# Patient Record
Sex: MALE | Race: WHITE | URBAN - METROPOLITAN AREA
[De-identification: names, ages, dates, MRNs, and addresses within clinical notes are randomized per-mention and may not be internally consistent; named-entity substitution may affect disease eponyms.]

---

## 2017-06-15 ENCOUNTER — OUTPATIENT (OUTPATIENT)
Dept: OUTPATIENT SERVICES | Facility: HOSPITAL | Age: 67
LOS: 1 days | Discharge: HOME | End: 2017-06-15

## 2017-06-15 DIAGNOSIS — I25.10 ATHEROSCLEROTIC HEART DISEASE OF NATIVE CORONARY ARTERY WITHOUT ANGINA PECTORIS: ICD-10-CM

## 2017-06-15 DIAGNOSIS — I10 ESSENTIAL (PRIMARY) HYPERTENSION: ICD-10-CM

## 2017-06-15 DIAGNOSIS — E11.9 TYPE 2 DIABETES MELLITUS WITHOUT COMPLICATIONS: ICD-10-CM

## 2017-06-15 DIAGNOSIS — N20.0 CALCULUS OF KIDNEY: ICD-10-CM

## 2017-06-28 DIAGNOSIS — E11.9 TYPE 2 DIABETES MELLITUS WITHOUT COMPLICATIONS: ICD-10-CM

## 2018-01-26 ENCOUNTER — OUTPATIENT (OUTPATIENT)
Dept: OUTPATIENT SERVICES | Facility: HOSPITAL | Age: 68
LOS: 1 days | Discharge: HOME | End: 2018-01-26

## 2018-01-26 DIAGNOSIS — R07.9 CHEST PAIN, UNSPECIFIED: ICD-10-CM

## 2018-02-04 DIAGNOSIS — I25.10 ATHEROSCLEROTIC HEART DISEASE OF NATIVE CORONARY ARTERY WITHOUT ANGINA PECTORIS: ICD-10-CM

## 2018-02-04 DIAGNOSIS — I10 ESSENTIAL (PRIMARY) HYPERTENSION: ICD-10-CM

## 2018-02-04 DIAGNOSIS — E11.9 TYPE 2 DIABETES MELLITUS WITHOUT COMPLICATIONS: ICD-10-CM

## 2018-02-04 DIAGNOSIS — N20.0 CALCULUS OF KIDNEY: ICD-10-CM

## 2018-03-31 ENCOUNTER — INPATIENT (INPATIENT)
Facility: HOSPITAL | Age: 68
LOS: 0 days | Discharge: HOME | End: 2018-04-01
Attending: SPECIALIST | Admitting: INTERNAL MEDICINE

## 2018-03-31 VITALS
RESPIRATION RATE: 18 BRPM | OXYGEN SATURATION: 96 % | DIASTOLIC BLOOD PRESSURE: 112 MMHG | SYSTOLIC BLOOD PRESSURE: 237 MMHG | HEART RATE: 66 BPM | TEMPERATURE: 96 F

## 2018-03-31 DIAGNOSIS — Z98.890 OTHER SPECIFIED POSTPROCEDURAL STATES: Chronic | ICD-10-CM

## 2018-03-31 DIAGNOSIS — N20.0 CALCULUS OF KIDNEY: ICD-10-CM

## 2018-03-31 LAB
ALBUMIN SERPL ELPH-MCNC: 4.2 G/DL — SIGNIFICANT CHANGE UP (ref 3.5–5.2)
ALP SERPL-CCNC: 54 U/L — SIGNIFICANT CHANGE UP (ref 30–115)
ALT FLD-CCNC: 14 U/L — SIGNIFICANT CHANGE UP (ref 0–41)
ANION GAP SERPL CALC-SCNC: 18 MMOL/L — HIGH (ref 7–14)
APPEARANCE UR: CLEAR — SIGNIFICANT CHANGE UP
APTT BLD: 26.2 SEC — LOW (ref 27–39.2)
AST SERPL-CCNC: 20 U/L — SIGNIFICANT CHANGE UP (ref 0–41)
BILIRUB SERPL-MCNC: 1.3 MG/DL — HIGH (ref 0.2–1.2)
BILIRUB UR-MCNC: NEGATIVE — SIGNIFICANT CHANGE UP
BLD GP AB SCN SERPL QL: SIGNIFICANT CHANGE UP
BUN SERPL-MCNC: 17 MG/DL — SIGNIFICANT CHANGE UP (ref 10–20)
CALCIUM SERPL-MCNC: 8.4 MG/DL — LOW (ref 8.5–10.1)
CHLORIDE SERPL-SCNC: 90 MMOL/L — LOW (ref 98–110)
CK MB CFR SERPL CALC: 2.5 NG/ML — SIGNIFICANT CHANGE UP (ref 0.6–6.3)
CO2 SERPL-SCNC: 23 MMOL/L — SIGNIFICANT CHANGE UP (ref 17–32)
COLOR SPEC: YELLOW — SIGNIFICANT CHANGE UP
CREAT SERPL-MCNC: 0.8 MG/DL — SIGNIFICANT CHANGE UP (ref 0.7–1.5)
DIFF PNL FLD: (no result)
GLUCOSE SERPL-MCNC: 218 MG/DL — HIGH (ref 70–99)
GLUCOSE UR QL: 250 MG/DL
HCT VFR BLD CALC: 44.4 % — SIGNIFICANT CHANGE UP (ref 42–52)
HGB BLD-MCNC: 14.8 G/DL — SIGNIFICANT CHANGE UP (ref 14–18)
INR BLD: 1.59 RATIO — HIGH (ref 0.65–1.3)
KETONES UR-MCNC: NEGATIVE — SIGNIFICANT CHANGE UP
LEUKOCYTE ESTERASE UR-ACNC: NEGATIVE — SIGNIFICANT CHANGE UP
LIDOCAIN IGE QN: 37 U/L — SIGNIFICANT CHANGE UP (ref 7–60)
MCHC RBC-ENTMCNC: 30 PG — SIGNIFICANT CHANGE UP (ref 27–31)
MCHC RBC-ENTMCNC: 33.3 G/DL — SIGNIFICANT CHANGE UP (ref 32–37)
MCV RBC AUTO: 89.9 FL — SIGNIFICANT CHANGE UP (ref 80–94)
NITRITE UR-MCNC: NEGATIVE — SIGNIFICANT CHANGE UP
NRBC # BLD: 0 /100 WBCS — SIGNIFICANT CHANGE UP (ref 0–0)
PH UR: 6 — SIGNIFICANT CHANGE UP (ref 5–8)
PLATELET # BLD AUTO: 182 K/UL — SIGNIFICANT CHANGE UP (ref 130–400)
POTASSIUM SERPL-MCNC: 4.4 MMOL/L — SIGNIFICANT CHANGE UP (ref 3.5–5)
POTASSIUM SERPL-SCNC: 4.4 MMOL/L — SIGNIFICANT CHANGE UP (ref 3.5–5)
PROT SERPL-MCNC: 6.6 G/DL — SIGNIFICANT CHANGE UP (ref 6–8)
PROT UR-MCNC: 100 MG/DL
PROTHROM AB SERPL-ACNC: 17.3 SEC — HIGH (ref 9.95–12.87)
RBC # BLD: 4.94 M/UL — SIGNIFICANT CHANGE UP (ref 4.7–6.1)
RBC # FLD: 12.5 % — SIGNIFICANT CHANGE UP (ref 11.5–14.5)
RBC CASTS # UR COMP ASSIST: (no result) /HPF
SODIUM SERPL-SCNC: 131 MMOL/L — LOW (ref 135–146)
SP GR SPEC: 1.02 — SIGNIFICANT CHANGE UP (ref 1.01–1.03)
TROPONIN T SERPL-MCNC: <0.01 NG/ML — SIGNIFICANT CHANGE UP
TYPE + AB SCN PNL BLD: SIGNIFICANT CHANGE UP
UROBILINOGEN FLD QL: 0.2 MG/DL — SIGNIFICANT CHANGE UP (ref 0.2–0.2)
WBC # BLD: 15.2 K/UL — HIGH (ref 4.8–10.8)
WBC # FLD AUTO: 15.2 K/UL — HIGH (ref 4.8–10.8)

## 2018-03-31 RX ORDER — CIPROFLOXACIN LACTATE 400MG/40ML
400 VIAL (ML) INTRAVENOUS EVERY 12 HOURS
Qty: 0 | Refills: 0 | Status: DISCONTINUED | OUTPATIENT
Start: 2018-03-31 | End: 2018-04-01

## 2018-03-31 RX ORDER — SIMVASTATIN 20 MG/1
40 TABLET, FILM COATED ORAL AT BEDTIME
Qty: 0 | Refills: 0 | Status: DISCONTINUED | OUTPATIENT
Start: 2018-03-31 | End: 2018-04-01

## 2018-03-31 RX ORDER — ONDANSETRON 8 MG/1
4 TABLET, FILM COATED ORAL ONCE
Qty: 0 | Refills: 0 | Status: DISCONTINUED | OUTPATIENT
Start: 2018-03-31 | End: 2018-03-31

## 2018-03-31 RX ORDER — SODIUM CHLORIDE 9 MG/ML
1000 INJECTION INTRAMUSCULAR; INTRAVENOUS; SUBCUTANEOUS ONCE
Qty: 0 | Refills: 0 | Status: DISCONTINUED | OUTPATIENT
Start: 2018-03-31 | End: 2018-03-31

## 2018-03-31 RX ORDER — LOSARTAN POTASSIUM 100 MG/1
50 TABLET, FILM COATED ORAL DAILY
Qty: 0 | Refills: 0 | Status: DISCONTINUED | OUTPATIENT
Start: 2018-03-31 | End: 2018-04-01

## 2018-03-31 RX ORDER — DOCUSATE SODIUM 100 MG
100 CAPSULE ORAL THREE TIMES A DAY
Qty: 0 | Refills: 0 | Status: DISCONTINUED | OUTPATIENT
Start: 2018-03-31 | End: 2018-03-31

## 2018-03-31 RX ORDER — METOPROLOL TARTRATE 50 MG
25 TABLET ORAL
Qty: 0 | Refills: 0 | Status: DISCONTINUED | OUTPATIENT
Start: 2018-03-31 | End: 2018-04-01

## 2018-03-31 RX ORDER — ASPIRIN/CALCIUM CARB/MAGNESIUM 324 MG
81 TABLET ORAL DAILY
Qty: 0 | Refills: 0 | Status: DISCONTINUED | OUTPATIENT
Start: 2018-03-31 | End: 2018-04-01

## 2018-03-31 RX ORDER — MORPHINE SULFATE 50 MG/1
8 CAPSULE, EXTENDED RELEASE ORAL ONCE
Qty: 0 | Refills: 0 | Status: DISCONTINUED | OUTPATIENT
Start: 2018-03-31 | End: 2018-03-31

## 2018-03-31 RX ORDER — MEPERIDINE HYDROCHLORIDE 50 MG/ML
12.5 INJECTION INTRAMUSCULAR; INTRAVENOUS; SUBCUTANEOUS
Qty: 0 | Refills: 0 | Status: DISCONTINUED | OUTPATIENT
Start: 2018-03-31 | End: 2018-03-31

## 2018-03-31 RX ORDER — KETOROLAC TROMETHAMINE 30 MG/ML
30 SYRINGE (ML) INJECTION ONCE
Qty: 0 | Refills: 0 | Status: DISCONTINUED | OUTPATIENT
Start: 2018-03-31 | End: 2018-03-31

## 2018-03-31 RX ORDER — LABETALOL HCL 100 MG
5 TABLET ORAL
Qty: 0 | Refills: 0 | Status: COMPLETED | OUTPATIENT
Start: 2018-03-31 | End: 2018-03-31

## 2018-03-31 RX ORDER — OXYCODONE AND ACETAMINOPHEN 5; 325 MG/1; MG/1
2 TABLET ORAL ONCE
Qty: 0 | Refills: 0 | Status: DISCONTINUED | OUTPATIENT
Start: 2018-03-31 | End: 2018-03-31

## 2018-03-31 RX ORDER — MORPHINE SULFATE 50 MG/1
2 CAPSULE, EXTENDED RELEASE ORAL
Qty: 0 | Refills: 0 | Status: DISCONTINUED | OUTPATIENT
Start: 2018-03-31 | End: 2018-03-31

## 2018-03-31 RX ORDER — ONDANSETRON 8 MG/1
4 TABLET, FILM COATED ORAL ONCE
Qty: 0 | Refills: 0 | Status: COMPLETED | OUTPATIENT
Start: 2018-03-31 | End: 2018-03-31

## 2018-03-31 RX ORDER — TAMSULOSIN HYDROCHLORIDE 0.4 MG/1
0.4 CAPSULE ORAL ONCE
Qty: 0 | Refills: 0 | Status: COMPLETED | OUTPATIENT
Start: 2018-03-31 | End: 2018-03-31

## 2018-03-31 RX ORDER — OXYCODONE AND ACETAMINOPHEN 5; 325 MG/1; MG/1
1 TABLET ORAL EVERY 6 HOURS
Qty: 0 | Refills: 0 | Status: DISCONTINUED | OUTPATIENT
Start: 2018-03-31 | End: 2018-04-01

## 2018-03-31 RX ORDER — SODIUM CHLORIDE 9 MG/ML
1000 INJECTION INTRAMUSCULAR; INTRAVENOUS; SUBCUTANEOUS ONCE
Qty: 0 | Refills: 0 | Status: COMPLETED | OUTPATIENT
Start: 2018-03-31 | End: 2018-03-31

## 2018-03-31 RX ORDER — SODIUM CHLORIDE 9 MG/ML
1000 INJECTION, SOLUTION INTRAVENOUS
Qty: 0 | Refills: 0 | Status: DISCONTINUED | OUTPATIENT
Start: 2018-03-31 | End: 2018-03-31

## 2018-03-31 RX ORDER — ASPIRIN/CALCIUM CARB/MAGNESIUM 324 MG
81 TABLET ORAL
Qty: 0 | Refills: 0 | COMMUNITY

## 2018-03-31 RX ORDER — MORPHINE SULFATE 50 MG/1
4 CAPSULE, EXTENDED RELEASE ORAL ONCE
Qty: 0 | Refills: 0 | Status: DISCONTINUED | OUTPATIENT
Start: 2018-03-31 | End: 2018-03-31

## 2018-03-31 RX ORDER — ONDANSETRON 8 MG/1
4 TABLET, FILM COATED ORAL EVERY 6 HOURS
Qty: 0 | Refills: 0 | Status: DISCONTINUED | OUTPATIENT
Start: 2018-03-31 | End: 2018-03-31

## 2018-03-31 RX ORDER — SODIUM CHLORIDE 9 MG/ML
1000 INJECTION INTRAMUSCULAR; INTRAVENOUS; SUBCUTANEOUS
Qty: 0 | Refills: 0 | Status: DISCONTINUED | OUTPATIENT
Start: 2018-03-31 | End: 2018-03-31

## 2018-03-31 RX ORDER — ONDANSETRON 8 MG/1
4 TABLET, FILM COATED ORAL EVERY 6 HOURS
Qty: 0 | Refills: 0 | Status: DISCONTINUED | OUTPATIENT
Start: 2018-03-31 | End: 2018-04-01

## 2018-03-31 RX ORDER — SENNA PLUS 8.6 MG/1
2 TABLET ORAL AT BEDTIME
Qty: 0 | Refills: 0 | Status: DISCONTINUED | OUTPATIENT
Start: 2018-03-31 | End: 2018-03-31

## 2018-03-31 RX ORDER — METFORMIN HYDROCHLORIDE 850 MG/1
1 TABLET ORAL
Qty: 0 | Refills: 0 | COMMUNITY

## 2018-03-31 RX ORDER — OXYCODONE AND ACETAMINOPHEN 5; 325 MG/1; MG/1
1 TABLET ORAL ONCE
Qty: 0 | Refills: 0 | Status: DISCONTINUED | OUTPATIENT
Start: 2018-03-31 | End: 2018-03-31

## 2018-03-31 RX ORDER — SODIUM CHLORIDE 9 MG/ML
1000 INJECTION, SOLUTION INTRAVENOUS
Qty: 0 | Refills: 0 | Status: DISCONTINUED | OUTPATIENT
Start: 2018-03-31 | End: 2018-04-01

## 2018-03-31 RX ORDER — MORPHINE SULFATE 50 MG/1
4 CAPSULE, EXTENDED RELEASE ORAL EVERY 4 HOURS
Qty: 0 | Refills: 0 | Status: DISCONTINUED | OUTPATIENT
Start: 2018-03-31 | End: 2018-04-01

## 2018-03-31 RX ADMIN — SODIUM CHLORIDE 75 MILLILITER(S): 9 INJECTION, SOLUTION INTRAVENOUS at 22:46

## 2018-03-31 RX ADMIN — MORPHINE SULFATE 4 MILLIGRAM(S): 50 CAPSULE, EXTENDED RELEASE ORAL at 12:21

## 2018-03-31 RX ADMIN — SODIUM CHLORIDE 1000 MILLILITER(S): 9 INJECTION INTRAMUSCULAR; INTRAVENOUS; SUBCUTANEOUS at 12:21

## 2018-03-31 RX ADMIN — SODIUM CHLORIDE 100 MILLILITER(S): 9 INJECTION, SOLUTION INTRAVENOUS at 21:09

## 2018-03-31 RX ADMIN — MORPHINE SULFATE 8 MILLIGRAM(S): 50 CAPSULE, EXTENDED RELEASE ORAL at 13:22

## 2018-03-31 RX ADMIN — TAMSULOSIN HYDROCHLORIDE 0.4 MILLIGRAM(S): 0.4 CAPSULE ORAL at 16:17

## 2018-03-31 RX ADMIN — MORPHINE SULFATE 8 MILLIGRAM(S): 50 CAPSULE, EXTENDED RELEASE ORAL at 16:17

## 2018-03-31 RX ADMIN — ONDANSETRON 4 MILLIGRAM(S): 8 TABLET, FILM COATED ORAL at 13:22

## 2018-03-31 RX ADMIN — Medication 5 MILLIGRAM(S): at 20:51

## 2018-03-31 NOTE — PRE-ANESTHESIA EVALUATION ADULT - NSANTHPMHFT_GEN_ALL_CORE
History of prior MI with CABG.  No current chest pain, SOB.  Able to perform greater than 4 mets of activity

## 2018-03-31 NOTE — H&P ADULT - ASSESSMENT
67 y.o M with Left flank pain x 1 day , CT A/P findings of 7.2 mm left ureteral stone at the junction of mid to distal segment, causing moderate left hydronephrosis.   additional B/L non-obstructing nephrolithiasis.         Admit to Dr. Siddiqi service x OR procedure : cystoscopy, left ureteroscopy, laser lithotripsy possible left ureteral stent placement.  IVF  Analgesia prn x pain  Cont. Flomax  Keep NPO  Patient don't remember medications/doses he is taking daily, awaiting his wife to bring all medications.

## 2018-03-31 NOTE — H&P ADULT - HISTORY OF PRESENT ILLNESS
67 y.o M with PMH of HTN, HLD, DM type 2, MI s/p CABG (6 years ago), kidney stones ( 5 years ago) c/o left flank pain 10/10 since 6 AM today associated with +N/V, chills, dribbling, urgency. Pt. denies fever, hematuria, dysuria, hesitancy. Urology called to evaluate Pt. for CT A/P findings of 7.2 mm left ureteral stone at the junction of mild to distal segment, causing moderate left hydronephrosis. with additional non obstructing nephrolithiasis.

## 2018-03-31 NOTE — ED PROVIDER NOTE - PHYSICAL EXAMINATION
On exam: Pt sitting comfortably on stretcher in NAD. No rash. Conjunctivae and sclera clear. dry mucus membranes. RRR, radial pulses 2/4 b/l. Breath sounds present b/l, CTABL, no wheezing or crackles, no tachypnea, no accessory muscle use, good resp effort and excursion, good air exchange, bs present throughout all 4 quadrants. Abd soft, nd, nt, no rebound or guarding, mild left CVAT. (-) murphys, (-) rovsing (-) obturator (-) psoas. FROM of ext, no edema, no calf pain/swelling/erythema. AAOx3, motor 5/5 and sensation intact throughout upper and lower ext. On exam: Pt sitting comfortably on stretcher in NAD. No rash. Conjunctivae and sclera clear. dry mucus membranes. RRR, radial pulses 2/4 b/l. Breath sounds present b/l, CTABL, no wheezing or crackles, no tachypnea, no accessory muscle use, good resp effort and excursion, good air exchange, bs present throughout all 4 quadrants. Abd soft, nd, nt, no rebound or guarding, mild left CVAT. (-) murphys, (-) rovsing (-) obturator (-) psoas. FROM of ext, no edema, no calf pain/swelling/erythema. AAOx3, motor 5/5 and sensation intact throughout upper and lower ext. No focal deficits.

## 2018-03-31 NOTE — H&P ADULT - NSHPPHYSICALEXAM_GEN_ALL_CORE
General: WN/WD in moderate distress due to flank pain  Neuro: A&O x 3, EOMI  HEENT: NC/ AT, PERRLA, EOMI  Abd: Obese, mildly distended, NT to palpation. + BS in all 4 quadrants. + Left CVAT  Extremities: MALAGON x 4, ambulates    Vital Signs Last 24 Hrs  T(C): 35.7 (31 Mar 2018 15:59), Max: 35.7 (31 Mar 2018 10:53)  T(F): 96.3 (31 Mar 2018 15:59), Max: 96.3 (31 Mar 2018 15:59)  HR: 65 (31 Mar 2018 15:59) (65 - 66)  BP: 230/115 (31 Mar 2018 15:59) (230/115 - 237/112)  RR: 17 (31 Mar 2018 15:59) (17 - 18)  SpO2: 100% (31 Mar 2018 15:59) (96% - 100%)

## 2018-03-31 NOTE — ED PROVIDER NOTE - PROGRESS NOTE DETAILS
Plan: EKG, CXR, pain control, labs, IVF, urine, CT, and reassess. urology p.a. aware of pt and ct findings of obstucting L side stone and additional findings as noted, pt alsoa ware, report with attending Dr. To will come see pt.

## 2018-03-31 NOTE — CHART NOTE - NSCHARTNOTEFT_GEN_A_CORE
PACU ANESTHESIA PACU ADMISSION NOTE      Procedure:Ureteroscopy of left ureter with use of laser    Post op diagnosisUreteral calculus, left      ____ Intubated  TV:______       Rate: ______      FiO2: ______    ___x_ Patent Airway    ___x_ Full return of protective reflexes    ____ Full recovery from anesthesia / sedation to baseline status    Viitals:  T(C): 35.7 (03-31-18 @ 17:41), Max: 35.7 (03-31-18 @ 10:53)  HR: 65 (03-31-18 @ 17:41) (65 - 66)  BP: 230/115 (03-31-18 @ 17:41) (230/115 - 237/112)  RR: 17 (03-31-18 @ 17:41) (17 - 18)  SpO2: 100% (03-31-18 @ 17:41) (96% - 100%)      see anesthesia record      Mental Status:  __x__ Awake   _____ Alert   _____ Drowsy   _____ Sedated    Nausea/Vomiting: ____ Yes, See Post - Op Orders      ___x_ No    Pain Scale (0-10): __0___    Treatment: ____ None    ____ See Post - Op/PCA Orders    Post - Operative Fluids:   ____ Oral   _x___ See Post - Op Orders    Plan:         Discharge:   ____Home       __x___Floor         _____Critical Care    _____Other:_________________    Comments:

## 2018-03-31 NOTE — ED PROVIDER NOTE - OBJECTIVE STATEMENT
68 y/o male with an pmhx of kidney stones, DM, HTN, high cholesterol, presents to the ED c/o left flank pain upon waking up this morning. Pt states he drank water, but has only been able to urinate a small amount. Pt doesn’t have an urologist. Notes last kidney stone attack was 5 years ago. Pt states he has nausea and vomiting.

## 2018-03-31 NOTE — PROGRESS NOTE ADULT - PROBLEM SELECTOR PLAN 1
Treatment options were discussed with the patient.  He wants to proceed with cystoscopy left ureteroscopy laser lithotripsy and stent placement.  Side effects were discussed including but not limited to bleeding, infection, etc.  He understands to f/up with me in 1 week for possible stent removal.

## 2018-03-31 NOTE — H&P ADULT - PMH
High cholesterol    Hypertension, unspecified type    Kidney stones    MI (myocardial infarction)    Type 2 diabetes mellitus with complication, unspecified long term insulin use status

## 2018-03-31 NOTE — ED PROVIDER NOTE - NS_ ATTENDINGSCRIBEDETAILS _ED_A_ED_FT
I have personally performed a history and physical exam on this patient and personally directed the management of the patient.  I have seen this patient with a scribe. Please see documentation of my history and physical examination and plan. I agree with scribe documentation except as indicated in my notes.

## 2018-03-31 NOTE — H&P ADULT - NSHPLABSRESULTS_GEN_ALL_CORE
14.8   15.20 )-----------( 182      ( 31 Mar 2018 12:27 )             44.4     03-31    131<L>  |  90<L>  |  17  ----------------------------<  218<H>  4.4   |  23  |  0.8    Ca    8.4<L>      31 Mar 2018 12:27    TPro  6.6  /  Alb  4.2  /  TBili  1.3<H>  /  DBili  x   /  AST  20  /  ALT  14  /  AlkPhos  54  03-31      PT/INR - ( 31 Mar 2018 12:27 )   PT: 17.30 sec;   INR: 1.59 ratio         PTT - ( 31 Mar 2018 12:27 )  PTT:26.2 sec    < from: CT Abdomen and Pelvis w/ IV Cont (03.31.18 @ 15:00) >      EXAM:  CT ABDOMEN AND PELVIS IC            PROCEDURE DATE:  03/31/2018            INTERPRETATION:  CLINICAL STATEMENT: Abdominal pain      TECHNIQUE: Contiguous axial CT images were obtained from the lower chest   to the pubic symphysis following administration of 100cc Optiray 320   intravenous contrast.  Oral contrast was not administered.  Reformatted   images in the coronal and sagittal planes were acquired.    COMPARISON CT: 3/9/2015    OTHER STUDIES USED FOR CORRELATION: None.       FINDINGS:    LOWER CHEST: Cardiomegaly. Status post median sternotomy. Coronary   atherosclerosis. Bibasilar atelectasis.    HEPATOBILIARY: Hypoattenuating hepatic lesion, measuring 2 mm, image   #37/3, too small to characterize. Cholelithiasis.    SPLEEN:Unremarkable.    PANCREAS: Unremarkable.    ADRENAL GLANDS: Unremarkable.    KIDNEYS: Significant left perinephric stranding. There is moderate left   hydronephrosis secondary to obstructing left ureter calculus which   measures 7.2 mm, image #270/4 with a Hounsfield units of 700. Bilateral   nephrolithiasis is seen. On the right the  calculus measure approximately   4 to 5 mm. On the left with calculus measure 6 to 7 mm.    ABDOMINOPELVIC NODES: Unremarkable.    PELVIC ORGANS: Prominent prostate..    PERITONEUM/MESENTERY/BOWEL: No acute bowel findings..    BONES/SOFT TISSUES: No evidence of from acute osseous abnormalities..    OTHER: Vascular calcifications without any evidence of aneurysm.   Fat-containing left internal hernia.      IMPRESSION:     7.2 mm left ureteral stone at the junction of mid to distal segment,   causing moderate left hydronephrosis.  Additional bilateral nonobstructing nephrolithiasis.    < end of copied text >

## 2018-03-31 NOTE — BRIEF OPERATIVE NOTE - PRE-OP DX
Renal colic on left side  03/31/2018    Active  Donal Siddiqi  Ureteral calculus, left  03/31/2018    Active  Donal Siddiqi

## 2018-03-31 NOTE — ED ADULT NURSE NOTE - PMH
High cholesterol    Hypertension, unspecified type    Kidney stones    Type 2 diabetes mellitus with complication, unspecified long term insulin use status

## 2018-03-31 NOTE — BRIEF OPERATIVE NOTE - PROCEDURE
<<-----Click on this checkbox to enter Procedure Ureteroscopy of left ureter with use of laser  03/31/2018    Active  [email or web address]

## 2018-03-31 NOTE — ED PROVIDER NOTE - NS ED ROS FT
Denies fevers/chills, cough, SOB, chest pain, pleuritic CP, abdominal pain, diarrhea, constipation, melena/ BRBPR, urinary SX, skin rashes, recent travel or sick contacts. Denies fevers/chills, cough, SOB, chest pain, pleuritic CP, palpitations, diaphoresis, abdominal pain, diarrhea, constipation, melena/ BRBPR, hematuria, urgency, burning upon urination, testicular pain/swelling/erythema, trauma, skin rashes, recent travel or sick contacts.

## 2018-04-01 ENCOUNTER — TRANSCRIPTION ENCOUNTER (OUTPATIENT)
Age: 68
End: 2018-04-01

## 2018-04-01 VITALS
DIASTOLIC BLOOD PRESSURE: 75 MMHG | SYSTOLIC BLOOD PRESSURE: 133 MMHG | HEART RATE: 80 BPM | RESPIRATION RATE: 18 BRPM | TEMPERATURE: 96 F

## 2018-04-01 LAB
ALBUMIN SERPL ELPH-MCNC: 3.4 G/DL — LOW (ref 3.5–5.2)
ALP SERPL-CCNC: 48 U/L — SIGNIFICANT CHANGE UP (ref 30–115)
ALT FLD-CCNC: 11 U/L — SIGNIFICANT CHANGE UP (ref 0–41)
ANION GAP SERPL CALC-SCNC: 17 MMOL/L — HIGH (ref 7–14)
AST SERPL-CCNC: 19 U/L — SIGNIFICANT CHANGE UP (ref 0–41)
BILIRUB SERPL-MCNC: 1 MG/DL — SIGNIFICANT CHANGE UP (ref 0.2–1.2)
BUN SERPL-MCNC: 12 MG/DL — SIGNIFICANT CHANGE UP (ref 10–20)
CALCIUM SERPL-MCNC: 7.9 MG/DL — LOW (ref 8.5–10.1)
CHLORIDE SERPL-SCNC: 99 MMOL/L — SIGNIFICANT CHANGE UP (ref 98–110)
CO2 SERPL-SCNC: 24 MMOL/L — SIGNIFICANT CHANGE UP (ref 17–32)
CREAT SERPL-MCNC: 0.8 MG/DL — SIGNIFICANT CHANGE UP (ref 0.7–1.5)
CULTURE RESULTS: SIGNIFICANT CHANGE UP
GLUCOSE SERPL-MCNC: 190 MG/DL — HIGH (ref 70–99)
POTASSIUM SERPL-MCNC: 4.2 MMOL/L — SIGNIFICANT CHANGE UP (ref 3.5–5)
POTASSIUM SERPL-SCNC: 4.2 MMOL/L — SIGNIFICANT CHANGE UP (ref 3.5–5)
PROT SERPL-MCNC: 5.6 G/DL — LOW (ref 6–8)
SODIUM SERPL-SCNC: 140 MMOL/L — SIGNIFICANT CHANGE UP (ref 135–146)
SPECIMEN SOURCE: SIGNIFICANT CHANGE UP

## 2018-04-01 RX ORDER — MOXIFLOXACIN HYDROCHLORIDE TABLETS, 400 MG 400 MG/1
1 TABLET, FILM COATED ORAL
Qty: 7 | Refills: 0 | OUTPATIENT
Start: 2018-04-01 | End: 2018-04-07

## 2018-04-01 RX ORDER — METFORMIN HYDROCHLORIDE 850 MG/1
1000 TABLET ORAL
Qty: 0 | Refills: 0 | Status: DISCONTINUED | OUTPATIENT
Start: 2018-04-01 | End: 2018-04-01

## 2018-04-01 RX ORDER — METFORMIN HYDROCHLORIDE 850 MG/1
1000 TABLET ORAL ONCE
Qty: 0 | Refills: 0 | Status: COMPLETED | OUTPATIENT
Start: 2018-04-01 | End: 2018-04-01

## 2018-04-01 RX ADMIN — LOSARTAN POTASSIUM 50 MILLIGRAM(S): 100 TABLET, FILM COATED ORAL at 05:38

## 2018-04-01 RX ADMIN — Medication 81 MILLIGRAM(S): at 11:32

## 2018-04-01 RX ADMIN — Medication 200 MILLIGRAM(S): at 05:39

## 2018-04-01 RX ADMIN — METFORMIN HYDROCHLORIDE 1000 MILLIGRAM(S): 850 TABLET ORAL at 12:59

## 2018-04-01 RX ADMIN — Medication 25 MILLIGRAM(S): at 05:38

## 2018-04-01 NOTE — DISCHARGE NOTE ADULT - PATIENT PORTAL LINK FT
You can access the "[employer] Patient Portal, offered by [facility], by registering with the following website: [email or web address]

## 2018-04-01 NOTE — DISCHARGE NOTE ADULT - MEDICATION SUMMARY - MEDICATIONS TO TAKE
I will START or STAY ON the medications listed below when I get home from the hospital:    Aspirin Enteric Coated 81 mg oral delayed release tablet  -- 1 tab(s) by mouth once a day  -- Indication: For No Change    oxyCODONE-acetaminophen 5 mg-325 mg oral tablet  -- 1 tab(s) by mouth every 6 hours, As needed, Moderate Pain (4 - 6) MDD:4 tabs daily  -- Indication: For pain    losartan 50 mg oral tablet  -- 1 tab(s) by mouth once a day  -- Indication: For No Change    simvastatin 40 mg oral tablet  -- 1 tab(s) by mouth once a day (at bedtime)  -- Indication: For No Change    Metoprolol Tartrate 25 mg oral tablet  -- 1 tab(s) by mouth 2 times a day  -- Indication: For No Change    Cipro 500 mg oral tablet  -- 1 tab(s) by mouth once a day   -- Avoid prolonged or excessive exposure to direct and/or artificial sunlight while taking this medication.  Check with your doctor before becoming pregnant.  Do not take dairy products, antacids, or iron preparations within one hour of this medication.  Finish all this medication unless otherwise directed by prescriber.  Medication should be taken with plenty of water.    -- Indication: For prophylaxis

## 2018-04-01 NOTE — DISCHARGE NOTE ADULT - HOSPITAL COURSE
pt seen for 7.4mm stone in L ureter. taken to OR for cystscopy L stent placement and stone removal. Doing well, afebrile, pain free

## 2018-04-01 NOTE — DISCHARGE NOTE ADULT - ADDITIONAL INSTRUCTIONS
no heavy lifting, bending or straneous activity. DO NOT pull out stent. will need follow up in office with Dr. Siddiqi in 2 weeks for stent removal.

## 2018-04-01 NOTE — DISCHARGE NOTE ADULT - NS AS ACTIVITY OBS
Sex allowed/No Heavy lifting/straining/Do not make important decisions/Do not drive or operate machinery/Stairs allowed/Showering allowed/Bathing allowed/Return to Work/School allowed

## 2018-04-01 NOTE — DISCHARGE NOTE ADULT - CARE PLAN
Principal Discharge DX:	Kidney stone on left side  Goal:	Treat kidney stone via cystoscopy, L stent placement and stone removal  Assessment and plan of treatment:	Pt has L stent placed with strings to be pulled out in office next week. stone was removed, pt is afebrile and pain free.

## 2018-04-01 NOTE — PROGRESS NOTE ADULT - SUBJECTIVE AND OBJECTIVE BOX
66 yo male POD #0 s/p cystoscopy, left ureteroscopy, laser lithotripsy and left ureteral stent placement  pt seen and examined at bedside  no complaints, no n/v/f/c, able to void    a+ox3 nad, non toxic  abd - soft, nd, nttp, no guarding, no rebound tenderness  gu - + ureteral stent string, no mcneil    Vital Signs Last 24 Hrs  T(C): 35.9 (31 Mar 2018 22:00), Max: 36.9 (31 Mar 2018 21:00)  T(F): 96.6 (31 Mar 2018 22:00), Max: 98.4 (31 Mar 2018 21:00)  HR: 81 (31 Mar 2018 22:00) (65 - 98)  BP: 176/79 (31 Mar 2018 22:00) (153/80 - 237/112)  RR: 18 (31 Mar 2018 22:00) (17 - 25)  SpO2: 98% (31 Mar 2018 21:00) (94% - 100%)

## 2018-04-01 NOTE — DISCHARGE NOTE ADULT - PLAN OF CARE
Treat kidney stone via cystoscopy, L stent placement and stone removal Pt has L stent placed with strings to be pulled out in office next week. stone was removed, pt is afebrile and pain free.

## 2018-04-01 NOTE — PROGRESS NOTE ADULT - ASSESSMENT
68 yo male doing well POD #0 s/p cysto/laser lithotripsy of left ureteral calculus  afebrile, voiding w/o issue    plan  -analgesia prn  -htn control  -glucose control  -d/c home in am

## 2018-04-05 DIAGNOSIS — N13.2 HYDRONEPHROSIS WITH RENAL AND URETERAL CALCULOUS OBSTRUCTION: ICD-10-CM

## 2018-04-05 DIAGNOSIS — Z87.442 PERSONAL HISTORY OF URINARY CALCULI: ICD-10-CM

## 2018-04-05 DIAGNOSIS — I25.2 OLD MYOCARDIAL INFARCTION: ICD-10-CM

## 2018-04-05 DIAGNOSIS — I10 ESSENTIAL (PRIMARY) HYPERTENSION: ICD-10-CM

## 2018-04-05 DIAGNOSIS — Z95.1 PRESENCE OF AORTOCORONARY BYPASS GRAFT: ICD-10-CM

## 2018-04-05 DIAGNOSIS — E78.5 HYPERLIPIDEMIA, UNSPECIFIED: ICD-10-CM

## 2018-04-05 DIAGNOSIS — N20.0 CALCULUS OF KIDNEY: ICD-10-CM

## 2018-04-05 DIAGNOSIS — E11.9 TYPE 2 DIABETES MELLITUS WITHOUT COMPLICATIONS: ICD-10-CM

## 2018-09-06 ENCOUNTER — OUTPATIENT (OUTPATIENT)
Dept: OUTPATIENT SERVICES | Facility: HOSPITAL | Age: 68
LOS: 1 days | Discharge: HOME | End: 2018-09-06

## 2018-09-06 DIAGNOSIS — Z98.890 OTHER SPECIFIED POSTPROCEDURAL STATES: Chronic | ICD-10-CM

## 2018-09-06 DIAGNOSIS — E55.9 VITAMIN D DEFICIENCY, UNSPECIFIED: ICD-10-CM

## 2018-09-06 DIAGNOSIS — E11.69 TYPE 2 DIABETES MELLITUS WITH OTHER SPECIFIED COMPLICATION: ICD-10-CM

## 2018-09-06 DIAGNOSIS — K62.5 HEMORRHAGE OF ANUS AND RECTUM: ICD-10-CM

## 2018-09-06 DIAGNOSIS — E78.5 HYPERLIPIDEMIA, UNSPECIFIED: ICD-10-CM

## 2018-09-06 DIAGNOSIS — I10 ESSENTIAL (PRIMARY) HYPERTENSION: ICD-10-CM

## 2018-09-06 DIAGNOSIS — R60.9 EDEMA, UNSPECIFIED: ICD-10-CM

## 2018-09-07 PROBLEM — N20.0 CALCULUS OF KIDNEY: Chronic | Status: ACTIVE | Noted: 2018-03-31

## 2018-09-07 PROBLEM — I21.9 ACUTE MYOCARDIAL INFARCTION, UNSPECIFIED: Chronic | Status: ACTIVE | Noted: 2018-03-31

## 2018-09-07 PROBLEM — I10 ESSENTIAL (PRIMARY) HYPERTENSION: Chronic | Status: ACTIVE | Noted: 2018-03-31

## 2018-09-07 PROBLEM — E78.00 PURE HYPERCHOLESTEROLEMIA, UNSPECIFIED: Chronic | Status: ACTIVE | Noted: 2018-03-31

## 2018-09-07 PROBLEM — E11.8 TYPE 2 DIABETES MELLITUS WITH UNSPECIFIED COMPLICATIONS: Chronic | Status: ACTIVE | Noted: 2018-03-31

## 2018-10-06 ENCOUNTER — EMERGENCY (EMERGENCY)
Facility: HOSPITAL | Age: 68
LOS: 0 days | Discharge: HOME | End: 2018-10-06
Attending: EMERGENCY MEDICINE | Admitting: INTERNAL MEDICINE

## 2018-10-06 VITALS
SYSTOLIC BLOOD PRESSURE: 132 MMHG | HEART RATE: 74 BPM | DIASTOLIC BLOOD PRESSURE: 65 MMHG | OXYGEN SATURATION: 98 % | RESPIRATION RATE: 18 BRPM | TEMPERATURE: 98 F

## 2018-10-06 VITALS — WEIGHT: 202.83 LBS | HEIGHT: 73 IN

## 2018-10-06 DIAGNOSIS — R19.7 DIARRHEA, UNSPECIFIED: ICD-10-CM

## 2018-10-06 DIAGNOSIS — I10 ESSENTIAL (PRIMARY) HYPERTENSION: ICD-10-CM

## 2018-10-06 DIAGNOSIS — Z79.899 OTHER LONG TERM (CURRENT) DRUG THERAPY: ICD-10-CM

## 2018-10-06 DIAGNOSIS — Z98.890 OTHER SPECIFIED POSTPROCEDURAL STATES: Chronic | ICD-10-CM

## 2018-10-06 DIAGNOSIS — E11.9 TYPE 2 DIABETES MELLITUS WITHOUT COMPLICATIONS: ICD-10-CM

## 2018-10-06 DIAGNOSIS — Z79.811 LONG TERM (CURRENT) USE OF AROMATASE INHIBITORS: ICD-10-CM

## 2018-10-06 DIAGNOSIS — Z79.2 LONG TERM (CURRENT) USE OF ANTIBIOTICS: ICD-10-CM

## 2018-10-06 DIAGNOSIS — Z79.891 LONG TERM (CURRENT) USE OF OPIATE ANALGESIC: ICD-10-CM

## 2018-10-06 DIAGNOSIS — E78.00 PURE HYPERCHOLESTEROLEMIA, UNSPECIFIED: ICD-10-CM

## 2018-10-06 DIAGNOSIS — Z79.82 LONG TERM (CURRENT) USE OF ASPIRIN: ICD-10-CM

## 2018-10-06 DIAGNOSIS — K52.9 NONINFECTIVE GASTROENTERITIS AND COLITIS, UNSPECIFIED: ICD-10-CM

## 2018-10-06 DIAGNOSIS — I25.2 OLD MYOCARDIAL INFARCTION: ICD-10-CM

## 2018-10-06 LAB
ALBUMIN SERPL ELPH-MCNC: 3.7 G/DL — SIGNIFICANT CHANGE UP (ref 3.5–5.2)
ALP SERPL-CCNC: 46 U/L — SIGNIFICANT CHANGE UP (ref 30–115)
ALT FLD-CCNC: 49 U/L — HIGH (ref 0–41)
ANION GAP SERPL CALC-SCNC: 16 MMOL/L — HIGH (ref 7–14)
AST SERPL-CCNC: 55 U/L — HIGH (ref 0–41)
BILIRUB SERPL-MCNC: 1.2 MG/DL — SIGNIFICANT CHANGE UP (ref 0.2–1.2)
BUN SERPL-MCNC: 15 MG/DL — SIGNIFICANT CHANGE UP (ref 10–20)
CALCIUM SERPL-MCNC: 8.9 MG/DL — SIGNIFICANT CHANGE UP (ref 8.5–10.1)
CHLORIDE SERPL-SCNC: 99 MMOL/L — SIGNIFICANT CHANGE UP (ref 98–110)
CO2 SERPL-SCNC: 27 MMOL/L — SIGNIFICANT CHANGE UP (ref 17–32)
CREAT SERPL-MCNC: 0.7 MG/DL — SIGNIFICANT CHANGE UP (ref 0.7–1.5)
GLUCOSE SERPL-MCNC: 161 MG/DL — HIGH (ref 70–99)
HCT VFR BLD CALC: 41.1 % — LOW (ref 42–52)
HGB BLD-MCNC: 13.9 G/DL — LOW (ref 14–18)
LIDOCAIN IGE QN: 46 U/L — SIGNIFICANT CHANGE UP (ref 7–60)
MAGNESIUM SERPL-MCNC: 1.5 MG/DL — LOW (ref 1.8–2.4)
MCHC RBC-ENTMCNC: 29.8 PG — SIGNIFICANT CHANGE UP (ref 27–31)
MCHC RBC-ENTMCNC: 33.8 G/DL — SIGNIFICANT CHANGE UP (ref 32–37)
MCV RBC AUTO: 88 FL — SIGNIFICANT CHANGE UP (ref 80–94)
NRBC # BLD: 0 /100 WBCS — SIGNIFICANT CHANGE UP (ref 0–0)
PHOSPHATE SERPL-MCNC: 2.9 MG/DL — SIGNIFICANT CHANGE UP (ref 2.1–4.9)
PLATELET # BLD AUTO: 193 K/UL — SIGNIFICANT CHANGE UP (ref 130–400)
POTASSIUM SERPL-MCNC: 3.4 MMOL/L — LOW (ref 3.5–5)
POTASSIUM SERPL-SCNC: 3.4 MMOL/L — LOW (ref 3.5–5)
PROT SERPL-MCNC: 6.2 G/DL — SIGNIFICANT CHANGE UP (ref 6–8)
RBC # BLD: 4.67 M/UL — LOW (ref 4.7–6.1)
RBC # FLD: 13.2 % — SIGNIFICANT CHANGE UP (ref 11.5–14.5)
SODIUM SERPL-SCNC: 142 MMOL/L — SIGNIFICANT CHANGE UP (ref 135–146)
WBC # BLD: 7.76 K/UL — SIGNIFICANT CHANGE UP (ref 4.8–10.8)
WBC # FLD AUTO: 7.76 K/UL — SIGNIFICANT CHANGE UP (ref 4.8–10.8)

## 2018-10-06 RX ORDER — SODIUM CHLORIDE 9 MG/ML
2000 INJECTION, SOLUTION INTRAVENOUS ONCE
Qty: 0 | Refills: 0 | Status: COMPLETED | OUTPATIENT
Start: 2018-10-06 | End: 2018-10-06

## 2018-10-06 RX ORDER — POTASSIUM CHLORIDE 20 MEQ
40 PACKET (EA) ORAL ONCE
Qty: 0 | Refills: 0 | Status: COMPLETED | OUTPATIENT
Start: 2018-10-06 | End: 2018-10-06

## 2018-10-06 RX ORDER — MAGNESIUM SULFATE 500 MG/ML
2 VIAL (ML) INJECTION ONCE
Qty: 0 | Refills: 0 | Status: COMPLETED | OUTPATIENT
Start: 2018-10-06 | End: 2018-10-06

## 2018-10-06 RX ORDER — METRONIDAZOLE 500 MG
1 TABLET ORAL
Qty: 21 | Refills: 0 | OUTPATIENT
Start: 2018-10-06 | End: 2018-10-12

## 2018-10-06 RX ORDER — MOXIFLOXACIN HYDROCHLORIDE TABLETS, 400 MG 400 MG/1
1 TABLET, FILM COATED ORAL
Qty: 14 | Refills: 0 | OUTPATIENT
Start: 2018-10-06 | End: 2018-10-12

## 2018-10-06 RX ADMIN — Medication 50 GRAM(S): at 14:58

## 2018-10-06 RX ADMIN — Medication 40 MILLIEQUIVALENT(S): at 14:57

## 2018-10-06 RX ADMIN — SODIUM CHLORIDE 2000 MILLILITER(S): 9 INJECTION, SOLUTION INTRAVENOUS at 13:30

## 2018-10-06 NOTE — ED PROVIDER NOTE - CARE PLAN
Assessment and plan of treatment:	Eval for intraabdominal pathology as cause for diarrhea and significant weight loss. Also possible is simple gastroenteritis with fluid losses and dehydration leading to fall. Labs, imaging, reassess. Principal Discharge DX:	Colitis  Assessment and plan of treatment:	Eval for intraabdominal pathology as cause for diarrhea and significant weight loss. Also possible is simple gastroenteritis with fluid losses and dehydration leading to fall. Labs, imaging, reassess.

## 2018-10-06 NOTE — ED ADULT NURSE NOTE - NSIMPLEMENTINTERV_GEN_ALL_ED
Implemented All Universal Safety Interventions:  [place] to call system. Call bell, personal items and telephone within reach. Instruct patient to call for assistance. Room bathroom lighting operational. Non-slip footwear when patient is off stretcher. Physically safe environment: no spills, clutter or unnecessary equipment. Stretcher in lowest position, wheels locked, appropriate side rails in place.

## 2018-10-06 NOTE — ED ADULT TRIAGE NOTE - CHIEF COMPLAINT QUOTE
diarrhea x 1 week /lost 14 pounds in 1 week / patient states that he tripped today and fell, no LOC or head trauma abrasions to B/L knees and hands, Patient states that he is on aspirin has not taken it for the past 4 days

## 2018-10-06 NOTE — ED PROVIDER NOTE - PHYSICAL EXAMINATION
Vital Signs: I have reviewed the initial vital signs.  Constitutional: NAD, well-nourished, appears stated age, no acute distress.  HEENT: Airway patent, moist MM, no erythema/swelling/deformity of oral structures. EOMI, PERRLA.  CV: regular rate, regular rhythm, well-perfused extremities, 2+ b/l DP and radial pulses equal.  Lungs: BCTA, no increased WOB.  ABD: NTND, no guarding or rebound, no masses or organomegaly, no hernias.   MSK: Neck supple, nontender, nl ROM, no stepoff. Chest nontender. Back nontender in TLS spine or to b/l bony structures or flanks. Ext nontender, nl rom, no deformity.   INTEG: Skin warm, dry, no rash.  NEURO: A&Ox3, CN II-XII intact, normal strength 5/5 all 4 ext, nl sensation throughout, normal speech and coordination.  PSYCH: Calm, cooperative, normal affect and interaction.

## 2018-10-06 NOTE — ED PROVIDER NOTE - OBJECTIVE STATEMENT
67 y M PMH b/l kidney stones, DM on orals, prior CABG x 2, prior MI, HTN, pw diarrhea x 1 wk, watery orange and brown, with intermittent abd cramping preceding the episodes of diarrhea, and weight loss of 14 lbs over this 1 wk period. Feeling weak and unsteady on his feet and tripped on the sidewalk, falling to his b/l knees and left hand today. Diarrhea is improving, however the weakness concerns him. Declining imaging for these, ambulatory since.  No chills, dysuria, hematuria, numbness, tingling.

## 2018-10-06 NOTE — ED PROVIDER NOTE - PLAN OF CARE
Eval for intraabdominal pathology as cause for diarrhea and significant weight loss. Also possible is simple gastroenteritis with fluid losses and dehydration leading to fall. Labs, imaging, reassess.

## 2018-10-06 NOTE — ED PROVIDER NOTE - NS ED ROS FT
Constitutional: (-) fever, (-) chills, (+) diffuse weakness, (+) weight loss.  Eyes/ENT: (-) blurry vision, (-) epistaxis, (-) sore throat  Cardiovascular: (-) chest pain, (-) syncope  Respiratory: (-) cough, (-) shortness of breath  Gastrointestinal: (+) abdominal pain, (-) vomiting, (+) diarrhea  Musculoskeletal: (-) neck pain, (-) back pain, (-) joint pain  Integumentary: (-) rash, (-) edema  Neurological: (-) headache, (-) altered mental status  Psychiatric: (-) hallucinations,   Allergic/Immunologic: (-) pruritus

## 2018-12-16 ENCOUNTER — INPATIENT (INPATIENT)
Facility: HOSPITAL | Age: 68
LOS: 3 days | Discharge: HOME | End: 2018-12-20
Attending: INTERNAL MEDICINE | Admitting: INTERNAL MEDICINE
Payer: MEDICARE

## 2018-12-16 VITALS
HEART RATE: 98 BPM | SYSTOLIC BLOOD PRESSURE: 105 MMHG | DIASTOLIC BLOOD PRESSURE: 74 MMHG | RESPIRATION RATE: 20 BRPM | OXYGEN SATURATION: 100 %

## 2018-12-16 DIAGNOSIS — Z98.890 OTHER SPECIFIED POSTPROCEDURAL STATES: Chronic | ICD-10-CM

## 2018-12-16 LAB
ALBUMIN SERPL ELPH-MCNC: 4.2 G/DL — SIGNIFICANT CHANGE UP (ref 3.5–5.2)
ALP SERPL-CCNC: 68 U/L — SIGNIFICANT CHANGE UP (ref 30–115)
ALT FLD-CCNC: 24 U/L — SIGNIFICANT CHANGE UP (ref 0–41)
ANION GAP SERPL CALC-SCNC: 24 MMOL/L — HIGH (ref 7–14)
APTT BLD: 27.4 SEC — SIGNIFICANT CHANGE UP (ref 27–39.2)
AST SERPL-CCNC: 51 U/L — HIGH (ref 0–41)
BASE EXCESS BLDV CALC-SCNC: -1.9 MMOL/L — SIGNIFICANT CHANGE UP (ref -2–2)
BASOPHILS # BLD AUTO: 0.05 K/UL — SIGNIFICANT CHANGE UP (ref 0–0.2)
BASOPHILS NFR BLD AUTO: 0.3 % — SIGNIFICANT CHANGE UP (ref 0–1)
BILIRUB DIRECT SERPL-MCNC: 0.2 MG/DL — SIGNIFICANT CHANGE UP (ref 0–0.2)
BILIRUB INDIRECT FLD-MCNC: 1 MG/DL — SIGNIFICANT CHANGE UP (ref 0.2–1.2)
BILIRUB SERPL-MCNC: 1.2 MG/DL — SIGNIFICANT CHANGE UP (ref 0.2–1.2)
BUN SERPL-MCNC: 16 MG/DL — SIGNIFICANT CHANGE UP (ref 10–20)
CA-I SERPL-SCNC: 1.17 MMOL/L — SIGNIFICANT CHANGE UP (ref 1.12–1.3)
CALCIUM SERPL-MCNC: 9 MG/DL — SIGNIFICANT CHANGE UP (ref 8.5–10.1)
CHLORIDE SERPL-SCNC: 95 MMOL/L — LOW (ref 98–110)
CK MB CFR SERPL CALC: 3.2 NG/ML — SIGNIFICANT CHANGE UP (ref 0.6–6.3)
CO2 SERPL-SCNC: 19 MMOL/L — SIGNIFICANT CHANGE UP (ref 17–32)
CREAT SERPL-MCNC: 1.1 MG/DL — SIGNIFICANT CHANGE UP (ref 0.7–1.5)
EOSINOPHIL # BLD AUTO: 0.07 K/UL — SIGNIFICANT CHANGE UP (ref 0–0.7)
EOSINOPHIL NFR BLD AUTO: 0.5 % — SIGNIFICANT CHANGE UP (ref 0–8)
GAS PNL BLDV: 138 MMOL/L — SIGNIFICANT CHANGE UP (ref 136–145)
GAS PNL BLDV: SIGNIFICANT CHANGE UP
GLUCOSE SERPL-MCNC: 307 MG/DL — HIGH (ref 70–99)
HCO3 BLDV-SCNC: 24 MMOL/L — SIGNIFICANT CHANGE UP (ref 22–29)
HCT VFR BLD CALC: 48.1 % — SIGNIFICANT CHANGE UP (ref 42–52)
HCT VFR BLDA CALC: 50.2 % — HIGH (ref 34–44)
HGB BLD CALC-MCNC: 16.4 G/DL — SIGNIFICANT CHANGE UP (ref 14–18)
HGB BLD-MCNC: 15.6 G/DL — SIGNIFICANT CHANGE UP (ref 14–18)
IMM GRANULOCYTES NFR BLD AUTO: 1 % — HIGH (ref 0.1–0.3)
INR BLD: 1.84 RATIO — HIGH (ref 0.65–1.3)
LACTATE BLDV-MCNC: 4 MMOL/L — HIGH (ref 0.5–1.6)
LACTATE SERPL-SCNC: 4.7 MMOL/L — CRITICAL HIGH (ref 0.5–2.2)
LIDOCAIN IGE QN: 24 U/L — SIGNIFICANT CHANGE UP (ref 7–60)
LYMPHOCYTES # BLD AUTO: 1.9 K/UL — SIGNIFICANT CHANGE UP (ref 1.2–3.4)
LYMPHOCYTES # BLD AUTO: 13 % — LOW (ref 20.5–51.1)
MAGNESIUM SERPL-MCNC: 1.5 MG/DL — LOW (ref 1.8–2.4)
MCHC RBC-ENTMCNC: 29.4 PG — SIGNIFICANT CHANGE UP (ref 27–31)
MCHC RBC-ENTMCNC: 32.4 G/DL — SIGNIFICANT CHANGE UP (ref 32–37)
MCV RBC AUTO: 90.8 FL — SIGNIFICANT CHANGE UP (ref 80–94)
MONOCYTES # BLD AUTO: 1.01 K/UL — HIGH (ref 0.1–0.6)
MONOCYTES NFR BLD AUTO: 6.9 % — SIGNIFICANT CHANGE UP (ref 1.7–9.3)
NEUTROPHILS # BLD AUTO: 11.4 K/UL — HIGH (ref 1.4–6.5)
NEUTROPHILS NFR BLD AUTO: 78.3 % — HIGH (ref 42.2–75.2)
NRBC # BLD: 0 /100 WBCS — SIGNIFICANT CHANGE UP (ref 0–0)
NT-PROBNP SERPL-SCNC: 1874 PG/ML — HIGH (ref 0–300)
PCO2 BLDV: 44 MMHG — SIGNIFICANT CHANGE UP (ref 41–51)
PH BLDV: 7.34 — SIGNIFICANT CHANGE UP (ref 7.26–7.43)
PLATELET # BLD AUTO: 162 K/UL — SIGNIFICANT CHANGE UP (ref 130–400)
PO2 BLDV: 37 MMHG — SIGNIFICANT CHANGE UP (ref 20–40)
POTASSIUM BLDV-SCNC: 3.9 MMOL/L — SIGNIFICANT CHANGE UP (ref 3.3–5.6)
POTASSIUM SERPL-MCNC: 5.2 MMOL/L — HIGH (ref 3.5–5)
POTASSIUM SERPL-SCNC: 5.2 MMOL/L — HIGH (ref 3.5–5)
PROT SERPL-MCNC: 6.7 G/DL — SIGNIFICANT CHANGE UP (ref 6–8)
PROTHROM AB SERPL-ACNC: 21 SEC — HIGH (ref 9.95–12.87)
RBC # BLD: 5.3 M/UL — SIGNIFICANT CHANGE UP (ref 4.7–6.1)
RBC # FLD: 13.2 % — SIGNIFICANT CHANGE UP (ref 11.5–14.5)
SAO2 % BLDV: 68 % — SIGNIFICANT CHANGE UP
SODIUM SERPL-SCNC: 138 MMOL/L — SIGNIFICANT CHANGE UP (ref 135–146)
TROPONIN T SERPL-MCNC: 0.15 NG/ML — CRITICAL HIGH
WBC # BLD: 14.58 K/UL — HIGH (ref 4.8–10.8)
WBC # FLD AUTO: 14.58 K/UL — HIGH (ref 4.8–10.8)

## 2018-12-16 RX ORDER — SODIUM CHLORIDE 9 MG/ML
2000 INJECTION, SOLUTION INTRAVENOUS ONCE
Qty: 0 | Refills: 0 | Status: COMPLETED | OUTPATIENT
Start: 2018-12-16 | End: 2018-12-16

## 2018-12-16 RX ADMIN — SODIUM CHLORIDE 1000 MILLILITER(S): 9 INJECTION, SOLUTION INTRAVENOUS at 20:29

## 2018-12-16 RX ADMIN — SODIUM CHLORIDE 2000 MILLILITER(S): 9 INJECTION, SOLUTION INTRAVENOUS at 08:30

## 2018-12-16 RX ADMIN — ALTEPLASE 50 MILLIGRAM(S): KIT at 09:20

## 2018-12-16 NOTE — ED PROVIDER NOTE - ATTENDING CONTRIBUTION TO CARE
68 year old male, pmhx of htn, dld, cabg and 2 cardiac stents, bibems with complaint of sob and syncope, patient was on the floor at home for about ne hour. Patient ekg upon arrival displayed anterior twila, activated stemi, DR. branch bedside, patient not a cath lab candidate. patient then taken to CT to rule out Dissection vs central OPE. Patient found to have bilateral large main Pulmonary Artery PE's/ patient does have a history of dvt. patient not currently maintained on anticoagulation. Upon arrival back from CT, patient started on immediate 100 mg tpa over 2 hours. initially started on 1000 of heparin which was stopped when the patient started on tpa. Patient was also started on pressors. Initial CT head negaive for ICH, Dissection negative. Second Ct head obtained due to mild confusion that started during Tpa administration but immediately resolved. Second CT head negative for ICH. patient remains with NIH 0, GCS 15, no focal findings. decision made with Family to continue giving tpa. Son, daughter and wife bedside. After approx 1 hour on tpa, patient felt much better, color returned to face, patient symptoms much improve, patient sitting up and talking to wife and kids. Patient bedside echo initially displayed right heart strain, began to improve wth repeat examinations. Patient case discussed with IR, patient is not a cathter directed tpa candidate. patient admitted to the ICU. Patient feels much better on admission.

## 2018-12-16 NOTE — ED PROCEDURE NOTE - US DIAGNOSIS
RV Dilation/Acute Right heart strain, septal bowing, RV dilatation - D sign concerning for Pulmonary embolus

## 2018-12-16 NOTE — ED ADULT NURSE NOTE - NSIMPLEMENTINTERV_GEN_ALL_ED
Implemented All Fall with Harm Risk Interventions:  [place] to call system. Call bell, personal items and telephone within reach. Instruct patient to call for assistance. Room bathroom lighting operational. Non-slip footwear when patient is off stretcher. Physically safe environment: no spills, clutter or unnecessary equipment. Stretcher in lowest position, wheels locked, appropriate side rails in place. Provide visual cue, wrist band, yellow gown, etc. Monitor gait and stability. Monitor for mental status changes and reorient to person, place, and time. Review medications for side effects contributing to fall risk. Reinforce activity limits and safety measures with patient and family. Provide visual clues: red socks.

## 2018-12-16 NOTE — CHART NOTE - NSCHARTNOTEFT_GEN_A_CORE
Responded to STEMI code   69 yo M CAD/CABG, fell down at home as per did not pass out  came here feeling weak not in active chest pain  EKG old q wave inferior lead, Non specific ST/T changes  will cancel STEMI for now  case d/w Dr. Aguayo   Will follow Responded to STEMI code   69 yo M CAD/CABG, fell down at home as per did not pass out  came here feeling weak not in active chest pain  EKG old q wave inferior lead, Non specific ST/T changes  will cancel STEMI code  case d/w on call interventional cardiologist  Will follow

## 2018-12-16 NOTE — ED PROVIDER NOTE - NS ED ROS FT
Constitutional: (-) fever  Eyes/ENT: (-) blurry vision, (-) epistaxis  Cardiovascular: (-) chest pain,+syncope  Respiratory: (-) cough, + shortness of breath  Gastrointestinal: (-) vomiting, (-) diarrhea  Musculoskeletal: (-) neck pain, (-) back pain, (-) joint pain  Integumentary: (-) rash, (-) edema  Neurological: (-) headache, (-) altered mental status  Psychiatric: (-) hallucinations  Allergic/Immunologic: (-) pruritus

## 2018-12-16 NOTE — ED PROVIDER NOTE - CRITICAL CARE PROVIDED
direct patient care (not related to procedure)/interpretation of diagnostic studies/additional history taking/consultation with other physicians/conducted a detailed discussion of DNR status/documentation/consult w/ pt's family directly relating to pts condition

## 2018-12-16 NOTE — ED PROVIDER NOTE - OBJECTIVE STATEMENT
68 year old male, pmhx of htn, dld, cabg and 2 cardiac stents, bibems with complaint of sob and syncope, patient was on the floor at home for about ne hour.

## 2018-12-17 DIAGNOSIS — R09.89 OTHER SPECIFIED SYMPTOMS AND SIGNS INVOLVING THE CIRCULATORY AND RESPIRATORY SYSTEMS: ICD-10-CM

## 2018-12-17 DIAGNOSIS — I26.99 OTHER PULMONARY EMBOLISM WITHOUT ACUTE COR PULMONALE: ICD-10-CM

## 2018-12-17 LAB
ANION GAP SERPL CALC-SCNC: 15 MMOL/L — HIGH (ref 7–14)
ANION GAP SERPL CALC-SCNC: 18 MMOL/L — HIGH (ref 7–14)
APTT BLD: 24.5 SEC — LOW (ref 27–39.2)
APTT BLD: 29 SEC — SIGNIFICANT CHANGE UP (ref 27–39.2)
APTT BLD: 98 SEC — CRITICAL HIGH (ref 27–39.2)
BASOPHILS # BLD AUTO: 0.03 K/UL — SIGNIFICANT CHANGE UP (ref 0–0.2)
BASOPHILS NFR BLD AUTO: 0.3 % — SIGNIFICANT CHANGE UP (ref 0–1)
BUN SERPL-MCNC: 15 MG/DL — SIGNIFICANT CHANGE UP (ref 10–20)
BUN SERPL-MCNC: 16 MG/DL — SIGNIFICANT CHANGE UP (ref 10–20)
CALCIUM SERPL-MCNC: 8.6 MG/DL — SIGNIFICANT CHANGE UP (ref 8.5–10.1)
CALCIUM SERPL-MCNC: 8.7 MG/DL — SIGNIFICANT CHANGE UP (ref 8.5–10.1)
CHLORIDE SERPL-SCNC: 101 MMOL/L — SIGNIFICANT CHANGE UP (ref 98–110)
CHLORIDE SERPL-SCNC: 102 MMOL/L — SIGNIFICANT CHANGE UP (ref 98–110)
CK MB CFR SERPL CALC: 5.7 NG/ML — SIGNIFICANT CHANGE UP (ref 0.6–6.3)
CK SERPL-CCNC: 88 U/L — SIGNIFICANT CHANGE UP (ref 0–225)
CO2 SERPL-SCNC: 22 MMOL/L — SIGNIFICANT CHANGE UP (ref 17–32)
CO2 SERPL-SCNC: 26 MMOL/L — SIGNIFICANT CHANGE UP (ref 17–32)
CREAT SERPL-MCNC: 0.8 MG/DL — SIGNIFICANT CHANGE UP (ref 0.7–1.5)
CREAT SERPL-MCNC: 0.8 MG/DL — SIGNIFICANT CHANGE UP (ref 0.7–1.5)
EOSINOPHIL # BLD AUTO: 0.02 K/UL — SIGNIFICANT CHANGE UP (ref 0–0.7)
EOSINOPHIL NFR BLD AUTO: 0.2 % — SIGNIFICANT CHANGE UP (ref 0–8)
GLUCOSE BLDC GLUCOMTR-MCNC: 221 MG/DL — HIGH (ref 70–99)
GLUCOSE BLDC GLUCOMTR-MCNC: 252 MG/DL — HIGH (ref 70–99)
GLUCOSE SERPL-MCNC: 180 MG/DL — HIGH (ref 70–99)
GLUCOSE SERPL-MCNC: 221 MG/DL — HIGH (ref 70–99)
HCT VFR BLD CALC: 39 % — LOW (ref 42–52)
HCT VFR BLD CALC: 42.4 % — SIGNIFICANT CHANGE UP (ref 42–52)
HGB BLD-MCNC: 12.7 G/DL — LOW (ref 14–18)
HGB BLD-MCNC: 13.7 G/DL — LOW (ref 14–18)
IMM GRANULOCYTES NFR BLD AUTO: 1 % — HIGH (ref 0.1–0.3)
LYMPHOCYTES # BLD AUTO: 1.72 K/UL — SIGNIFICANT CHANGE UP (ref 1.2–3.4)
LYMPHOCYTES # BLD AUTO: 14.6 % — LOW (ref 20.5–51.1)
MCHC RBC-ENTMCNC: 29.5 PG — SIGNIFICANT CHANGE UP (ref 27–31)
MCHC RBC-ENTMCNC: 30.1 PG — SIGNIFICANT CHANGE UP (ref 27–31)
MCHC RBC-ENTMCNC: 32.3 G/DL — SIGNIFICANT CHANGE UP (ref 32–37)
MCHC RBC-ENTMCNC: 32.6 G/DL — SIGNIFICANT CHANGE UP (ref 32–37)
MCV RBC AUTO: 91.2 FL — SIGNIFICANT CHANGE UP (ref 80–94)
MCV RBC AUTO: 92.4 FL — SIGNIFICANT CHANGE UP (ref 80–94)
MONOCYTES # BLD AUTO: 1.32 K/UL — HIGH (ref 0.1–0.6)
MONOCYTES NFR BLD AUTO: 11.2 % — HIGH (ref 1.7–9.3)
NEUTROPHILS # BLD AUTO: 8.59 K/UL — HIGH (ref 1.4–6.5)
NEUTROPHILS NFR BLD AUTO: 72.7 % — SIGNIFICANT CHANGE UP (ref 42.2–75.2)
NRBC # BLD: 0 /100 WBCS — SIGNIFICANT CHANGE UP (ref 0–0)
NRBC # BLD: 0 /100 WBCS — SIGNIFICANT CHANGE UP (ref 0–0)
PLATELET # BLD AUTO: 120 K/UL — LOW (ref 130–400)
PLATELET # BLD AUTO: 147 K/UL — SIGNIFICANT CHANGE UP (ref 130–400)
POTASSIUM SERPL-MCNC: 4.1 MMOL/L — SIGNIFICANT CHANGE UP (ref 3.5–5)
POTASSIUM SERPL-MCNC: 4.3 MMOL/L — SIGNIFICANT CHANGE UP (ref 3.5–5)
POTASSIUM SERPL-SCNC: 4.1 MMOL/L — SIGNIFICANT CHANGE UP (ref 3.5–5)
POTASSIUM SERPL-SCNC: 4.3 MMOL/L — SIGNIFICANT CHANGE UP (ref 3.5–5)
RBC # BLD: 4.22 M/UL — LOW (ref 4.7–6.1)
RBC # BLD: 4.65 M/UL — LOW (ref 4.7–6.1)
RBC # FLD: 13.4 % — SIGNIFICANT CHANGE UP (ref 11.5–14.5)
RBC # FLD: 13.5 % — SIGNIFICANT CHANGE UP (ref 11.5–14.5)
SODIUM SERPL-SCNC: 141 MMOL/L — SIGNIFICANT CHANGE UP (ref 135–146)
SODIUM SERPL-SCNC: 143 MMOL/L — SIGNIFICANT CHANGE UP (ref 135–146)
TROPONIN T SERPL-MCNC: 0.16 NG/ML — CRITICAL HIGH
WBC # BLD: 11.8 K/UL — HIGH (ref 4.8–10.8)
WBC # BLD: 8.28 K/UL — SIGNIFICANT CHANGE UP (ref 4.8–10.8)
WBC # FLD AUTO: 11.8 K/UL — HIGH (ref 4.8–10.8)
WBC # FLD AUTO: 8.28 K/UL — SIGNIFICANT CHANGE UP (ref 4.8–10.8)

## 2018-12-17 RX ORDER — METOPROLOL TARTRATE 50 MG
12.5 TABLET ORAL
Qty: 0 | Refills: 0 | Status: DISCONTINUED | OUTPATIENT
Start: 2018-12-17 | End: 2018-12-20

## 2018-12-17 RX ORDER — INSULIN GLARGINE 100 [IU]/ML
23 INJECTION, SOLUTION SUBCUTANEOUS AT BEDTIME
Qty: 0 | Refills: 0 | Status: DISCONTINUED | OUTPATIENT
Start: 2018-12-17 | End: 2018-12-20

## 2018-12-17 RX ORDER — INSULIN LISPRO 100/ML
8 VIAL (ML) SUBCUTANEOUS
Qty: 0 | Refills: 0 | Status: DISCONTINUED | OUTPATIENT
Start: 2018-12-17 | End: 2018-12-20

## 2018-12-17 RX ORDER — SIMVASTATIN 20 MG/1
40 TABLET, FILM COATED ORAL AT BEDTIME
Qty: 0 | Refills: 0 | Status: DISCONTINUED | OUTPATIENT
Start: 2018-12-17 | End: 2018-12-20

## 2018-12-17 RX ORDER — SODIUM CHLORIDE 9 MG/ML
2000 INJECTION, SOLUTION INTRAVENOUS ONCE
Qty: 0 | Refills: 0 | Status: COMPLETED | OUTPATIENT
Start: 2018-12-17 | End: 2018-12-16

## 2018-12-17 RX ORDER — LABETALOL HCL 100 MG
5 TABLET ORAL ONCE
Qty: 0 | Refills: 0 | Status: COMPLETED | OUTPATIENT
Start: 2018-12-17 | End: 2018-12-17

## 2018-12-17 RX ORDER — SODIUM CHLORIDE 9 MG/ML
1000 INJECTION, SOLUTION INTRAVENOUS
Qty: 0 | Refills: 0 | Status: DISCONTINUED | OUTPATIENT
Start: 2018-12-17 | End: 2018-12-20

## 2018-12-17 RX ORDER — DEXTROSE 50 % IN WATER 50 %
15 SYRINGE (ML) INTRAVENOUS ONCE
Qty: 0 | Refills: 0 | Status: DISCONTINUED | OUTPATIENT
Start: 2018-12-17 | End: 2018-12-20

## 2018-12-17 RX ORDER — NOREPINEPHRINE BITARTRATE/D5W 8 MG/250ML
0.05 PLASTIC BAG, INJECTION (ML) INTRAVENOUS
Qty: 8 | Refills: 0 | Status: DISCONTINUED | OUTPATIENT
Start: 2018-12-17 | End: 2018-12-17

## 2018-12-17 RX ORDER — DEXTROSE 50 % IN WATER 50 %
25 SYRINGE (ML) INTRAVENOUS ONCE
Qty: 0 | Refills: 0 | Status: DISCONTINUED | OUTPATIENT
Start: 2018-12-17 | End: 2018-12-20

## 2018-12-17 RX ORDER — DEXTROSE 50 % IN WATER 50 %
12.5 SYRINGE (ML) INTRAVENOUS ONCE
Qty: 0 | Refills: 0 | Status: DISCONTINUED | OUTPATIENT
Start: 2018-12-17 | End: 2018-12-20

## 2018-12-17 RX ORDER — METOPROLOL TARTRATE 50 MG
1 TABLET ORAL
Qty: 0 | Refills: 0 | COMMUNITY

## 2018-12-17 RX ORDER — LOSARTAN POTASSIUM 100 MG/1
50 TABLET, FILM COATED ORAL DAILY
Qty: 0 | Refills: 0 | Status: DISCONTINUED | OUTPATIENT
Start: 2018-12-17 | End: 2018-12-20

## 2018-12-17 RX ORDER — INSULIN LISPRO 100/ML
VIAL (ML) SUBCUTANEOUS
Qty: 0 | Refills: 0 | Status: DISCONTINUED | OUTPATIENT
Start: 2018-12-17 | End: 2018-12-20

## 2018-12-17 RX ORDER — HEPARIN SODIUM 5000 [USP'U]/ML
INJECTION INTRAVENOUS; SUBCUTANEOUS
Qty: 25000 | Refills: 0 | Status: DISCONTINUED | OUTPATIENT
Start: 2018-12-17 | End: 2018-12-18

## 2018-12-17 RX ORDER — HEPARIN SODIUM 5000 [USP'U]/ML
7500 INJECTION INTRAVENOUS; SUBCUTANEOUS ONCE
Qty: 0 | Refills: 0 | Status: DISCONTINUED | OUTPATIENT
Start: 2018-12-17 | End: 2018-12-17

## 2018-12-17 RX ORDER — ALTEPLASE 100 MG
100 KIT INTRAVENOUS ONCE
Qty: 0 | Refills: 0 | Status: COMPLETED | OUTPATIENT
Start: 2018-12-17 | End: 2018-12-16

## 2018-12-17 RX ORDER — INSULIN LISPRO 100/ML
8 VIAL (ML) SUBCUTANEOUS ONCE
Qty: 0 | Refills: 0 | Status: COMPLETED | OUTPATIENT
Start: 2018-12-17 | End: 2018-12-17

## 2018-12-17 RX ORDER — MAGNESIUM SULFATE 500 MG/ML
2 VIAL (ML) INJECTION ONCE
Qty: 0 | Refills: 0 | Status: COMPLETED | OUTPATIENT
Start: 2018-12-17 | End: 2018-12-17

## 2018-12-17 RX ORDER — HEPARIN SODIUM 5000 [USP'U]/ML
3500 INJECTION INTRAVENOUS; SUBCUTANEOUS EVERY 6 HOURS
Qty: 0 | Refills: 0 | Status: DISCONTINUED | OUTPATIENT
Start: 2018-12-17 | End: 2018-12-18

## 2018-12-17 RX ORDER — GLUCAGON INJECTION, SOLUTION 0.5 MG/.1ML
1 INJECTION, SOLUTION SUBCUTANEOUS ONCE
Qty: 0 | Refills: 0 | Status: DISCONTINUED | OUTPATIENT
Start: 2018-12-17 | End: 2018-12-20

## 2018-12-17 RX ORDER — HEPARIN SODIUM 5000 [USP'U]/ML
7500 INJECTION INTRAVENOUS; SUBCUTANEOUS ONCE
Qty: 0 | Refills: 0 | Status: COMPLETED | OUTPATIENT
Start: 2018-12-17 | End: 2018-12-17

## 2018-12-17 RX ORDER — HEPARIN SODIUM 5000 [USP'U]/ML
7500 INJECTION INTRAVENOUS; SUBCUTANEOUS EVERY 6 HOURS
Qty: 0 | Refills: 0 | Status: DISCONTINUED | OUTPATIENT
Start: 2018-12-17 | End: 2018-12-18

## 2018-12-17 RX ORDER — AMLODIPINE BESYLATE 2.5 MG/1
5 TABLET ORAL ONCE
Qty: 0 | Refills: 0 | Status: COMPLETED | OUTPATIENT
Start: 2018-12-17 | End: 2018-12-17

## 2018-12-17 RX ADMIN — Medication 5 MILLIGRAM(S): at 01:29

## 2018-12-17 RX ADMIN — Medication 8 UNIT(S): at 22:45

## 2018-12-17 RX ADMIN — INSULIN GLARGINE 23 UNIT(S): 100 INJECTION, SOLUTION SUBCUTANEOUS at 22:45

## 2018-12-17 RX ADMIN — HEPARIN SODIUM 7500 UNIT(S): 5000 INJECTION INTRAVENOUS; SUBCUTANEOUS at 15:00

## 2018-12-17 RX ADMIN — Medication 12.5 MILLIGRAM(S): at 19:34

## 2018-12-17 RX ADMIN — AMLODIPINE BESYLATE 5 MILLIGRAM(S): 2.5 TABLET ORAL at 03:22

## 2018-12-17 RX ADMIN — Medication 8.84 MICROGRAM(S)/KG/MIN: at 01:30

## 2018-12-17 RX ADMIN — Medication 50 GRAM(S): at 20:10

## 2018-12-17 RX ADMIN — LOSARTAN POTASSIUM 50 MILLIGRAM(S): 100 TABLET, FILM COATED ORAL at 05:58

## 2018-12-17 RX ADMIN — HEPARIN SODIUM 1700 UNIT(S)/HR: 5000 INJECTION INTRAVENOUS; SUBCUTANEOUS at 15:08

## 2018-12-17 RX ADMIN — SIMVASTATIN 40 MILLIGRAM(S): 20 TABLET, FILM COATED ORAL at 22:44

## 2018-12-17 RX ADMIN — Medication 5 MILLIGRAM(S): at 01:53

## 2018-12-17 RX ADMIN — Medication 12.5 MILLIGRAM(S): at 05:58

## 2018-12-17 NOTE — H&P ADULT - ASSESSMENT
Patient is a 69 yo M with PMHx of DVT in remote past, CAD with MI s/p CABG, hx of DVT in past presents to [facility] with complaints of SOB. Found to have bilateral left and right main pulmonary artery emboli with extension into the bilateral lobar pulmonary arteries with evidence of right heart strain. Patient received systemic tPA in ED.     #) Bilateral Pulmonary Emboli  - s/p tPA Patient is a 67 yo M with PMHx of DVT in remote past, CAD with MI s/p CABG, hx of DVT in past presents to [facility] with complaints of SOB. Found to have bilateral left and right main pulmonary artery emboli with extension into the bilateral lobar pulmonary arteries with evidence of right heart strain. Patient received systemic tPA in ED.     #) Bilateral Pulmonary Emboli  - s/p tPA in ED  - 2D Echo  - Monitor for signs of bleeding for at least 24 hrs-->No anticoagulants in this time frame  - Duplex of lower extremities     #) Hx of CAD  - Cont with Metoprolol and Losartan and Simvastatin    #) Hx of HLD  - Cont with Simvastatin     #) Hx of renal stones  - Patient is seen as outpatient with Dr. Siddiqi  - Can cont to follow up as outpatient for follow up on Lithotripsy     #DVT PPx: s/p TPA   #GI PPx: Not indicated   #Dispo: MICU monitoring for now

## 2018-12-17 NOTE — CONSULT NOTE ADULT - SUBJECTIVE AND OBJECTIVE BOX
Patient is a 68y old  Male who presents with a chief complaint of SOB    HPI:  Patient is a 69 yo M with PMHx of DVT in remote past, CAD with MI s/p CABG, hx of DVT in past presents to [facility] with complaints of SOB. Per patient, he has been short of breath for the last three months. He notices it more when he tries to exert himself. He does endorse a recent trave history. Reports he took a flight on December 1st in which the flight was about 3/5-4 hours long. Patient says that he is told by his PMD that if he is going on long distance travel he should take Eliquis on those days. Otherwise he does not take it regularly. On day of admission patient became very short of breath with associated chest tightness so he came to the emergency room for further evaluation.     Initially, there were ST changes noted on the EKG but code STEMI was canceled. Aortic Dissection study was done which showed evidence of Bilateral left and right main pulmonary artery emboli with extension into the bilateral lobar pulmonary arteries with evidence of right heart strain. Patient received systemic tPA in the ED. (17 Dec 2018 00:51), NO EVENTS OVERNIGHT, OFF PRESSORS      PAST MEDICAL & SURGICAL HISTORY:  MI (myocardial infarction)  Kidney stones  High cholesterol  Type 2 diabetes mellitus with complication, unspecified long term insulin use status  Hypertension, unspecified type  H/O cystoscopy  H/O heart surgery: quadruple bypass 6 years ago      SOCIAL HX:   Smoking    FAMILY HISTORY:  No pertinent family history in first degree relatives      REVIEW OF SYSTEMS HPI    	      Allergies    No Known Allergies    Intolerances        losartan 50 milliGRAM(s) Oral daily  metoprolol tartrate 12.5 milliGRAM(s) Oral two times a day  norepinephrine Infusion 0.05 MICROgram(s)/kG/Min IV Continuous <Continuous>  simvastatin 40 milliGRAM(s) Oral at bedtime  : Home Meds:      PHYSICAL EXAM    ICU Vital Signs Last 24 Hrs  T(C): 37.1 (17 Dec 2018 06:01), Max: 37.1 (17 Dec 2018 06:01)  T(F): 98.8 (17 Dec 2018 06:01), Max: 98.8 (17 Dec 2018 06:01)  HR: 85 (17 Dec 2018 06:15) (80 - 106)  BP: 165/75 (17 Dec 2018 06:15) (105/74 - 182/94)  RR: 20 (17 Dec 2018 06:15) (18 - 24)  SpO2: 95% (17 Dec 2018 06:15) (95% - 100%)      General:  HEENT:  AXO3           Lymph Nodes: No cervical LN   Lungs: Bilateral BS, DEC BS BOTH BASE  Cardiovascular: Regular  Abdomen: Soft, Positive BS  Extremities: No clubbing  Skin: Warm  Neurological: SWELLING +        LABS:                          15.6   14.58 )-----------( 162      ( 16 Dec 2018 20:26 )             48.1                                               12-16    138  |  95<L>  |  16  ----------------------------<  307<H>  5.2<H>   |  19  |  1.1    Ca    9.0      16 Dec 2018 20:26  Mg     1.5     12-16    TPro  6.7  /  Alb  4.2  /  TBili  1.2  /  DBili  0.2  /  AST  51<H>  /  ALT  24  /  AlkPhos  68  12-16      PT/INR - ( 16 Dec 2018 20:26 )   PT: 21.00 sec;   INR: 1.84 ratio         PTT - ( 16 Dec 2018 20:26 )  PTT:27.4 sec                                           CARDIAC MARKERS ( 16 Dec 2018 20:26 )  x     / 0.15 ng/mL / x     / x     / 3.2 ng/mL                                            LIVER FUNCTIONS - ( 16 Dec 2018 20:26 )  Alb: 4.2 g/dL / Pro: 6.7 g/dL / ALK PHOS: 68 U/L / ALT: 24 U/L / AST: 51 U/L / GGT: x                                                                                                                                       X-Rays   REVIEWED/ CT REVIEWED    MEDICATIONS  (STANDING):  losartan 50 milliGRAM(s) Oral daily  metoprolol tartrate 12.5 milliGRAM(s) Oral two times a day  norepinephrine Infusion 0.05 MICROgram(s)/kG/Min (8.841 mL/Hr) IV Continuous <Continuous>  simvastatin 40 milliGRAM(s) Oral at bedtime    MEDICATIONS  (PRN):

## 2018-12-17 NOTE — PATIENT PROFILE ADULT - NSPROEDALEARNPREF_GEN_A_NUR
transition care documenrs given to patient and signed. has a prescription of augmentin 
liquid in 400mg/5ml and will take 10cc twice a day via g tube. continue same home 
medications. iv access removed. [facility] id band removed. and follow up with primary care md in 
one week. verbal instruction

## 2018-12-17 NOTE — H&P ADULT - HISTORY OF PRESENT ILLNESS
Patient is a 67 yo M with PMHx of DVT in remote past, CAD with MI s/p CABG, hx of DVT in past presents to [facility] with complaints of SOB. Per patient, he has been Patient is a 67 yo M with PMHx of DVT in remote past, CAD with MI s/p CABG, hx of DVT in past presents to [facility] with complaints of SOB. Per patient, he has been short of breath for the last three months. He notices it more when he tries to exert himself. He does endorse a recent trave history. Reports he took a flight on December 1st in which the flight was about 3/5-4 hours long. Patient says that he is told by his PMD that if he is going on long distance travel he should take Eliquis on those days. Otherwise he does not take it regularly. On day of admission patient became very short of breath with associated chest tightness so he came to the emergency room for further evaluation.     Initially, there were ST changes noted on the EKG but code STEMI was canceled. Aortic Dissection study was done which showed evidence of Bilateral left and right main pulmonary artery emboli with extension into the bilateral lobar pulmonary arteries with evidence of right heart strain. Patient received systemic tPA in the ED. Patient is a 67 yo M with PMHx of DVT in remote past, CAD with MI s/p CABG, hx of DVT in past presents to [facility] with complaints of SOB. Per patient, he has been short of breath for the last three months. He notices it more when he tries to exert himself. He does endorse a recent trave history. Reports he took a flight on December 1st in which the flight was about 3/5-4 hours long (Flew to [place] and went on a cruise). Patient also admits to recent bus travel to [place]. Patient says that he is told by his PMD that if he is going on long distance travel he should take Eliquis on those days,. Patient took Eliquis on travel to [place] but not on travel to . Otherwise he does not take it regularly. On day of admission patient became very short of breath with associated chest tightness so he came to the emergency room for further evaluation.     Initially, there were ST changes noted on the EKG but code STEMI was canceled. Aortic Dissection study was done which showed evidence of Bilateral left and right main pulmonary artery emboli with extension into the bilateral lobar pulmonary arteries with evidence of right heart strain. Patient received systemic tPA in the ED.

## 2018-12-17 NOTE — H&P ADULT - NSHPLABSRESULTS_GEN_ALL_CORE
15.6   14.58  )----------(  162       ( 16 Dec 2018 20:26 )               48.1    12-16    138  |  95<L>  |  16  ----------------------------<  307<H>  5.2<H>   |  19  |  1.1    Ca    9.0      16 Dec 2018 20:26  Mg     1.5     12-16    TPro  6.7  /  Alb  4.2  /  TBili  1.2  /  DBili  0.2  /  AST  51<H>  /  ALT  24  /  AlkPhos  68  12-16    PT/INR -  21.00 sec / 1.84 ratio   ( 16 Dec 2018 20:26 )    PTT -  27.4 sec   ( 16 Dec 2018 20:26 )    < from: CT Angio Chest Dissection Protocol (12.16.18 @ 21:15) >    Bilateral left and right main pulmonary artery emboli with extension into   the bilateral lobar pulmonary arteries. There is evidence of right heart   strain, with an RV/LV ratio of 1.5.     Left upper lobe peripheral wedge-shaped consolidation, likely represents   a pulmonary infarct.     Dilation of the main pulmonary artery up to 2.5 cm, consistent with   pulmonary hypertension.    No aortic dissection.    < end of copied text >

## 2018-12-17 NOTE — CONSULT NOTE ADULT - ASSESSMENT
IMPRESSION: MASSIVE PE/ S/P TPA OFF PRESSORS, FEELS BETTER, PRIOR HX OF DVT ON AS NEEDED ELIQUIS      PLAN:    CNS: AVOID CNS DEPRESSANT    HEENT:  Oral care    PULMONARY:  HOB @ 45 degrees, REPEAT PTT, AND START HEPARIN    CARDIOVASCULAR: IF BP GOOD DC IVF, CE, ECHO    GI: GI prophylaxis                                          Feeding PO    RENAL:  F/u  lytes.  Correct as needed. accurate I/O    INFECTIOUS DISEASE: NO ABX    HEMATOLOGICAL:  DVT prophylaxis. IV HEAPRIN, LE DOPPLER    ENDOCRINE:  Follow up FS.      CODE STATUS: FULL CODE    DISPOSITION: Pt requires monitoring in the MICU  LIFE LONG AC

## 2018-12-17 NOTE — H&P ADULT - ATTENDING COMMENTS
Patient seen this am in the ED. Patient less SOB however still has a cough. Patient visibly upset about degree of SOB, MURPHY and collapsing on the floor. Patient states he was on the floor for about 40 minutes prior to having the energy to reach for the phone to call for help. Patient remains on a monitor. Patient expects to be able to go home later this week.

## 2018-12-17 NOTE — ED ADULT NURSE REASSESSMENT NOTE - NS ED NURSE REASSESS COMMENT FT1
Pt reassessed, A/Ox4, no complaints at this time. TPA completed. Norepinephrine titrated down and discontinued. VSS at this time. Comfort and safety maintained, will continue to monitor.
Pt syncopized at home, was down for approx one hour before he reached family members; pale with difficulty breathing upon arrival; 16G placed to RAC and 18G placed to LAC; triple lumen central line placed to left EJ; pt on norepinephrine drip currently at 0.12mcg/kg/min; receiving 100 mg tPA over two hours to treat PE; pt AXOX4 the entire time being treated

## 2018-12-17 NOTE — H&P ADULT - NSHPREVIEWOFSYSTEMS_GEN_ALL_CORE
CONSTITUTIONAL: No weakness, fevers or chills  EYES/ENT: No visual changes;  No vertigo or throat pain   NECK: No pain or stiffness  RESPIRATORY: No cough, wheezing, hemoptysis; +VE shortness of breath  CARDIOVASCULAR: No chest pain or palpitations  GASTROINTESTINAL: No abdominal or epigastric pain. No nausea, vomiting  NEUROLOGICAL: No numbness or weakness  SKIN: No itching, rashes

## 2018-12-17 NOTE — H&P ADULT - NSHPPHYSICALEXAM_GEN_ALL_CORE
T(C): 36.9 (16 Dec 2018 22:04), Max: 36.9 (16 Dec 2018 22:04)  T(F): 98.5 (16 Dec 2018 22:04), Max: 98.5 (16 Dec 2018 22:04)  HR: 92 (17 Dec 2018 01:32) (92 - 106)  BP: 136/82 (17 Dec 2018 01:32) (105/74 - 182/94)  RR: 18 (17 Dec 2018 01:32) (18 - 24)  SpO2: 100% (17 Dec 2018 01:32) (97% - 100%)    General: well developed, well nourished, NAD  Neuro: alert and oriented, no focal deficits, moves all extremities spontaneously  HEENT: NCAT, EOMI, anicteric, mucosa moist  Respiratory: airway patent, respirations unlabored  CVS: regular rate and rhythm  Abdomen: soft, nontender, nondistended  Extremities: no edema, sensation and movement grossly intact  Skin: warm, dry, appropriate color

## 2018-12-17 NOTE — H&P ADULT - NSHPSOCIALHISTORY_GEN_ALL_CORE
Former smoker, quit over 20 years ago   No EtOH use  No illicit drug use Former smoker, quit over 20 years ago   No ETOH use  No illicit drug use

## 2018-12-18 LAB
ANION GAP SERPL CALC-SCNC: 13 MMOL/L — SIGNIFICANT CHANGE UP (ref 7–14)
APTT BLD: 131.4 SEC — CRITICAL HIGH (ref 27–39.2)
APTT BLD: 61.6 SEC — HIGH (ref 27–39.2)
APTT BLD: 72.7 SEC — CRITICAL HIGH (ref 27–39.2)
APTT BLD: 73.4 SEC — CRITICAL HIGH (ref 27–39.2)
BASOPHILS # BLD AUTO: 0.03 K/UL — SIGNIFICANT CHANGE UP (ref 0–0.2)
BASOPHILS NFR BLD AUTO: 0.3 % — SIGNIFICANT CHANGE UP (ref 0–1)
BUN SERPL-MCNC: 14 MG/DL — SIGNIFICANT CHANGE UP (ref 10–20)
CALCIUM SERPL-MCNC: 7.7 MG/DL — LOW (ref 8.5–10.1)
CHLORIDE SERPL-SCNC: 103 MMOL/L — SIGNIFICANT CHANGE UP (ref 98–110)
CHOLEST SERPL-MCNC: 113 MG/DL — SIGNIFICANT CHANGE UP (ref 100–200)
CO2 SERPL-SCNC: 27 MMOL/L — SIGNIFICANT CHANGE UP (ref 17–32)
CREAT SERPL-MCNC: 0.8 MG/DL — SIGNIFICANT CHANGE UP (ref 0.7–1.5)
EOSINOPHIL # BLD AUTO: 0.12 K/UL — SIGNIFICANT CHANGE UP (ref 0–0.7)
EOSINOPHIL NFR BLD AUTO: 1.2 % — SIGNIFICANT CHANGE UP (ref 0–8)
ESTIMATED AVERAGE GLUCOSE: 137 MG/DL — HIGH (ref 68–114)
GLUCOSE BLDC GLUCOMTR-MCNC: 134 MG/DL — HIGH (ref 70–99)
GLUCOSE BLDC GLUCOMTR-MCNC: 143 MG/DL — HIGH (ref 70–99)
GLUCOSE BLDC GLUCOMTR-MCNC: 145 MG/DL — HIGH (ref 70–99)
GLUCOSE BLDC GLUCOMTR-MCNC: 150 MG/DL — HIGH (ref 70–99)
GLUCOSE BLDC GLUCOMTR-MCNC: 161 MG/DL — HIGH (ref 70–99)
GLUCOSE SERPL-MCNC: 151 MG/DL — HIGH (ref 70–99)
HBA1C BLD-MCNC: 6.4 % — HIGH (ref 4–5.6)
HCT VFR BLD CALC: 38.1 % — LOW (ref 42–52)
HDLC SERPL-MCNC: 34 MG/DL — LOW
HGB BLD-MCNC: 12.2 G/DL — LOW (ref 14–18)
IMM GRANULOCYTES NFR BLD AUTO: 0.9 % — HIGH (ref 0.1–0.3)
LIPID PNL WITH DIRECT LDL SERPL: 67 MG/DL — SIGNIFICANT CHANGE UP (ref 4–129)
LYMPHOCYTES # BLD AUTO: 1.72 K/UL — SIGNIFICANT CHANGE UP (ref 1.2–3.4)
LYMPHOCYTES # BLD AUTO: 17.1 % — LOW (ref 20.5–51.1)
MAGNESIUM SERPL-MCNC: 1.8 MG/DL — SIGNIFICANT CHANGE UP (ref 1.8–2.4)
MCHC RBC-ENTMCNC: 29.7 PG — SIGNIFICANT CHANGE UP (ref 27–31)
MCHC RBC-ENTMCNC: 32 G/DL — SIGNIFICANT CHANGE UP (ref 32–37)
MCV RBC AUTO: 92.7 FL — SIGNIFICANT CHANGE UP (ref 80–94)
MONOCYTES # BLD AUTO: 0.96 K/UL — HIGH (ref 0.1–0.6)
MONOCYTES NFR BLD AUTO: 9.5 % — HIGH (ref 1.7–9.3)
NEUTROPHILS # BLD AUTO: 7.15 K/UL — HIGH (ref 1.4–6.5)
NEUTROPHILS NFR BLD AUTO: 71 % — SIGNIFICANT CHANGE UP (ref 42.2–75.2)
PLATELET # BLD AUTO: 129 K/UL — LOW (ref 130–400)
POTASSIUM SERPL-MCNC: 3.7 MMOL/L — SIGNIFICANT CHANGE UP (ref 3.5–5)
POTASSIUM SERPL-SCNC: 3.7 MMOL/L — SIGNIFICANT CHANGE UP (ref 3.5–5)
RBC # BLD: 4.11 M/UL — LOW (ref 4.7–6.1)
RBC # FLD: 13.4 % — SIGNIFICANT CHANGE UP (ref 11.5–14.5)
SODIUM SERPL-SCNC: 143 MMOL/L — SIGNIFICANT CHANGE UP (ref 135–146)
TOTAL CHOLESTEROL/HDL RATIO MEASUREMENT: 3.3 RATIO — LOW (ref 4–5.5)
TRIGL SERPL-MCNC: 105 MG/DL — SIGNIFICANT CHANGE UP (ref 10–149)
WBC # BLD: 10.07 K/UL — SIGNIFICANT CHANGE UP (ref 4.8–10.8)
WBC # FLD AUTO: 10.07 K/UL — SIGNIFICANT CHANGE UP (ref 4.8–10.8)

## 2018-12-18 RX ORDER — HEPARIN SODIUM 5000 [USP'U]/ML
1400 INJECTION INTRAVENOUS; SUBCUTANEOUS
Qty: 25000 | Refills: 0 | Status: DISCONTINUED | OUTPATIENT
Start: 2018-12-18 | End: 2018-12-19

## 2018-12-18 RX ORDER — HYDRALAZINE HCL 50 MG
10 TABLET ORAL ONCE
Qty: 0 | Refills: 0 | Status: COMPLETED | OUTPATIENT
Start: 2018-12-18 | End: 2018-12-18

## 2018-12-18 RX ORDER — CHLORHEXIDINE GLUCONATE 213 G/1000ML
1 SOLUTION TOPICAL
Qty: 0 | Refills: 0 | Status: DISCONTINUED | OUTPATIENT
Start: 2018-12-18 | End: 2018-12-20

## 2018-12-18 RX ORDER — ACETAMINOPHEN 500 MG
650 TABLET ORAL EVERY 6 HOURS
Qty: 0 | Refills: 0 | Status: DISCONTINUED | OUTPATIENT
Start: 2018-12-18 | End: 2018-12-20

## 2018-12-18 RX ADMIN — CHLORHEXIDINE GLUCONATE 1 APPLICATION(S): 213 SOLUTION TOPICAL at 06:25

## 2018-12-18 RX ADMIN — Medication 8 UNIT(S): at 09:16

## 2018-12-18 RX ADMIN — Medication 12.5 MILLIGRAM(S): at 06:28

## 2018-12-18 RX ADMIN — Medication 12.5 MILLIGRAM(S): at 17:47

## 2018-12-18 RX ADMIN — LOSARTAN POTASSIUM 50 MILLIGRAM(S): 100 TABLET, FILM COATED ORAL at 06:28

## 2018-12-18 RX ADMIN — Medication 1: at 12:12

## 2018-12-18 RX ADMIN — INSULIN GLARGINE 23 UNIT(S): 100 INJECTION, SOLUTION SUBCUTANEOUS at 21:32

## 2018-12-18 RX ADMIN — HEPARIN SODIUM 1700 UNIT(S)/HR: 5000 INJECTION INTRAVENOUS; SUBCUTANEOUS at 05:00

## 2018-12-18 RX ADMIN — Medication 8 UNIT(S): at 12:11

## 2018-12-18 RX ADMIN — Medication 8 UNIT(S): at 17:19

## 2018-12-18 RX ADMIN — Medication 10 MILLIGRAM(S): at 01:32

## 2018-12-18 RX ADMIN — SIMVASTATIN 40 MILLIGRAM(S): 20 TABLET, FILM COATED ORAL at 21:32

## 2018-12-18 RX ADMIN — Medication 650 MILLIGRAM(S): at 00:48

## 2018-12-18 NOTE — PROGRESS NOTE ADULT - ASSESSMENT
IMPRESSION:     Massive PE s/p tPA  h/o DVT      PLAN:    CNS: no CNS depressants     HEENT:  Oral care    PULMONARY:  HOB @ 45 degrees, continue with heparin gtt     CARDIOVASCULAR: I = O    GI: GI prophylaxis,  PO diet    RENAL:  F/u  lytes.  Correct as needed. accurate I/O    INFECTIOUS DISEASE: f/u cultures    HEMATOLOGICAL: f/u PTT, hypercoag. studies     ENDOCRINE:  Follow up FS.        Downgrade to medicine IMPRESSION:     Massive PE s/p tPA  h/o DVT      PLAN:    CNS: no CNS depressants     HEENT:  Oral care    PULMONARY:  HOB @ 45 degrees, LMWH for today.  TO start DOAcs in am    CARDIOVASCULAR: I = O    GI: GI prophylaxis,  PO diet    RENAL:  F/u  lytes.  Correct as needed. accurate I/O    INFECTIOUS DISEASE: f/u cultures    HEMATOLOGICAL: f/u PTT, hypercoagulable studies as outpatient     ENDOCRINE:  Follow up FS.          Downgrade to medicine

## 2018-12-18 NOTE — CHART NOTE - NSCHARTNOTEFT_GEN_A_CORE
Patient is a 67 yo M with PMHx of B/L LE DVT (unknown if provoked or not) (stopped taking Eliquis), CAD s/p CABG, presented to the ED w/worsening SOB. He was found to have B/L main Pulmonary Artery Emboli with extension into the B/L Lobar Pulmonary Arteries and  evidence of right heart strain. He received systemic tPA in the ED, was subsequently started on Heparin drip and admitted to the ICU for monitoring. He remained hemodynamically stable in the ICU and his respiratory status has improved. He is now on RA and saturating at 94-96% w/o SOB.     Heparin dose was adjusted to 1400 Units/Hr because the last PTT was supratherapeutic at 131. Please f/u PTT at 20:00, LE Duplex Scan and monitor pt's respiratory status.    He has no Central Lines d/c'd. No mcneil. 2 Peripheral IV Lines.    # Massive PE: Stable    - Pt saturating at 94-96% on RA  - S/P tPA   - 2D Echo: Normal LVSF, G1DD, mild PAH  - No signs of bleeding  - Duplex of lower extremities: Pending  - Dena 2 sets: 0.15, 0.16  - Pt on Heparin infusion at 1400 Units/Hr  - F/U PTT at 20:00    # Hypomagnesemia: Resolved    - Monitor Magnesium     # Hx of CAD: Stable    - Cont with Metoprolol and Losartan and Simvastatin    # Hx of HLD:     - Cont with Simvastatin     # Hx of Nephrolithiasis: Stable    - OP f/u    #DVT PPx: Heparin  #GI PPx: DASH Diet  #Diet: Carbohydrate consistent  #Dispo: Downgrade to Medicine   #Code Staus: Full Code Patient is a 67 yo M with PMHx of B/L LE DVT (unknown if provoked or not) (stopped taking Eliquis), CAD s/p CABG, presented to the ED w/worsening SOB. He was found to have B/L main Pulmonary Artery Emboli with extension into the B/L Lobar Pulmonary Arteries and  evidence of right heart strain. He received systemic tPA in the ED, was subsequently started on Heparin drip and admitted to the ICU for monitoring. He remained hemodynamically stable in the ICU and his respiratory status has improved. He is now on RA and saturating at 94-96% w/o SOB.     Heparin dose was adjusted to 1400 Units/Hr because the last PTT was supratherapeutic at 131. Please f/u PTT at 20:00, LE Duplex Scan and monitor pt's respiratory status.    He has no Central Lines d/c'd. No mcneil. 2 Peripheral IV Lines.    # Massive PE: Stable    - Pt saturating at 94-96% on RA  - S/P tPA   - 2D Echo: Normal LVSF, G1DD, mild PAH  - No signs of bleeding  - Duplex of lower extremities: Pending  - Dena 2 sets: 0.15, 0.16  - Pt on Heparin infusion at 1400 Units/Hr  - F/U PTT at 20:00    # Hypomagnesemia: Resolved    - Monitor Magnesium     # Hx of CAD: Stable    - Cont with Metoprolol and Losartan and Simvastatin    # Hx of HLD:     - Cont with Simvastatin     # Hx of Nephrolithiasis: Stable    - OP f/u    #DVT PPx: Heparin  #GI PPx: DASH Diet  #Diet: Carbohydrate consistent  #Dispo: Downgrade to Medicine   #Code Staus: Full Code      Sign out given to: Dr Ziegler. Spectra: [phone] Patient is a 69 yo M with PMHx of B/L LE DVT (unknown if provoked or not) (stopped taking Eliquis), CAD s/p CABG, presented to the ED w/worsening SOB. He was found to have B/L main Pulmonary Artery Emboli with extension into the B/L Lobar Pulmonary Arteries and  evidence of right heart strain. He received systemic tPA in the ED, was subsequently started on Heparin drip and admitted to the ICU for monitoring. He remained hemodynamically stable in the ICU and his respiratory status has improved. He is now on RA and saturating at 94-96% w/o SOB.     Heparin dose was adjusted to 1400 Units/Hr because the last PTT was supratherapeutic at 131. Please f/u PTT at 20:00, LE Duplex Scan and monitor pt's respiratory status. Please change to LMWH tomorrow and stop drip.    He has no Central Lines d/c'd. No mcneil. 2 Peripheral IV Lines.    # Massive PE: Stable    - Pt saturating at 94-96% on RA  - S/P tPA   - 2D Echo: Normal LVSF, G1DD, mild PAH  - No signs of bleeding  - Duplex of lower extremities: Pending  - Dena 2 sets: 0.15, 0.16  - Pt on Heparin infusion at 1400 Units/Hr  - F/U PTT at 20:00    # Hypomagnesemia: Resolved    - Monitor Magnesium     # Hx of CAD: Stable    - Cont with Metoprolol and Losartan and Simvastatin    # Hx of HLD:     - Cont with Simvastatin     # Hx of Nephrolithiasis: Stable    - OP f/u    #DVT PPx: Heparin, please change to LMWH tomorrow and stop drip  #GI PPx: DASH Diet  #Diet: Carbohydrate consistent  #Dispo: Downgrade to Medicine   #Code Staus: Full Code      Sign out given to: Dr Ziegler. Spectra: [phone] Patient is a 67 yo M with PMHx of B/L LE DVT (unknown if provoked or not) (stopped taking Eliquis), CAD s/p CABG, presented to the ED w/worsening SOB. He was found to have B/L main Pulmonary Artery Emboli with extension into the B/L Lobar Pulmonary Arteries and  evidence of right heart strain. He received systemic tPA in the ED, was subsequently started on Heparin drip and admitted to the ICU for monitoring. He remained hemodynamically stable in the ICU and his respiratory status has improved. He is now on RA and saturating at 94-96% w/o SOB.     Heparin dose was adjusted to 1400 Units/Hr because the last PTT was supratherapeutic at 131. Please f/u PTT at 20:00, LE Duplex Scan and monitor pt's respiratory status. Please change to LMWH tomorrow and stop drip. Please also have patient worked up for hypercoagulability w/u per Dr. Dorantes.    He has no Central Lines d/c'd. No mcneil. 2 Peripheral IV Lines.    # Massive PE: Stable    - Pt saturating at 94-96% on RA  - S/P tPA   - 2D Echo: Normal LVSF, G1DD, mild PAH  - No signs of bleeding  - Duplex of lower extremities: Pending  - Dena 2 sets: 0.15, 0.16  - Pt on Heparin infusion at 1400 Units/Hr  - F/U PTT at 20:00    # Hypomagnesemia: Resolved    - Monitor Magnesium     # Hx of CAD: Stable    - Cont with Metoprolol and Losartan and Simvastatin    # Hx of HLD:     - Cont with Simvastatin     # Hx of Nephrolithiasis: Stable    - OP f/u    #DVT PPx: Heparin, please change to LMWH tomorrow and stop drip  #GI PPx: DASH Diet  #Diet: Carbohydrate consistent  #Dispo: Downgrade to Medicine   #Code Staus: Full Code      Sign out given to: Dr Ziegler. Spectra: [phone]

## 2018-12-18 NOTE — PROGRESS NOTE ADULT - ASSESSMENT
Patient is a 69 yo M with PMHx of B/L LE DVT (unknown if provoked or not) (stopped taking Eliquis), CAD s/p CABG, presented to the ED w/worsening SOB. He was found to have B/L main Pulmonary Artery Emboli with extension into the B/L Lobar Pulmonary Arteries and  evidence of right heart strain. He received systemic tPA in the ED, was subsequently started on Heparin drip and admitted to the ICU for monitoring. He remained hemodynamically stable in the ICU and his respiratory status has improved. He is now on RA and saturating at 94-96% w/o SOB.     Massive PE: Stable  - S/P tPA   - 2D Echo: Normal LVSF, G1DD, mild PAH  - No signs of bleeding  - Duplex of lower extremities: Pending  - Dena 2 sets: 0.15, 0.16  - Pt on Heparin infusion at 1400 Units/Hr  - F/U PTT at 20:00    DM, type 2 on oral medications at home  - insulin coverage  - Monitor f/s    Hx of CAD, old MI, CABG : Stable  - Cont with Metoprolol and Losartan     Hx of HLD:   - Cont with Simvastatin     Hx of Nephrolithiasis: Stable  - OP f/u with urology    DVT PPx: Heparin  GI PPx:   Diet: Carbohydrate consistent, DASH Diet  Dispo: Downgrade to Medical floor  Code Staus: Full Code

## 2018-12-18 NOTE — PROGRESS NOTE ADULT - ASSESSMENT
Patient is a 69 yo M with PMHx of B/L LE DVT (stopped taking Eliquis) , CAD s/p CABG, presented to the ED w/SOB. He was found to have B/L main Pulmonary Artery Emboli with extension into the B/L Lobar Pulmonary Arteries with evidence of right heart strain. He received systemic tPA in ED.     # Massive PE: Stable    - Pt saturating at 94-96% on RA  - S/P tPA   - 2D Echo: Normal LVSF, G1DD, mild PAH  - No signs of bleeding  - Duplex of lower extremities: Pending  - Dena 2 sets: 0.15, 0.16    # Hypomagnesemia: Replenished    F/U Magnsium at 23:30    # Hx of CAD: Stable    - Cont with Metoprolol and Losartan and Simvastatin    # Hx of HLD:     - Cont with Simvastatin   - F/U Lipid profile in AM    # Hx of renal stones: Stable    - OP f/u      #DVT PPx: Heparin  #GI PPx: DASH Diet  #Diet: Carbohydrate consistent  #Dispo: Downgrade to Medicine   #Code Staus: Full Code Patient is a 67 yo M with PMHx of B/L LE DVT (unknown if provoked or not) (stopped taking Eliquis), CAD s/p CABG, presented to the ED w/worsening SOB. He was found to have B/L main Pulmonary Artery Emboli with extension into the B/L Lobar Pulmonary Arteries and  evidence of right heart strain. He received systemic tPA in the ED, was subsequently started on Heparin drip and admitted to the ICU for monitoring. He remained hemodynamically stable in the ICU and his respiratory status has improved. He is now on RA and saturating at 94-96% w/o SOB.     Heparin dose was adjusted to 1400 Units/Hr because the last PTT was supratherapeutic at 131. Please f/u PTT at 20:00, LE Duplex Scan and monitor pt's respiratory status.    He has no Central Lines d/c'd. No mcneil. 2 Peripheral IV Lines.    # Massive PE: Stable    - Pt saturating at 94-96% on RA  - S/P tPA   - 2D Echo: Normal LVSF, G1DD, mild PAH  - No signs of bleeding  - Duplex of lower extremities: Pending  - Dena 2 sets: 0.15, 0.16  - Pt on Heparin infusion at 1400 Units/Hr  - F/U PTT at 20:00    # Hypomagnesemia: Resolved    - Monitor Magnesium     # Hx of CAD: Stable    - Cont with Metoprolol and Losartan and Simvastatin    # Hx of HLD:     - Cont with Simvastatin     # Hx of Nephrolithiasis: Stable    - OP f/u    #DVT PPx: Heparin  #GI PPx: DASH Diet  #Diet: Carbohydrate consistent  #Dispo: Downgrade to Medicine   #Code Staus: Full Code

## 2018-12-19 LAB
APTT BLD: 63.2 SEC — HIGH (ref 27–39.2)
APTT BLD: 66.5 SEC — HIGH (ref 27–39.2)
GLUCOSE BLDC GLUCOMTR-MCNC: 123 MG/DL — HIGH (ref 70–99)
GLUCOSE BLDC GLUCOMTR-MCNC: 128 MG/DL — HIGH (ref 70–99)
GLUCOSE BLDC GLUCOMTR-MCNC: 132 MG/DL — HIGH (ref 70–99)
GLUCOSE BLDC GLUCOMTR-MCNC: 154 MG/DL — HIGH (ref 70–99)
HCT VFR BLD CALC: 35.9 % — LOW (ref 42–52)
HGB BLD-MCNC: 11.6 G/DL — LOW (ref 14–18)
MCHC RBC-ENTMCNC: 29.7 PG — SIGNIFICANT CHANGE UP (ref 27–31)
MCHC RBC-ENTMCNC: 32.3 G/DL — SIGNIFICANT CHANGE UP (ref 32–37)
MCV RBC AUTO: 91.8 FL — SIGNIFICANT CHANGE UP (ref 80–94)
NRBC # BLD: 0 /100 WBCS — SIGNIFICANT CHANGE UP (ref 0–0)
PLATELET # BLD AUTO: 126 K/UL — LOW (ref 130–400)
RBC # BLD: 3.91 M/UL — LOW (ref 4.7–6.1)
RBC # FLD: 13.4 % — SIGNIFICANT CHANGE UP (ref 11.5–14.5)
WBC # BLD: 7.88 K/UL — SIGNIFICANT CHANGE UP (ref 4.8–10.8)
WBC # FLD AUTO: 7.88 K/UL — SIGNIFICANT CHANGE UP (ref 4.8–10.8)

## 2018-12-19 PROCEDURE — 93970 EXTREMITY STUDY: CPT | Mod: 26

## 2018-12-19 RX ORDER — APIXABAN 2.5 MG/1
10 TABLET, FILM COATED ORAL EVERY 12 HOURS
Qty: 0 | Refills: 0 | Status: DISCONTINUED | OUTPATIENT
Start: 2018-12-19 | End: 2018-12-20

## 2018-12-19 RX ORDER — APIXABAN 2.5 MG/1
1 TABLET, FILM COATED ORAL
Qty: 60 | Refills: 0 | OUTPATIENT
Start: 2018-12-19 | End: 2019-01-17

## 2018-12-19 RX ORDER — APIXABAN 2.5 MG/1
2 TABLET, FILM COATED ORAL
Qty: 24 | Refills: 0 | OUTPATIENT
Start: 2018-12-19 | End: 2018-12-24

## 2018-12-19 RX ADMIN — SIMVASTATIN 40 MILLIGRAM(S): 20 TABLET, FILM COATED ORAL at 22:45

## 2018-12-19 RX ADMIN — Medication 8 UNIT(S): at 17:47

## 2018-12-19 RX ADMIN — CHLORHEXIDINE GLUCONATE 1 APPLICATION(S): 213 SOLUTION TOPICAL at 05:41

## 2018-12-19 RX ADMIN — Medication 12.5 MILLIGRAM(S): at 05:41

## 2018-12-19 RX ADMIN — INSULIN GLARGINE 23 UNIT(S): 100 INJECTION, SOLUTION SUBCUTANEOUS at 22:45

## 2018-12-19 RX ADMIN — APIXABAN 10 MILLIGRAM(S): 2.5 TABLET, FILM COATED ORAL at 18:28

## 2018-12-19 RX ADMIN — Medication 8 UNIT(S): at 13:24

## 2018-12-19 RX ADMIN — Medication 8 UNIT(S): at 08:53

## 2018-12-19 RX ADMIN — Medication 12.5 MILLIGRAM(S): at 17:47

## 2018-12-19 RX ADMIN — LOSARTAN POTASSIUM 50 MILLIGRAM(S): 100 TABLET, FILM COATED ORAL at 05:41

## 2018-12-19 RX ADMIN — APIXABAN 10 MILLIGRAM(S): 2.5 TABLET, FILM COATED ORAL at 11:54

## 2018-12-19 NOTE — PROGRESS NOTE ADULT - ASSESSMENT
67 yo M with PMHx of B/L LE DVT (unknown if provoked or not) (stopped taking Eliquis), CAD s/p CABG, presented to the ED w/worsening SOB. He was found to have B/L main Pulmonary Artery Emboli with extension into the B/L Lobar Pulmonary Arteries and  evidence of right heart strain. He received systemic tPA in the ED, was subsequently started on Heparin drip and admitted to the ICU for monitoring. He remained hemodynamically stable in the ICU and his respiratory status has improved. He is now on RA and saturating at 94-96% w/o SOB.       # Massive PE: B/L main Pulmonary Artery Emboli with extension into the B/L Lobar Pulmonary Arteries and  evidence of right heart strain.  - Pt saturating at 94-96% on RA  - S/P tPA   - 2D Echo: Normal LVSF, G1DD, mild PAH  - No signs of bleeding  - Duplex of lower extremities: Pending  - Dena 2 sets: 0.15, 0.16  - Transition to Eliquis today. Dc Hep gtt    # Hypomagnesemia: Resolved  # Hx of CAD: Stable - Cont with Metoprolol and Losartan and Simvastatin  # Hx of HLD - Cont with Simvastatin   # Hx of Nephrolithiasis: Stable - OP f/u  # DVT PPx: on  Heparin gtt.   # DASH Diet  # Diet: Carbohydrate consistent  # Dispo: DC home when medically stable 69 yo M with PMHx of B/L LE DVT (unknown if provoked or not) (stopped taking Eliquis), CAD s/p CABG, presented to the ED w/worsening SOB. He was found to have B/L main Pulmonary Artery Emboli with extension into the B/L Lobar Pulmonary Arteries and  evidence of right heart strain. He received systemic tPA in the ED, was subsequently started on Heparin drip and admitted to the ICU for monitoring. He remained hemodynamically stable in the ICU and his respiratory status has improved. He is now on RA and saturating at 94-96% w/o SOB.       # Massive PE: B/L main Pulmonary Artery Emboli with extension into the B/L Lobar Pulmonary Arteries and  evidence of right heart strain. Now noted to have right lower extremity popliteal acute DVT.  - Pt saturating at 94-96% on RA  - S/P tPA   - 2D Echo: Normal LVSF, G1DD, mild PAH  - No signs of bleeding  - Duplex of lower extremities: Pending  - Dena 2 sets: 0.15, 0.16  - Transition to Eliquis today. Dc Hep gtt;  I reviewed with HO to check authorization for outpatient Eliquis since patient NOW has new insurance plan compared to prior treatment 3 years ago;  - Now- also mention-patient  has plans for another flight to [place] on January 5t h: ask Pulmonary for their Advice regarding the safety of that plan    # Hypomagnesemia: Resolved  # Hx of CAD: Stable - Cont with Metoprolol and Losartan and Simvastatin  # Hx of HLD - Cont with Simvastatin   # Hx of Nephrolithiasis: Stable - OP f/u  # DVT PPx: on  Heparin gtt.   # DASH Diet  # Diet: Carbohydrate consistent  # Dispo: DC home when medically stable- Hopefully tomorrow if the medication is authorized

## 2018-12-19 NOTE — PROGRESS NOTE ADULT - ASSESSMENT
67 yo M with PMHx of B/L LE DVT (unknown if provoked or not) (stopped taking Eliquis), CAD s/p CABG, presented to the ED w/worsening SOB. He was found to have B/L main Pulmonary Artery Emboli with extension into the B/L Lobar Pulmonary Arteries and  evidence of right heart strain. He received systemic tPA in the ED, was subsequently started on Heparin drip and admitted to the ICU for monitoring. He remained hemodynamically stable in the ICU and his respiratory status has improved. He is now on RA and saturating at 94-96% w/o SOB.       # Massive PE: B/L main Pulmonary Artery Emboli with extension into the B/L Lobar Pulmonary Arteries and  evidence of right heart strain.  - Pt saturating at 94-96% on RA  - S/P tPA   - 2D Echo: Normal LVSF, G1DD, mild PAH  - No signs of bleeding  - Duplex of lower extremities: Pending  - Dena 2 sets: 0.15, 0.16  - Pt on Heparin infusion at 1400 Units/Hr  - Transition to Lovenox.     # Hypomagnesemia: Resolved  # Hx of CAD: Stable - Cont with Metoprolol and Losartan and Simvastatin  # Hx of HLD - Cont with Simvastatin   # Hx of Nephrolithiasis: Stable - OP f/u  # DVT PPx: on  Heparin gtt.   # DASH Diet  # Diet: Carbohydrate consistent  # Dispo: DC home when medically stable 69 yo M with PMHx of B/L LE DVT (unknown if provoked or not) (stopped taking Eliquis), CAD s/p CABG, presented to the ED w/worsening SOB. He was found to have B/L main Pulmonary Artery Emboli with extension into the B/L Lobar Pulmonary Arteries and  evidence of right heart strain. He received systemic tPA in the ED, was subsequently started on Heparin drip and admitted to the ICU for monitoring. He remained hemodynamically stable in the ICU and his respiratory status has improved. He is now on RA and saturating at 94-96% w/o SOB.       # Massive PE: B/L main Pulmonary Artery Emboli with extension into the B/L Lobar Pulmonary Arteries and  evidence of right heart strain.  - Pt saturating at 94-96% on RA  - S/P tPA   - 2D Echo: Normal LVSF, G1DD, mild PAH  - No signs of bleeding  - Duplex of lower extremities: Pending  - Dena 2 sets: 0.15, 0.16  - Transition to Eliquis today. Dc Hep gtt    # Hypomagnesemia: Resolved  # Hx of CAD: Stable - Cont with Metoprolol and Losartan and Simvastatin  # Hx of HLD - Cont with Simvastatin   # Hx of Nephrolithiasis: Stable - OP f/u  # DVT PPx: on  Heparin gtt.   # DASH Diet  # Diet: Carbohydrate consistent  # Dispo: DC home when medically stable

## 2018-12-20 ENCOUNTER — TRANSCRIPTION ENCOUNTER (OUTPATIENT)
Age: 68
End: 2018-12-20

## 2018-12-20 VITALS
DIASTOLIC BLOOD PRESSURE: 86 MMHG | SYSTOLIC BLOOD PRESSURE: 147 MMHG | HEART RATE: 94 BPM | RESPIRATION RATE: 18 BRPM | TEMPERATURE: 97 F

## 2018-12-20 LAB
ANION GAP SERPL CALC-SCNC: 13 MMOL/L — SIGNIFICANT CHANGE UP (ref 7–14)
BUN SERPL-MCNC: 11 MG/DL — SIGNIFICANT CHANGE UP (ref 10–20)
CALCIUM SERPL-MCNC: 8.5 MG/DL — SIGNIFICANT CHANGE UP (ref 8.5–10.1)
CHLORIDE SERPL-SCNC: 100 MMOL/L — SIGNIFICANT CHANGE UP (ref 98–110)
CO2 SERPL-SCNC: 27 MMOL/L — SIGNIFICANT CHANGE UP (ref 17–32)
CREAT SERPL-MCNC: 0.5 MG/DL — LOW (ref 0.7–1.5)
GLUCOSE BLDC GLUCOMTR-MCNC: 149 MG/DL — HIGH (ref 70–99)
GLUCOSE BLDC GLUCOMTR-MCNC: 187 MG/DL — HIGH (ref 70–99)
GLUCOSE SERPL-MCNC: 138 MG/DL — HIGH (ref 70–99)
HCT VFR BLD CALC: 36.6 % — LOW (ref 42–52)
HGB BLD-MCNC: 12 G/DL — LOW (ref 14–18)
MAGNESIUM SERPL-MCNC: 1.7 MG/DL — LOW (ref 1.8–2.4)
MCHC RBC-ENTMCNC: 30 PG — SIGNIFICANT CHANGE UP (ref 27–31)
MCHC RBC-ENTMCNC: 32.8 G/DL — SIGNIFICANT CHANGE UP (ref 32–37)
MCV RBC AUTO: 91.5 FL — SIGNIFICANT CHANGE UP (ref 80–94)
NRBC # BLD: 0 /100 WBCS — SIGNIFICANT CHANGE UP (ref 0–0)
PHOSPHATE SERPL-MCNC: 3 MG/DL — SIGNIFICANT CHANGE UP (ref 2.1–4.9)
PLATELET # BLD AUTO: 143 K/UL — SIGNIFICANT CHANGE UP (ref 130–400)
POTASSIUM SERPL-MCNC: 4 MMOL/L — SIGNIFICANT CHANGE UP (ref 3.5–5)
POTASSIUM SERPL-SCNC: 4 MMOL/L — SIGNIFICANT CHANGE UP (ref 3.5–5)
RBC # BLD: 4 M/UL — LOW (ref 4.7–6.1)
RBC # FLD: 13.2 % — SIGNIFICANT CHANGE UP (ref 11.5–14.5)
SODIUM SERPL-SCNC: 140 MMOL/L — SIGNIFICANT CHANGE UP (ref 135–146)
WBC # BLD: 6.91 K/UL — SIGNIFICANT CHANGE UP (ref 4.8–10.8)
WBC # FLD AUTO: 6.91 K/UL — SIGNIFICANT CHANGE UP (ref 4.8–10.8)

## 2018-12-20 RX ORDER — MAGNESIUM OXIDE 400 MG ORAL TABLET 241.3 MG
400 TABLET ORAL
Qty: 0 | Refills: 0 | Status: DISCONTINUED | OUTPATIENT
Start: 2018-12-20 | End: 2018-12-20

## 2018-12-20 RX ADMIN — MAGNESIUM OXIDE 400 MG ORAL TABLET 400 MILLIGRAM(S): 241.3 TABLET ORAL at 11:57

## 2018-12-20 RX ADMIN — Medication 12.5 MILLIGRAM(S): at 05:49

## 2018-12-20 RX ADMIN — Medication 8 UNIT(S): at 10:22

## 2018-12-20 RX ADMIN — APIXABAN 10 MILLIGRAM(S): 2.5 TABLET, FILM COATED ORAL at 05:49

## 2018-12-20 RX ADMIN — LOSARTAN POTASSIUM 50 MILLIGRAM(S): 100 TABLET, FILM COATED ORAL at 05:49

## 2018-12-20 NOTE — DISCHARGE NOTE ADULT - CARE PLAN
Principal Discharge DX:	Pulmonary thromboembolism  Goal:	Complete Medication Regimen as perscribed  Assessment and plan of treatment:	Take Eliquis as directed  Follow up with PCP in 1 week Principal Discharge DX:	Pulmonary thromboembolism  Goal:	Complete Medication Regimen as perscribed  Assessment and plan of treatment:	Take Xarelto  as directed. 15mg PO QD X 21 days. Sample provided by PCP no perscription sent.   Follow up with PCP in 1 week

## 2018-12-20 NOTE — DISCHARGE NOTE ADULT - MEDICATION SUMMARY - MEDICATIONS TO STOP TAKING
I will STOP taking the medications listed below when I get home from the hospital:    oxyCODONE-acetaminophen 5 mg-325 mg oral tablet  -- 1 tab(s) by mouth every 6 hours, As needed, Moderate Pain (4 - 6) MDD:4 tabs daily    Cipro 500 mg oral tablet  -- 1 tab(s) by mouth once a day   -- Avoid prolonged or excessive exposure to direct and/or artificial sunlight while taking this medication.  Check with your doctor before becoming pregnant.  Do not take dairy products, antacids, or iron preparations within one hour of this medication.  Finish all this medication unless otherwise directed by prescriber.  Medication should be taken with plenty of water.    Cipro 500 mg oral tablet  -- 1 tab(s) by mouth every 12 hours   -- Avoid prolonged or excessive exposure to direct and/or artificial sunlight while taking this medication.  Check with your doctor before becoming pregnant.  Do not take dairy products, antacids, or iron preparations within one hour of this medication.  Finish all this medication unless otherwise directed by prescriber.  Medication should be taken with plenty of water.    Flagyl 500 mg oral tablet  -- 1 tab(s) by mouth 3 times a day   -- Do not drink alcoholic beverages when taking this medication.  Finish all this medication unless otherwise directed by prescriber.  May discolor urine or feces.

## 2018-12-20 NOTE — PROGRESS NOTE ADULT - SUBJECTIVE AND OBJECTIVE BOX
DAILY PROGRESS NOTE  ===========================================================  Patient Information:   Hospital Day:</MERARY GARCIA  /  68y  /  Male  /b> 3d /  MRN#: [id number]  Working / Admitting Diagnosis:  1. Pulmonary Embolism     |:::::::::::::::::::::::::::::| SUBJECTIVE |:::::::::::::::::::::::::::::::|  OVERNIGHT EVENTS:   No acute events noted.  Denies chest pain / SOB / palpitations / Nausea / Vomitting / constipation / diarrhea or abdominal pain.   ROS otherwise negative.  Past Medical History:   MI (myocardial infarction)  Kidney stones  High cholesterol  Type 2 diabetes mellitus with complication, unspecified long term insulin use status  Hypertension  H/O cystoscopy  H/O heart surgery    ALLERGIES:  No Known Allergies    HOME MEDICATIONS:  Aspirin Enteric Coated 81 mg oral delayed release tablet: 1 tab(s) orally once a day (31 Mar 2018 17:14)  losartan 50 mg oral tablet: 1 tab(s) orally once a day (31 Mar 2018 22:32)  Metoprolol Tartrate 25 mg oral tablet: 0.5 tab(s) orally 2 times a day (17 Dec 2018 02:05)  simvastatin 40 mg oral tablet: 1 tab(s) orally once a day (at bedtime) (31 Mar 2018 17:14)      |:::::::::::::::::::::::::::| OBJECTIVE |:::::::::::::::::::::::::::|    VITAL SIGNS: Last 24 Hours  T(C): 35.7 (19 Dec 2018 05:03), Max: 37 (18 Dec 2018 16:00)  T(F): 96.3 (19 Dec 2018 05:03), Max: 98.6 (18 Dec 2018 16:00)  HR: 84 (19 Dec 2018 05:03) (74 - 92)  BP: 164/78 (19 Dec 2018 05:03) (138/73 - 173/77)  BP(mean): 98 (18 Dec 2018 19:00) (93 - 122)  RR: 18 (19 Dec 2018 05:03) (17 - 28)  SpO2: 94% (18 Dec 2018 19:56) (90% - 94%)    12-18-18 @ 07:01  -  12-19-18 @ 07:00  --------------------------------------------------------  IN: 319 mL / OUT: 600 mL / NET: -281 mL      PHYSICAL EXAM:  GENERAL:   Awake, alert; NAD.  HEENT:  Head NC/AT; Conjunctivae pink, Sclera anicteric & non-injected; Oral mucosa moist.  NECK:   Supple.  CARDIO:   RRR; S1 & S2 audible  RESP:  No respiratory distress or accessory muscle use. CTAB  GI:   Soft/ NT/ND / No guarding; No rebound tenderness.  EXT:   Without any cyanosis, clubbing, rash, lesions or edema.     LAB RESULTS:                        11.6   7.88  )-----------( 126      ( 19 Dec 2018 08:22 )             35.9     12-18    143  |  103  |  14  ----------------------------<  151<H>  3.7   |  27  |  0.8    Ca    7.7<L>      18 Dec 2018 04:30  Mg     1.8     12-17    PTT - ( 19 Dec 2018 02:08 )  PTT:63.2 sec    RADIOLOGY:  < from: CT Angio Chest Dissection Protocol (12.16.18 @ 21:15) >    IMPRESSION:    Bilateral left and right main pulmonary artery emboli with extension into   the bilateral lobar pulmonary arteries. There is evidence of right heart   strain, with an RV/LV ratio of 1.5.     Left upper lobe peripheral wedge-shaped consolidation, likely represents   a pulmonary infarct.     Dilation of the main pulmonary artery up to 2.5 cm, consistent with   pulmonary hypertension.    No aortic dissection.    < end of copied text >    INPATIENT MEDICATIONS:  chlorhexidine 4% Liquid 1 Application(s) Topical <User Schedule>  dextrose 5%. 1000 milliLiter(s) IV Continuous <Continuous>  dextrose 50% Injectable 12.5 Gram(s) IV Push once  dextrose 50% Injectable 25 Gram(s) IV Push once  dextrose 50% Injectable 25 Gram(s) IV Push once  heparin  Infusion. 1400 Unit(s)/Hr IV Continuous <Continuous>  insulin glargine Injectable (LANTUS) 23 Unit(s) SubCutaneous at bedtime  insulin lispro (HumaLOG) corrective regimen sliding scale   SubCutaneous three times a day before meals  insulin lispro Injectable (HumaLOG) 8 Unit(s) SubCutaneous before breakfast  insulin lispro Injectable (HumaLOG) 8 Unit(s) SubCutaneous before lunch  insulin lispro Injectable (HumaLOG) 8 Unit(s) SubCutaneous before dinner  losartan 50 milliGRAM(s) Oral daily  metoprolol tartrate 12.5 milliGRAM(s) Oral two times a day  simvastatin 40 milliGRAM(s) Oral at bedtime    PRN MEDICATIONS  acetaminophen   Tablet .. 650 milliGRAM(s) Oral every 6 hours PRN  dextrose 40% Gel 15 Gram(s) Oral once PRN  glucagon  Injectable 1 milliGRAM(s) IntraMuscular once PRN    ------------------------------------------------------------------------------------------------------------
CHIEF COMPLAINT:    Patient is a 68y old Male who presents with a chief complaint of massive PE (18 Dec 2018 10:25)    Currently admitted to medicine with the primary diagnosis of Pulmonary embolism     Today is hospital day 2d. This morning he is resting comfortably in bed and reports no new issues or overnight events. He denied SOB, cough, CP, palpitations, n/v, abd pain. He feels better and was on RA, saturating fine.    PAST MEDICAL & SURGICAL HISTORY  MI (myocardial infarction)  Kidney stones  High cholesterol  Type 2 diabetes mellitus with complication, unspecified long term insulin use status  Hypertension, unspecified type  H/O cystoscopy  H/O heart surgery: quadruple bypass 6 years ago    SOCIAL HISTORY:  Negative for smoking/alcohol/drug use.     ALLERGIES:  No Known Allergies    MEDICATIONS:  STANDING MEDICATIONS  chlorhexidine 4% Liquid 1 Application(s) Topical <User Schedule>  dextrose 5%. 1000 milliLiter(s) IV Continuous <Continuous>  dextrose 50% Injectable 12.5 Gram(s) IV Push once  dextrose 50% Injectable 25 Gram(s) IV Push once  dextrose 50% Injectable 25 Gram(s) IV Push once  heparin  Infusion.  Unit(s)/Hr IV Continuous <Continuous>  insulin glargine Injectable (LANTUS) 23 Unit(s) SubCutaneous at bedtime  insulin lispro (HumaLOG) corrective regimen sliding scale   SubCutaneous three times a day before meals  insulin lispro Injectable (HumaLOG) 8 Unit(s) SubCutaneous before breakfast  insulin lispro Injectable (HumaLOG) 8 Unit(s) SubCutaneous before lunch  insulin lispro Injectable (HumaLOG) 8 Unit(s) SubCutaneous before dinner  losartan 50 milliGRAM(s) Oral daily  metoprolol tartrate 12.5 milliGRAM(s) Oral two times a day  simvastatin 40 milliGRAM(s) Oral at bedtime    PRN MEDICATIONS  acetaminophen   Tablet .. 650 milliGRAM(s) Oral every 6 hours PRN  dextrose 40% Gel 15 Gram(s) Oral once PRN  glucagon  Injectable 1 milliGRAM(s) IntraMuscular once PRN  heparin  Injectable 7500 Unit(s) IV Push every 6 hours PRN  heparin  Injectable 3500 Unit(s) IV Push every 6 hours PRN    VITALS:   T(F): 96.4  HR: 74  BP: 141/69  RR: 20  SpO2: 91%    LABS:                        12.2   10.07 )-----------( 129      ( 18 Dec 2018 04:30 )             38.1     12-18    143  |  103  |  14  ----------------------------<  151<H>  3.7   |  27  |  0.8    Ca    7.7<L>      18 Dec 2018 04:30  Mg     1.8     12-17    TPro  6.7  /  Alb  4.2  /  TBili  1.2  /  DBili  0.2  /  AST  51<H>  /  ALT  24  /  AlkPhos  68  12-16    PT/INR - ( 16 Dec 2018 20:26 )   PT: 21.00 sec;   INR: 1.84 ratio         PTT - ( 18 Dec 2018 04:30 )  PTT:73.4 sec          CARDIAC MARKERS ( 17 Dec 2018 07:36 )  x     / 0.16 ng/mL / 88 U/L / x     / 5.7 ng/mL  CARDIAC MARKERS ( 16 Dec 2018 20:26 )  x     / 0.15 ng/mL / x     / x     / 3.2 ng/mL        RADIOLOGY:    PHYSICAL EXAM:  GEN: Mild distress  PULM: Decreased b/l and expiratory wheezes  CARD: S1/S2 present. RRR.   GI: Soft, non-tender, non-distended. Bowel sounds present  MSK: NC/NC/NE/2+PP/MALAGON  NEURO: AAOX3
Chart reviewed, patient examined. Pertinent results reviewed.  Case discussed with HO; specialist f/u reviewed  HD#2; out of ICU    DAILY PROGRESS NOTE  ===========================================================  Patient Information:   Hospital Day:</MERARY GARCIA  /  68y  /  Male  /b> 3d /  MRN#: [id number]; He had just flown back from [place], and Took a single dose of 5 mg-Eliquis as a prophylactic dose  Working / Admitting Diagnosis:  1. Pulmonary Embolism     |:::::::::::::::::::::::::::::| SUBJECTIVE |:::::::::::::::::::::::::::::::|  OVERNIGHT EVENTS:   No acute events noted. Patient was transferred from critical care unit to medical floor  Denies chest pain / SOB / palpitations / Nausea / Vomitting / constipation / diarrhea or abdominal pain. He does acknowledge some pain in his right calf.  ROS otherwise negative.  Past Medical History:   MI (myocardial infarction)/ CABG  Prior PE  Kidney stones  High cholesterol  Type 2 diabetes mellitus with complication, unspecified long term insulin use status  Hypertension  H/O cystoscopy  H/O heart surgery    ALLERGIES:  No Known Allergies    HOME MEDICATIONS:  Aspirin Enteric Coated 81 mg oral delayed release tablet: 1 tab(s) orally once a day (31 Mar 2018 17:14)  losartan 50 mg oral tablet: 1 tab(s) orally once a day (31 Mar 2018 22:32)  Metoprolol Tartrate 25 mg oral tablet: 0.5 tab(s) orally 2 times a day (17 Dec 2018 02:05)  simvastatin 40 mg oral tablet: 1 tab(s) orally once a day (at bedtime) (31 Mar 2018 17:14)      |:::::::::::::::::::::::::::| OBJECTIVE |:::::::::::::::::::::::::::|    VITAL SIGNS: Last 24 Hours  T(C): 35.7 (19 Dec 2018 05:03), Max: 37 (18 Dec 2018 16:00)  T(F): 96.3 (19 Dec 2018 05:03), Max: 98.6 (18 Dec 2018 16:00)  HR: 84 (19 Dec 2018 05:03) (74 - 92)  BP: 164/78 (19 Dec 2018 05:03) (138/73 - 173/77)  BP(mean): 98 (18 Dec 2018 19:00) (93 - 122)  RR: 18 (19 Dec 2018 05:03) (17 - 28)  SpO2: 94% (18 Dec 2018 19:56) (90% - 94%)    12-18-18 @ 07:01  -  12-19-18 @ 07:00  --------------------------------------------------------  IN: 319 mL / OUT: 600 mL / NET: -281 mL      PHYSICAL EXAM:  GENERAL:   Awake, alert; NAD.  HEENT:  Head NC/AT; Conjunctivae pink, Sclera anicteric & non-injected; Oral mucosa moist.  NECK:   Supple.  CARDIO:   RRR; S1 & S2 audible  RESP:  No respiratory distress or accessory muscle use. CTAB  GI:   Soft/ NT/ND / No guarding; No rebound tenderness.  EXT:   Without any cyanosis, clubbing, rash, lesions ; R calf & post Knee- min TTP    LAB RESULTS:                        11.6   7.88  )-----------( 126      ( 19 Dec 2018 08:22 )             35.9     12-18    143  |  103  |  14  ----------------------------<  151<H>  3.7   |  27  |  0.8    Ca    7.7<L>      18 Dec 2018 04:30  Mg     1.8     12-17    PTT - ( 19 Dec 2018 02:08 )  PTT:63.2 sec    RADIOLOGY:  < from: CT Angio Chest Dissection Protocol (12.16.18 @ 21:15) >    IMPRESSION:    Bilateral left and right main pulmonary artery emboli with extension into   the bilateral lobar pulmonary arteries. There is evidence of right heart   strain, with an RV/LV ratio of 1.5.     Left upper lobe peripheral wedge-shaped consolidation, likely represents   a pulmonary infarct.     Dilation of the main pulmonary artery up to 2.5 cm, consistent with   pulmonary hypertension.    No aortic dissection.    < end of copied text >  < from: VA Duplex Lower Ext Vein Scan, Bilat (12.19.18 @ 10:38) >  Impression:    Acute right popliteal vein DVT.    Chronic left popliteal vein DVT.    ICD-10: M79.89      < end of copied text >  < from: VA Duplex Lower Ext Vein Scan, Bilat (12.19.18 @ 10:38) >  Impression:    Acute right popliteal vein DVT.    Chronic left popliteal vein DVT.    ICD-10: M79.89      < end of copied text >    INPATIENT MEDICATIONS:  chlorhexidine 4% Liquid 1 Application(s) Topical <User Schedule>  dextrose 5%. 1000 milliLiter(s) IV Continuous <Continuous>  dextrose 50% Injectable 12.5 Gram(s) IV Push once  dextrose 50% Injectable 25 Gram(s) IV Push once  dextrose 50% Injectable 25 Gram(s) IV Push once  heparin  Infusion. 1400 Unit(s)/Hr IV Continuous <Continuous>  insulin glargine Injectable (LANTUS) 23 Unit(s) SubCutaneous at bedtime  insulin lispro (HumaLOG) corrective regimen sliding scale   SubCutaneous three times a day before meals  insulin lispro Injectable (HumaLOG) 8 Unit(s) SubCutaneous before breakfast  insulin lispro Injectable (HumaLOG) 8 Unit(s) SubCutaneous before lunch  insulin lispro Injectable (HumaLOG) 8 Unit(s) SubCutaneous before dinner  losartan 50 milliGRAM(s) Oral daily  metoprolol tartrate 12.5 milliGRAM(s) Oral two times a day  simvastatin 40 milliGRAM(s) Oral at bedtime    PRN MEDICATIONS  acetaminophen   Tablet .. 650 milliGRAM(s) Oral every 6 hours PRN  dextrose 40% Gel 15 Gram(s) Oral once PRN  glucagon  Injectable 1 milliGRAM(s) IntraMuscular once PRN    ------------------------------------------------------------------------------------------------------------
GARCIAMERARY DAY  68y  Male  HPI:  Patient is a 69 yo M with PMHx of DVT in remote past, CAD with MI s/p CABG, hx of DVT in past presents to [facility] with complaints of SOB. Per patient, he has been short of breath for the last three months. He notices it more when he tries to exert himself. He does endorse a recent trave history. Reports he took a flight on December 1st in which the flight was about 3/5-4 hours long (Flew to [place] and went on a cruise). Patient also admits to recent bus travel to [place]. Patient says that he is told by his PMD that if he is going on long distance travel he should take Eliquis on those days,. Patient took Eliquis on travel to [place] but not on travel to . Otherwise he does not take it regularly. On day of admission patient became very short of breath with associated chest tightness so he came to the emergency room for further evaluation.     Initially, there were ST changes noted on the EKG but code STEMI was canceled. Aortic Dissection study was done which showed evidence of Bilateral left and right main pulmonary artery emboli with extension into the bilateral lobar pulmonary arteries with evidence of right heart strain. Patient received systemic tPA in the ED. (17 Dec 2018 00:51)    MEDICATIONS  (STANDING):  chlorhexidine 4% Liquid 1 Application(s) Topical <User Schedule>  dextrose 5%. 1000 milliLiter(s) (50 mL/Hr) IV Continuous <Continuous>  dextrose 50% Injectable 12.5 Gram(s) IV Push once  dextrose 50% Injectable 25 Gram(s) IV Push once  dextrose 50% Injectable 25 Gram(s) IV Push once  heparin  Infusion. 1400 Unit(s)/Hr (14 mL/Hr) IV Continuous <Continuous>  insulin glargine Injectable (LANTUS) 23 Unit(s) SubCutaneous at bedtime  insulin lispro (HumaLOG) corrective regimen sliding scale   SubCutaneous three times a day before meals  insulin lispro Injectable (HumaLOG) 8 Unit(s) SubCutaneous before breakfast  insulin lispro Injectable (HumaLOG) 8 Unit(s) SubCutaneous before lunch  insulin lispro Injectable (HumaLOG) 8 Unit(s) SubCutaneous before dinner  losartan 50 milliGRAM(s) Oral daily  metoprolol tartrate 12.5 milliGRAM(s) Oral two times a day  simvastatin 40 milliGRAM(s) Oral at bedtime    MEDICATIONS  (PRN):  acetaminophen   Tablet .. 650 milliGRAM(s) Oral every 6 hours PRN Temp greater or equal to 38C (100.4F)  dextrose 40% Gel 15 Gram(s) Oral once PRN Blood Glucose LESS THAN 70 milliGRAM(s)/deciliter  glucagon  Injectable 1 milliGRAM(s) IntraMuscular once PRN Glucose LESS THAN 70 milligrams/deciliter    INTERVAL EVENTS: Patient seen today without distress, less SOB, has some pain to the RLE    T(C): 36.1 (12-18-18 @ 20:17), Max: 38.4 (12-18-18 @ 00:00)  HR: 81 (12-18-18 @ 20:17) (66 - 92)  BP: 156/75 (12-18-18 @ 20:17) (119/65 - 194/90)  RR: 18 (12-18-18 @ 20:17) (16 - 28)  SpO2: 94% (12-18-18 @ 19:56) (90% - 99%)  Wt(kg): --Vital Signs Last 24 Hrs  T(C): 36.1 (18 Dec 2018 20:17), Max: 38.4 (18 Dec 2018 00:00)  T(F): 97 (18 Dec 2018 20:17), Max: 101.1 (18 Dec 2018 00:00)  HR: 81 (18 Dec 2018 20:17) (66 - 92)  BP: 156/75 (18 Dec 2018 20:17) (119/65 - 194/90)  BP(mean): 98 (18 Dec 2018 19:00) (81 - 141)  RR: 18 (18 Dec 2018 20:17) (16 - 28)  SpO2: 94% (18 Dec 2018 19:56) (90% - 99%)    PHYSICAL EXAM:  GENERAL: NAD  NECK: Supple, No JVD, left TLC  CHEST/LUNG: Clear; No wheezing  HEART: S1, S2, Regular rate and rhythm  ABDOMEN: Soft, Nontender, Bowel sounds present  EXTREMITIES: Trace edema  SKIN: Faint bruising to the RLE    LABS:  Labs:                        12.2   10.07 )-----------( 129      ( 18 Dec 2018 04:30 )             38.1             12-18    143  |  103  |  14  ----------------------------<  151<H>  3.7   |  27  |  0.8    Ca    7.7<L>      18 Dec 2018 04:30  Mg     1.8     12-17    PTT - ( 18 Dec 2018 11:00 )  PTT:131.4 sec  CARDIAC MARKERS ( 17 Dec 2018 07:36 )  x     / 0.16 ng/mL / 88 U/L / x     / 5.7 ng/mL        RADIOLOGY & ADDITIONAL TESTS:  < from: Xray Chest 1 View- PORTABLE-Routine (12.18.18 @ 05:39) >  Interpretation:     Support devices: Left IJ catheter, stable    Cardiac/mediastinum/hilum: Cardiomegaly, stable findings of post CABG    Lung parenchyma/Pleura: Stable left upper lobe opacity. New finding of   retrocardiac opacity. No evidence of pneumothorax.    Skeleton/soft tissues: Unchanged      < end of copied text >
GARCIAMERARY DAY  68y  Male  HPI:  Patient is a 69 yo M with PMHx of DVT in remote past, CAD with MI s/p CABG, hx of DVT in past presents to [facility] with complaints of SOB. Per patient, he has been short of breath for the last three months. He notices it more when he tries to exert himself. He does endorse a recent trave history. Reports he took a flight on December 1st in which the flight was about 3/5-4 hours long (Flew to [place] and went on a cruise). Patient also admits to recent bus travel to [place]. Patient says that he is told by his PMD that if he is going on long distance travel he should take Eliquis on those days,. Patient took Eliquis on travel to [place] but not on travel to . Otherwise he does not take it regularly. On day of admission patient became very short of breath with associated chest tightness so he came to the emergency room for further evaluation.     Initially, there were ST changes noted on the EKG but code STEMI was canceled. Aortic Dissection study was done which showed evidence of Bilateral left and right main pulmonary artery emboli with extension into the bilateral lobar pulmonary arteries with evidence of right heart strain. Patient received systemic tPA in the ED. (17 Dec 2018 00:51)    MEDICATIONS  (STANDING):  apixaban 10 milliGRAM(s) Oral every 12 hours  chlorhexidine 4% Liquid 1 Application(s) Topical <User Schedule>  dextrose 5%. 1000 milliLiter(s) (50 mL/Hr) IV Continuous <Continuous>  dextrose 50% Injectable 12.5 Gram(s) IV Push once  dextrose 50% Injectable 25 Gram(s) IV Push once  dextrose 50% Injectable 25 Gram(s) IV Push once  insulin glargine Injectable (LANTUS) 23 Unit(s) SubCutaneous at bedtime  insulin lispro (HumaLOG) corrective regimen sliding scale   SubCutaneous three times a day before meals  insulin lispro Injectable (HumaLOG) 8 Unit(s) SubCutaneous before breakfast  insulin lispro Injectable (HumaLOG) 8 Unit(s) SubCutaneous before lunch  insulin lispro Injectable (HumaLOG) 8 Unit(s) SubCutaneous before dinner  losartan 50 milliGRAM(s) Oral daily  magnesium oxide 400 milliGRAM(s) Oral three times a day with meals  metoprolol tartrate 12.5 milliGRAM(s) Oral two times a day  simvastatin 40 milliGRAM(s) Oral at bedtime    MEDICATIONS  (PRN):  acetaminophen   Tablet .. 650 milliGRAM(s) Oral every 6 hours PRN Temp greater or equal to 38C (100.4F)  dextrose 40% Gel 15 Gram(s) Oral once PRN Blood Glucose LESS THAN 70 milliGRAM(s)/deciliter  glucagon  Injectable 1 milliGRAM(s) IntraMuscular once PRN Glucose LESS THAN 70 milligrams/deciliter    INTERVAL EVENTS: Patient seen today without distress. Patient's wife at bedside.    T(C): 36.1 (12-20-18 @ 05:07), Max: 37.1 (12-19-18 @ 19:50)  HR: 94 (12-20-18 @ 05:07) (77 - 94)  BP: 147/86 (12-20-18 @ 05:07) (147/86 - 168/77)  RR: 18 (12-20-18 @ 05:07) (18 - 18)  SpO2: 94% (12-19-18 @ 23:28) (92% - 94%)  Wt(kg): --Vital Signs Last 24 Hrs  T(C): 36.1 (20 Dec 2018 05:07), Max: 37.1 (19 Dec 2018 19:50)  T(F): 96.9 (20 Dec 2018 05:07), Max: 98.7 (19 Dec 2018 19:50)  HR: 94 (20 Dec 2018 05:07) (77 - 94)  BP: 147/86 (20 Dec 2018 05:07) (147/86 - 168/77)  BP(mean): --  RR: 18 (20 Dec 2018 05:07) (18 - 18)  SpO2: 94% (19 Dec 2018 23:28) (92% - 94%)    PHYSICAL EXAM:  GENERAL: NAD, TLC out  NECK: Supple, No JVD  CHEST/LUNG: Clear  HEART: S1, S2, Regular rate and rhythm;   ABDOMEN: Soft, Nontender, Nondistended; Bowel sounds present  EXTREMITIES: Minimal edema  SKIN: faint bruises    LABS:  Labs:                        12.0   6.91  )-----------( 143      ( 20 Dec 2018 07:18 )             36.6             12-20    140  |  100  |  11  ----------------------------<  138<H>  4.0   |  27  |  0.5<L>    Ca    8.5      20 Dec 2018 07:18  Phos  3.0     12-20  Mg     1.7     12-20                PTT - ( 19 Dec 2018 08:22 )  PTT:66.5 sec      Culture - Blood (collected 18 Dec 2018 04:35)  Source: .Blood None  Preliminary Report (19 Dec 2018 13:02):    No growth to date.      RADIOLOGY & ADDITIONAL TESTS:
Patient is a 68y old  Male who presents with a chief complaint of       Over Night Events: Patient is feeling well. He denies chest pain and dyspnea.        ROS:  See HPI    PHYSICAL EXAM    ICU Vital Signs Last 24 Hrs  T(C): 35.8 (18 Dec 2018 08:00), Max: 38.4 (18 Dec 2018 00:00)  T(F): 96.4 (18 Dec 2018 08:00), Max: 101.1 (18 Dec 2018 00:00)  HR: 66 (18 Dec 2018 08:00) (66 - 92)  BP: 162/82 (18 Dec 2018 08:00) (119/65 - 194/90)  BP(mean): 120 (18 Dec 2018 08:00) (81 - 141)  RR: 23 (18 Dec 2018 08:00) (16 - 33)  SpO2: 98% (18 Dec 2018 08:00) (91% - 100%)      General:  NAD  HEENT: YELITZA             Lymphatic system: No cervical LN   Lungs: Bilateral BS, b/l wheezing  Cardiovascular: Regular   Gastrointestinal: Soft, Positive BS  Extremities: No clubbing.  Moves extremities.  Full Range of motion   Skin: Warm, intact  Neurological: No motor deficit       12-17-18 @ 07:01  -  12-18-18 @ 07:00  --------------------------------------------------------  IN:    heparin  Infusion.: 289 mL    IV PiggyBack: 50 mL    Oral Fluid: 200 mL  Total IN: 539 mL    OUT:    Voided: 700 mL  Total OUT: 700 mL    Total NET: -161 mL          LABS:                            12.2   10.07 )-----------( 129      ( 18 Dec 2018 04:30 )             38.1                                               12-18    143  |  103  |  14  ----------------------------<  151<H>  3.7   |  27  |  0.8    Ca    7.7<L>      18 Dec 2018 04:30  Mg     1.8     12-17    TPro  6.7  /  Alb  4.2  /  TBili  1.2  /  DBili  0.2  /  AST  51<H>  /  ALT  24  /  AlkPhos  68  12-16      PT/INR - ( 16 Dec 2018 20:26 )   PT: 21.00 sec;   INR: 1.84 ratio         PTT - ( 18 Dec 2018 04:30 )  PTT:73.4 sec                                           CARDIAC MARKERS ( 17 Dec 2018 07:36 )  x     / 0.16 ng/mL / 88 U/L / x     / 5.7 ng/mL  CARDIAC MARKERS ( 16 Dec 2018 20:26 )  x     / 0.15 ng/mL / x     / x     / 3.2 ng/mL                                            LIVER FUNCTIONS - ( 16 Dec 2018 20:26 )  Alb: 4.2 g/dL / Pro: 6.7 g/dL / ALK PHOS: 68 U/L / ALT: 24 U/L / AST: 51 U/L / GGT: x                                                                                                                                       MEDICATIONS  (STANDING):  chlorhexidine 4% Liquid 1 Application(s) Topical <User Schedule>  dextrose 5%. 1000 milliLiter(s) (50 mL/Hr) IV Continuous <Continuous>  dextrose 50% Injectable 12.5 Gram(s) IV Push once  dextrose 50% Injectable 25 Gram(s) IV Push once  dextrose 50% Injectable 25 Gram(s) IV Push once  heparin  Infusion.  Unit(s)/Hr (17 mL/Hr) IV Continuous <Continuous>  insulin glargine Injectable (LANTUS) 23 Unit(s) SubCutaneous at bedtime  insulin lispro (HumaLOG) corrective regimen sliding scale   SubCutaneous three times a day before meals  insulin lispro Injectable (HumaLOG) 8 Unit(s) SubCutaneous before breakfast  insulin lispro Injectable (HumaLOG) 8 Unit(s) SubCutaneous before lunch  insulin lispro Injectable (HumaLOG) 8 Unit(s) SubCutaneous before dinner  losartan 50 milliGRAM(s) Oral daily  metoprolol tartrate 12.5 milliGRAM(s) Oral two times a day  simvastatin 40 milliGRAM(s) Oral at bedtime    MEDICATIONS  (PRN):  acetaminophen   Tablet .. 650 milliGRAM(s) Oral every 6 hours PRN Temp greater or equal to 38C (100.4F)  dextrose 40% Gel 15 Gram(s) Oral once PRN Blood Glucose LESS THAN 70 milliGRAM(s)/deciliter  glucagon  Injectable 1 milliGRAM(s) IntraMuscular once PRN Glucose LESS THAN 70 milligrams/deciliter  heparin  Injectable 7500 Unit(s) IV Push every 6 hours PRN For aPTT less than   heparin  Injectable 3500 Unit(s) IV Push every 6 hours PRN For aPTT between 40 - 57      Xrays:     Cardiomegaly?                                                                                   < from: Transthoracic Echocardiogram (12.17.18 @ 14:16) >  Summary:   1. Normal global left ventricular systolic function.   2. Normal left ventricular internal cavity size.   3. Spectral Doppler shows impaired relaxation pattern of left   ventricular myocardial filling (Grade I diastolic dysfunction).   4. Mild mitral annular calcification.   5. Thickening and calcification of the anterior and posterior mitral   valve leaflets.   6. Mild tricuspid regurgitation.   7. Aortic sclerosis with normal AV opening.   8. Estimated pulmonary artery systolic pressure is 41.9 mmHg assuming a   right atrial pressure of 5 mmHg, which is consistent with mild pulmonary   hypertension.    < end of copied text >

## 2018-12-20 NOTE — DISCHARGE NOTE ADULT - PATIENT PORTAL LINK FT
You can access the [facility], offered by [facility], by registering with the following website: [email or web address]

## 2018-12-20 NOTE — DISCHARGE NOTE ADULT - PLAN OF CARE
Complete Medication Regimen as perscribed Take Eliquis as directed  Follow up with PCP in 1 week Take Xarelto  as directed. 15mg PO QD X 21 days. Sample provided by PCP no perscription sent.   Follow up with PCP in 1 week

## 2018-12-20 NOTE — DISCHARGE NOTE ADULT - ADDITIONAL INSTRUCTIONS
Pt advised not to take Flight trip in early January until seen and clear by PCP / Pulmonary as outpatient.

## 2018-12-20 NOTE — PROGRESS NOTE ADULT - ASSESSMENT
Patient is a 67 yo M with PMHx of B/L LE DVT (unknown if provoked or not) (stopped taking Eliquis), CAD s/p CABG, presented to the ED w/worsening SOB. He was found to have B/L main Pulmonary Artery Emboli with extension into the B/L Lobar Pulmonary Arteries and  evidence of right heart strain. He received systemic tPA in the ED, was subsequently started on Heparin drip and admitted to the ICU for monitoring. He remained hemodynamically stable in the ICU and his respiratory status has improved. He is now on RA and saturating at 94-96% w/o SOB.     Massive PE: Stable  - S/P tPA   - 2D Echo: Normal LVSF, G1DD, mild PAH  - No signs of bleeding  - on Eliquis  - Xarelto is the preferred rx by insurance plan with lower co -pay  - arranged for patient to  a starter pack in the office today  - patient stable for discharge    DM, type 2 on oral medications at home  - insulin coverage  - Monitor f/s    Hx of CAD, old MI, CABG : Stable  - Cont with Metoprolol and Losartan     Hx of HLD:   - Cont with Simvastatin     Hx of Nephrolithiasis: Stable  - OP f/u with urology    DVT PPx: Heparin  GI PPx:   Diet: Carbohydrate consistent, DASH Diet  Code Staus: Full Code  F/U in office on 1/2/2019

## 2018-12-20 NOTE — DISCHARGE NOTE ADULT - HOSPITAL COURSE
67 yo M with PMHx of B/L LE DVT (unknown if provoked or not) (stopped taking Eliquis), CAD s/p CABG, presented to the ED w/worsening SOB. He was found to have B/L main Pulmonary Artery Emboli with extension into the B/L Lobar Pulmonary Arteries and  evidence of right heart strain. He received systemic tPA in the ED, was subsequently started on Heparin drip and admitted to the ICU for monitoring. He remained hemodynamically stable in the ICU and his respiratory status has improved. He is now on RA and saturating at 94-96% w/o SOB.   For his Massive PE: B/L main Pulmonary Artery Emboli with extension into the B/L Lobar Pulmonary Arteries and  evidence of right heart strain pt was saturating at 94-96% on RA had 2D Echo: Normal LVSF, G1DD, mild PAH. No signs of bleeding while s/p TPa and hep gtt, tolerated Eliquis well Duplex of lower extremities with acute L pop DVT and chronic Right, Dena 2 sets: 0.15, 0.16. Pt insurance changed and Eliquis sent for approval, about 250/month supply, patient prefers Eliquis. Will discuss with PCP about transition to another NOAC. 69 yo M with PMHx of B/L LE DVT (unknown if provoked or not) (stopped taking Eliquis), CAD s/p CABG, presented to the ED w/worsening SOB. He was found to have B/L main Pulmonary Artery Emboli with extension into the B/L Lobar Pulmonary Arteries and  evidence of right heart strain. He received systemic tPA in the ED, was subsequently started on Heparin drip and admitted to the ICU for monitoring. He remained hemodynamically stable in the ICU and his respiratory status has improved. He is now on RA and saturating at 94-96% w/o SOB.   For his Massive PE: B/L main Pulmonary Artery Emboli with extension into the B/L Lobar Pulmonary Arteries and  evidence of right heart strain pt was saturating at 94-96% on RA had 2D Echo: Normal LVSF, G1DD, mild PAH. No signs of bleeding while s/p TPa and hep gtt, tolerated Eliquis well Duplex of lower extremities with acute L pop DVT and chronic Right, Dena 2 sets: 0.15, 0.16. Pt insurance changed and Eliquis sent for approval, about 250/month supply, Take Xarelto  as directed. 15mg PO QD X 21 days. Sample provided by PCP no prescription sent as per Attending Instructions

## 2018-12-20 NOTE — DISCHARGE NOTE ADULT - MEDICATION SUMMARY - MEDICATIONS TO TAKE
I will START or STAY ON the medications listed below when I get home from the hospital:    Aspirin Enteric Coated 81 mg oral delayed release tablet  -- 1 tab(s) by mouth once a day  -- Indication: For cad    losartan 50 mg oral tablet  -- 1 tab(s) by mouth once a day  -- Indication: For htn    apixaban 5 mg oral tablet  -- 2 tab(s) by mouth every 12 hours last dose on Dec 25 2018 then start 5mg q12 hours on Dec 26  -- Indication: For Pulmonary embolism    apixaban 5 mg oral tablet  -- 1 tab(s) by mouth every 12 hours starting on December 26 2018   -- Indication: For Pulmonary embolism    simvastatin 40 mg oral tablet  -- 1 tab(s) by mouth once a day (at bedtime)  -- Indication: For DLD    Metoprolol Tartrate 25 mg oral tablet  -- 0.5 tab(s) by mouth 2 times a day  -- Indication: For CAD I will START or STAY ON the medications listed below when I get home from the hospital:    Aspirin Enteric Coated 81 mg oral delayed release tablet  -- 1 tab(s) by mouth once a day  -- Indication: For cad    losartan 50 mg oral tablet  -- 1 tab(s) by mouth once a day  -- Indication: For htn    simvastatin 40 mg oral tablet  -- 1 tab(s) by mouth once a day (at bedtime)  -- Indication: For DLD    Metoprolol Tartrate 25 mg oral tablet  -- 0.5 tab(s) by mouth 2 times a day  -- Indication: For CAD

## 2018-12-20 NOTE — DISCHARGE NOTE ADULT - CARE PROVIDER_API CALL
Tressa Mariscal), Medicine  [address] [place] [address]  Phone: [phone]  Fax: [phone]    Guanako Nicholson), Hematology; Internal Medicine; Medical Oncology  [address] [place] [address]  Phone: [phone]  Fax: [phone]

## 2018-12-23 LAB
CULTURE RESULTS: SIGNIFICANT CHANGE UP
SPECIMEN SOURCE: SIGNIFICANT CHANGE UP

## 2018-12-26 DIAGNOSIS — E83.42 HYPOMAGNESEMIA: ICD-10-CM

## 2018-12-26 DIAGNOSIS — I27.9 PULMONARY HEART DISEASE, UNSPECIFIED: ICD-10-CM

## 2018-12-26 DIAGNOSIS — R06.02 SHORTNESS OF BREATH: ICD-10-CM

## 2018-12-26 DIAGNOSIS — I25.2 OLD MYOCARDIAL INFARCTION: ICD-10-CM

## 2018-12-26 DIAGNOSIS — Z95.5 PRESENCE OF CORONARY ANGIOPLASTY IMPLANT AND GRAFT: ICD-10-CM

## 2018-12-26 DIAGNOSIS — Z95.1 PRESENCE OF AORTOCORONARY BYPASS GRAFT: ICD-10-CM

## 2018-12-26 DIAGNOSIS — I26.99 OTHER PULMONARY EMBOLISM WITHOUT ACUTE COR PULMONALE: ICD-10-CM

## 2018-12-26 DIAGNOSIS — E11.9 TYPE 2 DIABETES MELLITUS WITHOUT COMPLICATIONS: ICD-10-CM

## 2018-12-26 DIAGNOSIS — I10 ESSENTIAL (PRIMARY) HYPERTENSION: ICD-10-CM

## 2018-12-26 DIAGNOSIS — I25.10 ATHEROSCLEROTIC HEART DISEASE OF NATIVE CORONARY ARTERY WITHOUT ANGINA PECTORIS: ICD-10-CM

## 2018-12-26 DIAGNOSIS — Z86.718 PERSONAL HISTORY OF OTHER VENOUS THROMBOSIS AND EMBOLISM: ICD-10-CM

## 2019-02-13 ENCOUNTER — OUTPATIENT (OUTPATIENT)
Dept: OUTPATIENT SERVICES | Facility: HOSPITAL | Age: 69
LOS: 1 days | Discharge: HOME | End: 2019-02-13

## 2019-02-13 DIAGNOSIS — E11.69 TYPE 2 DIABETES MELLITUS WITH OTHER SPECIFIED COMPLICATION: ICD-10-CM

## 2019-02-13 DIAGNOSIS — J15.9 UNSPECIFIED BACTERIAL PNEUMONIA: ICD-10-CM

## 2019-02-13 DIAGNOSIS — J02.9 ACUTE PHARYNGITIS, UNSPECIFIED: ICD-10-CM

## 2019-02-13 DIAGNOSIS — Z98.890 OTHER SPECIFIED POSTPROCEDURAL STATES: Chronic | ICD-10-CM

## 2019-02-13 DIAGNOSIS — R60.9 EDEMA, UNSPECIFIED: ICD-10-CM

## 2019-02-13 DIAGNOSIS — Z87.09 PERSONAL HISTORY OF OTHER DISEASES OF THE RESPIRATORY SYSTEM: ICD-10-CM

## 2019-02-13 DIAGNOSIS — I10 ESSENTIAL (PRIMARY) HYPERTENSION: ICD-10-CM

## 2019-03-16 ENCOUNTER — OUTPATIENT (OUTPATIENT)
Dept: OUTPATIENT SERVICES | Facility: HOSPITAL | Age: 69
LOS: 1 days | Discharge: HOME | End: 2019-03-16

## 2019-03-16 DIAGNOSIS — Z98.890 OTHER SPECIFIED POSTPROCEDURAL STATES: Chronic | ICD-10-CM

## 2019-03-16 DIAGNOSIS — R91.8 OTHER NONSPECIFIC ABNORMAL FINDING OF LUNG FIELD: ICD-10-CM

## 2019-06-05 ENCOUNTER — OUTPATIENT (OUTPATIENT)
Dept: OUTPATIENT SERVICES | Facility: HOSPITAL | Age: 69
LOS: 1 days | Discharge: HOME | End: 2019-06-05

## 2019-06-05 DIAGNOSIS — Z98.890 OTHER SPECIFIED POSTPROCEDURAL STATES: Chronic | ICD-10-CM

## 2019-06-06 DIAGNOSIS — E11.29 TYPE 2 DIABETES MELLITUS WITH OTHER DIABETIC KIDNEY COMPLICATION: ICD-10-CM

## 2019-06-06 DIAGNOSIS — I10 ESSENTIAL (PRIMARY) HYPERTENSION: ICD-10-CM

## 2019-06-07 ENCOUNTER — OUTPATIENT (OUTPATIENT)
Dept: OUTPATIENT SERVICES | Facility: HOSPITAL | Age: 69
LOS: 1 days | Discharge: HOME | End: 2019-06-07

## 2019-06-07 DIAGNOSIS — Z98.890 OTHER SPECIFIED POSTPROCEDURAL STATES: Chronic | ICD-10-CM

## 2019-06-07 DIAGNOSIS — E11.9 TYPE 2 DIABETES MELLITUS WITHOUT COMPLICATIONS: ICD-10-CM

## 2019-06-07 DIAGNOSIS — R05 COUGH: ICD-10-CM

## 2019-06-07 DIAGNOSIS — I10 ESSENTIAL (PRIMARY) HYPERTENSION: ICD-10-CM

## 2019-06-07 DIAGNOSIS — N20.0 CALCULUS OF KIDNEY: ICD-10-CM

## 2019-10-29 ENCOUNTER — OUTPATIENT (OUTPATIENT)
Dept: OUTPATIENT SERVICES | Facility: HOSPITAL | Age: 69
LOS: 1 days | Discharge: HOME | End: 2019-10-29

## 2019-10-29 DIAGNOSIS — R80.9 PROTEINURIA, UNSPECIFIED: ICD-10-CM

## 2019-10-29 DIAGNOSIS — E11.29 TYPE 2 DIABETES MELLITUS WITH OTHER DIABETIC KIDNEY COMPLICATION: ICD-10-CM

## 2019-10-29 DIAGNOSIS — L30.9 DERMATITIS, UNSPECIFIED: ICD-10-CM

## 2019-10-29 DIAGNOSIS — E78.5 HYPERLIPIDEMIA, UNSPECIFIED: ICD-10-CM

## 2019-10-29 DIAGNOSIS — Z98.890 OTHER SPECIFIED POSTPROCEDURAL STATES: Chronic | ICD-10-CM

## 2019-10-29 DIAGNOSIS — E55.9 VITAMIN D DEFICIENCY, UNSPECIFIED: ICD-10-CM

## 2019-10-29 DIAGNOSIS — I82.409 ACUTE EMBOLISM AND THROMBOSIS OF UNSPECIFIED DEEP VEINS OF UNSPECIFIED LOWER EXTREMITY: ICD-10-CM

## 2019-11-11 ENCOUNTER — OUTPATIENT (OUTPATIENT)
Dept: OUTPATIENT SERVICES | Facility: HOSPITAL | Age: 69
LOS: 1 days | Discharge: HOME | End: 2019-11-11
Payer: MEDICARE

## 2019-11-11 DIAGNOSIS — Z98.890 OTHER SPECIFIED POSTPROCEDURAL STATES: Chronic | ICD-10-CM

## 2019-11-11 DIAGNOSIS — R07.89 OTHER CHEST PAIN: ICD-10-CM

## 2019-11-11 DIAGNOSIS — I10 ESSENTIAL (PRIMARY) HYPERTENSION: ICD-10-CM

## 2019-11-11 PROCEDURE — 93880 EXTRACRANIAL BILAT STUDY: CPT | Mod: 26

## 2019-12-01 ENCOUNTER — INPATIENT (INPATIENT)
Facility: HOSPITAL | Age: 69
LOS: 1 days | Discharge: HOME | End: 2019-12-03
Attending: INTERNAL MEDICINE | Admitting: INTERNAL MEDICINE
Payer: MEDICARE

## 2019-12-01 VITALS
OXYGEN SATURATION: 95 % | RESPIRATION RATE: 18 BRPM | DIASTOLIC BLOOD PRESSURE: 79 MMHG | TEMPERATURE: 97 F | SYSTOLIC BLOOD PRESSURE: 165 MMHG | WEIGHT: 214.95 LBS | HEART RATE: 104 BPM | HEIGHT: 73 IN

## 2019-12-01 DIAGNOSIS — Z98.890 OTHER SPECIFIED POSTPROCEDURAL STATES: Chronic | ICD-10-CM

## 2019-12-01 LAB
ALBUMIN SERPL ELPH-MCNC: 4.3 G/DL — SIGNIFICANT CHANGE UP (ref 3.5–5.2)
ALP SERPL-CCNC: 53 U/L — SIGNIFICANT CHANGE UP (ref 30–115)
ALT FLD-CCNC: 11 U/L — SIGNIFICANT CHANGE UP (ref 0–41)
ANION GAP SERPL CALC-SCNC: 16 MMOL/L — HIGH (ref 7–14)
APPEARANCE UR: CLEAR — SIGNIFICANT CHANGE UP
APTT BLD: 31.1 SEC — SIGNIFICANT CHANGE UP (ref 27–39.2)
AST SERPL-CCNC: 14 U/L — SIGNIFICANT CHANGE UP (ref 0–41)
BACTERIA # UR AUTO: NEGATIVE — SIGNIFICANT CHANGE UP
BASE EXCESS BLDV CALC-SCNC: 2.4 MMOL/L — HIGH (ref -2–2)
BILIRUB SERPL-MCNC: 1.4 MG/DL — HIGH (ref 0.2–1.2)
BILIRUB UR-MCNC: NEGATIVE — SIGNIFICANT CHANGE UP
BUN SERPL-MCNC: 12 MG/DL — SIGNIFICANT CHANGE UP (ref 10–20)
CA-I SERPL-SCNC: SIGNIFICANT CHANGE UP MMOL/L (ref 1.12–1.3)
CALCIUM SERPL-MCNC: 9.2 MG/DL — SIGNIFICANT CHANGE UP (ref 8.5–10.1)
CHLORIDE SERPL-SCNC: 101 MMOL/L — SIGNIFICANT CHANGE UP (ref 98–110)
CO2 SERPL-SCNC: 25 MMOL/L — SIGNIFICANT CHANGE UP (ref 17–32)
COLOR SPEC: YELLOW — SIGNIFICANT CHANGE UP
CREAT SERPL-MCNC: 0.9 MG/DL — SIGNIFICANT CHANGE UP (ref 0.7–1.5)
DIFF PNL FLD: ABNORMAL
EPI CELLS # UR: 2 /HPF — SIGNIFICANT CHANGE UP (ref 0–5)
FLU A RESULT: NEGATIVE — SIGNIFICANT CHANGE UP
FLU A RESULT: NEGATIVE — SIGNIFICANT CHANGE UP
FLUAV AG NPH QL: NEGATIVE — SIGNIFICANT CHANGE UP
FLUBV AG NPH QL: NEGATIVE — SIGNIFICANT CHANGE UP
GAS PNL BLDV: 144 MMOL/L — SIGNIFICANT CHANGE UP (ref 136–145)
GAS PNL BLDV: SIGNIFICANT CHANGE UP
GLUCOSE BLDC GLUCOMTR-MCNC: 158 MG/DL — HIGH (ref 70–99)
GLUCOSE SERPL-MCNC: 238 MG/DL — HIGH (ref 70–99)
GLUCOSE UR QL: SIGNIFICANT CHANGE UP
HCO3 BLDV-SCNC: 30 MMOL/L — HIGH (ref 22–29)
HCT VFR BLD CALC: 48.1 % — SIGNIFICANT CHANGE UP (ref 42–52)
HCT VFR BLDA CALC: 48.5 % — HIGH (ref 34–44)
HGB BLD CALC-MCNC: 15.8 G/DL — SIGNIFICANT CHANGE UP (ref 14–18)
HGB BLD-MCNC: 15.4 G/DL — SIGNIFICANT CHANGE UP (ref 14–18)
HYALINE CASTS # UR AUTO: 4 /LPF — SIGNIFICANT CHANGE UP (ref 0–7)
INR BLD: 2.15 RATIO — HIGH (ref 0.65–1.3)
KETONES UR-MCNC: NEGATIVE — SIGNIFICANT CHANGE UP
LACTATE BLDV-MCNC: SIGNIFICANT CHANGE UP MMOL/L (ref 0.5–1.6)
LEUKOCYTE ESTERASE UR-ACNC: NEGATIVE — SIGNIFICANT CHANGE UP
MCHC RBC-ENTMCNC: 30.3 PG — SIGNIFICANT CHANGE UP (ref 27–31)
MCHC RBC-ENTMCNC: 32 G/DL — SIGNIFICANT CHANGE UP (ref 32–37)
MCV RBC AUTO: 94.7 FL — HIGH (ref 80–94)
NITRITE UR-MCNC: NEGATIVE — SIGNIFICANT CHANGE UP
NRBC # BLD: 0 /100 WBCS — SIGNIFICANT CHANGE UP (ref 0–0)
NT-PROBNP SERPL-SCNC: 611 PG/ML — HIGH (ref 0–300)
PCO2 BLDV: 54 MMHG — HIGH (ref 41–51)
PH BLDV: 7.34 — SIGNIFICANT CHANGE UP (ref 7.26–7.43)
PH UR: 6 — SIGNIFICANT CHANGE UP (ref 5–8)
PLATELET # BLD AUTO: 162 K/UL — SIGNIFICANT CHANGE UP (ref 130–400)
PO2 BLDV: 25 MMHG — SIGNIFICANT CHANGE UP (ref 20–40)
POTASSIUM BLDV-SCNC: 4.4 MMOL/L — SIGNIFICANT CHANGE UP (ref 3.3–5.6)
POTASSIUM SERPL-MCNC: 4.6 MMOL/L — SIGNIFICANT CHANGE UP (ref 3.5–5)
POTASSIUM SERPL-SCNC: 4.6 MMOL/L — SIGNIFICANT CHANGE UP (ref 3.5–5)
PROT SERPL-MCNC: 6.8 G/DL — SIGNIFICANT CHANGE UP (ref 6–8)
PROT UR-MCNC: ABNORMAL
PROTHROM AB SERPL-ACNC: 24.5 SEC — HIGH (ref 9.95–12.87)
RBC # BLD: 5.08 M/UL — SIGNIFICANT CHANGE UP (ref 4.7–6.1)
RBC # FLD: 12.7 % — SIGNIFICANT CHANGE UP (ref 11.5–14.5)
RBC CASTS # UR COMP ASSIST: 11 /HPF — HIGH (ref 0–4)
RSV RESULT: NEGATIVE — SIGNIFICANT CHANGE UP
RSV RNA RESP QL NAA+PROBE: NEGATIVE — SIGNIFICANT CHANGE UP
SAO2 % BLDV: 44 % — SIGNIFICANT CHANGE UP
SODIUM SERPL-SCNC: 142 MMOL/L — SIGNIFICANT CHANGE UP (ref 135–146)
SP GR SPEC: >1.05 (ref 1.01–1.02)
TROPONIN T SERPL-MCNC: <0.01 NG/ML — SIGNIFICANT CHANGE UP
UROBILINOGEN FLD QL: SIGNIFICANT CHANGE UP
WBC # BLD: 14.58 K/UL — HIGH (ref 4.8–10.8)
WBC # FLD AUTO: 14.58 K/UL — HIGH (ref 4.8–10.8)
WBC UR QL: 5 /HPF — SIGNIFICANT CHANGE UP (ref 0–5)

## 2019-12-01 PROCEDURE — 71275 CT ANGIOGRAPHY CHEST: CPT | Mod: 26

## 2019-12-01 PROCEDURE — 71045 X-RAY EXAM CHEST 1 VIEW: CPT | Mod: 26

## 2019-12-01 PROCEDURE — 74177 CT ABD & PELVIS W/CONTRAST: CPT | Mod: 26

## 2019-12-01 PROCEDURE — 99285 EMERGENCY DEPT VISIT HI MDM: CPT

## 2019-12-01 PROCEDURE — 93010 ELECTROCARDIOGRAM REPORT: CPT

## 2019-12-01 PROCEDURE — 93010 ELECTROCARDIOGRAM REPORT: CPT | Mod: 77

## 2019-12-01 RX ORDER — RIVAROXABAN 15 MG-20MG
10 KIT ORAL
Refills: 0 | Status: DISCONTINUED | OUTPATIENT
Start: 2019-12-01 | End: 2019-12-03

## 2019-12-01 RX ORDER — AZITHROMYCIN 500 MG/1
500 TABLET, FILM COATED ORAL EVERY 24 HOURS
Refills: 0 | Status: DISCONTINUED | OUTPATIENT
Start: 2019-12-01 | End: 2019-12-03

## 2019-12-01 RX ORDER — RIVAROXABAN 15 MG-20MG
0 KIT ORAL
Qty: 0 | Refills: 0 | DISCHARGE

## 2019-12-01 RX ORDER — ACETAMINOPHEN 500 MG
650 TABLET ORAL ONCE
Refills: 0 | Status: COMPLETED | OUTPATIENT
Start: 2019-12-01 | End: 2019-12-01

## 2019-12-01 RX ORDER — CEFTRIAXONE 500 MG/1
1000 INJECTION, POWDER, FOR SOLUTION INTRAMUSCULAR; INTRAVENOUS ONCE
Refills: 0 | Status: COMPLETED | OUTPATIENT
Start: 2019-12-01 | End: 2019-12-01

## 2019-12-01 RX ORDER — LABETALOL HCL 100 MG
10 TABLET ORAL ONCE
Refills: 0 | Status: COMPLETED | OUTPATIENT
Start: 2019-12-01 | End: 2019-12-01

## 2019-12-01 RX ORDER — ACETAMINOPHEN 500 MG
650 TABLET ORAL EVERY 6 HOURS
Refills: 0 | Status: DISCONTINUED | OUTPATIENT
Start: 2019-12-01 | End: 2019-12-03

## 2019-12-01 RX ORDER — ASPIRIN/CALCIUM CARB/MAGNESIUM 324 MG
81 TABLET ORAL DAILY
Refills: 0 | Status: DISCONTINUED | OUTPATIENT
Start: 2019-12-01 | End: 2019-12-03

## 2019-12-01 RX ORDER — SODIUM CHLORIDE 9 MG/ML
2000 INJECTION, SOLUTION INTRAVENOUS ONCE
Refills: 0 | Status: COMPLETED | OUTPATIENT
Start: 2019-12-01 | End: 2019-12-01

## 2019-12-01 RX ORDER — METOPROLOL TARTRATE 50 MG
25 TABLET ORAL
Refills: 0 | Status: DISCONTINUED | OUTPATIENT
Start: 2019-12-01 | End: 2019-12-03

## 2019-12-01 RX ORDER — SIMVASTATIN 20 MG/1
40 TABLET, FILM COATED ORAL AT BEDTIME
Refills: 0 | Status: DISCONTINUED | OUTPATIENT
Start: 2019-12-01 | End: 2019-12-03

## 2019-12-01 RX ORDER — CHLORHEXIDINE GLUCONATE 213 G/1000ML
1 SOLUTION TOPICAL
Refills: 0 | Status: DISCONTINUED | OUTPATIENT
Start: 2019-12-01 | End: 2019-12-03

## 2019-12-01 RX ORDER — CEFTRIAXONE 500 MG/1
1000 INJECTION, POWDER, FOR SOLUTION INTRAMUSCULAR; INTRAVENOUS EVERY 24 HOURS
Refills: 0 | Status: DISCONTINUED | OUTPATIENT
Start: 2019-12-02 | End: 2019-12-03

## 2019-12-01 RX ORDER — LOSARTAN POTASSIUM 100 MG/1
50 TABLET, FILM COATED ORAL DAILY
Refills: 0 | Status: DISCONTINUED | OUTPATIENT
Start: 2019-12-01 | End: 2019-12-02

## 2019-12-01 RX ORDER — RIVAROXABAN 15 MG-20MG
1 KIT ORAL
Qty: 0 | Refills: 0 | DISCHARGE

## 2019-12-01 RX ADMIN — SODIUM CHLORIDE 2000 MILLILITER(S): 9 INJECTION, SOLUTION INTRAVENOUS at 13:43

## 2019-12-01 RX ADMIN — AZITHROMYCIN 255 MILLIGRAM(S): 500 TABLET, FILM COATED ORAL at 13:01

## 2019-12-01 RX ADMIN — Medication 10 MILLIGRAM(S): at 22:58

## 2019-12-01 RX ADMIN — Medication 25 MILLIGRAM(S): at 18:43

## 2019-12-01 RX ADMIN — Medication 10 MILLIGRAM(S): at 21:11

## 2019-12-01 RX ADMIN — RIVAROXABAN 10 MILLIGRAM(S): KIT at 18:43

## 2019-12-01 RX ADMIN — Medication 81 MILLIGRAM(S): at 18:43

## 2019-12-01 RX ADMIN — Medication 650 MILLIGRAM(S): at 12:00

## 2019-12-01 RX ADMIN — SIMVASTATIN 40 MILLIGRAM(S): 20 TABLET, FILM COATED ORAL at 21:31

## 2019-12-01 RX ADMIN — AZITHROMYCIN 500 MILLIGRAM(S): 500 TABLET, FILM COATED ORAL at 13:40

## 2019-12-01 RX ADMIN — CEFTRIAXONE 100 MILLIGRAM(S): 500 INJECTION, POWDER, FOR SOLUTION INTRAMUSCULAR; INTRAVENOUS at 13:03

## 2019-12-01 RX ADMIN — LOSARTAN POTASSIUM 50 MILLIGRAM(S): 100 TABLET, FILM COATED ORAL at 18:43

## 2019-12-01 RX ADMIN — SODIUM CHLORIDE 2000 MILLILITER(S): 9 INJECTION, SOLUTION INTRAVENOUS at 13:02

## 2019-12-01 RX ADMIN — Medication 650 MILLIGRAM(S): at 13:39

## 2019-12-01 NOTE — H&P ADULT - NSHPSOCIALHISTORY_GEN_ALL_CORE
Former smoker, quit over 20 years ago   Denies EtOH or illicit drug use    Lives at home with wife. Ambulates independently.

## 2019-12-01 NOTE — H&P ADULT - NSHPLABSRESULTS_GEN_ALL_CORE
15.4   14.58 )-----------( 162      ( 01 Dec 2019 09:50 )             48.1       12-01    142  |  101  |  12  ----------------------------<  238<H>  4.6   |  25  |  0.9    Ca    9.2      01 Dec 2019 09:50    TPro  6.8  /  Alb  4.3  /  TBili  1.4<H>  /  DBili  x   /  AST  14  /  ALT  11  /  AlkPhos  53  12-01      PT/INR - ( 01 Dec 2019 09:50 )   PT: 24.50 sec;   INR: 2.15 ratio         PTT - ( 01 Dec 2019 09:50 )  PTT:31.1 sec    RADIOLOGY:  < from: CT Abdomen and Pelvis w/ IV Cont (12.01.19 @ 13:05) >      IMPRESSION:    Consolidation at the left lung base may represent pneumonia in the   appropriate clinical setting.    No CTA evidence for acute pulmonary embolism.            < end of copied text >

## 2019-12-01 NOTE — ED PROVIDER NOTE - NS ED ROS FT
Constitutional: See HPI.  Eyes: No visual changes  ENMT: No neck pain   Cardiac: No cp  Respiratory: see hpi  GI: No nausea, vomiting, diarrhea or abdominal pain.  : No dysuria, frequency or burning.   MS: see hpi  Psych: No suicidal or homicidal ideations.  Neuro: No headache   Skin: No skin rash.

## 2019-12-01 NOTE — ED PROVIDER NOTE - PHYSICAL EXAMINATION
CONSTITUTIONAL: WA / WN / NAD  HEAD: NCAT  EYES: PERRL; EOMI;   ENT: Normal pharynx; mucous membranes pink/moist, no erythema.  NECK: Supple; no meningeal signs  CARD: tachycardic nl S1/S2; no M/R/G.   RESP: Respiratory rate and effort are normal; breath sounds clear and equal bilaterally.  ABD: Soft, NT ND  MSK/EXT: No gross deformities; full range of motion.  SKIN: Warm and dry;   NEURO: AAOx3  PSYCH: Memory Intact, Normal Affect CONSTITUTIONAL: WA / WN / NAD  HEAD: NCAT  EYES: PERRL; EOMI;   ENT: Normal pharynx; mucous membranes pink/moist, no erythema.  NECK: Supple; no meningeal signs  CARD: tachycardic nl S1/S2; no M/R/G.   RESP: Respiratory rate and effort are normal; breath sounds clear and equal bilaterally.  ABD: Soft, NT ND   MSK/EXT: No gross deformities; full range of motion.  SKIN: Warm and dry;   NEURO: AAOx3,  PSYCH: Memory Intact, Normal Affect

## 2019-12-01 NOTE — ED PROVIDER NOTE - ATTENDING CONTRIBUTION TO CARE
69M PMH pe, dvt, on xarelto, cad CABG, dm, kidney stone, htn, hl, p/w lightheadedness and sob after taking a shower today. reports 1 mo of cough, no fever. took Levaquin which didn't help. feels similar to prior PE. no loc. no cp, palp. no abd pain, nvdc. no dysuria, freq, hematuria. no ha, numbness, weakness. pt states last week she had flank pain similar to prior kidney stones but it went away and he never passed the stone. cards saravanan. pmd rippoll.     on exam, FVSS, well madan nad, ncat, eomi, perrla, mmm, L basilar crackles noted, no resp distress, rrr nl s1s2 no mrg, abd soft ntnd, aaox3, no focal deficits, no le edema or calf ttp,     a/p; fever, cough x 1 mo, sob, crackles on exam, lightheaded feels like prior PE, concern for pna, r/o acs, r/o new pe, r/o renal colic, will do labs, ekg/trop, CXR, CTA chest, ct a/p, ua, ivf, tylenol, cultures, iv abx, admit

## 2019-12-01 NOTE — ED PROVIDER NOTE - OBJECTIVE STATEMENT
69 year old male w/ a pmh of htn hld kidney stones mi diabetes & PE on xarelto 69 year old male w/ a pmh of htn hld kidney stones mi diabetes & PE on xarelto presents here c/o dizziness and shortness of breath that began while in the shower. Patient states his symptoms are similar to when he was diagnosed with a PE. Patient states he also have been having a cough for > 1 month . He took levaquin but hasn't had any improved. Patient also states few days ago he had flank pain several days ago which felt like his previous stones but resolved. Patient denies any fever chills cp sob n/v urinary frequency urgency or burning.

## 2019-12-01 NOTE — ED ADULT TRIAGE NOTE - CHIEF COMPLAINT QUOTE
"I was taking a shower this morning and I felt very weak like I was going to pass out " "I was taking a shower this morning and I felt very weak like I was going to pass out " fs 215

## 2019-12-01 NOTE — ED ADULT NURSE NOTE - NSIMPLEMENTINTERV_GEN_ALL_ED
Implemented All Fall with Harm Risk Interventions:  [place] to call system. Call bell, personal items and telephone within reach. Instruct patient to call for assistance. Room bathroom lighting operational. Non-slip footwear when patient is off stretcher. Physically safe environment: no spills, clutter or unnecessary equipment. Stretcher in lowest position, wheels locked, appropriate side rails in place. Provide visual cue, wrist band, yellow gown, etc. Monitor gait and stability. Monitor for mental status changes and reorient to person, place, and time. Review medications for side effects contributing to fall risk. Reinforce activity limits and safety measures with patient and family. Provide visual clues: red socks.

## 2019-12-01 NOTE — H&P ADULT - ASSESSMENT
69 y/o M with PMH of HTN, recurrent nephrolithiasis, DMII, DVT and massive PE in 2018 s/p tpa - now on Xarelto, CAD with MI s/p CABG, hx of DVT in past presents to [facility] with complaints of SOB and weakness for past few days.     # SOB/Weakness - likely secondary to Community Acquired Pneumonia - CURB 65 = 1 (for age)  - Febrile to 101 in ED.   - Leukocytosis to 14  - CXR showing possible L sided opacity.   - CT chest showing consolidation at the left lung base may represent pneumonia in the appropriate clinical setting. No CTA evidence for acute pulmonary embolism.  - s/p rocephin and azithromycin in ED. s/p 2L IVF  - f/u flu swap  - f/u blood cultures  - continue antibiotics    # hx of DVT and massive PE  - continue Xarelto     # DM   - hold oral anti-glycemics  - f/u hemoglobin A1C  - fingersticks ACHS  - Start on basal/bolus and SSI insulin if fs > 180     # HTN   - continue home meds    # CAD with MI s/p CABG  - continue home meds     # recurrent nephrolithiasis - stable  - outpatient follow up     dvt ppx: on xarelto  gi ppx: not indicated  ambulate as tolerated  DASH/Carb consistent diet  Dispo: from home  FULL CODE

## 2019-12-01 NOTE — PATIENT PROFILE ADULT - ABILITY TO HEAR (WITH HEARING AID OR HEARING APPLIANCE IF NORMALLY USED):
Mildly to Moderately Impaired: difficulty hearing in some environments or speaker may need to increase volume or speak distinctly/B/l Hearing Aids

## 2019-12-01 NOTE — H&P ADULT - NSHPPHYSICALEXAM_GEN_ALL_CORE
GENERAL: Elderly male in NAD, speaks in full sentences, no signs of respiratory distress  HEAD: Atraumatic  NECK: Supple  CHEST/LUNG: Clear to auscultation bilaterally; No wheeze or crackles  HEART: S1, S2; RRR; No murmurs, rubs, or gallops  ABDOMEN: BS+; Soft, Non-tender, Non-distended  EXTREMITIES:  2+ Peripheral Pulses, No clubbing, cyanosis, or edema  PSYCH: AAOx3  NEUROLOGY: non-focal  SKIN: No rashes or lesions

## 2019-12-01 NOTE — H&P ADULT - ATTENDING COMMENTS
Chart reviewed, patient examined. Pertinent results reviewed.  Case discussed with HO  HD#1  URI sx x 1 month, then a sudden episode of weakness and some L sided chest/flank pain. + Productive cough.  ED w/u revealed + PNA. NEG for PE; Pt feels slightly better after 1 day of Antibiotics.  PMH- CAD, DM, PE  SH- Lives w wife  MEDICATIONS  (STANDING):  aspirin enteric coated 81 milliGRAM(s) Oral daily  azithromycin  IVPB 500 milliGRAM(s) IV Intermittent every 24 hours  cefTRIAXone   IVPB 1000 milliGRAM(s) IV Intermittent every 24 hours  chlorhexidine 4% Liquid 1 Application(s) Topical <User Schedule>  losartan 50 milliGRAM(s) Oral daily  metoprolol tartrate 25 milliGRAM(s) Oral two times a day  rivaroxaban 10 milliGRAM(s) Oral with dinner  simvastatin 40 milliGRAM(s) Oral at bedtime    MEDICATIONS  (PRN):  acetaminophen   Tablet .. 650 milliGRAM(s) Oral every 6 hours PRN Temp greater or equal to 38C (100.4F)    Vital Signs Last 24 Hrs  T(C): 35.6 (02 Dec 2019 05:51), Max: 38.3 (01 Dec 2019 11:33)  T(F): 96.1 (02 Dec 2019 05:51), Max: 101 (01 Dec 2019 11:33)  HR: 85 (02 Dec 2019 05:51) (82 - 104)  BP: 156/76 (02 Dec 2019 05:51) (147/81 - 191/86)  BP(mean): --  RR: 18 (01 Dec 2019 14:34) (18 - 18)  SpO2: 96% (01 Dec 2019 20:52) (95% - 96%)    PE: + L base Rales; HT: scar, RR, NO MRG  TH: MM dry  EXT: no CCE  Neuro: no gross deficit    < from: CT Angio Chest w/ IV Cont (12.01.19 @ 13:04) >      IMPRESSION:    New consolidation at the left lung base may represent pneumonia.   Noncontrast CT chest is recommended in 8 weeks to demonstrate resolution.    Linear hypodensity is seen within a pulmonary artery segmental branch of   the right lower lobe may represent chronic pulmonary embolism.        < end of copied text >      Agree w A&P; asked HO to consult pulmonary to advise; Hydrate pt.  Insulin protocol for DM

## 2019-12-01 NOTE — H&P ADULT - NSICDXPASTMEDICALHX_GEN_ALL_CORE_FT
PAST MEDICAL HISTORY:  Acute massive pulmonary embolism s/p tPA in 2018    DVT (deep venous thrombosis)     High cholesterol     Hypertension, unspecified type     Kidney stones     MI (myocardial infarction)     Type 2 diabetes mellitus with complication, unspecified long term insulin use status

## 2019-12-01 NOTE — H&P ADULT - HISTORY OF PRESENT ILLNESS
69 y/o M with PMH of DVT in remote past, CAD with MI s/p CABG, hx of DVT in past presents to [facility] with complaints of SOB 69 y/o M with PMH of HTN, recurrent nephrolithiasis, DMII, DVT and massive PE in 2018 s/p tpa - now on Xarelto, CAD with MI s/p CABG, hx of DVT in past presents to [facility] with complaints of SOB and weakness for past few days. Patient says that for the past 1 month he has been having rhinorrhea, sneezing, nasal congestion. Over last few days he became more SOB and has had productive cough of yellow sputum. Then this morning he was in the shower and all of a sudden felt like he was going to "pass out." He described the sensation of feeling generally very weak and unable to stand. He then immediately went to lie on the bed and felt like he couldn't move. He called in his family who said he looked very pale and they brought him to the ED. He also describes a period of flank pain 3 days ago which felt like his normal kidney stone pain, but denies passing a stone. During this episode he denied dizziness, vertigo, tinnitus, or palpitations. He denies have fever/chills, headache, chest pain, palpitations, abdominal pain, N/V/Diarrhea, urinary dysura/frequency/urgency, or lower extremity pain/erythema/swelling.     In ED /79, . Febrile to 101. CXR showing possible L sided opacity. CT chest showing consolidation at the left lung base may represent pneumonia in the appropriate clinical setting. No CTA evidence for acute pulmonary embolism. s/p rocephin and azithromycin in ED. s/p 2L IVF. Admitted to medicine for further management.

## 2019-12-01 NOTE — ED ADULT NURSE NOTE - OBJECTIVE STATEMENT
pt c/o near syncope at home. reports weakness/ dizziness while in shower and walked back to bed. no cp/ sob, skin warm dry pink. reports being "sick for past month with a cold"

## 2019-12-01 NOTE — ED ADULT NURSE NOTE - PMH
DVT (deep venous thrombosis)    High cholesterol    Hypertension, unspecified type    Kidney stones    MI (myocardial infarction)    Type 2 diabetes mellitus with complication, unspecified long term insulin use status

## 2019-12-02 LAB
ALBUMIN SERPL ELPH-MCNC: 3.6 G/DL — SIGNIFICANT CHANGE UP (ref 3.5–5.2)
ALP SERPL-CCNC: 47 U/L — SIGNIFICANT CHANGE UP (ref 30–115)
ALT FLD-CCNC: 10 U/L — SIGNIFICANT CHANGE UP (ref 0–41)
ANION GAP SERPL CALC-SCNC: 15 MMOL/L — HIGH (ref 7–14)
AST SERPL-CCNC: 11 U/L — SIGNIFICANT CHANGE UP (ref 0–41)
BASOPHILS # BLD AUTO: 0.03 K/UL — SIGNIFICANT CHANGE UP (ref 0–0.2)
BASOPHILS NFR BLD AUTO: 0.4 % — SIGNIFICANT CHANGE UP (ref 0–1)
BILIRUB SERPL-MCNC: 1 MG/DL — SIGNIFICANT CHANGE UP (ref 0.2–1.2)
BUN SERPL-MCNC: 12 MG/DL — SIGNIFICANT CHANGE UP (ref 10–20)
CALCIUM SERPL-MCNC: 8.5 MG/DL — SIGNIFICANT CHANGE UP (ref 8.5–10.1)
CHLORIDE SERPL-SCNC: 103 MMOL/L — SIGNIFICANT CHANGE UP (ref 98–110)
CO2 SERPL-SCNC: 24 MMOL/L — SIGNIFICANT CHANGE UP (ref 17–32)
CREAT SERPL-MCNC: 0.7 MG/DL — SIGNIFICANT CHANGE UP (ref 0.7–1.5)
CULTURE RESULTS: SIGNIFICANT CHANGE UP
EOSINOPHIL # BLD AUTO: 0.27 K/UL — SIGNIFICANT CHANGE UP (ref 0–0.7)
EOSINOPHIL NFR BLD AUTO: 3.2 % — SIGNIFICANT CHANGE UP (ref 0–8)
ESTIMATED AVERAGE GLUCOSE: 163 MG/DL — HIGH (ref 68–114)
GLUCOSE BLDC GLUCOMTR-MCNC: 177 MG/DL — HIGH (ref 70–99)
GLUCOSE BLDC GLUCOMTR-MCNC: 218 MG/DL — HIGH (ref 70–99)
GLUCOSE BLDC GLUCOMTR-MCNC: 237 MG/DL — HIGH (ref 70–99)
GLUCOSE BLDC GLUCOMTR-MCNC: 244 MG/DL — HIGH (ref 70–99)
GLUCOSE SERPL-MCNC: 196 MG/DL — HIGH (ref 70–99)
HBA1C BLD-MCNC: 7.3 % — HIGH (ref 4–5.6)
HCT VFR BLD CALC: 42.5 % — SIGNIFICANT CHANGE UP (ref 42–52)
HCT VFR BLD CALC: 43.5 % — SIGNIFICANT CHANGE UP (ref 42–52)
HCV AB S/CO SERPL IA: 0.13 S/CO — SIGNIFICANT CHANGE UP (ref 0–0.99)
HCV AB SERPL-IMP: SIGNIFICANT CHANGE UP
HGB BLD-MCNC: 13.4 G/DL — LOW (ref 14–18)
HGB BLD-MCNC: 13.8 G/DL — LOW (ref 14–18)
IMM GRANULOCYTES NFR BLD AUTO: 0.6 % — HIGH (ref 0.1–0.3)
LYMPHOCYTES # BLD AUTO: 1.18 K/UL — LOW (ref 1.2–3.4)
LYMPHOCYTES # BLD AUTO: 13.8 % — LOW (ref 20.5–51.1)
MAGNESIUM SERPL-MCNC: 1.4 MG/DL — LOW (ref 1.8–2.4)
MCHC RBC-ENTMCNC: 30.2 PG — SIGNIFICANT CHANGE UP (ref 27–31)
MCHC RBC-ENTMCNC: 30.3 PG — SIGNIFICANT CHANGE UP (ref 27–31)
MCHC RBC-ENTMCNC: 31.5 G/DL — LOW (ref 32–37)
MCHC RBC-ENTMCNC: 31.7 G/DL — LOW (ref 32–37)
MCV RBC AUTO: 95.6 FL — HIGH (ref 80–94)
MCV RBC AUTO: 95.9 FL — HIGH (ref 80–94)
MONOCYTES # BLD AUTO: 0.77 K/UL — HIGH (ref 0.1–0.6)
MONOCYTES NFR BLD AUTO: 9 % — SIGNIFICANT CHANGE UP (ref 1.7–9.3)
NEUTROPHILS # BLD AUTO: 6.26 K/UL — SIGNIFICANT CHANGE UP (ref 1.4–6.5)
NEUTROPHILS NFR BLD AUTO: 73 % — SIGNIFICANT CHANGE UP (ref 42.2–75.2)
NRBC # BLD: 0 /100 WBCS — SIGNIFICANT CHANGE UP (ref 0–0)
NRBC # BLD: 0 /100 WBCS — SIGNIFICANT CHANGE UP (ref 0–0)
PLATELET # BLD AUTO: 130 K/UL — SIGNIFICANT CHANGE UP (ref 130–400)
PLATELET # BLD AUTO: 133 K/UL — SIGNIFICANT CHANGE UP (ref 130–400)
POTASSIUM SERPL-MCNC: 4 MMOL/L — SIGNIFICANT CHANGE UP (ref 3.5–5)
POTASSIUM SERPL-SCNC: 4 MMOL/L — SIGNIFICANT CHANGE UP (ref 3.5–5)
PROT SERPL-MCNC: 5.9 G/DL — LOW (ref 6–8)
RBC # BLD: 4.43 M/UL — LOW (ref 4.7–6.1)
RBC # BLD: 4.55 M/UL — LOW (ref 4.7–6.1)
RBC # FLD: 12.9 % — SIGNIFICANT CHANGE UP (ref 11.5–14.5)
RBC # FLD: 13 % — SIGNIFICANT CHANGE UP (ref 11.5–14.5)
SODIUM SERPL-SCNC: 142 MMOL/L — SIGNIFICANT CHANGE UP (ref 135–146)
SPECIMEN SOURCE: SIGNIFICANT CHANGE UP
WBC # BLD: 8.42 K/UL — SIGNIFICANT CHANGE UP (ref 4.8–10.8)
WBC # BLD: 8.56 K/UL — SIGNIFICANT CHANGE UP (ref 4.8–10.8)
WBC # FLD AUTO: 8.42 K/UL — SIGNIFICANT CHANGE UP (ref 4.8–10.8)
WBC # FLD AUTO: 8.56 K/UL — SIGNIFICANT CHANGE UP (ref 4.8–10.8)

## 2019-12-02 RX ORDER — LOSARTAN POTASSIUM 100 MG/1
100 TABLET, FILM COATED ORAL DAILY
Refills: 0 | Status: DISCONTINUED | OUTPATIENT
Start: 2019-12-03 | End: 2019-12-03

## 2019-12-02 RX ORDER — LOSARTAN POTASSIUM 100 MG/1
100 TABLET, FILM COATED ORAL DAILY
Refills: 0 | Status: DISCONTINUED | OUTPATIENT
Start: 2019-12-02 | End: 2019-12-02

## 2019-12-02 RX ORDER — IPRATROPIUM/ALBUTEROL SULFATE 18-103MCG
3 AEROSOL WITH ADAPTER (GRAM) INHALATION EVERY 6 HOURS
Refills: 0 | Status: DISCONTINUED | OUTPATIENT
Start: 2019-12-02 | End: 2019-12-03

## 2019-12-02 RX ORDER — PANTOPRAZOLE SODIUM 20 MG/1
40 TABLET, DELAYED RELEASE ORAL
Refills: 0 | Status: DISCONTINUED | OUTPATIENT
Start: 2019-12-02 | End: 2019-12-02

## 2019-12-02 RX ORDER — LOSARTAN POTASSIUM 100 MG/1
50 TABLET, FILM COATED ORAL DAILY
Refills: 0 | Status: DISCONTINUED | OUTPATIENT
Start: 2019-12-02 | End: 2019-12-02

## 2019-12-02 RX ORDER — BUDESONIDE AND FORMOTEROL FUMARATE DIHYDRATE 160; 4.5 UG/1; UG/1
2 AEROSOL RESPIRATORY (INHALATION)
Refills: 0 | Status: DISCONTINUED | OUTPATIENT
Start: 2019-12-02 | End: 2019-12-03

## 2019-12-02 RX ORDER — MAGNESIUM SULFATE 500 MG/ML
2 VIAL (ML) INJECTION ONCE
Refills: 0 | Status: COMPLETED | OUTPATIENT
Start: 2019-12-02 | End: 2019-12-02

## 2019-12-02 RX ORDER — LOSARTAN POTASSIUM 100 MG/1
50 TABLET, FILM COATED ORAL DAILY
Refills: 0 | Status: COMPLETED | OUTPATIENT
Start: 2019-12-02 | End: 2019-12-02

## 2019-12-02 RX ADMIN — LOSARTAN POTASSIUM 50 MILLIGRAM(S): 100 TABLET, FILM COATED ORAL at 11:13

## 2019-12-02 RX ADMIN — Medication 50 MILLIGRAM(S): at 17:06

## 2019-12-02 RX ADMIN — Medication 3 MILLILITER(S): at 14:02

## 2019-12-02 RX ADMIN — SIMVASTATIN 40 MILLIGRAM(S): 20 TABLET, FILM COATED ORAL at 21:07

## 2019-12-02 RX ADMIN — Medication 50 GRAM(S): at 11:14

## 2019-12-02 RX ADMIN — Medication 3 MILLILITER(S): at 20:19

## 2019-12-02 RX ADMIN — Medication 25 MILLIGRAM(S): at 05:24

## 2019-12-02 RX ADMIN — Medication 25 MILLIGRAM(S): at 17:04

## 2019-12-02 RX ADMIN — BUDESONIDE AND FORMOTEROL FUMARATE DIHYDRATE 2 PUFF(S): 160; 4.5 AEROSOL RESPIRATORY (INHALATION) at 21:07

## 2019-12-02 RX ADMIN — Medication 81 MILLIGRAM(S): at 11:14

## 2019-12-02 RX ADMIN — RIVAROXABAN 10 MILLIGRAM(S): KIT at 17:04

## 2019-12-02 RX ADMIN — CEFTRIAXONE 100 MILLIGRAM(S): 500 INJECTION, POWDER, FOR SOLUTION INTRAMUSCULAR; INTRAVENOUS at 05:24

## 2019-12-02 RX ADMIN — AZITHROMYCIN 255 MILLIGRAM(S): 500 TABLET, FILM COATED ORAL at 11:14

## 2019-12-02 NOTE — CONSULT NOTE ADULT - CONSULT REASON
consult as per attending, CAP, Ct scan with consolidation of left lung base  ex smoker, does the pt need steroid

## 2019-12-02 NOTE — PROGRESS NOTE ADULT - SUBJECTIVE AND OBJECTIVE BOX
MERARY GARCIA  69y, Male  Allergy: No Known Allergies      CHIEF COMPLAINT: Shortness of breath + weakness (01 Dec 2019 14:14)      HPI:  69 y/o M with PMH of HTN, recurrent nephrolithiasis, DMII, DVT and massive PE in 2018 s/p tpa - now on Xarelto, CAD with MI s/p CABG, hx of DVT in past presents to [facility] with complaints of SOB and weakness for past few days. Patient says that for the past 1 month he has been having rhinorrhea, sneezing, nasal congestion. Over last few days he became more SOB and has had productive cough of yellow sputum. Then this morning he was in the shower and all of a sudden felt like he was going to "pass out." He described the sensation of feeling generally very weak and unable to stand. He then immediately went to lie on the bed and felt like he couldn't move. He called in his family who said he looked very pale and they brought him to the ED. He also describes a period of flank pain 3 days ago which felt like his normal kidney stone pain, but denies passing a stone. During this episode he denied dizziness, vertigo, tinnitus, or palpitations. He denies have fever/chills, headache, chest pain, palpitations, abdominal pain, N/V/Diarrhea, urinary dysura/frequency/urgency, or lower extremity pain/erythema/swelling.     In ED /79, . Febrile to 101. CXR showing possible L sided opacity. CT chest showing consolidation at the left lung base may represent pneumonia in the appropriate clinical setting. No CTA evidence for acute pulmonary embolism. s/p rocephin and azithromycin in ED. s/p 2L IVF. Admitted to medicine for further management. (01 Dec 2019 14:14)      INTERVAL HISTORY  patient sitting in bed comfortable, he denied any chest pain, SOB, he mentioned that he stil having sore throat, and productive cough with yellowish sputum.    FAMILY HISTORY:  No pertinent family history in first degree relatives    PAST MEDICAL & SURGICAL HISTORY:  Acute massive pulmonary embolism: s/p tPA in 2018  DVT (deep venous thrombosis)  MI (myocardial infarction)  Kidney stones  High cholesterol  Type 2 diabetes mellitus with complication, unspecified long term insulin use status  Hypertension, unspecified type  H/O cystoscopy  H/O heart surgery: quadruple bypass 6 years ago      SOCIAL HISTORY    VITALS:  T(F): 96.1, Max: 101 (12-01-19 @ 11:33)  HR: 85  BP: 156/76  RR: 18Vital Signs Last 24 Hrs  T(C): 35.6 (02 Dec 2019 05:51), Max: 38.3 (01 Dec 2019 11:33)  T(F): 96.1 (02 Dec 2019 05:51), Max: 101 (01 Dec 2019 11:33)  HR: 85 (02 Dec 2019 05:51) (82 - 94)  BP: 156/76 (02 Dec 2019 05:51) (147/81 - 191/86)  BP(mean): --  RR: 18 (01 Dec 2019 14:34) (18 - 18)  SpO2: 96% (01 Dec 2019 20:52) (95% - 96%)    PHYSICAL EXAM:    GENERAL: NAD, lying in bed comfortably  HEAD:  Atraumatic, Normocephalic  EYES: EOMI, PERRLA, conjunctiva and sclera clear  ENT: Moist mucous membranes  NECK: Supple, No JVD  CHEST/LUNG: Clear to auscultation bilaterally; minimal rhonci , wheezing, or rubs. Unlabored respirations  HEART: Regular rate and rhythm; No murmurs, rubs, or gallops  ABDOMEN: Bowel sounds present; Soft, Nontender, Nondistended. No hepatomegally  EXTREMITIES:  2+ Peripheral Pulses, brisk capillary refill. No clubbing, cyanosis, or edema  NERVOUS SYSTEM:  Alert & Oriented X3, speech clear. No deficits   MSK: FROM all 4 extremities, full and equal strength  SKIN: No rashes or lesions    TESTS & MEASUREMENTS:                        13.4   8.56  )-----------( 130      ( 02 Dec 2019 06:22 )             42.5     12-02    142  |  103  |  12  ----------------------------<  196<H>  4.0   |  24  |  0.7    Ca    8.5      02 Dec 2019 06:22  Mg     1.4     12-02    TPro  5.9<L>  /  Alb  3.6  /  TBili  1.0  /  DBili  x   /  AST  11  /  ALT  10  /  AlkPhos  47  12-02    eGFR if : 113 mL/min/1.73M2 (12-02)    LIVER FUNCTIONS - ( 02 Dec 2019 06:22 )  Alb: 3.6 g/dL / Pro: 5.9 g/dL / ALK PHOS: 47 U/L / ALT: 10 U/L / AST: 11 U/L / GGT: x           Urinalysis Basic - ( 01 Dec 2019 13:40 )    Color: Yellow / Appearance: Clear / SG: >1.050 / pH: x  Gluc: x / Ketone: Negative  / Bili: Negative / Urobili: <2 mg/dL   Blood: x / Protein: 100 mg/dL / Nitrite: Negative   Leuk Esterase: Negative / RBC: 11 /HPF / WBC 5 /HPF   Sq Epi: x / Non Sq Epi: 2 /HPF / Bacteria: Negative          Blood Gas Venous - Lactate: ?2.3 mmoL/L (12-01-19 @ 10:44)      INFECTIOUS DISEASES TESTING      RADIOLOGY & ADDITIONAL TESTS:  CXR      CT  CT Abdomen and Pelvis w/ IV Cont:   EXAM:  CT ABDOMEN AND PELVIS IC        EXAM:  CT ANGIO CHEST (W)AW IC            PROCEDURE DATE:  12/01/2019            INTERPRETATION:  CLINICAL HISTORY / REASON FOR EXAM: Shortness of breath    TECHNIQUE: Multislice helical sections were obtainedfrom the thoracic   inlet to the lung bases during rapid administration of 100 mL Optiray 350   intravenous contrast using a CTA protocol. Thin sections were   reconstructed through the pulmonary vasculature. Coronal, sagittal and   3D/MIP reformatted images are also submitted. Portal venous phase imaging   of the abdomen and pelvis was then obtained. Coronal and sagittal   reformatted images were submitted for review.    COMPARISON CT: CT chest from 3/16/2019    OTHER STUDIES USED FOR CORRELATION: Dissection study from 12/16/2018.      FINDINGS:    CHEST:    PULMONARY EMBOLUS: Linear hypodensity is seen within a pulmonary artery   segmental branch of the right lower lobe. Breathing motion artifact is   present.    LUNGS, PLEURA, AIRWAYS: Consolidation at the left lung base may represent   pneumonia in the appropriate clinical setting. No pneumothorax or   effusion. Right apical subpleural bulla.    THORACIC NODES:  Mildly enlarged right paratracheal and subcarinal lymph   node. Right hilar lymph nodes measuring 2.5 x 1.8 cm. These appear to be   similar in size to prior exam.    MEDIASTINUM/GREAT VESSELS: No pericardial effusion. Heart size   unremarkable. Coronary artery and thoracic aorta atherosclerotic   calcifications. No aneurysmal dilation of the thoracic aorta. Post CABG.    ABDOMEN/PELVIS:    HEPATOBILIARY: Subcentimeter hypodensity within the right hepatic lobe   too small to characterize but appears stable when compared to prior CT   chest dated 3/16/2019. There is cholelithiasis.    SPLEEN: Unremarkable.    PANCREAS: Unremarkable.    ADRENAL GLANDS: Unremarkable.    KIDNEYS: Nonobstructing bilateral renal calculi, not significantly   changed since the prior examination. No hydronephrosis. Subcentimeter   right renal hypodensity, too small to further characterize.    ABDOMINOPELVIC NODES: No enlarged abdominal or pelvic lymph nodes.    PELVIC ORGANS: Distended urinary bladder.    PERITONEUM/MESENTERY/BOWEL: No bowel obstruction, ascites or   intraperitoneal free air. Normal caliber appendix.    BONES/SOFT TISSUES: Degenerative changes to the thoracolumbar spine. Post   median sternotomy.    VASCULAR: Calcified and noncalcified atherosclerotic disease of the aorta.      IMPRESSION:    New consolidation at the left lung base may represent pneumonia.   Noncontrast CT chest is recommended in 8 weeks to demonstrate resolution.    Linear hypodensity is seen within a pulmonary artery segmental branch of   the right lower lobe may represent chronic pulmonary embolism.              CINTHYA CARROLL M.D., RESIDENT RADIOLOGIST  This document has been electronically signed.  JENNIFER DENNISON M.D., ATTENDING RADIOLOGIST  This document has been electronically signed. Dec  1 2019  3:18PM             (12-01-19 @ 13:05)  CT Angio Chest w/ IV Cont:   EXAM:  CT ABDOMEN AND PELVIS IC        EXAM:  CT ANGIO CHEST (W)AW IC            PROCEDURE DATE:  12/01/2019            INTERPRETATION:  CLINICAL HISTORY / REASON FOR EXAM: Shortness of breath    TECHNIQUE: Multislice helical sections were obtainedfrom the thoracic   inlet to the lung bases during rapid administration of 100 mL Optiray 350   intravenous contrast using a CTA protocol. Thin sections were   reconstructed through the pulmonary vasculature. Coronal, sagittal and   3D/MIP reformatted images are also submitted. Portal venous phase imaging   of the abdomen and pelvis was then obtained. Coronal and sagittal   reformatted images were submitted for review.    COMPARISON CT: CT chest from 3/16/2019    OTHER STUDIES USED FOR CORRELATION: Dissection study from 12/16/2018.      FINDINGS:    CHEST:    PULMONARY EMBOLUS: Linear hypodensity is seen within a pulmonary artery   segmental branch of the right lower lobe. Breathing motion artifact is   present.    LUNGS, PLEURA, AIRWAYS: Consolidation at the left lung base may represent   pneumonia in the appropriate clinical setting. No pneumothorax or   effusion. Right apical subpleural bulla.    THORACIC NODES:  Mildly enlarged right paratracheal and subcarinal lymph   node. Right hilar lymph nodes measuring 2.5 x 1.8 cm. These appear to be   similar in size to prior exam.    MEDIASTINUM/GREAT VESSELS: No pericardial effusion. Heart size   unremarkable. Coronary artery and thoracic aorta atherosclerotic   calcifications. No aneurysmal dilation of the thoracic aorta. Post CABG.    ABDOMEN/PELVIS:    HEPATOBILIARY: Subcentimeter hypodensity within the right hepatic lobe   too small to characterize but appears stable when compared to prior CT   chest dated 3/16/2019. There is cholelithiasis.    SPLEEN: Unremarkable.    PANCREAS: Unremarkable.    ADRENAL GLANDS: Unremarkable.    KIDNEYS: Nonobstructing bilateral renal calculi, not significantly   changed since the prior examination. No hydronephrosis. Subcentimeter   right renal hypodensity, too small to further characterize.    ABDOMINOPELVIC NODES: No enlarged abdominal or pelvic lymph nodes.    PELVIC ORGANS: Distended urinary bladder.    PERITONEUM/MESENTERY/BOWEL: No bowel obstruction, ascites or   intraperitoneal free air. Normal caliber appendix.    BONES/SOFT TISSUES: Degenerative changes to the thoracolumbar spine. Post   median sternotomy.    VASCULAR: Calcified and noncalcified atherosclerotic disease of the aorta.      IMPRESSION:    New consolidation at the left lung base may represent pneumonia.   Noncontrast CT chest is recommended in 8 weeks to demonstrate resolution.    Linear hypodensity is seen within a pulmonary artery segmental branch of   the right lower lobe may represent chronic pulmonary embolism.              CINTHYA CARROLL M.D., RESIDENT RADIOLOGIST  This document has been electronically signed.  JENNIFER DENNISON M.D., ATTENDING RADIOLOGIST  This document has been electronically signed. Dec  1 2019  3:18PM             (12-01-19 @ 13:04)      CARDIOLOGY TESTING  12 Lead ECG:   Ventricular Rate 104 BPM    Atrial Rate 104 BPM    P-R Interval 166 ms    QRS Duration 124 ms    Q-T Interval 364 ms    QTC Calculation(Bezet) 478 ms    P Axis 17 degrees    R Axis 53 degrees    T Axis -47 degrees    Diagnosis Line Sinus tachycardia with occasional Premature ventricular complexes  Left ventricular hypertrophy with QRS widening and repolarization abnormality  Inferior infarct , age undetermined  Abnormal ECG    Confirmed by Zaria Ragsdale MD ([id number]) on 12/2/2019 7:44:01 AM (12-01)  12 Lead ECG:   Ventricular Rate 105 BPM    Atrial Rate 105 BPM    P-R Interval 174 ms    QRS Duration 116 ms    Q-T Interval 362 ms    QTC Calculation(Bezet) 478 ms    P Axis 17 degrees    R Axis 34 degrees    T Axis -58 degrees    Diagnosis Line Sinus tachycardia with occasional Premature ventricular complexes  Left ventricular hypertrophy with QRS widening  Inferior infarct , age undetermined  T wave abnormality, consider lateral ischemia  Abnormal ECG    Confirmed by Pal Velasco ([id number]) on 12/1/2019 3:11:07 PM (12-01)      MEDICATIONS  aspirin enteric coated 81  azithromycin  IVPB 500  cefTRIAXone   IVPB 1000  chlorhexidine 4% Liquid 1  losartan 50  magnesium sulfate  IVPB 2  metoprolol tartrate 25  rivaroxaban 10  simvastatin 40      ANTIBIOTICS:  azithromycin  IVPB 500 IV Intermittent every 24 hours  cefTRIAXone   IVPB 1000 IV Intermittent every 24 hours

## 2019-12-02 NOTE — CONSULT NOTE ADULT - ASSESSMENT
67 y/o M with PMH of HTN, recurrent nephrolithiasis, DMII, DVT and massive PE in 2018 s/p tpa - now on Xarelto, CAD with MI s/p CABG, hx of DVT in past presents to [facility] with complaints of SOB and weakness for past few days.     *Community acquired pneumonia with hyperactive airway disease versus undiagnosed underlying COPD  *1 month symptoms of URI  -continue with azithromycin and ceftriaxone  -RVP pending, FLU negative  -start prednisone 40mg qd for 5 days  -add Duoneb standing and prn  -OP PFT and pneumonia vaccine    to be discussed with the attending 69 y/o M with PMH of HTN, recurrent nephrolithiasis, DMII, DVT and massive PE in 2018 s/p tpa - now on Xarelto, CAD with MI s/p CABG, hx of DVT in past presents to [facility] with complaints of SOB and weakness for past few days.     *Community acquired pneumonia with hyperactive airway disease versus undiagnosed underlying COPD  *1 month symptoms of URI  -continue with azithromycin and ceftriaxone  -RVP pending, FLU negative  -start prednisone 40mg qd for 5 days  -add Duoneb standing and prn  -OP PFT and pneumonia vaccine    discussed with primary team and patient 69 y/o M with PMH of HTN, recurrent nephrolithiasis, DMII, DVT and massive PE in 2018 s/p tpa - now on Xarelto, CAD with MI s/p CABG, hx of DVT in past presents to [facility] with complaints of SOB and weakness for past few days.     - COPD (ex smoker), viral like illness sp abx  -continue with azithromycin and ceftriaxone  -RVP pending, FLU negative  -start prednisone 40mg qd for 5 days than 20 for 5 days  -add Duoneb standing and prn  - symbicort 2 puffs q 12h  -OP PFT and pneumonia vaccine  Keep ac    discussed with primary team and patient

## 2019-12-02 NOTE — CONSULT NOTE ADULT - ATTENDING COMMENTS
agree with above, patent seen and examined, viral like illness/ ex smoker, actively wheezing,, prednisone/ inhalers/ RVP/ ambulate/ Keep AC, op pft

## 2019-12-02 NOTE — CONSULT NOTE ADULT - SUBJECTIVE AND OBJECTIVE BOX
Patient is a 69y old  Male who presents with a chief complaint of Shortness of breath + weakness (02 Dec 2019 10:26)      HPI:  67 y/o M with PMH of HTN, recurrent nephrolithiasis, DMII, DVT and massive PE in 2018 s/p tpa - now on Xarelto, CAD with MI s/p CABG, hx of DVT in past presents to [facility] with complaints of SOB and weakness for past few days. Patient says that for the past 1 month he has been having rhinorrhea, sneezing, nasal congestion. s/p levaquin course for 10 days?  Over last few days he became more SOB and has had productive cough of yellow sputum. Then this morning he was in the shower and all of a sudden felt like he was going to "pass out." He described the sensation of feeling generally very weak and unable to stand. He then immediately went to lie on the bed and felt like he couldn't move. He called in his family who said he looked very pale and they brought him to the ED. He also describes a period of flank pain 3 days ago which felt like his normal kidney stone pain, but denies passing a stone. During this episode he denied dizziness, vertigo, tinnitus, or palpitations. He denies have fever/chills, headache, chest pain, palpitations, abdominal pain, N/V/Diarrhea, urinary dysura/frequency/urgency, or lower extremity pain/erythema/swelling.     In ED /79, . Febrile to 101. CXR showing possible L sided opacity. CT chest showing consolidation at the left lung base may represent pneumonia in the appropriate clinical setting. No CTA evidence for acute pulmonary embolism. s/p rocephin and azithromycin in ED. s/p 2L IVF. Admitted to medicine for further management. (01 Dec 2019 14:14)    currently he feels better but not back to baseline. he has cough, productive of yellowish sputum.    PAST MEDICAL & SURGICAL HISTORY:  Acute massive pulmonary embolism: s/p tPA in dec 2018  DVT (deep venous thrombosis)  MI (myocardial infarction)  Kidney stones  High cholesterol  Type 2 diabetes mellitus with complication, unspecified long term insulin use status  Hypertension, unspecified type  H/O cystoscopy  H/O heart surgery: quadruple bypass 6 years ago      SOCIAL HX:   former smoker, stopped 20 years ago and was heavy smoker   Denies EtOH or illicit drug use                          Other    FAMILY HISTORY:  No pertinent family history in first degree relatives  .  No cardiovascular or pulmonary family history     REVIEW OF SYSTEMS:    CONSTITUTIONAL:   no fever , no chills, no weight gain , no weight loss    EYES:   no discharge, no visual changes.    ENT:   Ears: no ear pain and no hearing problems.  Nose: rhinorrhea for 1 month  Mouth/Throat: + throat pain.  Neck: no lumps, no pain, no stiffness and no swollen glands.    CARDIOVASCULAR:   no chest pain,   no swelling  no palpitations  no syncope    RESPIRATORY:  +SOB,  +wheezing ,  + sputum production    GASTROINTESTINAL:   no abdominal pain,   no constipation,   no diarrhea,   no vomiting.    GENITOURINARY:  left flank pain    MUSCULOSKELETAL:   no back pain,   no musculoskeletal pain,  no weakness.    SKIN:   no jaundice,    no rashes.    NEURO:   no loss of consciousness,   no headache,   no weakness.    PSYCHIATRIC:   no known mental health issues  no anxiety  no depression    ALLERGIC/IMMUNOLOGIC:   No active allergic or immunologic issues      Allergies    No Known Allergies    PHYSICAL EXAM  Vital Signs Last 24 Hrs  T(C): 35.6 (02 Dec 2019 05:51), Max: 38.3 (01 Dec 2019 11:33)  T(F): 96.1 (02 Dec 2019 05:51), Max: 101 (01 Dec 2019 11:33)  HR: 85 (02 Dec 2019 05:51) (82 - 94)  BP: 156/76 (02 Dec 2019 05:51) (147/81 - 191/86)  BP(mean): --  RR: 18 (01 Dec 2019 14:34) (18 - 18)  SpO2: 96% (01 Dec 2019 20:52) (95% - 96%)    CONSTITUTIONAL:  Well nourished.  NAD    ENT:   Airway patent,   No thrush    EYES:   Clear bilaterally, pupils equal, round and reactive to light.    CARDIAC:   Normal rate, regular rhythm. no edema    CAROTID: normal systolic impulseno bruits    RESPIRATORY:   Nnormal chest expansion  Not tachypneic,  No use of accessory muscles  bilateral expiratory wheezing, left base rhonchi    GASTROINTESTINAL:  Abdomen soft,   non-tender,   no guarding,   + BS    MUSCULOSKELETAL:   range of motion is not limited,  no clubbing, cyanosis    NEUROLOGICAL:   Alert and oriented   no motor deficits.  pertinent DTRs normal    SKIN:   Skin normal color for race,   No evidence of rash.    PSYCHIATRIC:   normal mood and affect.   no apparent risk to self or others.    HEME LYMPH:   No cervical  lymphadenopathy.  no inguinal lymphadenopathy      LABS:                          13.4   8.56  )-----------( 130      ( 02 Dec 2019 06:22 )             42.5                                               12-02    142  |  103  |  12  ----------------------------<  196<H>  4.0   |  24  |  0.7    Ca    8.5      02 Dec 2019 06:22  Mg     1.4     12-02    TPro  5.9<L>  /  Alb  3.6  /  TBili  1.0  /  DBili  x   /  AST  11  /  ALT  10  /  AlkPhos  47  12-02      PT/INR - ( 01 Dec 2019 09:50 )   PT: 24.50 sec;   INR: 2.15 ratio         PTT - ( 01 Dec 2019 09:50 )  PTT:31.1 sec                                       Urinalysis Basic - ( 01 Dec 2019 13:40 )    Color: Yellow / Appearance: Clear / SG: >1.050 / pH: x  Gluc: x / Ketone: Negative  / Bili: Negative / Urobili: <2 mg/dL   Blood: x / Protein: 100 mg/dL / Nitrite: Negative   Leuk Esterase: Negative / RBC: 11 /HPF / WBC 5 /HPF   Sq Epi: x / Non Sq Epi: 2 /HPF / Bacteria: Negative        CARDIAC MARKERS ( 01 Dec 2019 09:50 )  x     / <0.01 ng/mL / x     / x     / x                                                LIVER FUNCTIONS - ( 02 Dec 2019 06:22 )  Alb: 3.6 g/dL / Pro: 5.9 g/dL / ALK PHOS: 47 U/L / ALT: 10 U/L / AST: 11 U/L / GGT: x                                              FLU A B RSV Detection by PCR (12.01.19 @ 13:20)    Flu A Result: Negative: Negative results do not preclude influenza infection and  should not be used as the sole basis for treatment or  other patient management decisions.  A positive result may occur in the absence of viable virus.  By: [employer] Flu viral assay by Reverse Transcriptase  Polymerase Chain Reaction (RT-PCR).    Flu B Result: Negative    RSV Result: Negative                                                      MEDICATIONS  (STANDING):  aspirin enteric coated 81 milliGRAM(s) Oral daily  azithromycin  IVPB 500 milliGRAM(s) IV Intermittent every 24 hours  cefTRIAXone   IVPB 1000 milliGRAM(s) IV Intermittent every 24 hours  chlorhexidine 4% Liquid 1 Application(s) Topical <User Schedule>  losartan 50 milliGRAM(s) Oral daily  magnesium sulfate  IVPB 2 Gram(s) IV Intermittent once  metoprolol tartrate 25 milliGRAM(s) Oral two times a day  rivaroxaban 10 milliGRAM(s) Oral with dinner  simvastatin 40 milliGRAM(s) Oral at bedtime    MEDICATIONS  (PRN):  acetaminophen   Tablet .. 650 milliGRAM(s) Oral every 6 hours PRN Temp greater or equal to 38C (100.4F)    < from: 12 Lead ECG (12.01.19 @ 09:13) >  Diagnosis Line Sinus tachycardia with occasional Premature ventricular complexes  Left ventricular hypertrophy with QRS widening and repolarization abnormality  Inferior infarct , age undetermined  Abnormal ECG    Confirmed by Zaria Ragsdale MD ([id number]) on 12/2/2019 7:44:01 AM    < end of copied text >      radiology:  < from: CT Abdomen and Pelvis w/ IV Cont (12.01.19 @ 13:05) >  IMPRESSION:    New consolidation at the left lung base may represent pneumonia.   Noncontrast CT chest is recommended in 8 weeks to demonstrate resolution.    Linear hypodensity is seen within a pulmonary artery segmental branch of   the right lower lobe may represent chronic pulmonary embolism.    < end of copied text >    < from: Xray Chest 1 View-PORTABLE IMMEDIATE (12.01.19 @ 11:12) >  Impression:      Left basilar opacity which may represent pneumonia in the appropriate   clinical setting.    < end of copied text > Patient is a 69y old  Male who presents with a chief complaint of Shortness of breath + weakness (02 Dec 2019 10:26)      HPI:  69 y/o M with PMH of HTN, recurrent nephrolithiasis, DMII, DVT and massive PE in 2018 s/p tpa - now on Xarelto, CAD with MI s/p CABG, hx of DVT in past presents to [facility] with complaints of SOB and weakness for past few days. Patient says that for the past 1 month he has been having rhinorrhea, sneezing, nasal congestion. s/p levaquin course for 10 days?  Over last few days he became more SOB and has had productive cough of yellow sputum. Then this morning he was in the shower and all of a sudden felt like he was going to "pass out." He described the sensation of feeling generally very weak and unable to stand. He then immediately went to lie on the bed and felt like he couldn't move. He called in his family who said he looked very pale and they brought him to the ED. He also describes a period of flank pain 3 days ago which felt like his normal kidney stone pain, but denies passing a stone. During this episode he denied dizziness, vertigo, tinnitus, or palpitations. He denies have fever/chills, headache, chest pain, palpitations, abdominal pain, N/V/Diarrhea, urinary dysura/frequency/urgency, or lower extremity pain/erythema/swelling.     In ED /79, . Febrile to 101. CXR showing possible L sided opacity. CT chest showing consolidation at the left lung base may represent pneumonia in the appropriate clinical setting. No CTA evidence for acute pulmonary embolism. s/p rocephin and azithromycin in ED. s/p 2L IVF. Admitted to medicine for further management. (01 Dec 2019 14:14)    currently he feels better but not back to baseline. he has cough, productive of yellowish sputum.     PAST MEDICAL & SURGICAL HISTORY:  Acute massive pulmonary embolism: s/p tPA in dec 2018  DVT (deep venous thrombosis)  MI (myocardial infarction)  Kidney stones  High cholesterol  Type 2 diabetes mellitus with complication, unspecified long term insulin use status  Hypertension, unspecified type  H/O cystoscopy  H/O heart surgery: quadruple bypass 6 years ago      SOCIAL HX:   former smoker, stopped 20 years ago and was heavy smoker   Denies EtOH or illicit drug use                          Other    FAMILY HISTORY:  No pertinent family history in first degree relatives  .  No cardiovascular or pulmonary family history     REVIEW OF SYSTEMS:    CONSTITUTIONAL:   no fever , no chills, no weight gain , no weight loss    EYES:   no discharge, no visual changes.    ENT:   Ears: no ear pain and no hearing problems.  Nose: rhinorrhea for 1 month  Mouth/Throat: + throat pain.  Neck: no lumps, no pain, no stiffness and no swollen glands.    CARDIOVASCULAR:   no chest pain,   no swelling  no palpitations  no syncope    RESPIRATORY:  +SOB,  +wheezing ,  + sputum production    GASTROINTESTINAL:   no abdominal pain,   no constipation,   no diarrhea,   no vomiting.    GENITOURINARY:  left flank pain      SKIN:   no jaundice,    no rashes.    NEURO:   no loss of consciousness,   no headache,   no weakness.    PSYCHIATRIC:   no known mental health issues  no anxiety  no depression    ALLERGIC/IMMUNOLOGIC:   No active allergic or immunologic issues      Allergies    No Known Allergies    PHYSICAL EXAM  Vital Signs Last 24 Hrs  T(C): 35.6 (02 Dec 2019 05:51), Max: 38.3 (01 Dec 2019 11:33)  T(F): 96.1 (02 Dec 2019 05:51), Max: 101 (01 Dec 2019 11:33)  HR: 85 (02 Dec 2019 05:51) (82 - 94)  BP: 156/76 (02 Dec 2019 05:51) (147/81 - 191/86)  BP(mean): --  RR: 18 (01 Dec 2019 14:34) (18 - 18)  SpO2: 96% (01 Dec 2019 20:52) (95% - 96%)    CONSTITUTIONAL:  Well nourished.  NAD    ENT:   Airway patent,   No thrush    EYES:   Clear bilaterally, pupils equal, round and reactive to light.    CARDIAC:   Normal rate, regular rhythm. no edema    CAROTID: normal systolic impulseno bruits    RESPIRATORY:   Nnormal chest expansion  Not tachypneic,  No use of accessory muscles  bilateral expiratory wheezing, left base rhonchi    GASTROINTESTINAL:  Abdomen soft,   non-tender,   no guarding,   + BS    MUSCULOSKELETAL:   range of motion is not limited,  no clubbing, cyanosis    NEUROLOGICAL:   Alert and oriented   no motor deficits.  pertinent DTRs normal    SKIN:   Skin normal color for race,   No evidence of rash.    PSYCHIATRIC:   normal mood and affect.   no apparent risk to self or others.    HEME LYMPH:   No cervical  lymphadenopathy.  no inguinal lymphadenopathy      LABS:                          13.4   8.56  )-----------( 130      ( 02 Dec 2019 06:22 )             42.5                                               12-02    142  |  103  |  12  ----------------------------<  196<H>  4.0   |  24  |  0.7    Ca    8.5      02 Dec 2019 06:22  Mg     1.4     12-02    TPro  5.9<L>  /  Alb  3.6  /  TBili  1.0  /  DBili  x   /  AST  11  /  ALT  10  /  AlkPhos  47  12-02      PT/INR - ( 01 Dec 2019 09:50 )   PT: 24.50 sec;   INR: 2.15 ratio         PTT - ( 01 Dec 2019 09:50 )  PTT:31.1 sec                                       Urinalysis Basic - ( 01 Dec 2019 13:40 )    Color: Yellow / Appearance: Clear / SG: >1.050 / pH: x  Gluc: x / Ketone: Negative  / Bili: Negative / Urobili: <2 mg/dL   Blood: x / Protein: 100 mg/dL / Nitrite: Negative   Leuk Esterase: Negative / RBC: 11 /HPF / WBC 5 /HPF   Sq Epi: x / Non Sq Epi: 2 /HPF / Bacteria: Negative        CARDIAC MARKERS ( 01 Dec 2019 09:50 )  x     / <0.01 ng/mL / x     / x     / x                                                LIVER FUNCTIONS - ( 02 Dec 2019 06:22 )  Alb: 3.6 g/dL / Pro: 5.9 g/dL / ALK PHOS: 47 U/L / ALT: 10 U/L / AST: 11 U/L / GGT: x                                              FLU A B RSV Detection by PCR (12.01.19 @ 13:20)    Flu A Result: Negative: Negative results do not preclude influenza infection and  should not be used as the sole basis for treatment or  other patient management decisions.  A positive result may occur in the absence of viable virus.  By: [email or web address] Flu viral assay by Reverse Transcriptase  Polymerase Chain Reaction (RT-PCR).    Flu B Result: Negative    RSV Result: Negative                                                      MEDICATIONS  (STANDING):  aspirin enteric coated 81 milliGRAM(s) Oral daily  azithromycin  IVPB 500 milliGRAM(s) IV Intermittent every 24 hours  cefTRIAXone   IVPB 1000 milliGRAM(s) IV Intermittent every 24 hours  chlorhexidine 4% Liquid 1 Application(s) Topical <User Schedule>  losartan 50 milliGRAM(s) Oral daily  magnesium sulfate  IVPB 2 Gram(s) IV Intermittent once  metoprolol tartrate 25 milliGRAM(s) Oral two times a day  rivaroxaban 10 milliGRAM(s) Oral with dinner  simvastatin 40 milliGRAM(s) Oral at bedtime    MEDICATIONS  (PRN):  acetaminophen   Tablet .. 650 milliGRAM(s) Oral every 6 hours PRN Temp greater or equal to 38C (100.4F)    < from: 12 Lead ECG (12.01.19 @ 09:13) >  Diagnosis Line Sinus tachycardia with occasional Premature ventricular complexes  Left ventricular hypertrophy with QRS widening and repolarization abnormality  Inferior infarct , age undetermined  Abnormal ECG    Confirmed by Zaria Ragsdale MD ([id number]) on 12/2/2019 7:44:01 AM    < end of copied text >      radiology:  < from: CT Abdomen and Pelvis w/ IV Cont (12.01.19 @ 13:05) >  IMPRESSION:    New consolidation at the left lung base may represent pneumonia.   Noncontrast CT chest is recommended in 8 weeks to demonstrate resolution.    Linear hypodensity is seen within a pulmonary artery segmental branch of   the right lower lobe may represent chronic pulmonary embolism.    < end of copied text >    < from: Xray Chest 1 View-PORTABLE IMMEDIATE (12.01.19 @ 11:12) >  Impression:      Left basilar opacity which may represent pneumonia in the appropriate   clinical setting.    < end of copied text >

## 2019-12-03 ENCOUNTER — TRANSCRIPTION ENCOUNTER (OUTPATIENT)
Age: 69
End: 2019-12-03

## 2019-12-03 VITALS
HEART RATE: 89 BPM | TEMPERATURE: 96 F | DIASTOLIC BLOOD PRESSURE: 84 MMHG | RESPIRATION RATE: 20 BRPM | SYSTOLIC BLOOD PRESSURE: 177 MMHG

## 2019-12-03 LAB
ANION GAP SERPL CALC-SCNC: 15 MMOL/L — HIGH (ref 7–14)
BASOPHILS # BLD AUTO: 0.01 K/UL — SIGNIFICANT CHANGE UP (ref 0–0.2)
BASOPHILS NFR BLD AUTO: 0.1 % — SIGNIFICANT CHANGE UP (ref 0–1)
BUN SERPL-MCNC: 13 MG/DL — SIGNIFICANT CHANGE UP (ref 10–20)
CALCIUM SERPL-MCNC: 8.5 MG/DL — SIGNIFICANT CHANGE UP (ref 8.5–10.1)
CHLORIDE SERPL-SCNC: 103 MMOL/L — SIGNIFICANT CHANGE UP (ref 98–110)
CO2 SERPL-SCNC: 23 MMOL/L — SIGNIFICANT CHANGE UP (ref 17–32)
CREAT SERPL-MCNC: 0.6 MG/DL — LOW (ref 0.7–1.5)
EOSINOPHIL # BLD AUTO: 0 K/UL — SIGNIFICANT CHANGE UP (ref 0–0.7)
EOSINOPHIL NFR BLD AUTO: 0 % — SIGNIFICANT CHANGE UP (ref 0–8)
GLUCOSE BLDC GLUCOMTR-MCNC: 237 MG/DL — HIGH (ref 70–99)
GLUCOSE BLDC GLUCOMTR-MCNC: 324 MG/DL — HIGH (ref 70–99)
GLUCOSE SERPL-MCNC: 293 MG/DL — HIGH (ref 70–99)
HCOV PNL SPEC NAA+PROBE: DETECTED
HCT VFR BLD CALC: 43.6 % — SIGNIFICANT CHANGE UP (ref 42–52)
HGB BLD-MCNC: 14 G/DL — SIGNIFICANT CHANGE UP (ref 14–18)
IMM GRANULOCYTES NFR BLD AUTO: 0.9 % — HIGH (ref 0.1–0.3)
LYMPHOCYTES # BLD AUTO: 0.76 K/UL — LOW (ref 1.2–3.4)
LYMPHOCYTES # BLD AUTO: 8.5 % — LOW (ref 20.5–51.1)
MCHC RBC-ENTMCNC: 30.3 PG — SIGNIFICANT CHANGE UP (ref 27–31)
MCHC RBC-ENTMCNC: 32.1 G/DL — SIGNIFICANT CHANGE UP (ref 32–37)
MCV RBC AUTO: 94.4 FL — HIGH (ref 80–94)
MONOCYTES # BLD AUTO: 0.36 K/UL — SIGNIFICANT CHANGE UP (ref 0.1–0.6)
MONOCYTES NFR BLD AUTO: 4 % — SIGNIFICANT CHANGE UP (ref 1.7–9.3)
NEUTROPHILS # BLD AUTO: 7.76 K/UL — HIGH (ref 1.4–6.5)
NEUTROPHILS NFR BLD AUTO: 86.5 % — HIGH (ref 42.2–75.2)
NRBC # BLD: 0 /100 WBCS — SIGNIFICANT CHANGE UP (ref 0–0)
PLATELET # BLD AUTO: 156 K/UL — SIGNIFICANT CHANGE UP (ref 130–400)
POTASSIUM SERPL-MCNC: 4.4 MMOL/L — SIGNIFICANT CHANGE UP (ref 3.5–5)
POTASSIUM SERPL-SCNC: 4.4 MMOL/L — SIGNIFICANT CHANGE UP (ref 3.5–5)
RAPID RVP RESULT: DETECTED
RBC # BLD: 4.62 M/UL — LOW (ref 4.7–6.1)
RBC # FLD: 12.7 % — SIGNIFICANT CHANGE UP (ref 11.5–14.5)
SODIUM SERPL-SCNC: 141 MMOL/L — SIGNIFICANT CHANGE UP (ref 135–146)
WBC # BLD: 8.97 K/UL — SIGNIFICANT CHANGE UP (ref 4.8–10.8)
WBC # FLD AUTO: 8.97 K/UL — SIGNIFICANT CHANGE UP (ref 4.8–10.8)

## 2019-12-03 RX ORDER — IPRATROPIUM/ALBUTEROL SULFATE 18-103MCG
3 AEROSOL WITH ADAPTER (GRAM) INHALATION
Qty: 300 | Refills: 0
Start: 2019-12-03 | End: 2019-12-16

## 2019-12-03 RX ORDER — DEXTROSE 50 % IN WATER 50 %
25 SYRINGE (ML) INTRAVENOUS ONCE
Refills: 0 | Status: DISCONTINUED | OUTPATIENT
Start: 2019-12-03 | End: 2019-12-03

## 2019-12-03 RX ORDER — GLUCAGON INJECTION, SOLUTION 0.5 MG/.1ML
1 INJECTION, SOLUTION SUBCUTANEOUS ONCE
Refills: 0 | Status: DISCONTINUED | OUTPATIENT
Start: 2019-12-03 | End: 2019-12-03

## 2019-12-03 RX ORDER — METOPROLOL TARTRATE 50 MG
1 TABLET ORAL
Qty: 60 | Refills: 0
Start: 2019-12-03 | End: 2020-01-01

## 2019-12-03 RX ORDER — LOSARTAN POTASSIUM 100 MG/1
1 TABLET, FILM COATED ORAL
Qty: 0 | Refills: 0 | DISCHARGE

## 2019-12-03 RX ORDER — AZITHROMYCIN 500 MG/1
1 TABLET, FILM COATED ORAL
Qty: 5 | Refills: 0
Start: 2019-12-03 | End: 2019-12-07

## 2019-12-03 RX ORDER — LOSARTAN POTASSIUM 100 MG/1
1 TABLET, FILM COATED ORAL
Qty: 30 | Refills: 0
Start: 2019-12-03

## 2019-12-03 RX ORDER — SODIUM CHLORIDE 9 MG/ML
1000 INJECTION, SOLUTION INTRAVENOUS
Refills: 0 | Status: DISCONTINUED | OUTPATIENT
Start: 2019-12-03 | End: 2019-12-03

## 2019-12-03 RX ORDER — INSULIN LISPRO 100/ML
VIAL (ML) SUBCUTANEOUS
Refills: 0 | Status: DISCONTINUED | OUTPATIENT
Start: 2019-12-03 | End: 2019-12-03

## 2019-12-03 RX ORDER — AMOXICILLIN 250 MG/5ML
2 SUSPENSION, RECONSTITUTED, ORAL (ML) ORAL
Qty: 30 | Refills: 0
Start: 2019-12-03 | End: 2019-12-07

## 2019-12-03 RX ORDER — METOPROLOL TARTRATE 50 MG
0.5 TABLET ORAL
Qty: 0 | Refills: 0 | DISCHARGE

## 2019-12-03 RX ORDER — DEXTROSE 50 % IN WATER 50 %
15 SYRINGE (ML) INTRAVENOUS ONCE
Refills: 0 | Status: DISCONTINUED | OUTPATIENT
Start: 2019-12-03 | End: 2019-12-03

## 2019-12-03 RX ORDER — DEXTROSE 50 % IN WATER 50 %
12.5 SYRINGE (ML) INTRAVENOUS ONCE
Refills: 0 | Status: DISCONTINUED | OUTPATIENT
Start: 2019-12-03 | End: 2019-12-03

## 2019-12-03 RX ADMIN — Medication 3 MILLILITER(S): at 13:55

## 2019-12-03 RX ADMIN — CHLORHEXIDINE GLUCONATE 1 APPLICATION(S): 213 SOLUTION TOPICAL at 05:58

## 2019-12-03 RX ADMIN — Medication 3 MILLILITER(S): at 09:00

## 2019-12-03 RX ADMIN — Medication 2: at 08:06

## 2019-12-03 RX ADMIN — CEFTRIAXONE 100 MILLIGRAM(S): 500 INJECTION, POWDER, FOR SOLUTION INTRAMUSCULAR; INTRAVENOUS at 05:58

## 2019-12-03 RX ADMIN — LOSARTAN POTASSIUM 100 MILLIGRAM(S): 100 TABLET, FILM COATED ORAL at 05:58

## 2019-12-03 RX ADMIN — Medication 50 MILLIGRAM(S): at 05:58

## 2019-12-03 RX ADMIN — Medication 25 MILLIGRAM(S): at 05:58

## 2019-12-03 RX ADMIN — BUDESONIDE AND FORMOTEROL FUMARATE DIHYDRATE 2 PUFF(S): 160; 4.5 AEROSOL RESPIRATORY (INHALATION) at 08:07

## 2019-12-03 RX ADMIN — Medication 81 MILLIGRAM(S): at 11:58

## 2019-12-03 RX ADMIN — Medication 4: at 11:54

## 2019-12-03 NOTE — DISCHARGE NOTE PROVIDER - CARE PROVIDER_API CALL
Mg Nevarez)  Critical Care Medicine; Internal Medicine; Pulmonary Disease; Sleep Medicine  [address] [place] [address]  Phone: [phone]  Fax: [phone]  Follow Up Time: 1 week

## 2019-12-03 NOTE — DISCHARGE NOTE PROVIDER - NSDCCPCAREPLAN_GEN_ALL_CORE_FT
PRINCIPAL DISCHARGE DIAGNOSIS  Diagnosis: Pneumonia  Assessment and Plan of Treatment: Seek care immediately if:   You are confused and cannot think clearly.  You have increased trouble breathing.  Your lips or fingernails turn gray or blue.  Contact your healthcare provider if:   Your symptoms do not get better, or they get worse.  You are urinating less, or not at all.  You have questions or concerns about your condition or care.  Medicines:   Medicines may be given to treat a bacterial, viral, or fungal infection. You may also be given medicines to dilate your bronchial tubes to help you breathe more easily.  Take your medicine as directed. Contact your healthcare provider if you think your medicine is not helping or if you have side effects. Tell him or her if you are allergic to any medicine.  Deep breathing and coughing: Deep breathing helps open the air passages in your lungs. Coughing helps bring up mucus from your lungs. Take a deep breath and hold the breath as long as you can. Then push the air out of your lungs with a deep, strong cough. Spit out any mucus you have coughed up. Take 10 deep breaths in a row every hour that you are awake. Remember to follow each deep breath with a cough.   Ask about vaccines. You may need a vaccine to help prevent pneumonia. Get an influenza (flu) vaccine every year as soon as it becomes available.      SECONDARY DISCHARGE DIAGNOSES  Diagnosis: Hypertension  Assessment and Plan of Treatment:

## 2019-12-03 NOTE — DISCHARGE NOTE NURSING/CASE MANAGEMENT/SOCIAL WORK - PATIENT PORTAL LINK FT
You can access the FollowMyHealth Patient Portal offered by [facility] by registering at the following website: [email or web address]. By joining [employer]’s FollowMyHealth portal, you will also be able to view your health information using other applications (apps) compatible with our system.

## 2019-12-03 NOTE — DISCHARGE NOTE PROVIDER - HOSPITAL COURSE
67 y/o M with PMH of HTN, recurrent nephrolithiasis, DMII, DVT and massive PE in 2018 s/p tpa - now on Xarelto, CAD with MI s/p CABG, hx of DVT in past presents to [facility] with complaints of SOB and weakness for past few days. patient was admitted as case of CAP , azithromycin and ceftriaxone course of Abx, pulmonology consulted and patient was started on prednisone course for 5 days, Symbicort and duonebs .patient need to follow with pulm outpatient . patient WBC counts were trending down back to normal. RVP panel was positive for corona virus.        <<<RESIDENT DISCHARGE NOTE>>>         MERARY GARCIA    MRN-[id number]        VITAL SIGNS:    T(F): 96.7 (12-03-19 @ 05:34), Max: 98.5 (12-02-19 @ 20:38)    HR: 80 (12-03-19 @ 05:34)    BP: 158/79 (12-03-19 @ 05:34)    SpO2: 95% (12-02-19 @ 19:52)            PHYSICAL EXAMINATION:    GENERAL: NAD, lying in bed comfortably    HEAD:  Atraumatic, Normocephalic    EYES: EOMI, PERRLA, conjunctiva and sclera clear    ENT: Moist mucous membranes    NECK: Supple, No JVD    CHEST/LUNG: Clear to auscultation bilaterally; minimal rhonci and wheezing,     HEART: Regular rate and rhythm; No murmurs, rubs, or gallops    ABDOMEN: Bowel sounds present; Soft, Nontender, Nondistended. No hepatomegally    EXTREMITIES:  2+ Peripheral Pulses, brisk capillary refill. No clubbing, cyanosis, or edema    NERVOUS SYSTEM:  Alert & Oriented X3, speech clear. No deficits     MSK: FROM all 4 extremities, full and equal strength    SKIN: No rashes or lesions        TEST RESULTS:                            14.0     8.97  )-----------( 156      ( 03 Dec 2019 06:30 )               43.6             12-03        141  |  103  |  13    ----------------------------<  293<H>    4.4   |  23  |  0.6<L>        Ca    8.5      03 Dec 2019 06:30    Mg     1.4     12-02        TPro  5.9<L>  /  Alb  3.6  /  TBili  1.0  /  DBili  x   /  AST  11  /  ALT  10  /  AlkPhos  47  12-02

## 2019-12-03 NOTE — DISCHARGE NOTE NURSING/CASE MANAGEMENT/SOCIAL WORK - NSDCPEXARELTO_GEN_ALL_CORE
Rivaroxaban/Xarelto - Follow up monitoring/Rivaroxaban/Xarelto - Compliance/Rivaroxaban/Xarelto - Dietary Advice/Rivaroxaban/Xarelto - Potential for adverse drug reactions and interactions

## 2019-12-03 NOTE — PROGRESS NOTE ADULT - ASSESSMENT
69 y/o M with PMH of HTN, recurrent nephrolithiasis, DMII, DVT and massive PE in 2018 s/p tpa - now on Xarelto, CAD with MI s/p CABG, hx of DVT in past presents to [facility] with complaints of SOB and weakness for past few days.     Community Acquired Pneumonia, Mild COPD vs Reactive Airway with viral illness  - on Rocephin and Azithromycin   - flu negative  - corona virus detected  -pulmonary consult noted  - give script for nebulizer on D/C  - outpatient PFT's  - steroid taper as suggested    hx of DVT and recurrent PE  - continue Xarelto  - evidence of chronic PE on CT chest w contrast    DM   - hold oral anti-glycemics  - hemoglobin A1C, 6.4  - fingersticks ACHS  - resume home meds on discharge    HTN   - continue home meds  - maintain on increased Losartan to 100mg daily    CAD with MI s/p CABG  - continue home meds     recurrent nephrolithiasis   - stable  - outpatient follow up     dvt ppx: on Xarelto  gi ppx: not indicated  ambulate as tolerated  DASH/Carb consistent diet  Dispo: from home  FULL CODE    D/C home today, F/U next week. Case discussed with house staff and patient in detail.

## 2019-12-03 NOTE — DISCHARGE NOTE PROVIDER - NSDCMRMEDTOKEN_GEN_ALL_CORE_FT
amoxicillin 500 mg oral tablet: 2 tab(s) orally every 8 hours starting 12/4/2019  Aspirin Enteric Coated 81 mg oral delayed release tablet: 1 tab(s) orally once a day  azithromycin 500 mg oral tablet: 1 tab(s) orally once a day   ipratropium-albuterol 0.5 mg-2.5 mg/3 mLinhalation solution: 3 milliliter(s) inhaled every 6 hours  losartan 100 mg oral tablet: 1 tab(s) orally once a day  metoprolol tartrate 25 mg oral tablet: 1 tab(s) orally 2 times a day  predniSONE 20 mg oral tablet: 2 tab(s) oral - orally once a day x 4 days  1 tab(s) oral - orally once a day x 5 days  simvastatin 40 mg oral tablet: 1 tab(s) orally once a day (at bedtime)  Xarelto 10 mg oral tablet: 1 tab(s) orally once a day

## 2019-12-03 NOTE — PROGRESS NOTE ADULT - SUBJECTIVE AND OBJECTIVE BOX
MERARY GARCIA  69y  Male  HPI:  67 y/o M with PMH of HTN, recurrent nephrolithiasis, DMII, DVT and massive PE in 2018 s/p tpa - now on Xarelto, CAD with MI s/p CABG, hx of DVT in past presents to [facility] with complaints of SOB and weakness for past few days. Patient says that for the past 1 month he has been having rhinorrhea, sneezing, nasal congestion. Over last few days he became more SOB and has had productive cough of yellow sputum. Then this morning he was in the shower and all of a sudden felt like he was going to "pass out." He described the sensation of feeling generally very weak and unable to stand. He then immediately went to lie on the bed and felt like he couldn't move. He called in his family who said he looked very pale and they brought him to the ED. He also describes a period of flank pain 3 days ago which felt like his normal kidney stone pain, but denies passing a stone. During this episode he denied dizziness, vertigo, tinnitus, or palpitations. He denies have fever/chills, headache, chest pain, palpitations, abdominal pain, N/V/Diarrhea, urinary dysura/frequency/urgency, or lower extremity pain/erythema/swelling.     In ED /79, . Febrile to 101. CXR showing possible L sided opacity. CT chest showing consolidation at the left lung base may represent pneumonia in the appropriate clinical setting. No CTA evidence for acute pulmonary embolism. s/p rocephin and azithromycin in ED. s/p 2L IVF. Admitted to medicine for further management. (01 Dec 2019 14:14)    MEDICATIONS  (STANDING):  albuterol/ipratropium for Nebulization 3 milliLiter(s) Nebulizer every 6 hours  aspirin enteric coated 81 milliGRAM(s) Oral daily  azithromycin  IVPB 500 milliGRAM(s) IV Intermittent every 24 hours  budesonide  80 MICROgram(s)/formoterol 4.5 MICROgram(s) Inhaler 2 Puff(s) Inhalation two times a day  cefTRIAXone   IVPB 1000 milliGRAM(s) IV Intermittent every 24 hours  chlorhexidine 4% Liquid 1 Application(s) Topical <User Schedule>  dextrose 5%. 1000 milliLiter(s) (50 mL/Hr) IV Continuous <Continuous>  dextrose 50% Injectable 12.5 Gram(s) IV Push once  dextrose 50% Injectable 25 Gram(s) IV Push once  dextrose 50% Injectable 25 Gram(s) IV Push once  insulin lispro (HumaLOG) corrective regimen sliding scale   SubCutaneous three times a day before meals  losartan 100 milliGRAM(s) Oral daily  metoprolol tartrate 25 milliGRAM(s) Oral two times a day  predniSONE   Tablet 50 milliGRAM(s) Oral daily  rivaroxaban 10 milliGRAM(s) Oral with dinner  simvastatin 40 milliGRAM(s) Oral at bedtime    MEDICATIONS  (PRN):  acetaminophen   Tablet .. 650 milliGRAM(s) Oral every 6 hours PRN Temp greater or equal to 38C (100.4F)  dextrose 40% Gel 15 Gram(s) Oral once PRN Blood Glucose LESS THAN 70 milliGRAM(s)/deciliter  glucagon  Injectable 1 milliGRAM(s) IntraMuscular once PRN Glucose LESS THAN 70 milligrams/deciliter    INTERVAL EVENTS: Patient seen today without distress, feeling better, bringing up more secretions.    T(C): 35.9 (12-03-19 @ 05:34), Max: 36.9 (12-02-19 @ 20:38)  HR: 80 (12-03-19 @ 05:34) (80 - 90)  BP: 158/79 (12-03-19 @ 05:34) (155/74 - 190/89)  RR: 18 (12-03-19 @ 05:34) (18 - 18)  SpO2: 95% (12-02-19 @ 19:52) (95% - 95%)  Wt(kg): --Vital Signs Last 24 Hrs  T(C): 35.9 (03 Dec 2019 05:34), Max: 36.9 (02 Dec 2019 20:38)  T(F): 96.7 (03 Dec 2019 05:34), Max: 98.5 (02 Dec 2019 20:38)  HR: 80 (03 Dec 2019 05:34) (80 - 90)  BP: 158/79 (03 Dec 2019 05:34) (155/74 - 190/89)  BP(mean): --  RR: 18 (03 Dec 2019 05:34) (18 - 18)  SpO2: 95% (02 Dec 2019 19:52) (95% - 95%)    PHYSICAL EXAM:  GENERAL: NAD  NECK: Supple, No JVD  CHEST/LUNG: Decreased BS; Occ faint wheeze  HEART: S1, S2, Regular rate and rhythm  ABDOMEN: Soft, Nontender, Nondistended; Bowel sounds present  EXTREMITIES: No clubbing, cyanosis, or edema    LABS:                        14.0   8.97  )-----------( 156      ( 03 Dec 2019 06:30 )             43.6             12-03    141  |  103  |  13  ----------------------------<  293<H>  4.4   |  23  |  0.6<L>    Ca    8.5      03 Dec 2019 06:30  Mg     1.4     12-02    TPro  5.9<L>  /  Alb  3.6  /  TBili  1.0  /  DBili  x   /  AST  11  /  ALT  10  /  AlkPhos  47  12-02    LIVER FUNCTIONS - ( 02 Dec 2019 06:22 )  Alb: 3.6 g/dL / Pro: 5.9 g/dL / ALK PHOS: 47 U/L / ALT: 10 U/L / AST: 11 U/L / GGT: x                         Urinalysis Basic - ( 01 Dec 2019 13:40 )    Color: Yellow / Appearance: Clear / SG: >1.050 / pH: x  Gluc: x / Ketone: Negative  / Bili: Negative / Urobili: <2 mg/dL   Blood: x / Protein: 100 mg/dL / Nitrite: Negative   Leuk Esterase: Negative / RBC: 11 /HPF / WBC 5 /HPF   Sq Epi: x / Non Sq Epi: 2 /HPF / Bacteria: Negative      Culture - Urine (collected 01 Dec 2019 13:40)  Source: .Urine Clean Catch (Midstream)  Final Report (02 Dec 2019 17:26):    <10,000 CFU/mL Normal Urogenital Ela    Culture - Blood (collected 01 Dec 2019 13:00)  Source: .Blood Blood-Peripheral  Preliminary Report (02 Dec 2019 22:01):    No growth to date.    Culture - Blood (collected 01 Dec 2019 13:00)  Source: .Blood Blood-Peripheral  Preliminary Report (02 Dec 2019 22:01):    No growth to date.    Rapid Respiratory Viral Panel (12.02.19 @ 10:25)    Rapid RVP Result: Detected: This Respiratory Panel uses polymerase chain reaction (PCR) to detect for  adenovirus; coronavirus (HKU1, NL63, 229E, OC43); human metapneumovirus  (hMPV); human enterovirus/rhinovirus (Entero/RV); influenza A; influenza  A/H1; influenza A/H3; influenza A/H1-2009; influenza B; parainfluenza  viruses 1, 2, 3, 4; respiratory syncytial virus; Mycoplasma pneumoniae;  and Chlamydophila pneumoniae.    Coronavirus (229E,HKU1,NL63,OC43): Detected      RADIOLOGY & ADDITIONAL TESTS:  < from: CT Angio Chest w/ IV Cont (12.01.19 @ 13:04) >  FINDINGS:    CHEST:    PULMONARY EMBOLUS: Linear hypodensity is seen within a pulmonary artery   segmental branch of the right lower lobe. Breathing motion artifact is   present.    LUNGS, PLEURA, AIRWAYS: Consolidation at the left lung base may represent   pneumonia in the appropriate clinical setting. No pneumothorax or   effusion. Right apical subpleural bulla.    THORACIC NODES:  Mildly enlarged right paratracheal and subcarinal lymph   node. Right hilar lymph nodes measuring 2.5 x 1.8 cm. These appear to be   similar in size to prior exam.    MEDIASTINUM/GREAT VESSELS: No pericardial effusion. Heart size   unremarkable. Coronary artery and thoracic aorta atherosclerotic   calcifications. No aneurysmal dilation of the thoracic aorta. Post CABG.    ABDOMEN/PELVIS:    HEPATOBILIARY: Subcentimeter hypodensity within the right hepatic lobe   too small to characterize but appears stable when compared to prior CT   chest dated 3/16/2019. There is cholelithiasis.    SPLEEN: Unremarkable.    PANCREAS: Unremarkable.    ADRENAL GLANDS: Unremarkable.    KIDNEYS: Nonobstructing bilateral renal calculi, not significantly   changed since the prior examination. No hydronephrosis. Subcentimeter   right renal hypodensity, too small to further characterize.    ABDOMINOPELVIC NODES: No enlarged abdominal or pelvic lymph nodes.    PELVIC ORGANS: Distended urinary bladder.    PERITONEUM/MESENTERY/BOWEL: No bowel obstruction, ascites or   intraperitoneal free air. Normal caliber appendix.    BONES/SOFT TISSUES: Degenerative changes to the thoracolumbar spine. Post   median sternotomy.    VASCULAR: Calcified and noncalcified atherosclerotic disease of the aorta.      IMPRESSION:    New consolidation at the left lung base may represent pneumonia.   Noncontrast CT chest is recommended in 8 weeks to demonstrate resolution.    Linear hypodensity is seen within a pulmonary artery segmental branch of   the right lower lobe may represent chronic pulmonary embolism.      < end of copied text >

## 2019-12-04 RX ORDER — AMOXICILLIN 250 MG/5ML
2 SUSPENSION, RECONSTITUTED, ORAL (ML) ORAL
Qty: 30 | Refills: 0
Start: 2019-12-04 | End: 2019-12-08

## 2019-12-06 LAB
CULTURE RESULTS: SIGNIFICANT CHANGE UP
CULTURE RESULTS: SIGNIFICANT CHANGE UP
SPECIMEN SOURCE: SIGNIFICANT CHANGE UP
SPECIMEN SOURCE: SIGNIFICANT CHANGE UP

## 2019-12-10 DIAGNOSIS — I12.9 HYPERTENSIVE CHRONIC KIDNEY DISEASE WITH STAGE 1 THROUGH STAGE 4 CHRONIC KIDNEY DISEASE, OR UNSPECIFIED CHRONIC KIDNEY DISEASE: ICD-10-CM

## 2019-12-10 DIAGNOSIS — Z86.711 PERSONAL HISTORY OF PULMONARY EMBOLISM: ICD-10-CM

## 2019-12-10 DIAGNOSIS — Z79.01 LONG TERM (CURRENT) USE OF ANTICOAGULANTS: ICD-10-CM

## 2019-12-10 DIAGNOSIS — J18.9 PNEUMONIA, UNSPECIFIED ORGANISM: ICD-10-CM

## 2019-12-10 DIAGNOSIS — Z79.84 LONG TERM (CURRENT) USE OF ORAL HYPOGLYCEMIC DRUGS: ICD-10-CM

## 2019-12-10 DIAGNOSIS — E78.5 HYPERLIPIDEMIA, UNSPECIFIED: ICD-10-CM

## 2019-12-10 DIAGNOSIS — I25.2 OLD MYOCARDIAL INFARCTION: ICD-10-CM

## 2019-12-10 DIAGNOSIS — Z86.718 PERSONAL HISTORY OF OTHER VENOUS THROMBOSIS AND EMBOLISM: ICD-10-CM

## 2019-12-10 DIAGNOSIS — Z87.891 PERSONAL HISTORY OF NICOTINE DEPENDENCE: ICD-10-CM

## 2019-12-10 DIAGNOSIS — J44.0 CHRONIC OBSTRUCTIVE PULMONARY DISEASE WITH (ACUTE) LOWER RESPIRATORY INFECTION: ICD-10-CM

## 2019-12-10 DIAGNOSIS — N18.3 CHRONIC KIDNEY DISEASE, STAGE 3 (MODERATE): ICD-10-CM

## 2019-12-10 DIAGNOSIS — E11.22 TYPE 2 DIABETES MELLITUS WITH DIABETIC CHRONIC KIDNEY DISEASE: ICD-10-CM

## 2019-12-10 DIAGNOSIS — I25.10 ATHEROSCLEROTIC HEART DISEASE OF NATIVE CORONARY ARTERY WITHOUT ANGINA PECTORIS: ICD-10-CM

## 2019-12-10 DIAGNOSIS — I27.82 CHRONIC PULMONARY EMBOLISM: ICD-10-CM

## 2019-12-10 DIAGNOSIS — B97.29 OTHER CORONAVIRUS AS THE CAUSE OF DISEASES CLASSIFIED ELSEWHERE: ICD-10-CM

## 2019-12-10 DIAGNOSIS — Z79.82 LONG TERM (CURRENT) USE OF ASPIRIN: ICD-10-CM

## 2019-12-10 DIAGNOSIS — Z95.1 PRESENCE OF AORTOCORONARY BYPASS GRAFT: ICD-10-CM

## 2019-12-16 ENCOUNTER — OUTPATIENT (OUTPATIENT)
Dept: OUTPATIENT SERVICES | Facility: HOSPITAL | Age: 69
LOS: 1 days | Discharge: HOME | End: 2019-12-16

## 2019-12-16 DIAGNOSIS — Z98.890 OTHER SPECIFIED POSTPROCEDURAL STATES: Chronic | ICD-10-CM

## 2019-12-17 DIAGNOSIS — R31.9 HEMATURIA, UNSPECIFIED: ICD-10-CM

## 2019-12-17 PROBLEM — I82.409 ACUTE EMBOLISM AND THROMBOSIS OF UNSPECIFIED DEEP VEINS OF UNSPECIFIED LOWER EXTREMITY: Chronic | Status: ACTIVE | Noted: 2019-12-01

## 2019-12-17 PROBLEM — I26.99 OTHER PULMONARY EMBOLISM WITHOUT ACUTE COR PULMONALE: Chronic | Status: ACTIVE | Noted: 2019-12-01

## 2019-12-20 ENCOUNTER — OUTPATIENT (OUTPATIENT)
Dept: OUTPATIENT SERVICES | Facility: HOSPITAL | Age: 69
LOS: 1 days | Discharge: HOME | End: 2019-12-20
Payer: MEDICARE

## 2019-12-20 DIAGNOSIS — Z98.890 OTHER SPECIFIED POSTPROCEDURAL STATES: Chronic | ICD-10-CM

## 2019-12-20 DIAGNOSIS — R31.9 HEMATURIA, UNSPECIFIED: ICD-10-CM

## 2019-12-20 PROCEDURE — 76770 US EXAM ABDO BACK WALL COMP: CPT | Mod: 26

## 2020-08-03 NOTE — ED ADULT NURSE NOTE - CAS EDP DISCH DISPOSITION ADMI
Pt comes in with mother for complaints of a rash that started this AM. Pt denies itching. Patient's airway, breathing, circulation, and disability/mental status (ABCDs) intact/WDL during triage.    
[facility]

## 2020-09-28 PROBLEM — Z00.00 ENCOUNTER FOR PREVENTIVE HEALTH EXAMINATION: Status: ACTIVE | Noted: 2020-09-28

## 2020-12-16 ENCOUNTER — RX RENEWAL (OUTPATIENT)
Age: 70
End: 2020-12-16

## 2020-12-16 RX ORDER — METOPROLOL TARTRATE 25 MG/1
25 TABLET, FILM COATED ORAL DAILY
Qty: 90 | Refills: 3 | Status: ACTIVE | COMMUNITY
Start: 2020-10-28 | End: 1900-01-01

## 2021-03-17 ENCOUNTER — EMERGENCY (EMERGENCY)
Facility: HOSPITAL | Age: 71
LOS: 0 days | Discharge: HOME | End: 2021-03-18
Attending: EMERGENCY MEDICINE | Admitting: EMERGENCY MEDICINE
Payer: MEDICARE

## 2021-03-17 ENCOUNTER — TRANSCRIPTION ENCOUNTER (OUTPATIENT)
Age: 71
End: 2021-03-17

## 2021-03-17 VITALS
TEMPERATURE: 98 F | HEART RATE: 80 BPM | DIASTOLIC BLOOD PRESSURE: 100 MMHG | OXYGEN SATURATION: 97 % | WEIGHT: 212.97 LBS | HEIGHT: 73 IN | SYSTOLIC BLOOD PRESSURE: 215 MMHG | RESPIRATION RATE: 18 BRPM

## 2021-03-17 DIAGNOSIS — Z79.899 OTHER LONG TERM (CURRENT) DRUG THERAPY: ICD-10-CM

## 2021-03-17 DIAGNOSIS — Z98.890 OTHER SPECIFIED POSTPROCEDURAL STATES: Chronic | ICD-10-CM

## 2021-03-17 DIAGNOSIS — I10 ESSENTIAL (PRIMARY) HYPERTENSION: ICD-10-CM

## 2021-03-17 DIAGNOSIS — E78.5 HYPERLIPIDEMIA, UNSPECIFIED: ICD-10-CM

## 2021-03-17 LAB
BASOPHILS # BLD AUTO: 0.03 K/UL — SIGNIFICANT CHANGE UP (ref 0–0.2)
BASOPHILS NFR BLD AUTO: 0.4 % — SIGNIFICANT CHANGE UP (ref 0–1)
EOSINOPHIL # BLD AUTO: 0.31 K/UL — SIGNIFICANT CHANGE UP (ref 0–0.7)
EOSINOPHIL NFR BLD AUTO: 3.7 % — SIGNIFICANT CHANGE UP (ref 0–8)
HCT VFR BLD CALC: 44.3 % — SIGNIFICANT CHANGE UP (ref 42–52)
HGB BLD-MCNC: 14.4 G/DL — SIGNIFICANT CHANGE UP (ref 14–18)
IMM GRANULOCYTES NFR BLD AUTO: 0.8 % — HIGH (ref 0.1–0.3)
LYMPHOCYTES # BLD AUTO: 1.82 K/UL — SIGNIFICANT CHANGE UP (ref 1.2–3.4)
LYMPHOCYTES # BLD AUTO: 21.9 % — SIGNIFICANT CHANGE UP (ref 20.5–51.1)
MCHC RBC-ENTMCNC: 29.9 PG — SIGNIFICANT CHANGE UP (ref 27–31)
MCHC RBC-ENTMCNC: 32.5 G/DL — SIGNIFICANT CHANGE UP (ref 32–37)
MCV RBC AUTO: 91.9 FL — SIGNIFICANT CHANGE UP (ref 80–94)
MONOCYTES # BLD AUTO: 0.69 K/UL — HIGH (ref 0.1–0.6)
MONOCYTES NFR BLD AUTO: 8.3 % — SIGNIFICANT CHANGE UP (ref 1.7–9.3)
NEUTROPHILS # BLD AUTO: 5.39 K/UL — SIGNIFICANT CHANGE UP (ref 1.4–6.5)
NEUTROPHILS NFR BLD AUTO: 64.9 % — SIGNIFICANT CHANGE UP (ref 42.2–75.2)
NRBC # BLD: 0 /100 WBCS — SIGNIFICANT CHANGE UP (ref 0–0)
PLATELET # BLD AUTO: 172 K/UL — SIGNIFICANT CHANGE UP (ref 130–400)
RBC # BLD: 4.82 M/UL — SIGNIFICANT CHANGE UP (ref 4.7–6.1)
RBC # FLD: 12.9 % — SIGNIFICANT CHANGE UP (ref 11.5–14.5)
WBC # BLD: 8.31 K/UL — SIGNIFICANT CHANGE UP (ref 4.8–10.8)
WBC # FLD AUTO: 8.31 K/UL — SIGNIFICANT CHANGE UP (ref 4.8–10.8)

## 2021-03-17 PROCEDURE — 93010 ELECTROCARDIOGRAM REPORT: CPT

## 2021-03-17 PROCEDURE — 99285 EMERGENCY DEPT VISIT HI MDM: CPT

## 2021-03-17 RX ORDER — METOPROLOL TARTRATE 50 MG
25 TABLET ORAL ONCE
Refills: 0 | Status: DISCONTINUED | OUTPATIENT
Start: 2021-03-17 | End: 2021-03-17

## 2021-03-17 RX ORDER — METOPROLOL TARTRATE 50 MG
25 TABLET ORAL ONCE
Refills: 0 | Status: COMPLETED | OUTPATIENT
Start: 2021-03-17 | End: 2021-03-17

## 2021-03-17 RX ADMIN — Medication 25 MILLIGRAM(S): at 23:28

## 2021-03-17 NOTE — ED ADULT TRIAGE NOTE - CHIEF COMPLAINT QUOTE
pt sts" my blood pressure is high in the 200s and the urgent care send me here". pt denies any headache , blurry vision, weakness or chest pain.

## 2021-03-18 VITALS — DIASTOLIC BLOOD PRESSURE: 90 MMHG | SYSTOLIC BLOOD PRESSURE: 199 MMHG

## 2021-03-18 LAB
ALBUMIN SERPL ELPH-MCNC: 4 G/DL — SIGNIFICANT CHANGE UP (ref 3.5–5.2)
ALP SERPL-CCNC: 61 U/L — SIGNIFICANT CHANGE UP (ref 30–115)
ALT FLD-CCNC: 10 U/L — SIGNIFICANT CHANGE UP (ref 0–41)
ANION GAP SERPL CALC-SCNC: 10 MMOL/L — SIGNIFICANT CHANGE UP (ref 7–14)
APTT BLD: 41.3 SEC — HIGH (ref 27–39.2)
AST SERPL-CCNC: 13 U/L — SIGNIFICANT CHANGE UP (ref 0–41)
BILIRUB SERPL-MCNC: 1.2 MG/DL — SIGNIFICANT CHANGE UP (ref 0.2–1.2)
BUN SERPL-MCNC: 18 MG/DL — SIGNIFICANT CHANGE UP (ref 10–20)
CALCIUM SERPL-MCNC: 9.2 MG/DL — SIGNIFICANT CHANGE UP (ref 8.5–10.1)
CHLORIDE SERPL-SCNC: 104 MMOL/L — SIGNIFICANT CHANGE UP (ref 98–110)
CO2 SERPL-SCNC: 27 MMOL/L — SIGNIFICANT CHANGE UP (ref 17–32)
CREAT SERPL-MCNC: 0.8 MG/DL — SIGNIFICANT CHANGE UP (ref 0.7–1.5)
GLUCOSE SERPL-MCNC: 134 MG/DL — HIGH (ref 70–99)
INR BLD: 2.66 RATIO — HIGH (ref 0.65–1.3)
NT-PROBNP SERPL-SCNC: 432 PG/ML — HIGH (ref 0–300)
POTASSIUM SERPL-MCNC: 4.5 MMOL/L — SIGNIFICANT CHANGE UP (ref 3.5–5)
POTASSIUM SERPL-SCNC: 4.5 MMOL/L — SIGNIFICANT CHANGE UP (ref 3.5–5)
PROT SERPL-MCNC: 6.6 G/DL — SIGNIFICANT CHANGE UP (ref 6–8)
PROTHROM AB SERPL-ACNC: 30.6 SEC — HIGH (ref 9.95–12.87)
SODIUM SERPL-SCNC: 141 MMOL/L — SIGNIFICANT CHANGE UP (ref 135–146)
TROPONIN T SERPL-MCNC: <0.01 NG/ML — SIGNIFICANT CHANGE UP

## 2021-03-18 PROCEDURE — 71045 X-RAY EXAM CHEST 1 VIEW: CPT | Mod: 26

## 2021-03-18 NOTE — ED PROVIDER NOTE - PHYSICAL EXAMINATION
PE:  middle-aged m, obese, nad  skin warm, dry  ncat  neck supple  rrr nl s1s2 no mrg  ctab no wrr  abd soft ntnd no palpable masses no rgr  back non-tender no cvat  ext no mild pitting edema distal LEs bl, L>R (chronic per pt), no erythema, non-tender, dpi  neuro aaox3 grossly nf exam

## 2021-03-18 NOTE — ED PROVIDER NOTE - NS ED ROS FT
Constitutional: No fever, chills, unintended weight loss.  Eyes:  No visual changes, eye pain or discharge.  ENMT:  No hearing changes, pain, no sore throat or runny nose, no difficulty swallowing  Cardiac:  No chest pain, SOB or edema. No chest pain with exertion.  Respiratory:  No cough or respiratory distress. No hemoptysis. No history of asthma or RAD.  GI:  No nausea, vomiting, diarrhea or abdominal pain.  :  No dysuria, frequency or burning.  MS:  No myalgia, muscle weakness, joint pain or back pain.  Neuro:  No headache or weakness.  No LOC.  Skin:  No skin rash.   Endocrine: No history of thyroid disease +diabetes.

## 2021-03-18 NOTE — ED PROVIDER NOTE - PROVIDER TOKENS
PROVIDER:[TOKEN:[[id number]],SCHEDULEDAPPT:[03/18/2021],SCHEDULEDAPPTTIME:[09:30 AM],ESTABLISHEDPATIENT:[T]],PROVIDER:[TOKEN:[[id number]],FOLLOWUP:[Urgent],ESTABLISHEDPATIENT:[T]]

## 2021-03-18 NOTE — ED PROVIDER NOTE - CARE PROVIDER_API CALL
Tressa Mariscal)  Medicine  [address] [place] [address]  Phone: [phone]  Fax: [phone]  Established Patient  Scheduled Appointment: 03/18/2021 09:30 AM    Javed Mayer)  Cardiology; Internal Medicine  [address] [place] [address]  Phone: [phone]  Fax: [phone]  Established Patient  Follow Up Time: Urgent

## 2021-03-18 NOTE — ED PROVIDER NOTE - OBJECTIVE STATEMENT
70M h/o cad/cabg, dm, htn, hl, dvt/pe on xarelto, kidney stones p/w HTN. Went to outside  pta to obtain a routine covid swab needed for upcoming hockey game, was found hypertensive to 200s/100s. Referred to ED. Pt is asymptomatic. No ha, vision changes, cp/sob/blevins, cough, nv, abd pain, worsening edema, focal numbness or weakness. Has been on losartan 100 mg daily & metoprolol tartrate 25 mg BID, no recent changes. Does not chk his BP regularly at home. Last saw PCP ~2 mos ago and was not this high. PCP Davey / Cardio [facility].

## 2021-03-18 NOTE — ED PROVIDER NOTE - CARE PROVIDERS DIRECT ADDRESSES
[email or web address]

## 2021-03-18 NOTE — ED PROVIDER NOTE - PATIENT PORTAL LINK FT
You can access the FollowMyHealth Patient Portal offered by [facility] by registering at the following website: [email or web address]. By joining [facility]’s FollowMyHealth portal, you will also be able to view your health information using other applications (apps) compatible with our system.

## 2021-03-18 NOTE — ED PROVIDER NOTE - CLINICAL SUMMARY MEDICAL DECISION MAKING FREE TEXT BOX
asymptomatic HTN - ekg no acute ischemia, cxr clear, labs wnl - add'l dose meds given, slight improvement, all results d/w pt & copies given, strict return precautions discussed, rec op f/u with PCP Dr. Mariscal later this morning 9:30AM as previously scheduled, as well as Cardio f/u with Dr. Mayer

## 2021-03-18 NOTE — ED ADULT NURSE NOTE - PMH
Acute massive pulmonary embolism  s/p tPA in 2018  DVT (deep venous thrombosis)    High cholesterol    Hypertension, unspecified type    Kidney stones    MI (myocardial infarction)    Type 2 diabetes mellitus with complication, unspecified long term insulin use status

## 2021-03-24 ENCOUNTER — INPATIENT (INPATIENT)
Facility: HOSPITAL | Age: 71
LOS: 2 days | Discharge: HOME | End: 2021-03-27
Attending: INTERNAL MEDICINE | Admitting: INTERNAL MEDICINE
Payer: MEDICARE

## 2021-03-24 VITALS
SYSTOLIC BLOOD PRESSURE: 237 MMHG | HEIGHT: 73 IN | DIASTOLIC BLOOD PRESSURE: 97 MMHG | WEIGHT: 212.97 LBS | TEMPERATURE: 98 F | OXYGEN SATURATION: 97 % | HEART RATE: 79 BPM | RESPIRATION RATE: 18 BRPM

## 2021-03-24 DIAGNOSIS — Z98.890 OTHER SPECIFIED POSTPROCEDURAL STATES: Chronic | ICD-10-CM

## 2021-03-24 LAB
ALBUMIN SERPL ELPH-MCNC: 4 G/DL — SIGNIFICANT CHANGE UP (ref 3.5–5.2)
ALP SERPL-CCNC: 62 U/L — SIGNIFICANT CHANGE UP (ref 30–115)
ALT FLD-CCNC: 11 U/L — SIGNIFICANT CHANGE UP (ref 0–41)
ANION GAP SERPL CALC-SCNC: 13 MMOL/L — SIGNIFICANT CHANGE UP (ref 7–14)
APPEARANCE UR: ABNORMAL
APTT BLD: 36 SEC — SIGNIFICANT CHANGE UP (ref 27–39.2)
AST SERPL-CCNC: 21 U/L — SIGNIFICANT CHANGE UP (ref 0–41)
BACTERIA # UR AUTO: NEGATIVE — SIGNIFICANT CHANGE UP
BASOPHILS # BLD AUTO: 0.03 K/UL — SIGNIFICANT CHANGE UP (ref 0–0.2)
BASOPHILS NFR BLD AUTO: 0.4 % — SIGNIFICANT CHANGE UP (ref 0–1)
BILIRUB SERPL-MCNC: 1.4 MG/DL — HIGH (ref 0.2–1.2)
BILIRUB UR-MCNC: NEGATIVE — SIGNIFICANT CHANGE UP
BLD GP AB SCN SERPL QL: SIGNIFICANT CHANGE UP
BUN SERPL-MCNC: 15 MG/DL — SIGNIFICANT CHANGE UP (ref 10–20)
CALCIUM SERPL-MCNC: 8.4 MG/DL — LOW (ref 8.5–10.1)
CHLORIDE SERPL-SCNC: 102 MMOL/L — SIGNIFICANT CHANGE UP (ref 98–110)
CO2 SERPL-SCNC: 23 MMOL/L — SIGNIFICANT CHANGE UP (ref 17–32)
COLOR SPEC: ABNORMAL
CREAT SERPL-MCNC: 0.7 MG/DL — SIGNIFICANT CHANGE UP (ref 0.7–1.5)
DIFF PNL FLD: ABNORMAL
EOSINOPHIL # BLD AUTO: 0.15 K/UL — SIGNIFICANT CHANGE UP (ref 0–0.7)
EOSINOPHIL NFR BLD AUTO: 1.8 % — SIGNIFICANT CHANGE UP (ref 0–8)
EPI CELLS # UR: 3 /HPF — SIGNIFICANT CHANGE UP (ref 0–5)
GLUCOSE BLDC GLUCOMTR-MCNC: 188 MG/DL — HIGH (ref 70–99)
GLUCOSE SERPL-MCNC: 193 MG/DL — HIGH (ref 70–99)
GLUCOSE UR QL: NEGATIVE — SIGNIFICANT CHANGE UP
HCT VFR BLD CALC: 44.1 % — SIGNIFICANT CHANGE UP (ref 42–52)
HGB BLD-MCNC: 14.3 G/DL — SIGNIFICANT CHANGE UP (ref 14–18)
HYALINE CASTS # UR AUTO: 3 /LPF — SIGNIFICANT CHANGE UP (ref 0–7)
IMM GRANULOCYTES NFR BLD AUTO: 0.8 % — HIGH (ref 0.1–0.3)
INR BLD: 2.2 RATIO — HIGH (ref 0.65–1.3)
KETONES UR-MCNC: SIGNIFICANT CHANGE UP
LACTATE SERPL-SCNC: 1.1 MMOL/L — SIGNIFICANT CHANGE UP (ref 0.7–2)
LEUKOCYTE ESTERASE UR-ACNC: NEGATIVE — SIGNIFICANT CHANGE UP
LIDOCAIN IGE QN: 22 U/L — SIGNIFICANT CHANGE UP (ref 7–60)
LYMPHOCYTES # BLD AUTO: 1.34 K/UL — SIGNIFICANT CHANGE UP (ref 1.2–3.4)
LYMPHOCYTES # BLD AUTO: 16.3 % — LOW (ref 20.5–51.1)
MAGNESIUM SERPL-MCNC: 1.1 MG/DL — LOW (ref 1.8–2.4)
MCHC RBC-ENTMCNC: 29.8 PG — SIGNIFICANT CHANGE UP (ref 27–31)
MCHC RBC-ENTMCNC: 32.4 G/DL — SIGNIFICANT CHANGE UP (ref 32–37)
MCV RBC AUTO: 91.9 FL — SIGNIFICANT CHANGE UP (ref 80–94)
MONOCYTES # BLD AUTO: 0.53 K/UL — SIGNIFICANT CHANGE UP (ref 0.1–0.6)
MONOCYTES NFR BLD AUTO: 6.4 % — SIGNIFICANT CHANGE UP (ref 1.7–9.3)
NEUTROPHILS # BLD AUTO: 6.12 K/UL — SIGNIFICANT CHANGE UP (ref 1.4–6.5)
NEUTROPHILS NFR BLD AUTO: 74.3 % — SIGNIFICANT CHANGE UP (ref 42.2–75.2)
NITRITE UR-MCNC: NEGATIVE — SIGNIFICANT CHANGE UP
NRBC # BLD: 0 /100 WBCS — SIGNIFICANT CHANGE UP (ref 0–0)
PH UR: 6 — SIGNIFICANT CHANGE UP (ref 5–8)
PLATELET # BLD AUTO: 145 K/UL — SIGNIFICANT CHANGE UP (ref 130–400)
POTASSIUM SERPL-MCNC: 4.8 MMOL/L — SIGNIFICANT CHANGE UP (ref 3.5–5)
POTASSIUM SERPL-SCNC: 4.8 MMOL/L — SIGNIFICANT CHANGE UP (ref 3.5–5)
PROT SERPL-MCNC: 6.4 G/DL — SIGNIFICANT CHANGE UP (ref 6–8)
PROT UR-MCNC: ABNORMAL
PROTHROM AB SERPL-ACNC: 25.3 SEC — HIGH (ref 9.95–12.87)
RBC # BLD: 4.8 M/UL — SIGNIFICANT CHANGE UP (ref 4.7–6.1)
RBC # FLD: 12.9 % — SIGNIFICANT CHANGE UP (ref 11.5–14.5)
RBC CASTS # UR COMP ASSIST: >720 /HPF — HIGH (ref 0–4)
SARS-COV-2 RNA SPEC QL NAA+PROBE: SIGNIFICANT CHANGE UP
SODIUM SERPL-SCNC: 138 MMOL/L — SIGNIFICANT CHANGE UP (ref 135–146)
SP GR SPEC: 1.02 — SIGNIFICANT CHANGE UP (ref 1.01–1.03)
UROBILINOGEN FLD QL: SIGNIFICANT CHANGE UP
WBC # BLD: 8.24 K/UL — SIGNIFICANT CHANGE UP (ref 4.8–10.8)
WBC # FLD AUTO: 8.24 K/UL — SIGNIFICANT CHANGE UP (ref 4.8–10.8)
WBC UR QL: 20 /HPF — HIGH (ref 0–5)

## 2021-03-24 PROCEDURE — 99285 EMERGENCY DEPT VISIT HI MDM: CPT

## 2021-03-24 PROCEDURE — 74177 CT ABD & PELVIS W/CONTRAST: CPT | Mod: 26

## 2021-03-24 PROCEDURE — 93010 ELECTROCARDIOGRAM REPORT: CPT

## 2021-03-24 RX ORDER — SODIUM CHLORIDE 9 MG/ML
1000 INJECTION INTRAMUSCULAR; INTRAVENOUS; SUBCUTANEOUS ONCE
Refills: 0 | Status: COMPLETED | OUTPATIENT
Start: 2021-03-24 | End: 2021-03-24

## 2021-03-24 RX ORDER — DEXTROSE 50 % IN WATER 50 %
12.5 SYRINGE (ML) INTRAVENOUS ONCE
Refills: 0 | Status: DISCONTINUED | OUTPATIENT
Start: 2021-03-24 | End: 2021-03-25

## 2021-03-24 RX ORDER — SIMVASTATIN 20 MG/1
40 TABLET, FILM COATED ORAL AT BEDTIME
Refills: 0 | Status: DISCONTINUED | OUTPATIENT
Start: 2021-03-24 | End: 2021-03-25

## 2021-03-24 RX ORDER — MORPHINE SULFATE 50 MG/1
4 CAPSULE, EXTENDED RELEASE ORAL ONCE
Refills: 0 | Status: DISCONTINUED | OUTPATIENT
Start: 2021-03-24 | End: 2021-03-24

## 2021-03-24 RX ORDER — INSULIN LISPRO 100/ML
6 VIAL (ML) SUBCUTANEOUS
Refills: 0 | Status: DISCONTINUED | OUTPATIENT
Start: 2021-03-24 | End: 2021-03-25

## 2021-03-24 RX ORDER — SODIUM CHLORIDE 9 MG/ML
1000 INJECTION, SOLUTION INTRAVENOUS
Refills: 0 | Status: DISCONTINUED | OUTPATIENT
Start: 2021-03-24 | End: 2021-03-25

## 2021-03-24 RX ORDER — MORPHINE SULFATE 50 MG/1
4 CAPSULE, EXTENDED RELEASE ORAL EVERY 4 HOURS
Refills: 0 | Status: DISCONTINUED | OUTPATIENT
Start: 2021-03-24 | End: 2021-03-25

## 2021-03-24 RX ORDER — INSULIN LISPRO 100/ML
VIAL (ML) SUBCUTANEOUS
Refills: 0 | Status: DISCONTINUED | OUTPATIENT
Start: 2021-03-24 | End: 2021-03-25

## 2021-03-24 RX ORDER — HEPARIN SODIUM 5000 [USP'U]/ML
1800 INJECTION INTRAVENOUS; SUBCUTANEOUS
Qty: 25000 | Refills: 0 | Status: DISCONTINUED | OUTPATIENT
Start: 2021-03-24 | End: 2021-03-24

## 2021-03-24 RX ORDER — LABETALOL HCL 100 MG
5 TABLET ORAL ONCE
Refills: 0 | Status: COMPLETED | OUTPATIENT
Start: 2021-03-24 | End: 2021-03-24

## 2021-03-24 RX ORDER — DEXTROSE 50 % IN WATER 50 %
15 SYRINGE (ML) INTRAVENOUS ONCE
Refills: 0 | Status: DISCONTINUED | OUTPATIENT
Start: 2021-03-24 | End: 2021-03-25

## 2021-03-24 RX ORDER — DEXTROSE 50 % IN WATER 50 %
25 SYRINGE (ML) INTRAVENOUS ONCE
Refills: 0 | Status: DISCONTINUED | OUTPATIENT
Start: 2021-03-24 | End: 2021-03-25

## 2021-03-24 RX ORDER — METOPROLOL TARTRATE 50 MG
25 TABLET ORAL
Refills: 0 | Status: DISCONTINUED | OUTPATIENT
Start: 2021-03-24 | End: 2021-03-25

## 2021-03-24 RX ORDER — ONDANSETRON 8 MG/1
4 TABLET, FILM COATED ORAL ONCE
Refills: 0 | Status: COMPLETED | OUTPATIENT
Start: 2021-03-24 | End: 2021-03-24

## 2021-03-24 RX ORDER — RIVAROXABAN 15 MG-20MG
1 KIT ORAL
Qty: 0 | Refills: 0 | DISCHARGE

## 2021-03-24 RX ORDER — HYDRALAZINE HCL 50 MG
5 TABLET ORAL ONCE
Refills: 0 | Status: COMPLETED | OUTPATIENT
Start: 2021-03-24 | End: 2021-03-24

## 2021-03-24 RX ORDER — INSULIN GLARGINE 100 [IU]/ML
18 INJECTION, SOLUTION SUBCUTANEOUS AT BEDTIME
Refills: 0 | Status: DISCONTINUED | OUTPATIENT
Start: 2021-03-24 | End: 2021-03-25

## 2021-03-24 RX ORDER — ONDANSETRON 8 MG/1
4 TABLET, FILM COATED ORAL EVERY 6 HOURS
Refills: 0 | Status: DISCONTINUED | OUTPATIENT
Start: 2021-03-24 | End: 2021-03-25

## 2021-03-24 RX ORDER — CHLORHEXIDINE GLUCONATE 213 G/1000ML
1 SOLUTION TOPICAL
Refills: 0 | Status: DISCONTINUED | OUTPATIENT
Start: 2021-03-24 | End: 2021-03-25

## 2021-03-24 RX ORDER — HYDRALAZINE HCL 50 MG
25 TABLET ORAL EVERY 6 HOURS
Refills: 0 | Status: DISCONTINUED | OUTPATIENT
Start: 2021-03-24 | End: 2021-03-25

## 2021-03-24 RX ORDER — HYDROMORPHONE HYDROCHLORIDE 2 MG/ML
2 INJECTION INTRAMUSCULAR; INTRAVENOUS; SUBCUTANEOUS ONCE
Refills: 0 | Status: DISCONTINUED | OUTPATIENT
Start: 2021-03-24 | End: 2021-03-24

## 2021-03-24 RX ORDER — GLUCAGON INJECTION, SOLUTION 0.5 MG/.1ML
1 INJECTION, SOLUTION SUBCUTANEOUS ONCE
Refills: 0 | Status: DISCONTINUED | OUTPATIENT
Start: 2021-03-24 | End: 2021-03-25

## 2021-03-24 RX ORDER — LOSARTAN POTASSIUM 100 MG/1
100 TABLET, FILM COATED ORAL DAILY
Refills: 0 | Status: DISCONTINUED | OUTPATIENT
Start: 2021-03-24 | End: 2021-03-25

## 2021-03-24 RX ORDER — HYDRALAZINE HCL 50 MG
10 TABLET ORAL ONCE
Refills: 0 | Status: COMPLETED | OUTPATIENT
Start: 2021-03-24 | End: 2021-03-24

## 2021-03-24 RX ORDER — ASPIRIN/CALCIUM CARB/MAGNESIUM 324 MG
81 TABLET ORAL DAILY
Refills: 0 | Status: DISCONTINUED | OUTPATIENT
Start: 2021-03-24 | End: 2021-03-25

## 2021-03-24 RX ADMIN — MORPHINE SULFATE 4 MILLIGRAM(S): 50 CAPSULE, EXTENDED RELEASE ORAL at 11:46

## 2021-03-24 RX ADMIN — INSULIN GLARGINE 18 UNIT(S): 100 INJECTION, SOLUTION SUBCUTANEOUS at 22:40

## 2021-03-24 RX ADMIN — Medication 5 MILLIGRAM(S): at 21:18

## 2021-03-24 RX ADMIN — ONDANSETRON 4 MILLIGRAM(S): 8 TABLET, FILM COATED ORAL at 10:10

## 2021-03-24 RX ADMIN — SIMVASTATIN 40 MILLIGRAM(S): 20 TABLET, FILM COATED ORAL at 22:41

## 2021-03-24 RX ADMIN — Medication 5 MILLIGRAM(S): at 15:47

## 2021-03-24 RX ADMIN — MORPHINE SULFATE 4 MILLIGRAM(S): 50 CAPSULE, EXTENDED RELEASE ORAL at 13:54

## 2021-03-24 RX ADMIN — Medication 10 MILLIGRAM(S): at 17:22

## 2021-03-24 RX ADMIN — Medication 25 MILLIGRAM(S): at 21:54

## 2021-03-24 RX ADMIN — MORPHINE SULFATE 4 MILLIGRAM(S): 50 CAPSULE, EXTENDED RELEASE ORAL at 16:13

## 2021-03-24 RX ADMIN — Medication 25 MILLIGRAM(S): at 20:02

## 2021-03-24 RX ADMIN — MORPHINE SULFATE 4 MILLIGRAM(S): 50 CAPSULE, EXTENDED RELEASE ORAL at 17:57

## 2021-03-24 RX ADMIN — ONDANSETRON 4 MILLIGRAM(S): 8 TABLET, FILM COATED ORAL at 18:00

## 2021-03-24 RX ADMIN — SODIUM CHLORIDE 1000 MILLILITER(S): 9 INJECTION INTRAMUSCULAR; INTRAVENOUS; SUBCUTANEOUS at 10:10

## 2021-03-24 RX ADMIN — HYDROMORPHONE HYDROCHLORIDE 2 MILLIGRAM(S): 2 INJECTION INTRAMUSCULAR; INTRAVENOUS; SUBCUTANEOUS at 20:00

## 2021-03-24 NOTE — ED PROVIDER NOTE - CARE PLAN
Principal Discharge DX:	Renal colic on left side  Secondary Diagnosis:	Hypertension, unspecified type

## 2021-03-24 NOTE — H&P ADULT - ASSESSMENT
70 Y M with pmh of CAD s/p CABG/old MI, DM2, HTN, HLD, DVT/PE on Xarelto, recurrent nephrolithiasis presents to ED with left flank pain and hematuria since this morning. Found to have left obstructing nephroureteral calculus.    #Left mid ureteral stone  #Bilateral nephrolithiasis  -for cystoscopy, left ureteroscopy laser lithotripsy, possible stone basketing and placement of JJ stent with urology tomorrow  -NPO at midnight  -last dose of xarelto yesterday morning; will put patient on heparin drip for now  -preop labs  -pain control prn    #Hypertensive urgency  -s/p 5mg labetalol ivp in ED; giving 10mg hydralazine ivp now  -continue lopressor, losartan home doses  -patient was scheduled for renal artery doppler as outpatient; should have this done inpatient after urologic procedure    #DM2  -basal/bolus while inpatient  -check a1c  -avoid hypoglycemia    #HLD  -continue statin    #CAD s/p CABG  -continue home meds    #DVT/PE on Xarelto  -last dose yesterday morning at 8am  -hold xaretlo, start heparin drip    #DVT PPx- Heparin drip; f/u ptt  #GI PPx- None  #Diet- DASH/TLC; NPO at midnight  #CHG  #Activity- AAT  #Dispo- Home  #Code- FULL   70 Y M with pmh of CAD s/p CABG/old MI, DM2, HTN, HLD, DVT/PE on Xarelto, recurrent nephrolithiasis presents to ED with left flank pain and hematuria since this morning. Found to have left obstructing nephroureteral calculus.    #Left mid ureteral stone  #Bilateral nephrolithiasis  #Hematuria  -for cystoscopy, left ureteroscopy laser lithotripsy, possible stone basketing and placement of JJ stent with urology tomorrow  -NPO at midnight  -last dose of xarelto yesterday morning; will hold  -preop labs  -pain control prn    #Hypertensive urgency  -s/p 5mg labetalol ivp in ED; giving 10mg hydralazine ivp now  -continue lopressor, losartan home doses  -patient was scheduled for renal artery doppler as outpatient; should have this done inpatient after urologic procedure    #DM2  -basal/bolus while inpatient  -check a1c  -avoid hypoglycemia    #HLD  -continue statin    #CAD s/p CABG  -continue home meds    #DVT/PE on Xarelto  -last dose yesterday morning at 8am  -hold xaretlo    #DVT PPx- SCDs for now; will restart Xarelto once cleared by urology  #GI PPx- None  #Diet- DASH/TLC; NPO at midnight  #CHG  #Activity- AAT  #Dispo- Home  #Code- FULL

## 2021-03-24 NOTE — ED PROVIDER NOTE - OBJECTIVE STATEMENT
69 y/o male with hx CAD, DM, HTN, DVT/PE on xarelto, Kidney stones presents to the ED c/o "I have left sided flank pain radiating to my left groin with hematuria and nausea since this morning. I took Oxycodone 2 tabs this am." no fever/ chills/ weakness

## 2021-03-24 NOTE — ED PROVIDER NOTE - ATTENDING CONTRIBUTION TO CARE
69yo M with PMHx HTN, DM, DVT and h/o massive PE s/p TPA and now on xarelto, CAD/MI/CABG, recurrent kidney stones, 69yo M with PMHx HTN, DM, DVT and h/o massive PE s/p TPA and now on xarelto, CAD/MI/CABG, recurrent kidney stones, presents for left flank pain x 1 day, radiating to left groin, moderate-severe, no modifying factors, assoc with hematuria and nausea. Pt states took oxycodone x2 tabs this morning PTA but did not take his other medications. Denies fever, chills, headache, lightheadedness, CP, SOB, cough, nausea, vomiting, diarrhea, dysuria, leg swelling.     Vital signs reviewed  GENERAL: Patient nontoxic appearing, NAD  HEAD: NCAT  EYES: Anicteric  ENT: MMM  RESPIRATORY: Normal respiratory effort. CTA B/L. No wheezing, rales, rhonchi  CARDIOVASCULAR: Regular rate and rhythm  ABDOMEN: Soft. Nondistended. Nontender. No guarding or rebound. Left CVA tenderness.  MUSCULOSKELETAL/EXTREMITIES: Brisk cap refill.   SKIN:  Warm and dry  NEURO: AAOx3. No gross FND.

## 2021-03-24 NOTE — H&P ADULT - NSHPSOCIALHISTORY_GEN_ALL_CORE
, lives with wife  retired  former smoker, quit 20 years ago, smoked 30 years 1.5 ppd  no alcohol or drugs

## 2021-03-24 NOTE — ED PROVIDER NOTE - CLINICAL SUMMARY MEDICAL DECISION MAKING FREE TEXT BOX
67yo M presents for left flank pain x 1 day and hematuria. Found to have <Mild left hydronephroureter with a 0.7 x 0.6 x 1.3 cm obstructing nephroureteral calculus( )>. Eval by urology, plan for admission for pain control and poss lithotripsy/stent tomorrow.

## 2021-03-24 NOTE — CONSULT NOTE ADULT - SUBJECTIVE AND OBJECTIVE BOX
Patient is a 70y old  Male who presents with a chief complaint of left flank pain    HPI:  Urology: Pt known to urology, pt with hx of nephrolithiasis. Pt was s/p failed ESWL, he then developed a PE and failed to f/u thereafter.  Pt now presents to the ED with acute left flank pain 8-9/10 radiating to his groin since this am. Pt with associated hematuria and nausea.  Pt with moderate discomfort now, but controlled with meds.    PAST MEDICAL & SURGICAL HISTORY:    Acute massive pulmonary embolism  s/p tPA in 2018    DVT (deep venous thrombosis)    MI (myocardial infarction)    Kidney stones    High cholesterol    Type 2 diabetes mellitus with complication, unspecified long term insulin use status    Hypertension, unspecified type    H/O cystoscopy    H/O heart surgery  quadruple bypass 6 years ago        REVIEW OF SYSTEMS:    [x] a 10 point review of systems was negative except where noted    [  ]  Due to altered mental status/intubation, subjective information was not able t be obtained from the patient.  History was obtained, to the extent possible, from review of the chart and collateral sources of information.    MEDICATIONS  (STANDING):    Xarelto  Metformin   ASA  losartan  Metoprolol  Simvastatin     Allergies    No Known Allergies    SOCIAL HISTORY: No illicit drug use                             No ETOH, Tobacco      FAMILY HISTORY:  No pertinent family history in first degree relatives        Vital Signs Last 24 Hrs  T(C): 36.6 (24 Mar 2021 08:52), Max: 36.6 (24 Mar 2021 08:52)  T(F): 97.8 (24 Mar 2021 08:52), Max: 97.8 (24 Mar 2021 08:52)  HR: 79 (24 Mar 2021 08:52) (79 - 79)  BP: 237/97 (24 Mar 2021 08:52) (237/97 - 237/97)  RR: 18 (24 Mar 2021 08:52) (18 - 18)  SpO2: 97% (24 Mar 2021 08:52) (97% - 97%)    Daily Height in cm: 185.42 (24 Mar 2021 08:52)        PHYSICAL EXAM:    Constitutional: NAD, well-developed, well nourished, in mild discomfort  HEENT: NC/AT  Neck: no pain, FROM  Back: No CVA tenderness  Respiratory: No accessory respiratory muscle use  Abd: Soft, NT/ND  no organomegally  no hernia  Mild left lower quad tenderness to deep palpation  Extremities: no edema  Neurological: A/O x 3  Psychiatric: Normal mood, normal affect  Skin: No rashes    I&O's Summary      LABS:                        14.3   8.24  )-----------( 145      ( 24 Mar 2021 10:15 )             44.1         138  |  102  |  15  ----------------------------<  193<H>  4.8   |  23  |  0.7    Ca    8.4<L>      24 Mar 2021 10:15    TPro  6.4  /  Alb  4.0  /  TBili  1.4<H>  /  DBili  x   /  AST  21  /  ALT  11  /  AlkPhos  62      PT/INR - ( 24 Mar 2021 10:15 )   PT: 25.30 sec;   INR: 2.20 ratio         PTT - ( 24 Mar 2021 10:15 )  PTT:36.0 sec  Urinalysis Basic - ( 24 Mar 2021 10:30 )    Color: Dark Brown / Appearance: Turbid / S.018 / pH: x  Gluc: x / Ketone: Trace  / Bili: Negative / Urobili: <2 mg/dL   Blood: x / Protein: 100 mg/dL / Nitrite: Negative   Leuk Esterase: Negative / RBC: >720 /HPF / WBC 20 /HPF   Sq Epi: x / Non Sq Epi: 3 /HPF / Bacteria: Negative      Urine Culture: Pending    Covid pending        RADIOLOGY & ADDITIONAL STUDIES:    < from: CT Abdomen and Pelvis w/ IV Cont (21 @ 13:26) >    EXAM:  CT ABDOMEN AND PELVIS IC            PROCEDURE DATE:  2021            INTERPRETATION:  CLINICAL STATEMENT: Flank pain. Suspected kidney stone    TECHNIQUE: Contiguous axial CT images were obtained from the lower chest to the pubic symphysis following administration of 100cc Optiray 320 intravenous contrast.  Oral contrast was not administered.  Reformatted images in the coronal and sagittal planes were acquired.    COMPARISON CT: 2019    OTHER STUDIES USED FOR CORRELATION: None.      FINDINGS:    LOWER CHEST: Unchanged right middle lobe 0.3 cm subpleural solid nodule. Bibasilar subsegmental dependent atelectasis. Bilateral lower lobe mucus plugging. Post median sternotomy and CABG. Stable coronary calcifications.    HEPATOBILIARY: Unchanged subcentimeter hypodensity in the right hepatic lobe, too small to characterize. Cholelithiasis.    SPLEEN: Unremarkable.    PANCREAS: Unremarkable.    ADRENAL GLANDS: Unremarkable.    KIDNEYS: Mild left hydronephroureter secondary to a 0.7 x 0.6 x 1.3 cm midureteral calculus (). Additional nonobstructing bilateral intrarenal calculi, measuring up to 1.5 cm in the left lower pole () and 1.0 cm in the right lower pole (HU 1531).    ABDOMINOPELVIC NODES: No lymphadenopathy.    PELVIC ORGANS: Punctate calculi seen layering within the urinary bladder.    PERITONEUM/MESENTERY/BOWEL: No bowel obstruction, ascites or pneumoperitoneum. Normal appendix.    BONES/SOFT TISSUES: Degenerative changes of the thoracic spine noted.    OTHER: Diffuse atherosclerotic vascular calcifications.      IMPRESSION:  1.  Mild left hydronephroureter with a 0.7 x 0.6 x 1.3 cm obstructing nephroureteral calculus( ).    2.  Additional nonobstructing intrarenal calculi as above.              FILIPPO NORIEGA MD; Attending Radiologist  This document has been electronically signed. Mar 24 2021  2:36PM    < end of copied text >   Patient is a 70y old  Male who presents with a chief complaint of left flank pain    HPI:  Urology: Pt known to urology, pt with hx of nephrolithiasis. Pt was s/p failed ESWL, he then developed a PE and failed to f/u thereafter.  Pt now presents to the ED with acute left flank pain 8-9/10 radiating to his groin since this am. Pt with associated hematuria and nausea.  Pt with moderate discomfort now, but controlled with meds.    PAST MEDICAL & SURGICAL HISTORY:    Acute massive pulmonary embolism  s/p tPA in 2018    DVT (deep venous thrombosis)    MI (myocardial infarction)    Kidney stones    High cholesterol    Type 2 diabetes mellitus with complication, unspecified long term insulin use status    Hypertension, unspecified type    H/O cystoscopy    H/O heart surgery  quadruple bypass 6 years ago        REVIEW OF SYSTEMS:    [x] a 10 point review of systems was negative except where noted    [  ]  Due to altered mental status/intubation, subjective information was not able t be obtained from the patient.  History was obtained, to the extent possible, from review of the chart and collateral sources of information.    MEDICATIONS  (STANDING):    Xarelto  Metformin   ASA  losartan  Metoprolol  Simvastatin     Allergies    No Known Allergies    SOCIAL HISTORY: No illicit drug use                             No ETOH, Tobacco      FAMILY HISTORY:  No pertinent family history in first degree relatives        Vital Signs Last 24 Hrs  T(C): 36.6 (24 Mar 2021 08:52), Max: 36.6 (24 Mar 2021 08:52)  T(F): 97.8 (24 Mar 2021 08:52), Max: 97.8 (24 Mar 2021 08:52)  HR: 79 (24 Mar 2021 08:52) (79 - 79)  BP: 237/97 (24 Mar 2021 08:52) (237/97 - 237/97)  RR: 18 (24 Mar 2021 08:52) (18 - 18)  SpO2: 97% (24 Mar 2021 08:52) (97% - 97%)    Daily Height in cm: 185.42 (24 Mar 2021 08:52)        PHYSICAL EXAM:    Constitutional: NAD, well-developed, well nourished, in mild discomfort  HEENT: NC/AT  Neck: no pain, FROM  Back: No CVA tenderness  Respiratory: No accessory respiratory muscle use  Abd: Soft, NT/ND  no organomegally  no hernia  Mild left lower quad tenderness to deep palpation; left cva tenderness  :  No phallic deformities, no scrotal tenderness.  Extremities: no edema  Neurological: A/O x 3  Psychiatric: Normal mood, normal affect  Skin: No rashes    I&O's Summary      LABS:                        14.3   8.24  )-----------( 145      ( 24 Mar 2021 10:15 )             44.1         138  |  102  |  15  ----------------------------<  193<H>  4.8   |  23  |  0.7    Ca    8.4<L>      24 Mar 2021 10:15    TPro  6.4  /  Alb  4.0  /  TBili  1.4<H>  /  DBili  x   /  AST  21  /  ALT  11  /  AlkPhos  62      PT/INR - ( 24 Mar 2021 10:15 )   PT: 25.30 sec;   INR: 2.20 ratio         PTT - ( 24 Mar 2021 10:15 )  PTT:36.0 sec  Urinalysis Basic - ( 24 Mar 2021 10:30 )    Color: Dark Brown / Appearance: Turbid / S.018 / pH: x  Gluc: x / Ketone: Trace  / Bili: Negative / Urobili: <2 mg/dL   Blood: x / Protein: 100 mg/dL / Nitrite: Negative   Leuk Esterase: Negative / RBC: >720 /HPF / WBC 20 /HPF   Sq Epi: x / Non Sq Epi: 3 /HPF / Bacteria: Negative      Urine Culture: Pending    Covid pending        RADIOLOGY & ADDITIONAL STUDIES:    < from: CT Abdomen and Pelvis w/ IV Cont (21 @ 13:26) >    EXAM:  CT ABDOMEN AND PELVIS IC            PROCEDURE DATE:  2021            INTERPRETATION:  CLINICAL STATEMENT: Flank pain. Suspected kidney stone    TECHNIQUE: Contiguous axial CT images were obtained from the lower chest to the pubic symphysis following administration of 100cc Optiray 320 intravenous contrast.  Oral contrast was not administered.  Reformatted images in the coronal and sagittal planes were acquired.    COMPARISON CT: 2019    OTHER STUDIES USED FOR CORRELATION: None.      FINDINGS:    LOWER CHEST: Unchanged right middle lobe 0.3 cm subpleural solid nodule. Bibasilar subsegmental dependent atelectasis. Bilateral lower lobe mucus plugging. Post median sternotomy and CABG. Stable coronary calcifications.    HEPATOBILIARY: Unchanged subcentimeter hypodensity in the right hepatic lobe, too small to characterize. Cholelithiasis.    SPLEEN: Unremarkable.    PANCREAS: Unremarkable.    ADRENAL GLANDS: Unremarkable.    KIDNEYS: Mild left hydronephroureter secondary to a 0.7 x 0.6 x 1.3 cm midureteral calculus (). Additional nonobstructing bilateral intrarenal calculi, measuring up to 1.5 cm in the left lower pole () and 1.0 cm in the right lower pole (HU 1531).    ABDOMINOPELVIC NODES: No lymphadenopathy.    PELVIC ORGANS: Punctate calculi seen layering within the urinary bladder.    PERITONEUM/MESENTERY/BOWEL: No bowel obstruction, ascites or pneumoperitoneum. Normal appendix.    BONES/SOFT TISSUES: Degenerative changes of the thoracic spine noted.    OTHER: Diffuse atherosclerotic vascular calcifications.      IMPRESSION:  1.  Mild left hydronephroureter with a 0.7 x 0.6 x 1.3 cm obstructing nephroureteral calculus( ).    2.  Additional nonobstructing intrarenal calculi as above.              FILIPPO NORIEGA MD; Attending Radiologist  This document has been electronically signed. Mar 24 2021  2:36PM    < end of copied text >

## 2021-03-24 NOTE — CONSULT NOTE ADULT - ASSESSMENT
69 y/o m with known history of nephrolithiasis, PE, DVT, HTN    A) Left mid ureteral stone  b/l nephrolithiasis  Uncontrolled hypertension  DM  HX of PE, DVT    P) admit to medicine for uncontrolled hypertension  Medical optimization for OR tomorrow.  NPO after MN  for cystoscopy, left ureteroscopy laser lithotripsy, possible stone basketing and placement of JJ stent  d/w attending Dr. Siddiqi   69 y/o m with known history of nephrolithiasis, PE, DVT, HTN    A) Left mid ureteral stone  b/l nephrolithiasis  Uncontrolled hypertension  DM  HX of PE, DVT  hx of MI    P) admit to medicine for uncontrolled hypertension  Medical optimization for OR tomorrow.  consider renal artery sonogram  NPO after MN  for cystoscopy, left ureteroscopy laser lithotripsy, possible stone basketing and placement of JJ stent  d/w attending Dr. Siddiqi

## 2021-03-24 NOTE — ED ADULT NURSE NOTE - OBJECTIVE STATEMENT
right flank pain onset this am 4 am - with discoloration in urine patient reports left flank pain radiating to groin with nausea - left flank tenderness noted to palpation . abdomen soft non distended

## 2021-03-24 NOTE — H&P ADULT - NSHPLABSRESULTS_GEN_ALL_CORE
14.3     )-----------( 145      ( 24 Mar 2021 10:15 )             44.1         138  |  102  |  15  ----------------------------<  193<H>  4.8   |  23  |  0.7    Ca    8.4<L>      24 Mar 2021 10:15    TPro  6.4  /  Alb  4.0  /  TBili  1.4<H>  /  DBili  x   /  AST  21  /  ALT  11  /  AlkPhos  62      PT/INR - ( 24 Mar 2021 10:15 )   PT: 25.30 sec;   INR: 2.20 ratio         PTT - ( 24 Mar 2021 10:15 )  PTT:36.0 sec    Urinalysis Basic - ( 24 Mar 2021 10:30 )    Color: Dark Brown / Appearance: Turbid / S.018 / pH: x  Gluc: x / Ketone: Trace  / Bili: Negative / Urobili: <2 mg/dL   Blood: x / Protein: 100 mg/dL / Nitrite: Negative   Leuk Esterase: Negative / RBC: >720 /HPF / WBC 20 /HPF   Sq Epi: x / Non Sq Epi: 3 /HPF / Bacteria: Negative    < from: CT Abdomen and Pelvis w/ IV Cont (21 @ 13:26) >    IMPRESSION:  1.  Mild left hydronephroureter with a 0.7 x 0.6 x 1.3 cm obstructing nephroureteral calculus( ).    2.  Additional nonobstructing intrarenal calculi as above.    < end of copied text >

## 2021-03-24 NOTE — H&P ADULT - HISTORY OF PRESENT ILLNESS
70 Y M with pmh of CAD s/p CABG/old MI, DM2, HTN, HLD, DVT/PE on Xarelto, recurrent nephrolithiasis presents to ED with left flank pain and hematuria since this morning. Patient states that around 4AM he woke up with severe 10/10 left flank pain with radiation to groin, and "massive amounts" of hematuria. He says it feels like kidney stones that he has had before. He did not have any nausea, vomiting, fevers, chills, chest pain, shortness of breath. He last took his Xarelto yesterday morning at 8am. Currently, flank pain is 5/10 after receiving morphine.     ED Course:  T 97.8F, P 79, /97, R 18, S 97% on RA at rest. Received 5mg labetalol ivp, /90. Now it is 223/105. Ordered 10mg hydralazine ivp. CT abd/pelvis showing mild left hydronephroureter with a 0.7 x 0.6 x 1.3 cm obstructing nephroureteral calculus. Urology consulted.

## 2021-03-24 NOTE — CONSULT NOTE ADULT - PROBLEM SELECTOR RECOMMENDATION 9
Treatment options were discussed.  He wants to proceed with left ureteroscopy laser lithotripsy and stent placements.  Side effects were discussed.

## 2021-03-24 NOTE — H&P ADULT - NSHPPHYSICALEXAM_GEN_ALL_CORE
PHYSICAL EXAM:  GENERAL: NAD, lying in bed comfortably  HEAD:  Atraumatic, Normocephalic  EYES: EOMI, PERRLA, conjunctiva and sclera clear  ENT: Moist mucous membranes  NECK: Supple, No JVD  CHEST/LUNG: Clear to auscultation bilaterally; No rales, rhonchi, wheezing, or rubs. Unlabored respirations  HEART: Regular rate and rhythm; No murmurs, rubs, or gallops  ABDOMEN: Bowel sounds present; Soft, Nontender, Nondistended. No hepatomegaly; (+) left flank pain  EXTREMITIES:  2+ Peripheral Pulses, brisk capillary refill. No clubbing, cyanosis, or edema  NERVOUS SYSTEM:  Alert & Oriented X3, speech clear. No deficits   MSK: FROM all 4 extremities, full and equal strength  SKIN: No rashes or lesions

## 2021-03-25 ENCOUNTER — RESULT REVIEW (OUTPATIENT)
Age: 71
End: 2021-03-25

## 2021-03-25 DIAGNOSIS — N23 UNSPECIFIED RENAL COLIC: ICD-10-CM

## 2021-03-25 LAB
A1C WITH ESTIMATED AVERAGE GLUCOSE RESULT: 7.5 % — HIGH (ref 4–5.6)
ALBUMIN SERPL ELPH-MCNC: 4.1 G/DL — SIGNIFICANT CHANGE UP (ref 3.5–5.2)
ALP SERPL-CCNC: 67 U/L — SIGNIFICANT CHANGE UP (ref 30–115)
ALT FLD-CCNC: 9 U/L — SIGNIFICANT CHANGE UP (ref 0–41)
ANION GAP SERPL CALC-SCNC: 15 MMOL/L — HIGH (ref 7–14)
AST SERPL-CCNC: 13 U/L — SIGNIFICANT CHANGE UP (ref 0–41)
BASOPHILS # BLD AUTO: 0.03 K/UL — SIGNIFICANT CHANGE UP (ref 0–0.2)
BASOPHILS NFR BLD AUTO: 0.2 % — SIGNIFICANT CHANGE UP (ref 0–1)
BILIRUB SERPL-MCNC: 1.3 MG/DL — HIGH (ref 0.2–1.2)
BUN SERPL-MCNC: 12 MG/DL — SIGNIFICANT CHANGE UP (ref 10–20)
CALCIUM SERPL-MCNC: 8.5 MG/DL — SIGNIFICANT CHANGE UP (ref 8.5–10.1)
CHLORIDE SERPL-SCNC: 96 MMOL/L — LOW (ref 98–110)
CO2 SERPL-SCNC: 26 MMOL/L — SIGNIFICANT CHANGE UP (ref 17–32)
COVID-19 SPIKE DOMAIN AB INTERP: POSITIVE
COVID-19 SPIKE DOMAIN ANTIBODY RESULT: >250 U/ML — HIGH
CREAT SERPL-MCNC: 0.9 MG/DL — SIGNIFICANT CHANGE UP (ref 0.7–1.5)
EOSINOPHIL # BLD AUTO: 0.02 K/UL — SIGNIFICANT CHANGE UP (ref 0–0.7)
EOSINOPHIL NFR BLD AUTO: 0.2 % — SIGNIFICANT CHANGE UP (ref 0–8)
ESTIMATED AVERAGE GLUCOSE: 169 MG/DL — HIGH (ref 68–114)
GLUCOSE BLDC GLUCOMTR-MCNC: 142 MG/DL — HIGH (ref 70–99)
GLUCOSE BLDC GLUCOMTR-MCNC: 146 MG/DL — HIGH (ref 70–99)
GLUCOSE BLDC GLUCOMTR-MCNC: 172 MG/DL — HIGH (ref 70–99)
GLUCOSE BLDC GLUCOMTR-MCNC: 177 MG/DL — HIGH (ref 70–99)
GLUCOSE BLDC GLUCOMTR-MCNC: 179 MG/DL — HIGH (ref 70–99)
GLUCOSE SERPL-MCNC: 202 MG/DL — HIGH (ref 70–99)
HCT VFR BLD CALC: 48.8 % — SIGNIFICANT CHANGE UP (ref 42–52)
HGB BLD-MCNC: 15.5 G/DL — SIGNIFICANT CHANGE UP (ref 14–18)
IMM GRANULOCYTES NFR BLD AUTO: 1.1 % — HIGH (ref 0.1–0.3)
LYMPHOCYTES # BLD AUTO: 1.07 K/UL — LOW (ref 1.2–3.4)
LYMPHOCYTES # BLD AUTO: 8.7 % — LOW (ref 20.5–51.1)
MAGNESIUM SERPL-MCNC: 1.1 MG/DL — LOW (ref 1.8–2.4)
MCHC RBC-ENTMCNC: 29.2 PG — SIGNIFICANT CHANGE UP (ref 27–31)
MCHC RBC-ENTMCNC: 31.8 G/DL — LOW (ref 32–37)
MCV RBC AUTO: 92.1 FL — SIGNIFICANT CHANGE UP (ref 80–94)
MONOCYTES # BLD AUTO: 0.88 K/UL — HIGH (ref 0.1–0.6)
MONOCYTES NFR BLD AUTO: 7.2 % — SIGNIFICANT CHANGE UP (ref 1.7–9.3)
NEUTROPHILS # BLD AUTO: 10.13 K/UL — HIGH (ref 1.4–6.5)
NEUTROPHILS NFR BLD AUTO: 82.6 % — HIGH (ref 42.2–75.2)
NRBC # BLD: 0 /100 WBCS — SIGNIFICANT CHANGE UP (ref 0–0)
PLATELET # BLD AUTO: 176 K/UL — SIGNIFICANT CHANGE UP (ref 130–400)
POTASSIUM SERPL-MCNC: 4.5 MMOL/L — SIGNIFICANT CHANGE UP (ref 3.5–5)
POTASSIUM SERPL-SCNC: 4.5 MMOL/L — SIGNIFICANT CHANGE UP (ref 3.5–5)
PROT SERPL-MCNC: 6.7 G/DL — SIGNIFICANT CHANGE UP (ref 6–8)
RBC # BLD: 5.3 M/UL — SIGNIFICANT CHANGE UP (ref 4.7–6.1)
RBC # FLD: 13.1 % — SIGNIFICANT CHANGE UP (ref 11.5–14.5)
SARS-COV-2 IGG+IGM SERPL QL IA: >250 U/ML — HIGH
SARS-COV-2 IGG+IGM SERPL QL IA: POSITIVE
SODIUM SERPL-SCNC: 137 MMOL/L — SIGNIFICANT CHANGE UP (ref 135–146)
WBC # BLD: 12.26 K/UL — HIGH (ref 4.8–10.8)
WBC # FLD AUTO: 12.26 K/UL — HIGH (ref 4.8–10.8)

## 2021-03-25 PROCEDURE — 88300 SURGICAL PATH GROSS: CPT | Mod: 26

## 2021-03-25 RX ORDER — TAMSULOSIN HYDROCHLORIDE 0.4 MG/1
0.4 CAPSULE ORAL DAILY
Refills: 0 | Status: DISCONTINUED | OUTPATIENT
Start: 2021-03-25 | End: 2021-03-27

## 2021-03-25 RX ORDER — OXYCODONE AND ACETAMINOPHEN 5; 325 MG/1; MG/1
1 TABLET ORAL ONCE
Refills: 0 | Status: DISCONTINUED | OUTPATIENT
Start: 2021-03-25 | End: 2021-03-26

## 2021-03-25 RX ORDER — HYDRALAZINE HCL 50 MG
10 TABLET ORAL ONCE
Refills: 0 | Status: COMPLETED | OUTPATIENT
Start: 2021-03-25 | End: 2021-03-25

## 2021-03-25 RX ORDER — SODIUM CHLORIDE 9 MG/ML
1000 INJECTION, SOLUTION INTRAVENOUS
Refills: 0 | Status: DISCONTINUED | OUTPATIENT
Start: 2021-03-25 | End: 2021-03-26

## 2021-03-25 RX ORDER — ONDANSETRON 8 MG/1
4 TABLET, FILM COATED ORAL ONCE
Refills: 0 | Status: DISCONTINUED | OUTPATIENT
Start: 2021-03-25 | End: 2021-03-26

## 2021-03-25 RX ORDER — MAGNESIUM OXIDE 400 MG ORAL TABLET 241.3 MG
400 TABLET ORAL
Refills: 0 | Status: DISCONTINUED | OUTPATIENT
Start: 2021-03-25 | End: 2021-03-25

## 2021-03-25 RX ORDER — LABETALOL HCL 100 MG
200 TABLET ORAL EVERY 8 HOURS
Refills: 0 | Status: DISCONTINUED | OUTPATIENT
Start: 2021-03-25 | End: 2021-03-25

## 2021-03-25 RX ORDER — MAGNESIUM SULFATE 500 MG/ML
2 VIAL (ML) INJECTION
Refills: 0 | Status: COMPLETED | OUTPATIENT
Start: 2021-03-25 | End: 2021-03-25

## 2021-03-25 RX ORDER — HYDROMORPHONE HYDROCHLORIDE 2 MG/ML
1 INJECTION INTRAMUSCULAR; INTRAVENOUS; SUBCUTANEOUS
Refills: 0 | Status: DISCONTINUED | OUTPATIENT
Start: 2021-03-25 | End: 2021-03-26

## 2021-03-25 RX ORDER — HYDROMORPHONE HYDROCHLORIDE 2 MG/ML
0.5 INJECTION INTRAMUSCULAR; INTRAVENOUS; SUBCUTANEOUS
Refills: 0 | Status: DISCONTINUED | OUTPATIENT
Start: 2021-03-25 | End: 2021-03-26

## 2021-03-25 RX ORDER — HYDRALAZINE HCL 50 MG
50 TABLET ORAL EVERY 6 HOURS
Refills: 0 | Status: DISCONTINUED | OUTPATIENT
Start: 2021-03-25 | End: 2021-03-25

## 2021-03-25 RX ORDER — INFLUENZA VIRUS VACCINE 15; 15; 15; 15 UG/.5ML; UG/.5ML; UG/.5ML; UG/.5ML
0.5 SUSPENSION INTRAMUSCULAR ONCE
Refills: 0 | Status: DISCONTINUED | OUTPATIENT
Start: 2021-03-25 | End: 2021-03-27

## 2021-03-25 RX ADMIN — MAGNESIUM OXIDE 400 MG ORAL TABLET 400 MILLIGRAM(S): 241.3 TABLET ORAL at 12:06

## 2021-03-25 RX ADMIN — Medication 10 MILLIGRAM(S): at 01:52

## 2021-03-25 RX ADMIN — MORPHINE SULFATE 4 MILLIGRAM(S): 50 CAPSULE, EXTENDED RELEASE ORAL at 05:50

## 2021-03-25 RX ADMIN — MORPHINE SULFATE 4 MILLIGRAM(S): 50 CAPSULE, EXTENDED RELEASE ORAL at 05:37

## 2021-03-25 RX ADMIN — Medication 50 MILLIGRAM(S): at 17:20

## 2021-03-25 RX ADMIN — Medication 25 GRAM(S): at 13:53

## 2021-03-25 RX ADMIN — Medication 25 MILLIGRAM(S): at 05:31

## 2021-03-25 RX ADMIN — LOSARTAN POTASSIUM 100 MILLIGRAM(S): 100 TABLET, FILM COATED ORAL at 05:31

## 2021-03-25 RX ADMIN — SODIUM CHLORIDE 100 MILLILITER(S): 9 INJECTION, SOLUTION INTRAVENOUS at 23:50

## 2021-03-25 RX ADMIN — Medication 200 MILLIGRAM(S): at 13:53

## 2021-03-25 RX ADMIN — Medication 200 MILLIGRAM(S): at 12:08

## 2021-03-25 RX ADMIN — Medication 81 MILLIGRAM(S): at 13:52

## 2021-03-25 RX ADMIN — CHLORHEXIDINE GLUCONATE 1 APPLICATION(S): 213 SOLUTION TOPICAL at 05:31

## 2021-03-25 RX ADMIN — MAGNESIUM OXIDE 400 MG ORAL TABLET 400 MILLIGRAM(S): 241.3 TABLET ORAL at 17:20

## 2021-03-25 RX ADMIN — Medication 50 MILLIGRAM(S): at 12:08

## 2021-03-25 RX ADMIN — Medication 25 GRAM(S): at 12:07

## 2021-03-25 NOTE — PRE-ANESTHESIA EVALUATION ADULT - NSANTHADDINFOFT_GEN_ALL_CORE
Patient has not been evaluated by cardiology team, will proceed if deemed to be emergent by surgical team. Patient has not been evaluated by cardiology team, will proceed if deemed to be emergent by surgical team. Patient states that he recently (~2 weeks ago) had episodic hypertension to the systolic BP of 200s, measured as an outpatient, prior to the hospital, in the absence of any pain. Patient has not been evaluated by cardiology but we will proceed if cystoscopy deemed to be emergent by surgical team. Given that he was admitted in a state of hypertensive emergency, and has an extensive cardiac history (CAD s/p CABG, pharmacologic stress test from 2018 showing infarct in RCA distribution), he should be evaluated by Cardiology prior to surgery. Patient states that he recently (~2 weeks ago) had episodic hypertension to the systolic BP of 200s, measured as an outpatient, prior to the hospital, in the absence of any pain. Patient has not been evaluated by cardiology but we will proceed if cystoscopy deemed to be emergent by surgical team. Given that he was admitted in a state of hypertensive emergency, and has an extensive cardiac history (bilateral carotid atherosclerosis, uncontrolled HTN, CAD s/p CABG, pharmacologic stress test from 2018 showing infarct in RCA distribution), he should be evaluated by Cardiology prior to surgery. Patient states that he recently (~2 weeks ago) had episodic hypertension to the systolic BP of 200s, measured as an outpatient, prior to the hospital, in the absence of any pain. Patient has not been evaluated by cardiology but we will proceed if cystoscopy deemed to be emergent by surgical team. Given that he was admitted in a state of hypertensive emergency, and has an extensive cardiac history (MI, bilateral carotid atherosclerosis, uncontrolled HTN, CAD s/p CABG, pharmacologic stress test from 2018 showing infarct in RCA distribution), he should be evaluated by Cardiology prior to surgery. Patient states that he recently (~2 weeks ago) had episodic hypertension to the systolic BP of 200s, measured as an outpatient, prior to the hospital, in the absence of any pain. Patient has not been evaluated by cardiology but we will proceed if cystoscopy deemed to be emergent by surgical team. Given that he was admitted in a state of hypertensive emergency, and has an extensive cardiac history (MI, bilateral carotid atherosclerosis, uncontrolled HTN, CAD s/p CABG, pharmacologic stress test from 2018 showing infarct in RCA distribution), he should be evaluated by Cardiology prior to surgery. Patient states that he recently (~2 weeks ago) had episodic hypertension to the systolic BP of 200s, measured as an outpatient, prior to the hospital, in the absence of any pain. He is not in any pain at this time

## 2021-03-25 NOTE — CHART NOTE - NSCHARTNOTEFT_GEN_A_CORE
Patient has not been evaluated by cardiology but we will proceed if cystoscopy deemed to be emergent by surgical team. Given that he was admitted in a state of hypertensive emergency, and has an extensive cardiac history (MI, CAD s/p CABG, pharmacologic stress test from 2018 showing infarct in RCA distribution), he should be evaluated by Cardiology prior to surgery. Patient states that he recently (~2 weeks ago) had episodic hypertension to the systolic BP of 200s, measured as an outpatient, prior to the hospital, in the absence of any pain. Patient has not been evaluated by cardiology but we will proceed if cystoscopy deemed to be emergent by surgical team. Given that he was admitted in a state of hypertensive emergency, and has an extensive cardiac history (MI, CAD s/p CABG, pharmacologic stress test from 2018 showing infarct in RCA distribution, bilateral carotid atherosclerosis), he should be evaluated by Cardiology prior to surgery. Patient states that he recently (~2 weeks ago) had episodic hypertension to the systolic BP of 200s, measured as an outpatient, prior to the hospital, in the absence of any pain. Patient has not been evaluated by cardiology but we will proceed if cystoscopy deemed to be emergent by surgical team. Given that he was admitted in a state of hypertensive emergency, and has an extensive cardiac history (Uncontrolled HTN, MI, CAD s/p CABG, pharmacologic stress test from 2018 showing infarct in RCA distribution, bilateral carotid atherosclerosis), he should be evaluated by Cardiology prior to surgery. Patient states that he recently (~2 weeks ago) had episodic hypertension to the systolic BP of 200s, measured as an outpatient, prior to the hospital, in the absence of any pain. Patient has not been evaluated by cardiology but we will proceed if cystoscopy deemed to be emergent by surgical team. Given that he was admitted in a state of hypertensive emergency, and has an extensive cardiac history (Uncontrolled HTN, MI, CAD s/p CABG, pharmacologic stress test from 2018 showing infarct in RCA distribution, bilateral carotid atherosclerosis), he should be evaluated by Cardiology prior to surgery. Patient states that he recently (~2 weeks ago) had episodic hypertension to the systolic BP of 200s, measured as an outpatient, prior to the hospital, in the absence of any pain. He is not in any pain at this time

## 2021-03-25 NOTE — CHART NOTE - NSCHARTNOTEFT_GEN_A_CORE
70 Y M with pmh of CAD s/p CABG/old MI, DM2, HTN, HLD, DVT/PE on Xarelto, recurrent nephrolithiasis presents to ED with left flank pain and hematuria since this morning. Found to have left obstructing nephroureteral calculus. Urology on board,     #Left mid obstructing ureteral stone  #Bilateral nephrolithiasis  -  Urology planned for cystoscopy, left ureteroscopy laser lithotripsy, possible stone basketing and placement of JJ stent  today    After speaking with Tressa Cota,   - Patient is a moderate risk for Procedure (Cystoscopy, lithotripsy and JJ stent placement)  - Functional MET>4 70 Y M with pmh of CAD s/p CABG/old MI, DM2, HTN, HLD, DVT/PE on Xarelto, recurrent nephrolithiasis presents to ED with left flank pain and hematuria since this morning. Found to have left obstructing nephroureteral calculus. Urology on board,     #Left mid obstructing ureteral stone  #Bilateral nephrolithiasis  -  Urology planned for cystoscopy, left ureteroscopy laser lithotripsy, possible stone basketing and placement of JJ stent  today    After speaking with Tressa Cota, over phone she deemed patient to be a moderate risk  - Patient is a moderate risk for Procedure (Cystoscopy, lithotripsy and JJ stent placement)  - Functional MET>4

## 2021-03-25 NOTE — PROGRESS NOTE ADULT - SUBJECTIVE AND OBJECTIVE BOX
UROLOGY DAILY PROGRESS NOTE    Pt is a 70y M a/w    MEDICATIONS  (STANDING):  aspirin enteric coated 81 milliGRAM(s) Oral daily  chlorhexidine 4% Liquid 1 Application(s) Topical <User Schedule>  dextrose 40% Gel 15 Gram(s) Oral once  dextrose 5%. 1000 milliLiter(s) (50 mL/Hr) IV Continuous <Continuous>  dextrose 5%. 1000 milliLiter(s) (100 mL/Hr) IV Continuous <Continuous>  dextrose 50% Injectable 25 Gram(s) IV Push once  dextrose 50% Injectable 12.5 Gram(s) IV Push once  dextrose 50% Injectable 25 Gram(s) IV Push once  glucagon  Injectable 1 milliGRAM(s) IntraMuscular once  hydrALAZINE 50 milliGRAM(s) Oral every 6 hours  influenza   Vaccine 0.5 milliLiter(s) IntraMuscular once  insulin glargine Injectable (LANTUS) 18 Unit(s) SubCutaneous at bedtime  insulin lispro (ADMELOG) corrective regimen sliding scale   SubCutaneous three times a day before meals  insulin lispro Injectable (ADMELOG) 6 Unit(s) SubCutaneous three times a day before meals  labetalol 200 milliGRAM(s) Oral every 8 hours  losartan 100 milliGRAM(s) Oral daily  magnesium oxide 400 milliGRAM(s) Oral three times a day with meals  magnesium sulfate  IVPB 2 Gram(s) IV Intermittent every 2 hours  simvastatin 40 milliGRAM(s) Oral at bedtime    MEDICATIONS  (PRN):  morphine  - Injectable 4 milliGRAM(s) IV Push every 4 hours PRN Severe Pain (7 - 10)  ondansetron Injectable 4 milliGRAM(s) IV Push every 6 hours PRN Nausea and/or Vomiting    Review of Systems:  [X  A ten point review of systems was otherwise negative except as noted.    Vital Signs Last 24 Hrs  T(C): 36.2 (25 Mar 2021 06:15), Max: 37.1 (24 Mar 2021 20:00)  T(F): 97.2 (25 Mar 2021 06:15), Max: 98.7 (24 Mar 2021 20:00)  HR: 74 (25 Mar 2021 09:11) (65 - 95)  BP: 191/85 (25 Mar 2021 09:11) (172/80 - 242/102)  BP(mean): 138 (25 Mar 2021 06:52) (115 - 138)  RR: 18 (25 Mar 2021 06:15) (18 - 19)  SpO2: 95% (25 Mar 2021 06:15) (95% - 99%)    PHYSICAL EXAM:  GEN: NAD  SKIN: Good color, non diaphoretic  HEENT: NC/AT  RESP: Non-labored breathing  ABDO: Soft, non-distended, +LLQ ttp   : Pt voiding by self; urinal with slightly blood tinged urine    I&O's Summary    24 Mar 2021 07:01  -  25 Mar 2021 07:00  --------------------------------------------------------  IN: 0 mL / OUT: 450 mL / NET: -450 mL    25 Mar 2021 07:01  -  25 Mar 2021 13:13  --------------------------------------------------------  IN: 0 mL / OUT: 500 mL / NET: -500 mL        LABS:                        15.5   12.26 )-----------( 176      ( 25 Mar 2021 05:42 )             48.8     03-25    137  |  96<L>  |  12  ----------------------------<  202<H>  4.5   |  26  |  0.9    Ca    8.5      25 Mar 2021 05:42  Mg     1.1     03-25    TPro  6.7  /  Alb  4.1  /  TBili  1.3<H>  /  DBili  x   /  AST  13  /  ALT  9   /  AlkPhos  67  03-25       UROLOGY DAILY PROGRESS NOTE    Pt is a 70y M a/w     MEDICATIONS  (STANDING):  aspirin enteric coated 81 milliGRAM(s) Oral daily  chlorhexidine 4% Liquid 1 Application(s) Topical <User Schedule>  dextrose 40% Gel 15 Gram(s) Oral once  dextrose 5%. 1000 milliLiter(s) (50 mL/Hr) IV Continuous <Continuous>  dextrose 5%. 1000 milliLiter(s) (100 mL/Hr) IV Continuous <Continuous>  dextrose 50% Injectable 25 Gram(s) IV Push once  dextrose 50% Injectable 12.5 Gram(s) IV Push once  dextrose 50% Injectable 25 Gram(s) IV Push once  glucagon  Injectable 1 milliGRAM(s) IntraMuscular once  hydrALAZINE 50 milliGRAM(s) Oral every 6 hours  influenza   Vaccine 0.5 milliLiter(s) IntraMuscular once  insulin glargine Injectable (LANTUS) 18 Unit(s) SubCutaneous at bedtime  insulin lispro (ADMELOG) corrective regimen sliding scale   SubCutaneous three times a day before meals  insulin lispro Injectable (ADMELOG) 6 Unit(s) SubCutaneous three times a day before meals  labetalol 200 milliGRAM(s) Oral every 8 hours  losartan 100 milliGRAM(s) Oral daily  magnesium oxide 400 milliGRAM(s) Oral three times a day with meals  magnesium sulfate  IVPB 2 Gram(s) IV Intermittent every 2 hours  simvastatin 40 milliGRAM(s) Oral at bedtime    MEDICATIONS  (PRN):  morphine  - Injectable 4 milliGRAM(s) IV Push every 4 hours PRN Severe Pain (7 - 10)  ondansetron Injectable 4 milliGRAM(s) IV Push every 6 hours PRN Nausea and/or Vomiting    Review of Systems:  [X  A ten point review of systems was otherwise negative except as noted.    Vital Signs Last 24 Hrs  T(C): 36.2 (25 Mar 2021 06:15), Max: 37.1 (24 Mar 2021 20:00)  T(F): 97.2 (25 Mar 2021 06:15), Max: 98.7 (24 Mar 2021 20:00)  HR: 74 (25 Mar 2021 09:11) (65 - 95)  BP: 191/85 (25 Mar 2021 09:11) (172/80 - 242/102)  BP(mean): 138 (25 Mar 2021 06:52) (115 - 138)  RR: 18 (25 Mar 2021 06:15) (18 - 19)  SpO2: 95% (25 Mar 2021 06:15) (95% - 99%)    PHYSICAL EXAM:  GEN: NAD  SKIN: Good color, non diaphoretic  HEENT: NC/AT  RESP: Non-labored breathing  ABDO: Soft, non-distended, +LLQ ttp   : Pt voiding by self; urinal with slightly blood tinged urine    I&O's Summary    24 Mar 2021 07:01  -  25 Mar 2021 07:00  --------------------------------------------------------  IN: 0 mL / OUT: 450 mL / NET: -450 mL    25 Mar 2021 07:01  -  25 Mar 2021 13:13  --------------------------------------------------------  IN: 0 mL / OUT: 500 mL / NET: -500 mL        LABS:                        15.5   12.26 )-----------( 176      ( 25 Mar 2021 05:42 )             48.8     03-25    137  |  96<L>  |  12  ----------------------------<  202<H>  4.5   |  26  |  0.9    Ca    8.5      25 Mar 2021 05:42  Mg     1.1     03-25    TPro  6.7  /  Alb  4.1  /  TBili  1.3<H>  /  DBili  x   /  AST  13  /  ALT  9   /  AlkPhos  67  03-25       UROLOGY DAILY PROGRESS NOTE    Pt is a 70y M a/w L flank pain, found to have mild hydro 2* a 0.7 x 0.6 x 1.3cm L mid ureteral stone. Pt seen and examined at bedside.     MEDICATIONS  (STANDING):  aspirin enteric coated 81 milliGRAM(s) Oral daily  chlorhexidine 4% Liquid 1 Application(s) Topical <User Schedule>  dextrose 40% Gel 15 Gram(s) Oral once  dextrose 5%. 1000 milliLiter(s) (50 mL/Hr) IV Continuous <Continuous>  dextrose 5%. 1000 milliLiter(s) (100 mL/Hr) IV Continuous <Continuous>  dextrose 50% Injectable 25 Gram(s) IV Push once  dextrose 50% Injectable 12.5 Gram(s) IV Push once  dextrose 50% Injectable 25 Gram(s) IV Push once  glucagon  Injectable 1 milliGRAM(s) IntraMuscular once  hydrALAZINE 50 milliGRAM(s) Oral every 6 hours  influenza   Vaccine 0.5 milliLiter(s) IntraMuscular once  insulin glargine Injectable (LANTUS) 18 Unit(s) SubCutaneous at bedtime  insulin lispro (ADMELOG) corrective regimen sliding scale   SubCutaneous three times a day before meals  insulin lispro Injectable (ADMELOG) 6 Unit(s) SubCutaneous three times a day before meals  labetalol 200 milliGRAM(s) Oral every 8 hours  losartan 100 milliGRAM(s) Oral daily  magnesium oxide 400 milliGRAM(s) Oral three times a day with meals  magnesium sulfate  IVPB 2 Gram(s) IV Intermittent every 2 hours  simvastatin 40 milliGRAM(s) Oral at bedtime    MEDICATIONS  (PRN):  morphine  - Injectable 4 milliGRAM(s) IV Push every 4 hours PRN Severe Pain (7 - 10)  ondansetron Injectable 4 milliGRAM(s) IV Push every 6 hours PRN Nausea and/or Vomiting    Review of Systems:  [X  A ten point review of systems was otherwise negative except as noted.    Vital Signs Last 24 Hrs  T(C): 36.2 (25 Mar 2021 06:15), Max: 37.1 (24 Mar 2021 20:00)  T(F): 97.2 (25 Mar 2021 06:15), Max: 98.7 (24 Mar 2021 20:00)  HR: 74 (25 Mar 2021 09:11) (65 - 95)  BP: 191/85 (25 Mar 2021 09:11) (172/80 - 242/102)  BP(mean): 138 (25 Mar 2021 06:52) (115 - 138)  RR: 18 (25 Mar 2021 06:15) (18 - 19)  SpO2: 95% (25 Mar 2021 06:15) (95% - 99%)    PHYSICAL EXAM:  GEN: NAD  SKIN: Good color, non diaphoretic  HEENT: NC/AT  RESP: Non-labored breathing  ABDO: Soft, non-distended, +LLQ ttp   : Pt voiding by self; urinal with slightly blood tinged urine    I&O's Summary    24 Mar 2021 07:01  -  25 Mar 2021 07:00  --------------------------------------------------------  IN: 0 mL / OUT: 450 mL / NET: -450 mL    25 Mar 2021 07:01  -  25 Mar 2021 13:13  --------------------------------------------------------  IN: 0 mL / OUT: 500 mL / NET: -500 mL        LABS:                        15.5   12.26 )-----------( 176      ( 25 Mar 2021 05:42 )             48.8     03-25    137  |  96<L>  |  12  ----------------------------<  202<H>  4.5   |  26  |  0.9    Ca    8.5      25 Mar 2021 05:42  Mg     1.1     03-25    TPro  6.7  /  Alb  4.1  /  TBili  1.3<H>  /  DBili  x   /  AST  13  /  ALT  9   /  AlkPhos  67  03-25       UROLOGY DAILY PROGRESS NOTE    Pt is a 70y M a/w L flank pain, found to have mild hydro 2* a 0.7 x 0.6 x 1.3cm L mid ureteral stone. Pt seen and examined at bedside. Stating pain was better in the am.    MEDICATIONS  (STANDING):  aspirin enteric coated 81 milliGRAM(s) Oral daily  chlorhexidine 4% Liquid 1 Application(s) Topical <User Schedule>  dextrose 40% Gel 15 Gram(s) Oral once  dextrose 5%. 1000 milliLiter(s) (50 mL/Hr) IV Continuous <Continuous>  dextrose 5%. 1000 milliLiter(s) (100 mL/Hr) IV Continuous <Continuous>  dextrose 50% Injectable 25 Gram(s) IV Push once  dextrose 50% Injectable 12.5 Gram(s) IV Push once  dextrose 50% Injectable 25 Gram(s) IV Push once  glucagon  Injectable 1 milliGRAM(s) IntraMuscular once  hydrALAZINE 50 milliGRAM(s) Oral every 6 hours  influenza   Vaccine 0.5 milliLiter(s) IntraMuscular once  insulin glargine Injectable (LANTUS) 18 Unit(s) SubCutaneous at bedtime  insulin lispro (ADMELOG) corrective regimen sliding scale   SubCutaneous three times a day before meals  insulin lispro Injectable (ADMELOG) 6 Unit(s) SubCutaneous three times a day before meals  labetalol 200 milliGRAM(s) Oral every 8 hours  losartan 100 milliGRAM(s) Oral daily  magnesium oxide 400 milliGRAM(s) Oral three times a day with meals  magnesium sulfate  IVPB 2 Gram(s) IV Intermittent every 2 hours  simvastatin 40 milliGRAM(s) Oral at bedtime    MEDICATIONS  (PRN):  morphine  - Injectable 4 milliGRAM(s) IV Push every 4 hours PRN Severe Pain (7 - 10)  ondansetron Injectable 4 milliGRAM(s) IV Push every 6 hours PRN Nausea and/or Vomiting    Review of Systems:  [X  A ten point review of systems was otherwise negative except as noted.    Vital Signs Last 24 Hrs  T(C): 36.2 (25 Mar 2021 06:15), Max: 37.1 (24 Mar 2021 20:00)  T(F): 97.2 (25 Mar 2021 06:15), Max: 98.7 (24 Mar 2021 20:00)  HR: 74 (25 Mar 2021 09:11) (65 - 95)  BP: 191/85 (25 Mar 2021 09:11) (172/80 - 242/102)  BP(mean): 138 (25 Mar 2021 06:52) (115 - 138)  RR: 18 (25 Mar 2021 06:15) (18 - 19)  SpO2: 95% (25 Mar 2021 06:15) (95% - 99%)    PHYSICAL EXAM:  GEN: NAD  SKIN: Good color, non diaphoretic  HEENT: NC/AT  RESP: Non-labored breathing  ABDO: Soft, non-distended, +LLQ ttp   : Pt voiding by self; urinal with slightly blood tinged urine    I&O's Summary    24 Mar 2021 07:01  -  25 Mar 2021 07:00  --------------------------------------------------------  IN: 0 mL / OUT: 450 mL / NET: -450 mL    25 Mar 2021 07:01  -  25 Mar 2021 13:13  --------------------------------------------------------  IN: 0 mL / OUT: 500 mL / NET: -500 mL        LABS:                        15.5   12.26 )-----------( 176      ( 25 Mar 2021 05:42 )             48.8     03-25    137  |  96<L>  |  12  ----------------------------<  202<H>  4.5   |  26  |  0.9    Ca    8.5      25 Mar 2021 05:42  Mg     1.1     03-25    TPro  6.7  /  Alb  4.1  /  TBili  1.3<H>  /  DBili  x   /  AST  13  /  ALT  9   /  AlkPhos  67  03-25       UROLOGY DAILY PROGRESS NOTE    Pt is a 70y M a/w L flank pain, found to have mild hydro 2* a 0.7 x 0.6 x 1.3cm L mid ureteral stone. Pt seen and examined at bedside. Stating pain was better in the am.    MEDICATIONS  (STANDING):  aspirin enteric coated 81 milliGRAM(s) Oral daily  chlorhexidine 4% Liquid 1 Application(s) Topical <User Schedule>  dextrose 40% Gel 15 Gram(s) Oral once  dextrose 5%. 1000 milliLiter(s) (50 mL/Hr) IV Continuous <Continuous>  dextrose 5%. 1000 milliLiter(s) (100 mL/Hr) IV Continuous <Continuous>  dextrose 50% Injectable 25 Gram(s) IV Push once  dextrose 50% Injectable 12.5 Gram(s) IV Push once  dextrose 50% Injectable 25 Gram(s) IV Push once  glucagon  Injectable 1 milliGRAM(s) IntraMuscular once  hydrALAZINE 50 milliGRAM(s) Oral every 6 hours  influenza   Vaccine 0.5 milliLiter(s) IntraMuscular once  insulin glargine Injectable (LANTUS) 18 Unit(s) SubCutaneous at bedtime  insulin lispro (ADMELOG) corrective regimen sliding scale   SubCutaneous three times a day before meals  insulin lispro Injectable (ADMELOG) 6 Unit(s) SubCutaneous three times a day before meals  labetalol 200 milliGRAM(s) Oral every 8 hours  losartan 100 milliGRAM(s) Oral daily  magnesium oxide 400 milliGRAM(s) Oral three times a day with meals  magnesium sulfate  IVPB 2 Gram(s) IV Intermittent every 2 hours  simvastatin 40 milliGRAM(s) Oral at bedtime    MEDICATIONS  (PRN):  morphine  - Injectable 4 milliGRAM(s) IV Push every 4 hours PRN Severe Pain (7 - 10)  ondansetron Injectable 4 milliGRAM(s) IV Push every 6 hours PRN Nausea and/or Vomiting    Review of Systems:  [X  A ten point review of systems was otherwise negative except as noted.    Vital Signs Last 24 Hrs  T(C): 36.2 (25 Mar 2021 06:15), Max: 37.1 (24 Mar 2021 20:00)  T(F): 97.2 (25 Mar 2021 06:15), Max: 98.7 (24 Mar 2021 20:00)  HR: 74 (25 Mar 2021 09:11) (65 - 95)  BP: 191/85 (25 Mar 2021 09:11) (172/80 - 242/102)  BP(mean): 138 (25 Mar 2021 06:52) (115 - 138)  RR: 18 (25 Mar 2021 06:15) (18 - 19)  SpO2: 95% (25 Mar 2021 06:15) (95% - 99%)    PHYSICAL EXAM:  GEN: NAD  SKIN: Good color, non diaphoretic  HEENT: NC/AT  RESP: Non-labored breathing  ABDO: Soft, non-distended, +LLQ ttp   : Pt voiding by self; urinal with slightly blood tinged urine, mild left CVA tenderness    I&O's Summary    24 Mar 2021 07:01  -  25 Mar 2021 07:00  --------------------------------------------------------  IN: 0 mL / OUT: 450 mL / NET: -450 mL    25 Mar 2021 07:01  -  25 Mar 2021 13:13  --------------------------------------------------------  IN: 0 mL / OUT: 500 mL / NET: -500 mL        LABS:                        15.5   12.26 )-----------( 176      ( 25 Mar 2021 05:42 )             48.8     03-25    137  |  96<L>  |  12  ----------------------------<  202<H>  4.5   |  26  |  0.9    Ca    8.5      25 Mar 2021 05:42  Mg     1.1     03-25    TPro  6.7  /  Alb  4.1  /  TBili  1.3<H>  /  DBili  x   /  AST  13  /  ALT  9   /  AlkPhos  67  03-25

## 2021-03-25 NOTE — PROGRESS NOTE ADULT - ASSESSMENT
70 Y M with pmh of CAD s/p CABG/old MI, DM2, HTN, HLD, DVT/PE on Xarelto, recurrent nephrolithiasis presents to ED with left flank pain and hematuria since this morning. Found to have left obstructing nephroureteral calculus.    #Left mid obstructing ureteral stone  #Bilateral nephrolithiasis  #Hematuria- improved  - Urology planned for cystoscopy, left ureteroscopy laser lithotripsy, possible stone basketing and placement of JJ stent  today  - last dose of xarelto on  am, on hold  - pain control prn    #Hypertensive urgency  - s/p 5mg labetalol ivp and  10mg hydralazine ivp now  - on lopressor and losartan at home  - c/w losartan 100mg daily  - dced lopressor , started labetolol 200mg q8h  - started hydralazine 25 16 >>increased to 50 q6h for better BP control  - needs Renal artery doppler , was scheduled OP    #Hypomagnesemia  - M.1>>ordered IV Mg x2 doses and started PO    #DM2  -basal/bolus while inpatient  - fu a1c  -avoid hypoglycemia    #HLD  -continue statin    #CAD s/p CABG  - continue home meds    #DVT/PE on Xarelto  -hold xaretlo    #DVT PPx- SCDs for now; will restart Xarelto once cleared by urology  #GI PPx- None  #Diet- NPO  #Activity- AAT  #Dispo- Home  #Code- FULL

## 2021-03-25 NOTE — PRE-ANESTHESIA EVALUATION ADULT - NSANTHOSAYNRD_GEN_A_CORE
No. DENIA screening performed.  STOP BANG Legend: 0-2 = LOW Risk; 3-4 = INTERMEDIATE Risk; 5-8 = HIGH Risk

## 2021-03-25 NOTE — BRIEF OPERATIVE NOTE - NSICDXBRIEFPROCEDURE_GEN_ALL_CORE_FT
PROCEDURES:  Ureteroscopy with laser lithotripsy and stent placement 25-Mar-2021 23:57:29  Donal Siddiqi

## 2021-03-25 NOTE — PROGRESS NOTE ADULT - SUBJECTIVE AND OBJECTIVE BOX
MERARY GARCIA 70y Male  MRN#: [id number]   CODE STATUS: FULL      SUBJECTIVE  Patient is a 70y old Male who presents with a chief complaint of left flank pain, hematuria (24 Mar 2021 16:38)    Currently admitted to medicine with the primary diagnosis of obstructive uropathy     Today is hospital day 1d,   INTERVAL HPI/ OVERNIGHT EVENTS: patient urine is clear this morning.     This morning he is laying in bed comfortably .   Denies chest pain, shortness of breath, abdominal pain, nausea, vomiting or changes in bowel habits.     Urinating and stooling appropriately.    Present Today:           [place] Catheter (x)No/ ()Yes?   Indication:             Central Line (x)No/ ()Yes?   Indication:          IV Fluids (x)No/ ()Yes? Type:  Rate:  Indication:    OBJECTIVE  PAST MEDICAL & SURGICAL HISTORY  Acute massive pulmonary embolism  s/p tPA in 2018    DVT (deep venous thrombosis)    MI (myocardial infarction)    Kidney stones    High cholesterol    Type 2 diabetes mellitus with complication, unspecified long term insulin use status    Hypertension, unspecified type    H/O cystoscopy    H/O heart surgery  quadruple bypass 6 years ago      ALLERGIES:  No Known Allergies    HOME MEDICATIONS:  Home Medications:  Aspirin Enteric Coated 81 mg oral delayed release tablet: 1 tab(s) orally once a day (24 Mar 2021 16:36)  metFORMIN 1000 mg oral tablet: 1 tab(s) orally 2 times a day (24 Mar 2021 16:36)  simvastatin 40 mg oral tablet: 1 tab(s) orally once a day (at bedtime) (24 Mar 2021 16:36)  Xarelto 20 mg oral tablet: 1 tab(s) orally once a day (in the morning) (24 Mar 2021 16:36)    MEDICATIONS:  STANDING MEDICATIONS  aspirin enteric coated 81 milliGRAM(s) Oral daily  chlorhexidine 4% Liquid 1 Application(s) Topical <User Schedule>  dextrose 40% Gel 15 Gram(s) Oral once  dextrose 5%. 1000 milliLiter(s) (50 mL/Hr) IV Continuous <Continuous>  dextrose 5%. 1000 milliLiter(s) (100 mL/Hr) IV Continuous <Continuous>  dextrose 50% Injectable 25 Gram(s) IV Push once  dextrose 50% Injectable 12.5 Gram(s) IV Push once  dextrose 50% Injectable 25 Gram(s) IV Push once  glucagon  Injectable 1 milliGRAM(s) IntraMuscular once  hydrALAZINE 50 milliGRAM(s) Oral every 6 hours  influenza   Vaccine 0.5 milliLiter(s) IntraMuscular once  insulin glargine Injectable (LANTUS) 18 Unit(s) SubCutaneous at bedtime  insulin lispro (ADMELOG) corrective regimen sliding scale   SubCutaneous three times a day before meals  insulin lispro Injectable (ADMELOG) 6 Unit(s) SubCutaneous three times a day before meals  labetalol 200 milliGRAM(s) Oral every 8 hours  losartan 100 milliGRAM(s) Oral daily  simvastatin 40 milliGRAM(s) Oral at bedtime    PRN MEDICATIONS  morphine  - Injectable 4 milliGRAM(s) IV Push every 4 hours PRN  ondansetron Injectable 4 milliGRAM(s) IV Push every 6 hours PRN      VITAL SIGNS: Last 24 Hours  T(F): 97.2 (25 Mar 2021 06:15), Max: 98.7 (24 Mar 2021 20:00)  HR: 74 (25 Mar 2021 09:11) (65 - 95)  BP: 191/85 (25 Mar 2021 09:11) (172/80 - 242/102)  BP(mean): 138 (25 Mar 2021 06:52) (115 - 138)  RR: 18 (25 Mar 2021 06:15) (18 - 19)  SpO2: 95% (25 Mar 2021 06:15) (95% - 99%)    LABS:                        15.5   12.26 )-----------( 176      ( 25 Mar 2021 05:42 )             48.8     03-25    137  |  96<L>  |  12  ----------------------------<  202<H>  4.5   |  26  |  0.9    Ca    8.5      25 Mar 2021 05:42  Mg     1.1     03-25    TPro  6.7  /  Alb  4.1  /  TBili  1.3<H>  /  DBili  x   /  AST  13  /  ALT  9   /  AlkPhos  67  03-25    PT/INR - ( 24 Mar 2021 10:15 )   PT: 25.30 sec;   INR: 2.20 ratio         PTT - ( 24 Mar 2021 10:15 )  PTT:36.0 sec  Urinalysis Basic - ( 24 Mar 2021 10:30 )    Color: Dark Brown / Appearance: Turbid / S.018 / pH: x  Gluc: x / Ketone: Trace  / Bili: Negative / Urobili: <2 mg/dL   Blood: x / Protein: 100 mg/dL / Nitrite: Negative   Leuk Esterase: Negative / RBC: >720 /HPF / WBC 20 /HPF   Sq Epi: x / Non Sq Epi: 3 /HPF / Bacteria: Negative    RADIOLOGY:   CT Abdomen and Pelvis w/ IV Cont (21 @ 13:26) >  1.  Mild left hydronephroureter with a 0.7 x 0.6 x 1.3 cm obstructing nephroureteral calculus( ).    2.  Additional nonobstructing intrarenal calculi as above.      PHYSICAL EXAM:    GENERAL: NAD, well-developed, AAOx3  HEENT:  Atraumatic, Normocephalic. EOMI, PERRLA, conjunctiva and sclera clear, No JVD  PULMONARY: Clear to auscultation bilaterally; No wheeze  CARDIOVASCULAR: Regular rate and rhythm; No murmurs, rubs, or gallops  GASTROINTESTINAL: Soft, Nontender, Nondistended; Bowel sounds present  MUSCULOSKELETAL:  2+ Peripheral Pulses, No clubbing, cyanosis, or edema  NEUROLOGY: non-focal  SKIN: No rashes or lesions

## 2021-03-25 NOTE — PROGRESS NOTE ADULT - SUBJECTIVE AND OBJECTIVE BOX
MERARY GARCIA  70y  Male  HPI:  70 Y M with pmh of CAD s/p CABG/old MI, DM2, HTN, HLD, DVT/PE on Xarelto, recurrent nephrolithiasis presents to ED with left flank pain and hematuria since this morning. Patient states that around 4AM he woke up with severe 10/10 left flank pain with radiation to groin, and "massive amounts" of hematuria. He says it feels like kidney stones that he has had before. He did not have any nausea, vomiting, fevers, chills, chest pain, shortness of breath. He last took his Xarelto yesterday morning at 8am. Currently, flank pain is 5/10 after receiving morphine.     ED Course:  T 97.8F, P 79, /97, R 18, S 97% on RA at rest. Received 5mg labetalol ivp, /90. Now it is 223/105. Ordered 10mg hydralazine ivp. CT abd/pelvis showing mild left hydronephroureter with a 0.7 x 0.6 x 1.3 cm obstructing nephroureteral calculus. Urology consulted.      (24 Mar 2021 16:38)    MEDICATIONS  (STANDING):  aspirin enteric coated 81 milliGRAM(s) Oral daily  chlorhexidine 4% Liquid 1 Application(s) Topical <User Schedule>  dextrose 40% Gel 15 Gram(s) Oral once  dextrose 5%. 1000 milliLiter(s) (50 mL/Hr) IV Continuous <Continuous>  dextrose 5%. 1000 milliLiter(s) (100 mL/Hr) IV Continuous <Continuous>  dextrose 50% Injectable 25 Gram(s) IV Push once  dextrose 50% Injectable 12.5 Gram(s) IV Push once  dextrose 50% Injectable 25 Gram(s) IV Push once  glucagon  Injectable 1 milliGRAM(s) IntraMuscular once  hydrALAZINE 50 milliGRAM(s) Oral every 6 hours  influenza   Vaccine 0.5 milliLiter(s) IntraMuscular once  insulin glargine Injectable (LANTUS) 18 Unit(s) SubCutaneous at bedtime  insulin lispro (ADMELOG) corrective regimen sliding scale   SubCutaneous three times a day before meals  insulin lispro Injectable (ADMELOG) 6 Unit(s) SubCutaneous three times a day before meals  labetalol 200 milliGRAM(s) Oral every 8 hours  losartan 100 milliGRAM(s) Oral daily  magnesium oxide 400 milliGRAM(s) Oral three times a day with meals  simvastatin 40 milliGRAM(s) Oral at bedtime    MEDICATIONS  (PRN):  morphine  - Injectable 4 milliGRAM(s) IV Push every 4 hours PRN Severe Pain (7 - 10)  ondansetron Injectable 4 milliGRAM(s) IV Push every 6 hours PRN Nausea and/or Vomiting    INTERVAL EVENTS: Patient seen today uncomfortable, upset, NPO for urologic procedure.    T(C): 37.2 (21 @ 14:22), Max: 37.2 (21 @ 14:22)  HR: 71 (21 @ 14:22) (66 - 95)  BP: 176/81 (21 @ 14:22) (172/80 - 242/102)  RR: 20 (21 @ 14:22) (18 - 20)  SpO2: 95% (21 @ 06:15) (95% - 99%)  Wt(kg): --Vital Signs Last 24 Hrs  T(C): 37.2 (25 Mar 2021 14:22), Max: 37.2 (25 Mar 2021 14:22)  T(F): 98.9 (25 Mar 2021 14:22), Max: 98.9 (25 Mar 2021 14:22)  HR: 71 (25 Mar 2021 14:22) (66 - 95)  BP: 176/81 (25 Mar 2021 14:22) (172/80 - 242/102)  BP(mean): 138 (25 Mar 2021 06:52) (115 - 138)  RR: 20 (25 Mar 2021 14:22) (18 - 20)  SpO2: 95% (25 Mar 2021 06:15) (95% - 99%)    PHYSICAL EXAM:  GENERAL: NAD  NECK: Supple, No JVD  CHEST/LUNG: Clear  HEART: S1, S2, Regular rate and rhythm  ABDOMEN: Soft, Nontender, Nondistended; Bowel sounds present  EXTREMITIES: No clubbing, cyanosis, or edema    LABS:                        15.5   12.26 )-----------( 176      ( 25 Mar 2021 05:42 )             48.8                 137  |  96<L>  |  12  ----------------------------<  202<H>  4.5   |  26  |  0.9    Ca    8.5      25 Mar 2021 05:42  Mg     1.1         TPro  6.7  /  Alb  4.1  /  TBili  1.3<H>  /  DBili  x   /  AST  13  /  ALT  9   /  AlkPhos  67      LIVER FUNCTIONS - ( 25 Mar 2021 05:42 )  Alb: 4.1 g/dL / Pro: 6.7 g/dL / ALK PHOS: 67 U/L / ALT: 9 U/L / AST: 13 U/L / GGT: x                   PT/INR - ( 24 Mar 2021 10:15 )   PT: 25.30 sec;   INR: 2.20 ratio         PTT - ( 24 Mar 2021 10:15 )  PTT:36.0 sec        Urinalysis Basic - ( 24 Mar 2021 10:30 )    Color: Dark Brown / Appearance: Turbid / S.018 / pH: x  Gluc: x / Ketone: Trace  / Bili: Negative / Urobili: <2 mg/dL   Blood: x / Protein: 100 mg/dL / Nitrite: Negative   Leuk Esterase: Negative / RBC: >720 /HPF / WBC 20 /HPF   Sq Epi: x / Non Sq Epi: 3 /HPF / Bacteria: Negative      RADIOLOGY & ADDITIONAL TESTS:  < from: CT Abdomen and Pelvis w/ IV Cont (21 @ 13:26) >  FINDINGS:    LOWER CHEST: Unchanged right middle lobe 0.3 cm subpleural solid nodule. Bibasilar subsegmental dependent atelectasis. Bilateral lower lobe mucus plugging. Post median sternotomy and CABG. Stable coronary calcifications.    HEPATOBILIARY: Unchanged subcentimeter hypodensity in the right hepatic lobe, too small to characterize. Cholelithiasis.    SPLEEN: Unremarkable.    PANCREAS: Unremarkable.    ADRENAL GLANDS: Unremarkable.    KIDNEYS: Mild left hydronephroureter secondary to a 0.7 x 0.6 x 1.3 cm midureteral calculus (). Additional nonobstructing bilateral intrarenal calculi, measuring up to 1.5 cm in the left lower pole () and 1.0 cm in the right lower pole ([id number]).    ABDOMINOPELVIC NODES: No lymphadenopathy.    PELVIC ORGANS: Punctate calculi seen layering within the urinary bladder.    PERITONEUM/MESENTERY/BOWEL: No bowel obstruction, ascites or pneumoperitoneum. Normal appendix.    BONES/SOFT TISSUES: Degenerative changes of the thoracic spine noted.    OTHER: Diffuse atherosclerotic vascular calcifications.      IMPRESSION:  1.  Mild left hydronephroureter with a 0.7 x 0.6 x 1.3 cm obstructing nephroureteral calculus( ).    2.  Additional nonobstructing intrarenal calculi as above.      < end of copied text >

## 2021-03-25 NOTE — PROGRESS NOTE ADULT - ASSESSMENT
70 Y M with pmh of CAD s/p CABG/old MI, DM2, HTN, HLD, DVT/PE on Xarelto, recurrent nephrolithiasis presents to ED with left flank pain and hematuria since this morning. Found to have left obstructing nephroureteral calculus.    Left hydronephroureter secondary to obstructing ureteral stone  Bilateral nephrolithiasis  Hematuria- improved  - Urology planned for cystoscopy, left ureteroscopy laser lithotripsy, possible stone basketing and placement of JJ stent  today  - last dose of xarelto on  am, on hold  - last INR yesterday 2.2, would repeat  - pain control prn    Hypertensive urgency  - post IV Labetalol and Hydralazine   - pain and agravation increasing   - on Loressor and Loartan at home  - continue Losartan daily  - off Lopressor, started labetolol 200mg q8h  - started hydralazine 25 16 >>increased to 50 q6h for better BP control  - needs Renal artery doppler , was scheduled OP    Hypomagnesemia  - M.1>>ordered IV Mg x2 doses and started PO    DM type 2  - bsal/bolus while inpatient  - fu a1c  - avoidhypoglycemia    HLD  -continue statin    CAD s/p CABG  - continue home meds    DVT/PE - recurrent  -hold Xaretlo    DVT PPx- SCDs for now; will restart Xarelto once cleared by urology  GI PPx- None  Diet- NPO  Activity- AAT  Dispo- Home  Code- FULL

## 2021-03-25 NOTE — PROGRESS NOTE ADULT - ASSESSMENT
Pt is a 70y M a/w L flank pain, found to have mild hydro 2* a 0.7 x 0.6 x 1.3cm L mid ureteral stone.    Addeddum:   Pt seen in pre-op with Dr. Siddiqi.   Pt still pending medical clearance for procedure.   d/w with resident, covering Hospitalist to come assess pt in pre-op.

## 2021-03-25 NOTE — CHART NOTE - NSCHARTNOTEFT_GEN_A_CORE
PACU ANESTHESIA ADMISSION NOTE      Procedure: Cystoscopy, left ureteroscopy, laser lithotripsy, stent placement and stone basketing  Post op diagnosis:      ____  Intubated  TV:______       Rate: ______      FiO2: ______    __X__  Patent Airway    __X__  Full return of protective reflexes    __X__  Full recovery from anesthesia / back to baseline status    Vitals:  T: 97.7F  HR: 80  BP: 130/60  RR: 17  SpO2: 95%    Mental Status:  _X___ Awake   _____ Alert   _____ Drowsy   _____ Sedated    Nausea/Vomiting:  _X___ NO  ______Yes,   See Post - Op Orders          Pain Scale (0-10):  _____    Treatment: ____ None    __X__ See Post - Op/PCA Orders    Post - Operative Fluids:   ____ Oral   __X__ See Post - Op Orders    Plan: Discharge:   ____Home       _X___Floor     _____Critical Care    _____  Other:_________________    Comments: NO anesthetic related complications noted. Pt. transported to PACU, report endorsed to Rn

## 2021-03-26 LAB
ALBUMIN SERPL ELPH-MCNC: 3.4 G/DL — LOW (ref 3.5–5.2)
ALP SERPL-CCNC: 53 U/L — SIGNIFICANT CHANGE UP (ref 30–115)
ALT FLD-CCNC: 6 U/L — SIGNIFICANT CHANGE UP (ref 0–41)
ANION GAP SERPL CALC-SCNC: 11 MMOL/L — SIGNIFICANT CHANGE UP (ref 7–14)
AST SERPL-CCNC: 6 U/L — SIGNIFICANT CHANGE UP (ref 0–41)
BILIRUB SERPL-MCNC: 0.9 MG/DL — SIGNIFICANT CHANGE UP (ref 0.2–1.2)
BUN SERPL-MCNC: 22 MG/DL — HIGH (ref 10–20)
CALCIUM SERPL-MCNC: 7.7 MG/DL — LOW (ref 8.5–10.1)
CHLORIDE SERPL-SCNC: 99 MMOL/L — SIGNIFICANT CHANGE UP (ref 98–110)
CO2 SERPL-SCNC: 29 MMOL/L — SIGNIFICANT CHANGE UP (ref 17–32)
CREAT SERPL-MCNC: 1.2 MG/DL — SIGNIFICANT CHANGE UP (ref 0.7–1.5)
GLUCOSE BLDC GLUCOMTR-MCNC: 136 MG/DL — HIGH (ref 70–99)
GLUCOSE BLDC GLUCOMTR-MCNC: 170 MG/DL — HIGH (ref 70–99)
GLUCOSE BLDC GLUCOMTR-MCNC: 185 MG/DL — HIGH (ref 70–99)
GLUCOSE BLDC GLUCOMTR-MCNC: 214 MG/DL — HIGH (ref 70–99)
GLUCOSE SERPL-MCNC: 170 MG/DL — HIGH (ref 70–99)
HCT VFR BLD CALC: 41.1 % — LOW (ref 42–52)
HGB BLD-MCNC: 13.1 G/DL — LOW (ref 14–18)
MAGNESIUM SERPL-MCNC: 2 MG/DL — SIGNIFICANT CHANGE UP (ref 1.8–2.4)
MCHC RBC-ENTMCNC: 29.8 PG — SIGNIFICANT CHANGE UP (ref 27–31)
MCHC RBC-ENTMCNC: 31.9 G/DL — LOW (ref 32–37)
MCV RBC AUTO: 93.6 FL — SIGNIFICANT CHANGE UP (ref 80–94)
NRBC # BLD: 0 /100 WBCS — SIGNIFICANT CHANGE UP (ref 0–0)
PLATELET # BLD AUTO: 156 K/UL — SIGNIFICANT CHANGE UP (ref 130–400)
POTASSIUM SERPL-MCNC: 4.1 MMOL/L — SIGNIFICANT CHANGE UP (ref 3.5–5)
POTASSIUM SERPL-SCNC: 4.1 MMOL/L — SIGNIFICANT CHANGE UP (ref 3.5–5)
PROT SERPL-MCNC: 5.6 G/DL — LOW (ref 6–8)
RBC # BLD: 4.39 M/UL — LOW (ref 4.7–6.1)
RBC # FLD: 13.2 % — SIGNIFICANT CHANGE UP (ref 11.5–14.5)
SODIUM SERPL-SCNC: 139 MMOL/L — SIGNIFICANT CHANGE UP (ref 135–146)
WBC # BLD: 7.66 K/UL — SIGNIFICANT CHANGE UP (ref 4.8–10.8)
WBC # FLD AUTO: 7.66 K/UL — SIGNIFICANT CHANGE UP (ref 4.8–10.8)

## 2021-03-26 PROCEDURE — 93979 VASCULAR STUDY: CPT | Mod: 26

## 2021-03-26 RX ORDER — GLUCAGON INJECTION, SOLUTION 0.5 MG/.1ML
1 INJECTION, SOLUTION SUBCUTANEOUS ONCE
Refills: 0 | Status: DISCONTINUED | OUTPATIENT
Start: 2021-03-26 | End: 2021-03-27

## 2021-03-26 RX ORDER — OXYCODONE HYDROCHLORIDE 5 MG/1
5 TABLET ORAL ONCE
Refills: 0 | Status: DISCONTINUED | OUTPATIENT
Start: 2021-03-26 | End: 2021-03-26

## 2021-03-26 RX ORDER — RIVAROXABAN 15 MG-20MG
20 KIT ORAL
Refills: 0 | Status: DISCONTINUED | OUTPATIENT
Start: 2021-03-26 | End: 2021-03-27

## 2021-03-26 RX ORDER — HYDRALAZINE HCL 50 MG
50 TABLET ORAL EVERY 6 HOURS
Refills: 0 | Status: DISCONTINUED | OUTPATIENT
Start: 2021-03-26 | End: 2021-03-27

## 2021-03-26 RX ORDER — LABETALOL HCL 100 MG
200 TABLET ORAL EVERY 8 HOURS
Refills: 0 | Status: DISCONTINUED | OUTPATIENT
Start: 2021-03-26 | End: 2021-03-27

## 2021-03-26 RX ORDER — MAGNESIUM OXIDE 400 MG ORAL TABLET 241.3 MG
400 TABLET ORAL
Refills: 0 | Status: DISCONTINUED | OUTPATIENT
Start: 2021-03-26 | End: 2021-03-27

## 2021-03-26 RX ORDER — ONDANSETRON 8 MG/1
4 TABLET, FILM COATED ORAL EVERY 6 HOURS
Refills: 0 | Status: DISCONTINUED | OUTPATIENT
Start: 2021-03-26 | End: 2021-03-27

## 2021-03-26 RX ORDER — HYDRALAZINE HCL 50 MG
50 TABLET ORAL EVERY 6 HOURS
Refills: 0 | Status: DISCONTINUED | OUTPATIENT
Start: 2021-03-26 | End: 2021-03-26

## 2021-03-26 RX ORDER — LABETALOL HCL 100 MG
200 TABLET ORAL EVERY 8 HOURS
Refills: 0 | Status: DISCONTINUED | OUTPATIENT
Start: 2021-03-26 | End: 2021-03-26

## 2021-03-26 RX ORDER — LOSARTAN POTASSIUM 100 MG/1
100 TABLET, FILM COATED ORAL DAILY
Refills: 0 | Status: DISCONTINUED | OUTPATIENT
Start: 2021-03-26 | End: 2021-03-26

## 2021-03-26 RX ORDER — INSULIN LISPRO 100/ML
VIAL (ML) SUBCUTANEOUS
Refills: 0 | Status: DISCONTINUED | OUTPATIENT
Start: 2021-03-26 | End: 2021-03-27

## 2021-03-26 RX ORDER — DEXTROSE 50 % IN WATER 50 %
25 SYRINGE (ML) INTRAVENOUS ONCE
Refills: 0 | Status: DISCONTINUED | OUTPATIENT
Start: 2021-03-26 | End: 2021-03-27

## 2021-03-26 RX ORDER — SIMVASTATIN 20 MG/1
40 TABLET, FILM COATED ORAL AT BEDTIME
Refills: 0 | Status: DISCONTINUED | OUTPATIENT
Start: 2021-03-26 | End: 2021-03-27

## 2021-03-26 RX ORDER — MAGNESIUM OXIDE 400 MG ORAL TABLET 241.3 MG
400 TABLET ORAL
Refills: 0 | Status: DISCONTINUED | OUTPATIENT
Start: 2021-03-26 | End: 2021-03-26

## 2021-03-26 RX ORDER — SODIUM CHLORIDE 9 MG/ML
1000 INJECTION, SOLUTION INTRAVENOUS
Refills: 0 | Status: DISCONTINUED | OUTPATIENT
Start: 2021-03-26 | End: 2021-03-27

## 2021-03-26 RX ORDER — DEXTROSE 50 % IN WATER 50 %
15 SYRINGE (ML) INTRAVENOUS ONCE
Refills: 0 | Status: DISCONTINUED | OUTPATIENT
Start: 2021-03-26 | End: 2021-03-27

## 2021-03-26 RX ORDER — INSULIN LISPRO 100/ML
6 VIAL (ML) SUBCUTANEOUS
Refills: 0 | Status: DISCONTINUED | OUTPATIENT
Start: 2021-03-26 | End: 2021-03-27

## 2021-03-26 RX ORDER — INSULIN GLARGINE 100 [IU]/ML
18 INJECTION, SOLUTION SUBCUTANEOUS AT BEDTIME
Refills: 0 | Status: DISCONTINUED | OUTPATIENT
Start: 2021-03-26 | End: 2021-03-27

## 2021-03-26 RX ORDER — MORPHINE SULFATE 50 MG/1
4 CAPSULE, EXTENDED RELEASE ORAL EVERY 4 HOURS
Refills: 0 | Status: DISCONTINUED | OUTPATIENT
Start: 2021-03-26 | End: 2021-03-27

## 2021-03-26 RX ORDER — ASPIRIN/CALCIUM CARB/MAGNESIUM 324 MG
81 TABLET ORAL DAILY
Refills: 0 | Status: DISCONTINUED | OUTPATIENT
Start: 2021-03-26 | End: 2021-03-27

## 2021-03-26 RX ORDER — CHLORHEXIDINE GLUCONATE 213 G/1000ML
1 SOLUTION TOPICAL
Refills: 0 | Status: DISCONTINUED | OUTPATIENT
Start: 2021-03-26 | End: 2021-03-27

## 2021-03-26 RX ORDER — SIMVASTATIN 20 MG/1
40 TABLET, FILM COATED ORAL AT BEDTIME
Refills: 0 | Status: DISCONTINUED | OUTPATIENT
Start: 2021-03-26 | End: 2021-03-26

## 2021-03-26 RX ORDER — LOSARTAN POTASSIUM 100 MG/1
100 TABLET, FILM COATED ORAL DAILY
Refills: 0 | Status: DISCONTINUED | OUTPATIENT
Start: 2021-03-26 | End: 2021-03-27

## 2021-03-26 RX ADMIN — Medication 200 MILLIGRAM(S): at 21:48

## 2021-03-26 RX ADMIN — Medication 50 MILLIGRAM(S): at 11:43

## 2021-03-26 RX ADMIN — Medication 6 UNIT(S): at 11:36

## 2021-03-26 RX ADMIN — Medication 81 MILLIGRAM(S): at 11:43

## 2021-03-26 RX ADMIN — Medication 6 UNIT(S): at 16:44

## 2021-03-26 RX ADMIN — Medication 1: at 07:56

## 2021-03-26 RX ADMIN — CHLORHEXIDINE GLUCONATE 1 APPLICATION(S): 213 SOLUTION TOPICAL at 05:56

## 2021-03-26 RX ADMIN — LOSARTAN POTASSIUM 100 MILLIGRAM(S): 100 TABLET, FILM COATED ORAL at 05:57

## 2021-03-26 RX ADMIN — Medication 50 MILLIGRAM(S): at 17:35

## 2021-03-26 RX ADMIN — OXYCODONE AND ACETAMINOPHEN 1 TABLET(S): 5; 325 TABLET ORAL at 08:42

## 2021-03-26 RX ADMIN — Medication 50 MILLIGRAM(S): at 05:57

## 2021-03-26 RX ADMIN — OXYCODONE AND ACETAMINOPHEN 1 TABLET(S): 5; 325 TABLET ORAL at 09:00

## 2021-03-26 RX ADMIN — Medication 1: at 16:44

## 2021-03-26 RX ADMIN — OXYCODONE HYDROCHLORIDE 5 MILLIGRAM(S): 5 TABLET ORAL at 17:35

## 2021-03-26 RX ADMIN — Medication 200 MILLIGRAM(S): at 05:56

## 2021-03-26 RX ADMIN — TAMSULOSIN HYDROCHLORIDE 0.4 MILLIGRAM(S): 0.4 CAPSULE ORAL at 16:07

## 2021-03-26 RX ADMIN — MAGNESIUM OXIDE 400 MG ORAL TABLET 400 MILLIGRAM(S): 241.3 TABLET ORAL at 11:43

## 2021-03-26 RX ADMIN — INSULIN GLARGINE 18 UNIT(S): 100 INJECTION, SOLUTION SUBCUTANEOUS at 21:48

## 2021-03-26 RX ADMIN — SIMVASTATIN 40 MILLIGRAM(S): 20 TABLET, FILM COATED ORAL at 21:48

## 2021-03-26 RX ADMIN — SODIUM CHLORIDE 100 MILLILITER(S): 9 INJECTION, SOLUTION INTRAVENOUS at 05:56

## 2021-03-26 RX ADMIN — MAGNESIUM OXIDE 400 MG ORAL TABLET 400 MILLIGRAM(S): 241.3 TABLET ORAL at 16:46

## 2021-03-26 RX ADMIN — MAGNESIUM OXIDE 400 MG ORAL TABLET 400 MILLIGRAM(S): 241.3 TABLET ORAL at 07:57

## 2021-03-26 RX ADMIN — Medication 6 UNIT(S): at 07:56

## 2021-03-26 RX ADMIN — RIVAROXABAN 20 MILLIGRAM(S): KIT at 16:46

## 2021-03-26 RX ADMIN — OXYCODONE HYDROCHLORIDE 5 MILLIGRAM(S): 5 TABLET ORAL at 18:38

## 2021-03-26 NOTE — PROGRESS NOTE ADULT - SUBJECTIVE AND OBJECTIVE BOX
Post op Check    Pt seen and examined without complaints. Pain is controlled. Denies SOB/CP/N/V.     Vital Signs Last 24 Hrs  T(C): 36.2 (26 Mar 2021 04:03), Max: 37.2 (25 Mar 2021 14:22)  T(F): 97.2 (26 Mar 2021 04:03), Max: 98.9 (25 Mar 2021 14:22)  HR: 81 (26 Mar 2021 04:03) (65 - 95)  BP: 145/63 (26 Mar 2021 04:03) (115/58 - 200/96)  BP(mean): 138 (25 Mar 2021 06:52) (138 - 138)  RR: 20 (25 Mar 2021 23:59) (15 - 22)  SpO2: 95% (25 Mar 2021 23:15) (95% - 99%)    I&O's Summary    24 Mar 2021 07:01  -  25 Mar 2021 07:00  --------------------------------------------------------  IN: 0 mL / OUT: 450 mL / NET: -450 mL    25 Mar 2021 07:01  -  26 Mar 2021 06:15  --------------------------------------------------------  IN: 900 mL / OUT: 1600 mL / NET: -700 mL        Physical Exam  Gen: NAD  Pulm: No respiratory distress, no subcostal retractions  CV: RRR, no JVD  Abd: Soft, NT, ND  Back: No CVA tenderness  : No suprapubic fullness or tenderness                          15.5   12.26 )-----------( 176      ( 25 Mar 2021 05:42 )             48.8       03-25    137  |  96<L>  |  12  ----------------------------<  202<H>  4.5   |  26  |  0.9    Ca    8.5      25 Mar 2021 05:42  Mg     1.1     03-25    TPro  6.7  /  Alb  4.1  /  TBili  1.3<H>  /  DBili  x   /  AST  13  /  ALT  9   /  AlkPhos  67  03-25      A/P: 70y Male s/p Cystoscopy, Ureteroscopy, Laser lithotripsy and placement Left JJ stent  DVT prophylaxis/OOB  Incentive spirometry  Strict I&O's  Analgesia and antiemetics as needed  Diet  AM labs

## 2021-03-26 NOTE — PROGRESS NOTE ADULT - SUBJECTIVE AND OBJECTIVE BOX
24H events:    Patient is a 70y old Male who presents with a chief complaint of left flank pain, hematuria (26 Mar 2021 09:33)    Primary diagnosis of Renal colic on left side       Today is hospital day 2d.     PAST MEDICAL & SURGICAL HISTORY  Carotid atherosclerosis    Acute massive pulmonary embolism  s/p tPA in 2018    DVT (deep venous thrombosis)    MI (myocardial infarction)    Kidney stones    High cholesterol    Type 2 diabetes mellitus with complication, unspecified long term insulin use status    Hypertension, unspecified type    H/O cystoscopy    H/O heart surgery  quadruple bypass 6 years ago      SOCIAL HISTORY:  Negative for smoking/alcohol/drug use.     ALLERGIES:  No Known Allergies    MEDICATIONS:  STANDING MEDICATIONS  aspirin enteric coated 81 milliGRAM(s) Oral daily  chlorhexidine 4% Liquid 1 Application(s) Topical <User Schedule>  dextrose 40% Gel 15 Gram(s) Oral once  dextrose 5%. 1000 milliLiter(s) IV Continuous <Continuous>  dextrose 50% Injectable 25 Gram(s) IV Push once  glucagon  Injectable 1 milliGRAM(s) IntraMuscular once  hydrALAZINE 50 milliGRAM(s) Oral every 6 hours  influenza   Vaccine 0.5 milliLiter(s) IntraMuscular once  insulin glargine Injectable (LANTUS) 18 Unit(s) SubCutaneous at bedtime  insulin lispro (ADMELOG) corrective regimen sliding scale   SubCutaneous three times a day before meals  insulin lispro Injectable (ADMELOG) 6 Unit(s) SubCutaneous three times a day before meals  labetalol 200 milliGRAM(s) Oral every 8 hours  losartan 100 milliGRAM(s) Oral daily  magnesium oxide 400 milliGRAM(s) Oral three times a day with meals  rivaroxaban 20 milliGRAM(s) Oral with dinner  simvastatin 40 milliGRAM(s) Oral at bedtime  tamsulosin 0.4 milliGRAM(s) Oral daily    PRN MEDICATIONS  morphine  - Injectable 4 milliGRAM(s) IV Push every 4 hours PRN  ondansetron Injectable 4 milliGRAM(s) IV Push every 6 hours PRN    VITALS:   T(F): 97.2  HR: 81  BP: 145/63  RR: 20  SpO2: 95%    LABS:                        15.5   12.26 )-----------( 176      ( 25 Mar 2021 05:42 )             48.8     03-25    137  |  96<L>  |  12  ----------------------------<  202<H>  4.5   |  26  |  0.9    Ca    8.5      25 Mar 2021 05:42  Mg     1.1     03-25    TPro  6.7  /  Alb  4.1  /  TBili  1.3<H>  /  DBili  x   /  AST  13  /  ALT  9   /  AlkPhos  67  03-25            PHYSICAL EXAM:  GENERAL: NAD, well-developed, AAOx3  HEENT:  Atraumatic, Normocephalic. EOMI, PERRLA, conjunctiva and sclera clear, No JVD  PULMONARY: Clear to auscultation bilaterally; No wheeze  CARDIOVASCULAR: Regular rate and rhythm; No murmurs, rubs, or gallops  GASTROINTESTINAL: Soft, Nontender, Nondistended; Bowel sounds present  NEUROLOGY: non-focal  SKIN: No rashes or lesions

## 2021-03-26 NOTE — PROGRESS NOTE ADULT - PROBLEM SELECTOR PLAN 1
Patient will f/up for stent removal in the office.  Kidney stone prevention protocol discussed.  All questions were answered.
Case discussed with Anesthesiology  Appreciate Medical attending clearance note.  Case discussed with the patient also.  Risks were explained  He understands to f/up for stent removal post op

## 2021-03-26 NOTE — PROGRESS NOTE ADULT - ASSESSMENT
70 Y M with pmh of CAD s/p CABG/old MI, DM2, HTN, HLD, DVT/PE on Xarelto, recurrent nephrolithiasis presents to ED with left flank pain and hematuria since this morning. Found to have left obstructing nephroureteral calculus.    #Left mid obstructing ureteral stone  #Bilateral nephrolithiasis  #Hematuria- improved  - Patient underwent Cystoscopy with urology with placement of stent that is going to be removed by urology in the outpatient setting   - As per Urology can restart Xarelto, given the patient is at risk for clotting from previous hx   - pain control prn    #Hypertensive urgency  - on lopressor and losartan at home  - c/w losartan 100mg daily  - dced lopressor , started labetolol 200mg q8h  - needs Renal artery doppler , was scheduled OP  - BP is better controlled. Patient is on Hydralazine, labetalol and Losartan. Can add a diuretic if needed     #Hypomagnesemia  - M.1>>ordered IV Mg x2 doses and started PO  -Follow up repeat CMP with Mg     #DM2  -basal/bolus while inpatient  - fu a1c  -avoid hypoglycemia    #HLD  -continue statin    #CAD s/p CABG  - continue home meds    #DVT/PE on Xarelto  -Restart xarelto     #DVT PPx- Restart Xarelto   #GI PPx- None  #Diet- DASH   #Activity- AAT  #Dispo- Home  #Code- FULL

## 2021-03-26 NOTE — PROGRESS NOTE ADULT - SUBJECTIVE AND OBJECTIVE BOX
MERARY GARCIA  70y  Male  HPI:  70 Y M with pmh of CAD s/p CABG/old MI, DM2, HTN, HLD, DVT/PE on Xarelto, recurrent nephrolithiasis presents to ED with left flank pain and hematuria since this morning. Patient states that around 4AM he woke up with severe 10/10 left flank pain with radiation to groin, and "massive amounts" of hematuria. He says it feels like kidney stones that he has had before. He did not have any nausea, vomiting, fevers, chills, chest pain, shortness of breath. He last took his Xarelto yesterday morning at 8am. Currently, flank pain is 5/10 after receiving morphine.     ED Course:  T 97.8F, P 79, /97, R 18, S 97% on RA at rest. Received 5mg labetalol ivp, /90. Now it is 223/105. Ordered 10mg hydralazine ivp. CT abd/pelvis showing mild left hydronephroureter with a 0.7 x 0.6 x 1.3 cm obstructing nephroureteral calculus. Urology consulted.      (24 Mar 2021 16:38)    MEDICATIONS  (STANDING):  aspirin enteric coated 81 milliGRAM(s) Oral daily  chlorhexidine 4% Liquid 1 Application(s) Topical <User Schedule>  dextrose 40% Gel 15 Gram(s) Oral once  dextrose 5%. 1000 milliLiter(s) (50 mL/Hr) IV Continuous <Continuous>  dextrose 50% Injectable 25 Gram(s) IV Push once  glucagon  Injectable 1 milliGRAM(s) IntraMuscular once  hydrALAZINE 50 milliGRAM(s) Oral every 6 hours  influenza   Vaccine 0.5 milliLiter(s) IntraMuscular once  insulin glargine Injectable (LANTUS) 18 Unit(s) SubCutaneous at bedtime  insulin lispro (ADMELOG) corrective regimen sliding scale   SubCutaneous three times a day before meals  insulin lispro Injectable (ADMELOG) 6 Unit(s) SubCutaneous three times a day before meals  labetalol 200 milliGRAM(s) Oral every 8 hours  losartan 100 milliGRAM(s) Oral daily  magnesium oxide 400 milliGRAM(s) Oral three times a day with meals  simvastatin 40 milliGRAM(s) Oral at bedtime  tamsulosin 0.4 milliGRAM(s) Oral daily    MEDICATIONS  (PRN):  morphine  - Injectable 4 milliGRAM(s) IV Push every 4 hours PRN Severe Pain (7 - 10)  ondansetron Injectable 4 milliGRAM(s) IV Push every 6 hours PRN Nausea and/or Vomiting    INTERVAL EVENTS: Patient seen today without distress, uncomfortable    T(C): 36.2 (21 @ 04:03), Max: 37.2 (21 @ 14:22)  HR: 81 (21 @ 04:03) (65 - 81)  BP: 145/63 (21 @ 04:03) (115/58 - 190/72)  RR: 20 (21 @ 23:59) (15 - 22)  SpO2: 95% (21 @ 23:15) (95% - 99%)  Wt(kg): --Vital Signs Last 24 Hrs  T(C): 36.2 (26 Mar 2021 04:03), Max: 37.2 (25 Mar 2021 14:22)  T(F): 97.2 (26 Mar 2021 04:03), Max: 98.9 (25 Mar 2021 14:22)  HR: 81 (26 Mar 2021 04:03) (65 - 81)  BP: 145/63 (26 Mar 2021 04:03) (115/58 - 190/72)  BP(mean): --  RR: 20 (25 Mar 2021 23:59) (15 - 22)  SpO2: 95% (25 Mar 2021 23:15) (95% - 99%)    PHYSICAL EXAM:  GENERAL: NAD  NECK: Supple, No JVD  CHEST/LUNG: Clear  HEART: S1, S2, Regular rate and rhythm  ABDOMEN: Soft, Mildly tender, Nondistended; Bowel sounds present  EXTREMITIES: No edema  SKIN: No rashes or lesions    LABS:                        15.5   12. )-----------( 176      ( 25 Mar 2021 05:42 )             48.8                 137  |  96<L>  |  12  ----------------------------<  202<H>  4.5   |  26  |  0.9    Ca    8.5      25 Mar 2021 05:42  Mg     1.1         TPro  6.7  /  Alb  4.1  /  TBili  1.3<H>  /  DBili  x   /  AST  13  /  ALT  9   /  AlkPhos  67      LIVER FUNCTIONS - ( 25 Mar 2021 05:42 )  Alb: 4.1 g/dL / Pro: 6.7 g/dL / ALK PHOS: 67 U/L / ALT: 9 U/L / AST: 13 U/L / GGT: x                   PT/INR - ( 24 Mar 2021 10:15 )   PT: 25.30 sec;   INR: 2.20 ratio      PTT - ( 24 Mar 2021 10:15 )  PTT:36.0 sec        Urinalysis Basic - ( 24 Mar 2021 10:30 )    Color: Dark Brown / Appearance: Turbid / S.018 / pH: x  Gluc: x / Ketone: Trace  / Bili: Negative / Urobili: <2 mg/dL   Blood: x / Protein: 100 mg/dL / Nitrite: Negative   Leuk Esterase: Negative / RBC: >720 /HPF / WBC 20 /HPF   Sq Epi: x / Non Sq Epi: 3 /HPF / Bacteria: Negative        RADIOLOGY & ADDITIONAL TESTS:

## 2021-03-26 NOTE — PROGRESS NOTE ADULT - SUBJECTIVE AND OBJECTIVE BOX
Progress Note    Patient is a 71yo Male s/p cystoscopy, ureteroscopy with laser lithotripsy and left ureteral stent placement, POD #1. Pt seen and examined at bedside this morning. Pt lying in bed comfortably, offering no complaints at this time. Pt admits to mild stent colic. Pt denies fever, chills, N/V, abdominal pain, flank pain, or suprapubic tenderness.     [ x ] a 10 point review of systems was negative except where noted          Vital signs  T(C): , Max: 37.2 (03-25-21 @ 14:22)  HR: 81 (03-26-21 @ 04:03)  BP: 145/63 (03-26-21 @ 04:03)  SpO2: 95% (03-25-21 @ 23:15)    Physical Exam  Gen: NAD, alert & awake, well developed  HEENT: NC/AT  Respiratory: no accessory muscle use   Back: no CVA tenderness   Abd: soft, nontender, nondistended   : circumcised male genitalia, + ureteral stent string attached with steri strips  b/l testis descended, symmetrical, no edema or TTP   Ext: no edema   Skin: non diaphoretic     Labs                        15.5   12.26 )-----------( 176      ( 25 Mar 2021 05:42 )             48.8     25 Mar 2021 05:42    137    |  96     |  12     ----------------------------<  202    4.5     |  26     |  0.9      Ca    8.5        25 Mar 2021 05:42  Mg     1.1       25 Mar 2021 05:42      I&O's Detail    25 Mar 2021 07:01  -  26 Mar 2021 07:00  --------------------------------------------------------  IN:    Lactated Ringers: 900 mL  Total IN: 900 mL    OUT:    Voided (mL): 1800 mL  Total OUT: 1800 mL    Total NET: -900 mL                  Progress Note    Patient is a 69yo Male s/p cystoscopy, ureteroscopy with laser lithotripsy and left ureteral stent placement, POD #1. Pt seen and examined at bedside this morning. Pt lying in bed comfortably, offering no complaints at this time. Pt admits to mild stent colic. Pt denies fever, chills, N/V, abdominal pain, flank pain, or suprapubic tenderness.     [ x ] a 10 point review of systems was negative except where noted          Vital signs  T(C): , Max: 37.2 (03-25-21 @ 14:22)  HR: 81 (03-26-21 @ 04:03)  BP: 145/63 (03-26-21 @ 04:03)  SpO2: 95% (03-25-21 @ 23:15)    Physical Exam  Gen: NAD, alert & awake, well developed  HEENT: NC/AT  Respiratory: no accessory muscle use   Back: no CVA tenderness   Abd: soft, nontender, nondistended   : circumcised male genitalia, + ureteral stent string attached with steri strips, no CVA tenderness  b/l testis descended, symmetrical, no edema or TTP   Ext: no edema   Skin: non diaphoretic     Labs                        15.5   12.26 )-----------( 176      ( 25 Mar 2021 05:42 )             48.8     25 Mar 2021 05:42    137    |  96     |  12     ----------------------------<  202    4.5     |  26     |  0.9      Ca    8.5        25 Mar 2021 05:42  Mg     1.1       25 Mar 2021 05:42      I&O's Detail    25 Mar 2021 07:01  -  26 Mar 2021 07:00  --------------------------------------------------------  IN:    Lactated Ringers: 900 mL  Total IN: 900 mL    OUT:    Voided (mL): 1800 mL  Total OUT: 1800 mL    Total NET: -900 mL

## 2021-03-26 NOTE — PROGRESS NOTE ADULT - ASSESSMENT
69yo Male s/p cystoscopy, ureteroscopy with laser lithotripsy and left ureteral stent placement, POD #1  - stable       Plan:  ·	Cr stable 0.9  ·	encourage PO hydration   ·	OOB to chair, ambulate as tolerated   ·	pt is to follow-up as outpatient with Dr. Siddiqi for stent removal   ·	will d/w attending  71yo Male s/p cystoscopy, ureteroscopy with laser lithotripsy and left ureteral stent placement, POD #1  - stable       Plan:  ·	Cr stable 0.9  ·	encourage PO hydration   ·	OOB to chair, ambulate as tolerated   ·	pt is to follow-up as outpatient with Dr. Siddiqi for stent removal   ·	no further acute  surgical intervention at this time  ·	will d/w attending

## 2021-03-27 ENCOUNTER — TRANSCRIPTION ENCOUNTER (OUTPATIENT)
Age: 71
End: 2021-03-27

## 2021-03-27 VITALS
SYSTOLIC BLOOD PRESSURE: 105 MMHG | TEMPERATURE: 96 F | RESPIRATION RATE: 18 BRPM | DIASTOLIC BLOOD PRESSURE: 54 MMHG | HEART RATE: 75 BPM

## 2021-03-27 LAB
ALBUMIN SERPL ELPH-MCNC: 3.3 G/DL — LOW (ref 3.5–5.2)
ALP SERPL-CCNC: 49 U/L — SIGNIFICANT CHANGE UP (ref 30–115)
ALT FLD-CCNC: 5 U/L — SIGNIFICANT CHANGE UP (ref 0–41)
ANION GAP SERPL CALC-SCNC: 10 MMOL/L — SIGNIFICANT CHANGE UP (ref 7–14)
AST SERPL-CCNC: 10 U/L — SIGNIFICANT CHANGE UP (ref 0–41)
BILIRUB SERPL-MCNC: 0.8 MG/DL — SIGNIFICANT CHANGE UP (ref 0.2–1.2)
BUN SERPL-MCNC: 19 MG/DL — SIGNIFICANT CHANGE UP (ref 10–20)
CALCIUM SERPL-MCNC: 7.7 MG/DL — LOW (ref 8.5–10.1)
CHLORIDE SERPL-SCNC: 101 MMOL/L — SIGNIFICANT CHANGE UP (ref 98–110)
CO2 SERPL-SCNC: 30 MMOL/L — SIGNIFICANT CHANGE UP (ref 17–32)
CREAT SERPL-MCNC: 0.9 MG/DL — SIGNIFICANT CHANGE UP (ref 0.7–1.5)
GLUCOSE BLDC GLUCOMTR-MCNC: 176 MG/DL — HIGH (ref 70–99)
GLUCOSE BLDC GLUCOMTR-MCNC: 262 MG/DL — HIGH (ref 70–99)
GLUCOSE SERPL-MCNC: 194 MG/DL — HIGH (ref 70–99)
HCT VFR BLD CALC: 40.2 % — LOW (ref 42–52)
HGB BLD-MCNC: 12.6 G/DL — LOW (ref 14–18)
MAGNESIUM SERPL-MCNC: 2.1 MG/DL — SIGNIFICANT CHANGE UP (ref 1.8–2.4)
MCHC RBC-ENTMCNC: 29.6 PG — SIGNIFICANT CHANGE UP (ref 27–31)
MCHC RBC-ENTMCNC: 31.3 G/DL — LOW (ref 32–37)
MCV RBC AUTO: 94.4 FL — HIGH (ref 80–94)
NRBC # BLD: 0 /100 WBCS — SIGNIFICANT CHANGE UP (ref 0–0)
PLATELET # BLD AUTO: 132 K/UL — SIGNIFICANT CHANGE UP (ref 130–400)
POTASSIUM SERPL-MCNC: 4.1 MMOL/L — SIGNIFICANT CHANGE UP (ref 3.5–5)
POTASSIUM SERPL-SCNC: 4.1 MMOL/L — SIGNIFICANT CHANGE UP (ref 3.5–5)
PROT SERPL-MCNC: 5.4 G/DL — LOW (ref 6–8)
RBC # BLD: 4.26 M/UL — LOW (ref 4.7–6.1)
RBC # FLD: 13.3 % — SIGNIFICANT CHANGE UP (ref 11.5–14.5)
SODIUM SERPL-SCNC: 141 MMOL/L — SIGNIFICANT CHANGE UP (ref 135–146)
WBC # BLD: 6.36 K/UL — SIGNIFICANT CHANGE UP (ref 4.8–10.8)
WBC # FLD AUTO: 6.36 K/UL — SIGNIFICANT CHANGE UP (ref 4.8–10.8)

## 2021-03-27 RX ORDER — LABETALOL HCL 100 MG
1 TABLET ORAL
Qty: 60 | Refills: 0
Start: 2021-03-27 | End: 2021-04-25

## 2021-03-27 RX ORDER — TAMSULOSIN HYDROCHLORIDE 0.4 MG/1
1 CAPSULE ORAL
Qty: 0 | Refills: 0 | DISCHARGE
Start: 2021-03-27

## 2021-03-27 RX ORDER — TAMSULOSIN HYDROCHLORIDE 0.4 MG/1
1 CAPSULE ORAL
Qty: 30 | Refills: 0
Start: 2021-03-27 | End: 2021-04-25

## 2021-03-27 RX ORDER — MAGNESIUM OXIDE 400 MG ORAL TABLET 241.3 MG
1 TABLET ORAL
Qty: 21 | Refills: 0
Start: 2021-03-27 | End: 2021-04-02

## 2021-03-27 RX ADMIN — MAGNESIUM OXIDE 400 MG ORAL TABLET 400 MILLIGRAM(S): 241.3 TABLET ORAL at 07:57

## 2021-03-27 RX ADMIN — Medication 3: at 12:06

## 2021-03-27 RX ADMIN — Medication 6 UNIT(S): at 07:57

## 2021-03-27 RX ADMIN — Medication 6 UNIT(S): at 12:06

## 2021-03-27 RX ADMIN — CHLORHEXIDINE GLUCONATE 1 APPLICATION(S): 213 SOLUTION TOPICAL at 05:41

## 2021-03-27 RX ADMIN — Medication 200 MILLIGRAM(S): at 05:41

## 2021-03-27 RX ADMIN — Medication 50 MILLIGRAM(S): at 05:42

## 2021-03-27 RX ADMIN — LOSARTAN POTASSIUM 100 MILLIGRAM(S): 100 TABLET, FILM COATED ORAL at 05:41

## 2021-03-27 RX ADMIN — MAGNESIUM OXIDE 400 MG ORAL TABLET 400 MILLIGRAM(S): 241.3 TABLET ORAL at 12:05

## 2021-03-27 RX ADMIN — Medication 81 MILLIGRAM(S): at 12:05

## 2021-03-27 RX ADMIN — Medication 50 MILLIGRAM(S): at 00:00

## 2021-03-27 RX ADMIN — Medication 1: at 07:56

## 2021-03-27 NOTE — DISCHARGE NOTE NURSING/CASE MANAGEMENT/SOCIAL WORK - PATIENT PORTAL LINK FT
You can access the FollowMyHealth Patient Portal offered by [facility] by registering at the following website: [email or web address]. By joining [email or web address]’s FollowMyHealth portal, you will also be able to view your health information using other applications (apps) compatible with our system.

## 2021-03-27 NOTE — DISCHARGE NOTE PROVIDER - CARE PROVIDER_API CALL
[place] [address]  Phone: [phone]  Fax: [phone]  Follow Up Time: 1 week    Donal Siddiqi)  Urology  [address] [place] [address]  Phone: [phone]  Fax: [phone]  Follow Up Time: 1 week

## 2021-03-27 NOTE — DISCHARGE NOTE PROVIDER - PROVIDER TOKENS
PROVIDER:[TOKEN:[[id number]],FOLLOWUP:[1 week]],PROVIDER:[TOKEN:[[id number]],FOLLOWUP:[1 week]]

## 2021-03-27 NOTE — PROGRESS NOTE ADULT - SUBJECTIVE AND OBJECTIVE BOX
Chart reviewed, patient examined. Pertinent results reviewed.  Case discussed with HO; specialist f/u reviewed  HD#3; POD#2    Patient is a 70y old Male who presented with a chief complaint of left flank pain, hematuria (26 Mar 2021 09:33) diagnosed with Renal colic on left side;  On 3/25 ha had Cysto w laser lithotripsy by JOSE CARLOS. he did well.       Today is hospital day 2d.     PAST MEDICAL & SURGICAL HISTORY  Carotid atherosclerosis    Acute massive pulmonary embolism  s/p tPA in 2018    DVT (deep venous thrombosis)    MI (myocardial infarction)    Kidney stones    High cholesterol    Type 2 diabetes mellitus with complication, unspecified long term insulin use status    Hypertension, unspecified type    H/O cystoscopy    H/O heart surgery  quadruple bypass 6 years ago      SOCIAL HISTORY:  Negative for smoking/alcohol/drug use.     ALLERGIES:  No Known Allergies    MEDICATIONS:  STANDING MEDICATIONS  aspirin enteric coated 81 milliGRAM(s) Oral daily  chlorhexidine 4% Liquid 1 Application(s) Topical <User Schedule>  dextrose 40% Gel 15 Gram(s) Oral once  dextrose 5%. 1000 milliLiter(s) IV Continuous <Continuous>  dextrose 50% Injectable 25 Gram(s) IV Push once  glucagon  Injectable 1 milliGRAM(s) IntraMuscular once  hydrALAZINE 50 milliGRAM(s) Oral every 6 hours  influenza   Vaccine 0.5 milliLiter(s) IntraMuscular once  insulin glargine Injectable (LANTUS) 18 Unit(s) SubCutaneous at bedtime  insulin lispro (ADMELOG) corrective regimen sliding scale   SubCutaneous three times a day before meals  insulin lispro Injectable (ADMELOG) 6 Unit(s) SubCutaneous three times a day before meals  labetalol 200 milliGRAM(s) Oral every 8 hours  losartan 100 milliGRAM(s) Oral daily  magnesium oxide 400 milliGRAM(s) Oral three times a day with meals  rivaroxaban 20 milliGRAM(s) Oral with dinner  simvastatin 40 milliGRAM(s) Oral at bedtime  tamsulosin 0.4 milliGRAM(s) Oral daily    PRN MEDICATIONS  morphine  - Injectable 4 milliGRAM(s) IV Push every 4 hours PRN  ondansetron Injectable 4 milliGRAM(s) IV Push every 6 hours PRN    VITALS:   Vital Signs Last 24 Hrs  T(C): 36 (27 Mar 2021 05:23), Max: 36.6 (26 Mar 2021 13:57)  T(F): 96.8 (27 Mar 2021 05:23), Max: 97.9 (26 Mar 2021 13:57)  HR: 78 (27 Mar 2021 05:23) (78 - 87)  BP: 111/58 (27 Mar 2021 05:23) (85/52 - 146/67)  BP(mean): 80 (27 Mar 2021 05:23) (80 - 96)  RR: 18 (27 Mar 2021 05:23) (18 - 18)  SpO2: 91% (27 Mar 2021 05:23) (91% - 95%)    LABS:                           12.6   6.36  )-----------( 132      ( 27 Mar 2021 08:04 )             40.2                      15.5   12.26 )-----------( 176      ( 25 Mar 2021 05:42 )             48.8   03-27    141  |  101  |  19  ----------------------------<  194<H>  4.1   |  30  |  0.9    Ca    7.7<L>      27 Mar 2021 08:04  Mg     2.1     03-27    TPro  5.4<L>  /  Alb  3.3<L>  /  TBili  0.8  /  DBili  x   /  AST  10  /  ALT  5   /  AlkPhos  49  03-27 03-25    137  |  96<L>  |  12  ----------------------------<  202<H>  4.5   |  26  |  0.9    Ca    8.5      25 Mar 2021 05:42  Mg     1.1     03-25    TPro  6.7  /  Alb  4.1  /  TBili  1.3<H>  /  DBili  x   /  AST  13  /  ALT  9   /  AlkPhos  67  03-25            PHYSICAL EXAM:  GENERAL: NAD, well-developed, AAOx3; overweight  HEENT:  AT/NC. EOMI, PERRLA, conjunctiva and sclera clear, No JVD  PULMONARY: Clear bilaterally; No W/R/Rh  CARDIOVASCULAR: RRR; No murmurs, rubs, or gallops  GASTROINTESTINAL: Soft, Nontender, Nondistended; Bowel sounds present  NEUROLOGY: non-focal  SKIN: No rashes or lesions

## 2021-03-27 NOTE — DISCHARGE NOTE PROVIDER - NSDCMRMEDTOKEN_GEN_ALL_CORE_FT
Aspirin Enteric Coated 81 mg oral delayed release tablet: 1 tab(s) orally once a day  losartan 100 mg oral tablet: 1 tab(s) orally once a day  magnesium oxide 400 mg (241.3 mg elemental magnesium) oral tablet: 1 tab(s) orally 3 times a day (with meals)  metFORMIN 1000 mg oral tablet: 1 tab(s) orally 2 times a day  simvastatin 40 mg oral tablet: 1 tab(s) orally once a day (at bedtime)  tamsulosin 0.4 mg oral capsule: 1 cap(s) orally once a day  Trandate 200 mg oral tablet: 1 tab(s) orally 2 times a day. Do not take if blood pressure is &lt; 120/60  Xarelto 20 mg oral tablet: 1 tab(s) orally once a day (in the morning)

## 2021-03-27 NOTE — PROGRESS NOTE ADULT - PROVIDER SPECIALTY LIST ADULT
Internal Medicine
Urology
Internal Medicine
Urology
Internal Medicine
Urology

## 2021-03-27 NOTE — PROGRESS NOTE ADULT - ASSESSMENT
70 Y M with pmh of CAD s/p CABG/old MI, DM2, HTN, HLD, DVT/PE on Xarelto, recurrent nephrolithiasis presents to ED with left flank pain and hematuria since this morning. Found to have left obstructing nephroureteral calculus.    #Left mid obstructing ureteral stone  #Bilateral nephrolithiasis  #Hematuria- improved  - Patient underwent Cystoscopy with urology with placement of stent that is going to be removed by urology in the outpatient setting   - As per Urology can restart Xarelto, given the patient is at risk for clotting from previous hx   - pain control prn- min use of analgesia; pt not c/o pain now    #Hypertensive urgency  - on lopressor and losartan at home  - c/w losartan 100mg daily  - dced lopressor , started labetolol 200mg b3c--wg to q 12 h for DC  - needs Renal artery doppler , was scheduled OP-- done here- see NEG  - BP is better controlled. low at times yesterday; Patient was on Hydralazine, labetalol and Losartan.- to cont labet. & losartan now;  - F/U w PCP next wk    #Hypomagnesemia  - M.1>>ordered IV Mg x2 doses and started PO  - give Mg Oxide 400 mg BID for DC  -Follow up repeat CMP with Mg     #DM2  -basal/bolus while inpatient  - fu a1c  -avoid hypoglycemia    #HLD  -continue statin    #CAD s/p CABG  - continue home meds    #DVT/PE on Xarelto  -Restart xarelto     #DVT PPx- Restart Xarelto   #GI PPx- None  #Diet- DASH   #Activity- AAT  #Dispo- Home--today; F/U  and PCP  #Code- FULL

## 2021-03-27 NOTE — PROGRESS NOTE ADULT - REASON FOR ADMISSION
left flank pain, hematuria

## 2021-03-27 NOTE — DISCHARGE NOTE PROVIDER - HOSPITAL COURSE
70 Y M with pmh of CAD s/p CABG/old MI, DM2, HTN, HLD, DVT/PE on Xarelto, recurrent nephrolithiasis presents to ED with left flank pain and hematuria since this morning. Patient states that around 4AM he woke up with severe 10/10 left flank pain with radiation to groin, and "massive amounts" of hematuria. He says it feels like kidney stones that he has had before. He did not have any nausea, vomiting, fevers, chills, chest pain, shortness of breath. He last took his Xarelto yesterday morning at 8am. Currently, flank pain is 5/10 after receiving morphine.     ED Course:  T 97.8F, P 79, /97, R 18, S 97% on RA at rest. Received 5mg labetalol ivp, /90. Now it is 223/105. Ordered 10mg hydralazine ivp. CT abd/pelvis showing mild left hydronephroureter with a 0.7 x 0.6 x 1.3 cm obstructing nephroureteral calculus. Urology consulted.     --------------------------------------  70 Y M with pmh of CAD s/p CABG/old MI, DM2, HTN, HLD, DVT/PE on Xarelto, recurrent nephrolithiasis presents to ED with left flank pain and hematuria since this morning. Found to have left obstructing nephroureteral calculus.    #Left mid obstructing ureteral stone  #Bilateral nephrolithiasis  #Hematuria- improved  - Patient underwent Cystoscopy with urology with placement of stent that is going to be removed by urology in the outpatient setting   - As per Urology can restart Xarelto, given the patient is at risk for clotting from previous hx   - pain control prn- min use of analgesia; pt not c/o pain now    #Hypertensive urgency  - on lopressor and losartan at home  - c/w losartan 100mg daily  - dced lopressor , started labetolol 200mg q9a--tq to q 12 h for DC  - needs Renal artery doppler , was scheduled OP-- done here- see NEG  - BP is better controlled. low at times yesterday; Patient was on Hydralazine, labetalol and Losartan.- to cont labet. & losartan now;  - F/U w PCP next wk    #Hypomagnesemia  - M.1>>ordered IV Mg x2 doses and started PO  - give Mg Oxide 400 mg BID for DC  -Follow up repeat CMP with Mg     #DM2  -basal/bolus while inpatient  - fu a1c  -avoid hypoglycemia    #HLD  -continue statin    #CAD s/p CABG  - continue home meds    #DVT/PE on Xarelto  -Restart xarelto     #DVT PPx- Restart Xarelto   #GI PPx- None  #Diet- DASH   #Activity- AAT  #Dispo- Home--today; F/U  and PCP  #Code- FULL

## 2021-03-27 NOTE — DISCHARGE NOTE PROVIDER - NSDCCPCAREPLAN_GEN_ALL_CORE_FT
PRINCIPAL DISCHARGE DIAGNOSIS  Diagnosis: Renal colic on left side  Assessment and Plan of Treatment: You presented to the hospital for Left flank pain. you were found to have  L mid ureteral stone. Urology saw you and performed a cystoscopy, ureteroscopy with laser lithotripsy and left ureteral stent placement. Please follow up with Dr. GREGG for further mangaement      SECONDARY DISCHARGE DIAGNOSES  Diagnosis: Hypertension, unspecified type  Assessment and Plan of Treatment: You presented with elevated blood pressure. Please obtain a blood pressure monitor and take your blood pressure twice a day. Bring these reading with you for your next appointment with your primary care provider. Please continue to take labetolol & losartan as directed until then.

## 2021-03-29 LAB — SURGICAL PATHOLOGY STUDY: SIGNIFICANT CHANGE UP

## 2021-04-01 LAB — NIDUS STONE QN: SIGNIFICANT CHANGE UP

## 2021-04-02 DIAGNOSIS — I25.2 OLD MYOCARDIAL INFARCTION: ICD-10-CM

## 2021-04-02 DIAGNOSIS — Z86.711 PERSONAL HISTORY OF PULMONARY EMBOLISM: ICD-10-CM

## 2021-04-02 DIAGNOSIS — I16.1 HYPERTENSIVE EMERGENCY: ICD-10-CM

## 2021-04-02 DIAGNOSIS — I65.23 OCCLUSION AND STENOSIS OF BILATERAL CAROTID ARTERIES: ICD-10-CM

## 2021-04-02 DIAGNOSIS — E83.42 HYPOMAGNESEMIA: ICD-10-CM

## 2021-04-02 DIAGNOSIS — N13.2 HYDRONEPHROSIS WITH RENAL AND URETERAL CALCULOUS OBSTRUCTION: ICD-10-CM

## 2021-04-02 DIAGNOSIS — Z95.1 PRESENCE OF AORTOCORONARY BYPASS GRAFT: ICD-10-CM

## 2021-04-02 DIAGNOSIS — E11.9 TYPE 2 DIABETES MELLITUS WITHOUT COMPLICATIONS: ICD-10-CM

## 2021-04-02 DIAGNOSIS — Z79.84 LONG TERM (CURRENT) USE OF ORAL HYPOGLYCEMIC DRUGS: ICD-10-CM

## 2021-04-02 DIAGNOSIS — E78.00 PURE HYPERCHOLESTEROLEMIA, UNSPECIFIED: ICD-10-CM

## 2021-04-02 DIAGNOSIS — Z79.01 LONG TERM (CURRENT) USE OF ANTICOAGULANTS: ICD-10-CM

## 2021-04-02 DIAGNOSIS — I16.0 HYPERTENSIVE URGENCY: ICD-10-CM

## 2021-04-02 DIAGNOSIS — I25.10 ATHEROSCLEROTIC HEART DISEASE OF NATIVE CORONARY ARTERY WITHOUT ANGINA PECTORIS: ICD-10-CM

## 2021-04-02 DIAGNOSIS — Z86.718 PERSONAL HISTORY OF OTHER VENOUS THROMBOSIS AND EMBOLISM: ICD-10-CM

## 2021-04-02 DIAGNOSIS — Z87.891 PERSONAL HISTORY OF NICOTINE DEPENDENCE: ICD-10-CM

## 2021-04-02 DIAGNOSIS — I10 ESSENTIAL (PRIMARY) HYPERTENSION: ICD-10-CM

## 2021-04-02 DIAGNOSIS — Z87.442 PERSONAL HISTORY OF URINARY CALCULI: ICD-10-CM

## 2021-04-02 DIAGNOSIS — Z79.82 LONG TERM (CURRENT) USE OF ASPIRIN: ICD-10-CM

## 2021-04-02 DIAGNOSIS — R31.0 GROSS HEMATURIA: ICD-10-CM

## 2021-12-14 ENCOUNTER — TRANSCRIPTION ENCOUNTER (OUTPATIENT)
Age: 71
End: 2021-12-14

## 2022-03-04 NOTE — H&P ADULT - NSICDXFAMILYHX_GEN_ALL_CORE_FT
Abdomen, soft, nontender, nondistended, no guarding or rigidity, no masses palpable, normal bowel sounds, Liver and Spleen, no hepatomegaly present, no hepatosplenomegaly, liver nontender, spleen not palpable FAMILY HISTORY:  FH: myocardial infarction, mother at age 78

## 2022-03-23 NOTE — ED ADULT NURSE NOTE - NS ED NOTE ABUSE SUSPICION NEGLECT YN
No [Liquid Base] : liquid base [Tolerated Well] : the patient tolerated the procedure well [No Complications] : there were no complications

## 2022-05-29 NOTE — ED ADULT NURSE NOTE - ALCOHOL PRE SCREEN (AUDIT - C)
30 y/o female with a PMHx of migraines presents to the ED with exacerbation of headache as of today. Pt is 1 day status post BBL. However, no concern for post surgical complications or PE. No neuro-focal deficits appreciated. Will provide headache cocktail and will reassess. Statement Selected

## 2022-09-15 ENCOUNTER — EMERGENCY (EMERGENCY)
Facility: HOSPITAL | Age: 72
LOS: 0 days | Discharge: HOME | End: 2022-09-15
Attending: EMERGENCY MEDICINE | Admitting: EMERGENCY MEDICINE

## 2022-09-15 VITALS
DIASTOLIC BLOOD PRESSURE: 56 MMHG | RESPIRATION RATE: 18 BRPM | OXYGEN SATURATION: 98 % | HEART RATE: 81 BPM | HEIGHT: 73 IN | SYSTOLIC BLOOD PRESSURE: 94 MMHG | TEMPERATURE: 96 F | WEIGHT: 199.96 LBS

## 2022-09-15 VITALS
RESPIRATION RATE: 18 BRPM | TEMPERATURE: 96 F | DIASTOLIC BLOOD PRESSURE: 86 MMHG | SYSTOLIC BLOOD PRESSURE: 172 MMHG | OXYGEN SATURATION: 98 % | HEART RATE: 75 BPM

## 2022-09-15 DIAGNOSIS — G93.89 OTHER SPECIFIED DISORDERS OF BRAIN: ICD-10-CM

## 2022-09-15 DIAGNOSIS — E78.00 PURE HYPERCHOLESTEROLEMIA, UNSPECIFIED: ICD-10-CM

## 2022-09-15 DIAGNOSIS — I95.9 HYPOTENSION, UNSPECIFIED: ICD-10-CM

## 2022-09-15 DIAGNOSIS — I25.10 ATHEROSCLEROTIC HEART DISEASE OF NATIVE CORONARY ARTERY WITHOUT ANGINA PECTORIS: ICD-10-CM

## 2022-09-15 DIAGNOSIS — R09.81 NASAL CONGESTION: ICD-10-CM

## 2022-09-15 DIAGNOSIS — Z79.84 LONG TERM (CURRENT) USE OF ORAL HYPOGLYCEMIC DRUGS: ICD-10-CM

## 2022-09-15 DIAGNOSIS — Z79.82 LONG TERM (CURRENT) USE OF ASPIRIN: ICD-10-CM

## 2022-09-15 DIAGNOSIS — Z98.890 OTHER SPECIFIED POSTPROCEDURAL STATES: Chronic | ICD-10-CM

## 2022-09-15 DIAGNOSIS — Z86.718 PERSONAL HISTORY OF OTHER VENOUS THROMBOSIS AND EMBOLISM: ICD-10-CM

## 2022-09-15 DIAGNOSIS — E11.9 TYPE 2 DIABETES MELLITUS WITHOUT COMPLICATIONS: ICD-10-CM

## 2022-09-15 DIAGNOSIS — R42 DIZZINESS AND GIDDINESS: ICD-10-CM

## 2022-09-15 DIAGNOSIS — J02.9 ACUTE PHARYNGITIS, UNSPECIFIED: ICD-10-CM

## 2022-09-15 DIAGNOSIS — R05.1 ACUTE COUGH: ICD-10-CM

## 2022-09-15 DIAGNOSIS — I10 ESSENTIAL (PRIMARY) HYPERTENSION: ICD-10-CM

## 2022-09-15 DIAGNOSIS — I48.91 UNSPECIFIED ATRIAL FIBRILLATION: ICD-10-CM

## 2022-09-15 DIAGNOSIS — Z95.1 PRESENCE OF AORTOCORONARY BYPASS GRAFT: ICD-10-CM

## 2022-09-15 DIAGNOSIS — Z87.442 PERSONAL HISTORY OF URINARY CALCULI: ICD-10-CM

## 2022-09-15 DIAGNOSIS — Z79.01 LONG TERM (CURRENT) USE OF ANTICOAGULANTS: ICD-10-CM

## 2022-09-15 DIAGNOSIS — I25.2 OLD MYOCARDIAL INFARCTION: ICD-10-CM

## 2022-09-15 DIAGNOSIS — Z86.711 PERSONAL HISTORY OF PULMONARY EMBOLISM: ICD-10-CM

## 2022-09-15 PROBLEM — I65.29 OCCLUSION AND STENOSIS OF UNSPECIFIED CAROTID ARTERY: Chronic | Status: ACTIVE | Noted: 2021-03-25

## 2022-09-15 LAB
ALBUMIN SERPL ELPH-MCNC: 3.9 G/DL — SIGNIFICANT CHANGE UP (ref 3.5–5.2)
ALP SERPL-CCNC: 69 U/L — SIGNIFICANT CHANGE UP (ref 30–115)
ALT FLD-CCNC: 12 U/L — SIGNIFICANT CHANGE UP (ref 0–41)
ANION GAP SERPL CALC-SCNC: 14 MMOL/L — SIGNIFICANT CHANGE UP (ref 7–14)
AST SERPL-CCNC: 17 U/L — SIGNIFICANT CHANGE UP (ref 0–41)
BASOPHILS # BLD AUTO: 0.06 K/UL — SIGNIFICANT CHANGE UP (ref 0–0.2)
BASOPHILS NFR BLD AUTO: 0.6 % — SIGNIFICANT CHANGE UP (ref 0–1)
BILIRUB SERPL-MCNC: 1 MG/DL — SIGNIFICANT CHANGE UP (ref 0.2–1.2)
BUN SERPL-MCNC: 19 MG/DL — SIGNIFICANT CHANGE UP (ref 10–20)
CALCIUM SERPL-MCNC: 8.6 MG/DL — SIGNIFICANT CHANGE UP (ref 8.4–10.5)
CHLORIDE SERPL-SCNC: 102 MMOL/L — SIGNIFICANT CHANGE UP (ref 98–110)
CO2 SERPL-SCNC: 26 MMOL/L — SIGNIFICANT CHANGE UP (ref 17–32)
CREAT SERPL-MCNC: 0.8 MG/DL — SIGNIFICANT CHANGE UP (ref 0.7–1.5)
EGFR: 96 ML/MIN/1.73M2 — SIGNIFICANT CHANGE UP
EOSINOPHIL # BLD AUTO: 0.28 K/UL — SIGNIFICANT CHANGE UP (ref 0–0.7)
EOSINOPHIL NFR BLD AUTO: 2.7 % — SIGNIFICANT CHANGE UP (ref 0–8)
GLUCOSE SERPL-MCNC: 157 MG/DL — HIGH (ref 70–99)
HCT VFR BLD CALC: 37 % — LOW (ref 42–52)
HGB BLD-MCNC: 12 G/DL — LOW (ref 14–18)
IMM GRANULOCYTES NFR BLD AUTO: 1.8 % — HIGH (ref 0.1–0.3)
LYMPHOCYTES # BLD AUTO: 1.54 K/UL — SIGNIFICANT CHANGE UP (ref 1.2–3.4)
LYMPHOCYTES # BLD AUTO: 14.7 % — LOW (ref 20.5–51.1)
MAGNESIUM SERPL-MCNC: 1 MG/DL — LOW (ref 1.8–2.4)
MCHC RBC-ENTMCNC: 29.9 PG — SIGNIFICANT CHANGE UP (ref 27–31)
MCHC RBC-ENTMCNC: 32.4 G/DL — SIGNIFICANT CHANGE UP (ref 32–37)
MCV RBC AUTO: 92 FL — SIGNIFICANT CHANGE UP (ref 80–94)
MONOCYTES # BLD AUTO: 0.71 K/UL — HIGH (ref 0.1–0.6)
MONOCYTES NFR BLD AUTO: 6.8 % — SIGNIFICANT CHANGE UP (ref 1.7–9.3)
NEUTROPHILS # BLD AUTO: 7.71 K/UL — HIGH (ref 1.4–6.5)
NEUTROPHILS NFR BLD AUTO: 73.4 % — SIGNIFICANT CHANGE UP (ref 42.2–75.2)
NRBC # BLD: 0 /100 WBCS — SIGNIFICANT CHANGE UP (ref 0–0)
PLATELET # BLD AUTO: 198 K/UL — SIGNIFICANT CHANGE UP (ref 130–400)
POTASSIUM SERPL-MCNC: 4.4 MMOL/L — SIGNIFICANT CHANGE UP (ref 3.5–5)
POTASSIUM SERPL-SCNC: 4.4 MMOL/L — SIGNIFICANT CHANGE UP (ref 3.5–5)
PROT SERPL-MCNC: 6.5 G/DL — SIGNIFICANT CHANGE UP (ref 6–8)
RBC # BLD: 4.02 M/UL — LOW (ref 4.7–6.1)
RBC # FLD: 13.2 % — SIGNIFICANT CHANGE UP (ref 11.5–14.5)
SODIUM SERPL-SCNC: 142 MMOL/L — SIGNIFICANT CHANGE UP (ref 135–146)
WBC # BLD: 10.49 K/UL — SIGNIFICANT CHANGE UP (ref 4.8–10.8)
WBC # FLD AUTO: 10.49 K/UL — SIGNIFICANT CHANGE UP (ref 4.8–10.8)

## 2022-09-15 PROCEDURE — 71045 X-RAY EXAM CHEST 1 VIEW: CPT | Mod: 26

## 2022-09-15 PROCEDURE — 99285 EMERGENCY DEPT VISIT HI MDM: CPT

## 2022-09-15 PROCEDURE — 93010 ELECTROCARDIOGRAM REPORT: CPT

## 2022-09-15 PROCEDURE — 70450 CT HEAD/BRAIN W/O DYE: CPT | Mod: 26,MA

## 2022-09-15 RX ORDER — SODIUM CHLORIDE 9 MG/ML
1000 INJECTION, SOLUTION INTRAVENOUS ONCE
Refills: 0 | Status: COMPLETED | OUTPATIENT
Start: 2022-09-15 | End: 2022-09-15

## 2022-09-15 RX ORDER — MAGNESIUM SULFATE 500 MG/ML
2 VIAL (ML) INJECTION ONCE
Refills: 0 | Status: COMPLETED | OUTPATIENT
Start: 2022-09-15 | End: 2022-09-15

## 2022-09-15 RX ADMIN — SODIUM CHLORIDE 1000 MILLILITER(S): 9 INJECTION, SOLUTION INTRAVENOUS at 11:58

## 2022-09-15 RX ADMIN — Medication 25 GRAM(S): at 12:58

## 2022-09-15 NOTE — ED ADULT NURSE NOTE - OBJECTIVE STATEMENT
Pt a&ox3, in ED for hypotension x 1 week with dizziness, and recently was placed on amoxicillin for a sore throat, positive for covid-19 in June, persistent cough since then, pt states phlegm is clear & brown. No SOB, unlabored breathing, equal rise & fall, able to speak in full sentences, denies CP, no N/V/D, however mentioned soft stools secondary to antibiotic.

## 2022-09-15 NOTE — ED ADULT NURSE NOTE - NSICDXPASTMEDICALHX_GEN_ALL_CORE_FT
PAST MEDICAL HISTORY:  Acute massive pulmonary embolism s/p tPA in 2018    Carotid atherosclerosis     DVT (deep venous thrombosis)     High cholesterol     Hypertension, unspecified type     Kidney stones     MI (myocardial infarction)     Type 2 diabetes mellitus with complication, unspecified long term insulin use status

## 2022-09-15 NOTE — ED PROVIDER NOTE - NSFOLLOWUPINSTRUCTIONS_ED_ALL_ED_FT
FOLLOW UP WITH YOUR PRIMARY CARE DOCTOR.    Dizziness    Dizziness can manifest as a feeling of unsteadiness or light-headedness. You may feel like you are about to faint. This condition can be caused by a number of things, including medicines, dehydration, or illness. Drink enough fluid to keep your urine clear or pale yellow. Do not drink alcohol and limit your caffeine intake. Avoid quick or sudden movements.  Rise slowly from chairs and steady yourself until you feel okay. In the morning, first sit up on the side of the bed.    SEEK IMMEDIATE MEDICAL CARE IF YOU HAVE ANY OF THE FOLLOWING SYMPTOMS: vomiting, changes in your vision or speech, weakness in your arms or legs, trouble speaking or swallowing, chest pain, abdominal pain, shortness of breath, sweating, bleeding, headache, neck pain, or fever.

## 2022-09-15 NOTE — ED PROVIDER NOTE - CLINICAL SUMMARY MEDICAL DECISION MAKING FREE TEXT BOX
Labs okay, EKG unchanged from prior.  Chest x-ray no infiltrate.  Head CT with trace hygroma versus questionable chronic subdural, also punctate calcification versus tiny area of hemorrhage.  Doubt these findings are significant as patient denies headache or neuro complaints.  Neurosurgery was consulted, also agree unlikely acute finding.  Of note blood pressure rebounded to 190s systolic, still feeling well.  Will discharge with prompt PMD follow-up and advised to log blood pressures over the next several days.  Patient is comfortable with discharge.

## 2022-09-15 NOTE — ED ADULT TRIAGE NOTE - CHIEF COMPLAINT QUOTE
"my blood pressure is low"   pt reports he has HTN, noted it was low in urgent care (there for a cough)

## 2022-09-15 NOTE — CONSULT NOTE ADULT - SUBJECTIVE AND OBJECTIVE BOX
HPI: 71y M pmhx HTN on Xarelto for Afib pesents to ED for HoTN during workup CTH ordered for dizziness (now resolved) CTH read as   New punctate hyperdensity in the in the posterior left temporal lobe  since the prior CT head from 12/16/2018 could reflect hemorrhage.  Slightly more prominent extra-axial space along the bilateral convexities  with questionable thin rim of subdural fluid collection. The finding could are present. Trace subdural hygroma versus chronic subdural hematoma. No midline shift.    < end of copied text >    PAST MEDICAL & SURGICAL HISTORY:  Hypertension, unspecified type    Type 2 diabetes mellitus with complication, unspecified long term insulin use status    High cholesterol    Kidney stones    MI (myocardial infarction)    DVT (deep venous thrombosis)    Acute massive pulmonary embolism  s/p tPA in 2018    Carotid atherosclerosis    H/O heart surgery  quadruple bypass 6 years ago    H/O cystoscopy    HEALTH ISSUES - PROBLEM Dx:    FAMILY HISTORY:  FH: myocardial infarction  mother at age 78    Allergies    No Known Allergies    Intolerances    REVIEW OF SYSTEMS : As listed in HPI  Head and Neck:   Cardio :   Pum :  GI:  Neuro:     MEDICATIONS  (STANDING):    MEDICATIONS  (PRN):    Anticoagulation:    Vital Signs Last 24 Hrs  T(C): 35.6 (15 Sep 2022 14:10), Max: 35.8 (15 Sep 2022 09:20)  T(F): 96 (15 Sep 2022 14:10), Max: 96.5 (15 Sep 2022 09:20)  HR: 72 (15 Sep 2022 14:10) (72 - 81)  BP: 192/88 (15 Sep 2022 14:10) (94/56 - 192/88)  BP(mean): --  RR: 18 (15 Sep 2022 14:10) (18 - 18)  SpO2: 98% (15 Sep 2022 14:10) (98% - 98%)    Parameters below as of 15 Sep 2022 14:10  Patient On (Oxygen Delivery Method): room air    Physical Exam :  General :   A&O x 3   Atraumatic   Tongue midline  Facial features symmetric, No droop  Speech clear and appropriate, no slur   Pt speaking in full sentences   Follows all commands   Occular :   PERRLA, EOMI   Motor :   MAEx4   strength 5/5 to all extremities   Sensory :  Intact bilaterally     Pronator Drift : no drift     LABS:                        12.0   10.49 )-----------( 198      ( 15 Sep 2022 12:10 )             37.0     09-15    142  |  102  |  19  ----------------------------<  157<H>  4.4   |  26  |  0.8    Ca    8.6      15 Sep 2022 12:10  Mg     1.0     09-15    TPro  6.5  /  Alb  3.9  /  TBili  1.0  /  DBili  x   /  AST  17  /  ALT  12  /  AlkPhos  69  09-15    RADIOLOGY & ADDITIONAL STUDIES:  < from: CT Head No Cont (09.15.22 @ 11:37) >  IMPRESSION:    New punctate hyperdensity in the in the posterior left temporal lobe   since the prior CT head from 12/16/2018 could reflect hemorrhage.    Slightly more prominent extra-axial space along the bilateral convexities   with questionable thin rim of subdural fluid collection. The finding   could are present. Trace subdural hygroma versus chronic subdural   hematoma. No midline shift.    Communication: The summary of above findings were discussed with readback   confirmation with Dr. Rivas by radiologist Dr. Garrido on 9/15/2022 at 12:00 PM.    --- End of Report ---      QIANA GARRIDO MD; Attending Radiologist  This document has been electronically signed. Sep 15 2022 12:01PM    < end of copied text >    Assessment / Plan: Reviewed case and images with attending. PT denies HA, vision changes, neck  pain, N/V or trauma. neurologically intact.     - No neurosurgical intervention indicated at this time   - No need for repeat imaging   - May continue Xarelto   -STAT CTH if neurological exam changes from current   - D/w Dr. Mccann

## 2022-09-15 NOTE — ED PROVIDER NOTE - PROGRESS NOTE DETAILS
Rob: nsgy consulted for abnormal CTH reading.  Pt feeling improved, ambulated, s/p 1L IVF.   Will replete magnesium and reassess. Rob: pt was evaluated by NSGY - cleared for dc. Pt feels ready for dc  Full DC instructions discussed and patient knows when to seek immediate medical attention.  Patient has proper follow up.  All results discussed and patient aware they may require further work up.  Proper follow up ensured. Limitations of ED work up discussed.  Medications administered and prescribed/OTC home meds discussed.  All questions and concerns from patient or family addressed. Understanding of instructions verbalized.

## 2022-09-15 NOTE — ED PROVIDER NOTE - ATTENDING CONTRIBUTION TO CARE
71-year-old man, history of hypertension, hyperlipidemia, diabetes, CAD status post remote CABG, DVT on Xarelto presents with a couple of episodes of low blood pressure over the last week.  He has been having URI symptoms over the last 2 weeks with nasal congestion, sore throat, cough, saw PCP yesterday and was given amoxicillin.  Takes labetalol and losartan for and has been taking it despite low blood pressures.  No chest pain, shortness of breath, abdominal pain nausea, vomiting, dark stools.  No focal weakness.  On exam he is orthostatic, but nontoxic-appearing and comfortable.  Nasal congestion and mildly hoarse voice is noted, lungs clear, CV S1-S2, abdomen soft, nontender, no peripheral edema.  Overall patient looks well and comfortable.  Will hydrate, check labs and imaging, reassess.

## 2022-09-15 NOTE — ED PROVIDER NOTE - PHYSICAL EXAMINATION
VITAL SIGNS: I have reviewed nursing notes and confirm.  CONSTITUTIONAL: well-appearing male in stretcher, non-toxic, NAD  SKIN: Warm dry, normal skin turgor  HEAD: NCAT  EYES: EOMI, PERRL, no scleral icterus  ENT: Moist mucous membranes, normal pharynx with no erythema or exudates  NECK: Supple; non tender. Full ROM. No cervical LAD  CARD: RRR, no murmurs, rubs or gallops  RESP: clear to ausculation b/l.  No rales, rhonchi, or wheezing.  ABD: soft, non-tender, non-distended, no rebound or guarding. No CVA tenderness  EXT: Full ROM, no bony tenderness, no pedal edema, no calf tenderness  NEURO: Awake, alert, oriented. PERRL, EOMI, V1-V3 intact bl, no facial droop, +bl shoulder shrug, 5/5 strength/sensory bl UE/LEs, no pronator drift, normal finger to nose, normal gait.  PSYCH: Cooperative, appropriate.

## 2022-09-15 NOTE — ED PROVIDER NOTE - OBJECTIVE STATEMENT
71M PMHx HTN, HLD, DM, CAD s/p CABG, previous DVT on Xarelto presents for hypotension. Pt reports he took his BP at home today and found it to be low 90s/50s. Reports he took his antihypertensive medications this morning. Pt reports 2 weeks of dry cough, was started on amoxicillin recently by PCP. Denies syncope, chest pain/sob, abd pain, n/v/d, numbness, tingling, focal weakness.

## 2022-09-15 NOTE — ED PROVIDER NOTE - NS ED ROS FT
Constitutional: see HPI  Eyes/ENT: (-) runny nose  Cardiovascular: (-) chest pain  Respiratory: (-) cough  Gastrointestinal: (-) vomiting, (-) diarrhea  : (-) dysuria   Musculoskeletal: (-) joint pain  Integumentary: (-) rash  Neurological: (-) LOC  Allergic/Immunologic: (-) pruritus

## 2022-09-15 NOTE — ED PROVIDER NOTE - PATIENT PORTAL LINK FT
You can access the FollowMyHealth Patient Portal offered by [facility] by registering at the following website: [email or web address]. By joining [facility]’s FollowMyHealth portal, you will also be able to view your health information using other applications (apps) compatible with our system.

## 2022-09-15 NOTE — ED PROVIDER NOTE - CARE PROVIDER_API CALL
Tressa Mariscal  [address] [address]  Phone: [phone]  Fax: [phone]  Follow Up Time: 1-3 Days

## 2022-10-18 ENCOUNTER — INPATIENT (INPATIENT)
Facility: HOSPITAL | Age: 72
LOS: 11 days | Discharge: HOME | End: 2022-10-30
Attending: INTERNAL MEDICINE

## 2022-10-18 VITALS
TEMPERATURE: 96 F | SYSTOLIC BLOOD PRESSURE: 229 MMHG | HEART RATE: 68 BPM | HEIGHT: 73 IN | RESPIRATION RATE: 20 BRPM | DIASTOLIC BLOOD PRESSURE: 95 MMHG | OXYGEN SATURATION: 95 %

## 2022-10-18 DIAGNOSIS — Z98.890 OTHER SPECIFIED POSTPROCEDURAL STATES: Chronic | ICD-10-CM

## 2022-10-18 LAB
ALBUMIN SERPL ELPH-MCNC: 4.4 G/DL — SIGNIFICANT CHANGE UP (ref 3.5–5.2)
ALP SERPL-CCNC: 62 U/L — SIGNIFICANT CHANGE UP (ref 30–115)
ALT FLD-CCNC: 14 U/L — SIGNIFICANT CHANGE UP (ref 0–41)
ANION GAP SERPL CALC-SCNC: 12 MMOL/L — SIGNIFICANT CHANGE UP (ref 7–14)
AST SERPL-CCNC: 28 U/L — SIGNIFICANT CHANGE UP (ref 0–41)
BASOPHILS # BLD AUTO: 0.03 K/UL — SIGNIFICANT CHANGE UP (ref 0–0.2)
BASOPHILS NFR BLD AUTO: 0.3 % — SIGNIFICANT CHANGE UP (ref 0–1)
BILIRUB SERPL-MCNC: 1.8 MG/DL — HIGH (ref 0.2–1.2)
BUN SERPL-MCNC: 16 MG/DL — SIGNIFICANT CHANGE UP (ref 10–20)
CALCIUM SERPL-MCNC: 9.3 MG/DL — SIGNIFICANT CHANGE UP (ref 8.4–10.5)
CHLORIDE SERPL-SCNC: 98 MMOL/L — SIGNIFICANT CHANGE UP (ref 98–110)
CO2 SERPL-SCNC: 27 MMOL/L — SIGNIFICANT CHANGE UP (ref 17–32)
CREAT SERPL-MCNC: 0.7 MG/DL — SIGNIFICANT CHANGE UP (ref 0.7–1.5)
EGFR: 100 ML/MIN/1.73M2 — SIGNIFICANT CHANGE UP
EOSINOPHIL # BLD AUTO: 0.16 K/UL — SIGNIFICANT CHANGE UP (ref 0–0.7)
EOSINOPHIL NFR BLD AUTO: 1.6 % — SIGNIFICANT CHANGE UP (ref 0–8)
GLUCOSE SERPL-MCNC: 241 MG/DL — HIGH (ref 70–99)
HCT VFR BLD CALC: 43 % — SIGNIFICANT CHANGE UP (ref 42–52)
HGB BLD-MCNC: 13.9 G/DL — LOW (ref 14–18)
IMM GRANULOCYTES NFR BLD AUTO: 0.7 % — HIGH (ref 0.1–0.3)
LACTATE SERPL-SCNC: 1.6 MMOL/L — SIGNIFICANT CHANGE UP (ref 0.7–2)
LIDOCAIN IGE QN: 17 U/L — SIGNIFICANT CHANGE UP (ref 7–60)
LYMPHOCYTES # BLD AUTO: 0.97 K/UL — LOW (ref 1.2–3.4)
LYMPHOCYTES # BLD AUTO: 9.4 % — LOW (ref 20.5–51.1)
MCHC RBC-ENTMCNC: 30 PG — SIGNIFICANT CHANGE UP (ref 27–31)
MCHC RBC-ENTMCNC: 32.3 G/DL — SIGNIFICANT CHANGE UP (ref 32–37)
MCV RBC AUTO: 92.7 FL — SIGNIFICANT CHANGE UP (ref 80–94)
MONOCYTES # BLD AUTO: 0.52 K/UL — SIGNIFICANT CHANGE UP (ref 0.1–0.6)
MONOCYTES NFR BLD AUTO: 5 % — SIGNIFICANT CHANGE UP (ref 1.7–9.3)
NEUTROPHILS # BLD AUTO: 8.57 K/UL — HIGH (ref 1.4–6.5)
NEUTROPHILS NFR BLD AUTO: 83 % — HIGH (ref 42.2–75.2)
NRBC # BLD: 0 /100 WBCS — SIGNIFICANT CHANGE UP (ref 0–0)
PLATELET # BLD AUTO: 143 K/UL — SIGNIFICANT CHANGE UP (ref 130–400)
POTASSIUM SERPL-MCNC: 5.1 MMOL/L — HIGH (ref 3.5–5)
POTASSIUM SERPL-SCNC: 5.1 MMOL/L — HIGH (ref 3.5–5)
PROT SERPL-MCNC: 6.9 G/DL — SIGNIFICANT CHANGE UP (ref 6–8)
RBC # BLD: 4.64 M/UL — LOW (ref 4.7–6.1)
RBC # FLD: 13.6 % — SIGNIFICANT CHANGE UP (ref 11.5–14.5)
SODIUM SERPL-SCNC: 137 MMOL/L — SIGNIFICANT CHANGE UP (ref 135–146)
TROPONIN T SERPL-MCNC: <0.01 NG/ML — SIGNIFICANT CHANGE UP
WBC # BLD: 10.32 K/UL — SIGNIFICANT CHANGE UP (ref 4.8–10.8)
WBC # FLD AUTO: 10.32 K/UL — SIGNIFICANT CHANGE UP (ref 4.8–10.8)

## 2022-10-18 PROCEDURE — 93010 ELECTROCARDIOGRAM REPORT: CPT

## 2022-10-18 PROCEDURE — 99285 EMERGENCY DEPT VISIT HI MDM: CPT

## 2022-10-18 PROCEDURE — 71275 CT ANGIOGRAPHY CHEST: CPT | Mod: 26,MA

## 2022-10-18 PROCEDURE — 74174 CTA ABD&PLVS W/CONTRAST: CPT | Mod: 26,MA

## 2022-10-18 RX ORDER — MORPHINE SULFATE 50 MG/1
4 CAPSULE, EXTENDED RELEASE ORAL ONCE
Refills: 0 | Status: DISCONTINUED | OUTPATIENT
Start: 2022-10-18 | End: 2022-10-18

## 2022-10-18 RX ORDER — SODIUM CHLORIDE 9 MG/ML
1000 INJECTION INTRAMUSCULAR; INTRAVENOUS; SUBCUTANEOUS ONCE
Refills: 0 | Status: COMPLETED | OUTPATIENT
Start: 2022-10-18 | End: 2022-10-18

## 2022-10-18 RX ADMIN — MORPHINE SULFATE 4 MILLIGRAM(S): 50 CAPSULE, EXTENDED RELEASE ORAL at 23:12

## 2022-10-18 RX ADMIN — SODIUM CHLORIDE 1000 MILLILITER(S): 9 INJECTION INTRAMUSCULAR; INTRAVENOUS; SUBCUTANEOUS at 23:12

## 2022-10-19 LAB
ALBUMIN SERPL ELPH-MCNC: 4.5 G/DL — SIGNIFICANT CHANGE UP (ref 3.5–5.2)
ALP SERPL-CCNC: 72 U/L — SIGNIFICANT CHANGE UP (ref 30–115)
ALT FLD-CCNC: 12 U/L — SIGNIFICANT CHANGE UP (ref 0–41)
ANION GAP SERPL CALC-SCNC: 12 MMOL/L — SIGNIFICANT CHANGE UP (ref 7–14)
AST SERPL-CCNC: 17 U/L — SIGNIFICANT CHANGE UP (ref 0–41)
BILIRUB SERPL-MCNC: 2.6 MG/DL — HIGH (ref 0.2–1.2)
BUN SERPL-MCNC: 10 MG/DL — SIGNIFICANT CHANGE UP (ref 10–20)
CALCIUM SERPL-MCNC: 9 MG/DL — SIGNIFICANT CHANGE UP (ref 8.4–10.5)
CHLORIDE SERPL-SCNC: 93 MMOL/L — LOW (ref 98–110)
CO2 SERPL-SCNC: 30 MMOL/L — SIGNIFICANT CHANGE UP (ref 17–32)
CREAT SERPL-MCNC: 0.7 MG/DL — SIGNIFICANT CHANGE UP (ref 0.7–1.5)
EGFR: 100 ML/MIN/1.73M2 — SIGNIFICANT CHANGE UP
GLUCOSE BLDC GLUCOMTR-MCNC: 232 MG/DL — HIGH (ref 70–99)
GLUCOSE BLDC GLUCOMTR-MCNC: 247 MG/DL — HIGH (ref 70–99)
GLUCOSE SERPL-MCNC: 215 MG/DL — HIGH (ref 70–99)
HCT VFR BLD CALC: 46 % — SIGNIFICANT CHANGE UP (ref 42–52)
HGB BLD-MCNC: 14.8 G/DL — SIGNIFICANT CHANGE UP (ref 14–18)
MAGNESIUM SERPL-MCNC: 1 MG/DL — LOW (ref 1.8–2.4)
MCHC RBC-ENTMCNC: 29.8 PG — SIGNIFICANT CHANGE UP (ref 27–31)
MCHC RBC-ENTMCNC: 32.2 G/DL — SIGNIFICANT CHANGE UP (ref 32–37)
MCV RBC AUTO: 92.6 FL — SIGNIFICANT CHANGE UP (ref 80–94)
NRBC # BLD: 0 /100 WBCS — SIGNIFICANT CHANGE UP (ref 0–0)
PLATELET # BLD AUTO: 156 K/UL — SIGNIFICANT CHANGE UP (ref 130–400)
POTASSIUM SERPL-MCNC: 4.2 MMOL/L — SIGNIFICANT CHANGE UP (ref 3.5–5)
POTASSIUM SERPL-SCNC: 4.2 MMOL/L — SIGNIFICANT CHANGE UP (ref 3.5–5)
PROT SERPL-MCNC: 7.2 G/DL — SIGNIFICANT CHANGE UP (ref 6–8)
RBC # BLD: 4.97 M/UL — SIGNIFICANT CHANGE UP (ref 4.7–6.1)
RBC # FLD: 13.7 % — SIGNIFICANT CHANGE UP (ref 11.5–14.5)
SARS-COV-2 RNA SPEC QL NAA+PROBE: SIGNIFICANT CHANGE UP
SODIUM SERPL-SCNC: 135 MMOL/L — SIGNIFICANT CHANGE UP (ref 135–146)
WBC # BLD: 13.84 K/UL — HIGH (ref 4.8–10.8)
WBC # FLD AUTO: 13.84 K/UL — HIGH (ref 4.8–10.8)

## 2022-10-19 PROCEDURE — 76705 ECHO EXAM OF ABDOMEN: CPT | Mod: 26

## 2022-10-19 PROCEDURE — 78226 HEPATOBILIARY SYSTEM IMAGING: CPT | Mod: 26

## 2022-10-19 PROCEDURE — 99221 1ST HOSP IP/OBS SF/LOW 40: CPT

## 2022-10-19 RX ORDER — ENOXAPARIN SODIUM 100 MG/ML
40 INJECTION SUBCUTANEOUS EVERY 12 HOURS
Refills: 0 | Status: DISCONTINUED | OUTPATIENT
Start: 2022-10-19 | End: 2022-10-22

## 2022-10-19 RX ORDER — MORPHINE SULFATE 50 MG/1
4 CAPSULE, EXTENDED RELEASE ORAL ONCE
Refills: 0 | Status: DISCONTINUED | OUTPATIENT
Start: 2022-10-19 | End: 2022-10-19

## 2022-10-19 RX ORDER — TAMSULOSIN HYDROCHLORIDE 0.4 MG/1
0.4 CAPSULE ORAL AT BEDTIME
Refills: 0 | Status: DISCONTINUED | OUTPATIENT
Start: 2022-10-19 | End: 2022-10-30

## 2022-10-19 RX ORDER — AMPICILLIN SODIUM AND SULBACTAM SODIUM 250; 125 MG/ML; MG/ML
1.5 INJECTION, POWDER, FOR SUSPENSION INTRAMUSCULAR; INTRAVENOUS EVERY 6 HOURS
Refills: 0 | Status: DISCONTINUED | OUTPATIENT
Start: 2022-10-19 | End: 2022-10-21

## 2022-10-19 RX ORDER — FUROSEMIDE 40 MG
20 TABLET ORAL ONCE
Refills: 0 | Status: COMPLETED | OUTPATIENT
Start: 2022-10-19 | End: 2022-10-19

## 2022-10-19 RX ORDER — PREGABALIN 225 MG/1
1000 CAPSULE ORAL DAILY
Refills: 0 | Status: DISCONTINUED | OUTPATIENT
Start: 2022-10-19 | End: 2022-10-21

## 2022-10-19 RX ORDER — INSULIN LISPRO 100/ML
VIAL (ML) SUBCUTANEOUS
Refills: 0 | Status: DISCONTINUED | OUTPATIENT
Start: 2022-10-19 | End: 2022-10-30

## 2022-10-19 RX ORDER — GLUCAGON INJECTION, SOLUTION 0.5 MG/.1ML
1 INJECTION, SOLUTION SUBCUTANEOUS ONCE
Refills: 0 | Status: DISCONTINUED | OUTPATIENT
Start: 2022-10-19 | End: 2022-10-30

## 2022-10-19 RX ORDER — LOSARTAN POTASSIUM 100 MG/1
100 TABLET, FILM COATED ORAL ONCE
Refills: 0 | Status: COMPLETED | OUTPATIENT
Start: 2022-10-19 | End: 2022-10-19

## 2022-10-19 RX ORDER — DEXTROSE 50 % IN WATER 50 %
15 SYRINGE (ML) INTRAVENOUS ONCE
Refills: 0 | Status: DISCONTINUED | OUTPATIENT
Start: 2022-10-19 | End: 2022-10-30

## 2022-10-19 RX ORDER — SODIUM CHLORIDE 9 MG/ML
1000 INJECTION, SOLUTION INTRAVENOUS
Refills: 0 | Status: DISCONTINUED | OUTPATIENT
Start: 2022-10-19 | End: 2022-10-30

## 2022-10-19 RX ORDER — FUROSEMIDE 40 MG
20 TABLET ORAL DAILY
Refills: 0 | Status: DISCONTINUED | OUTPATIENT
Start: 2022-10-19 | End: 2022-10-29

## 2022-10-19 RX ORDER — LABETALOL HCL 100 MG
200 TABLET ORAL
Refills: 0 | Status: DISCONTINUED | OUTPATIENT
Start: 2022-10-19 | End: 2022-10-29

## 2022-10-19 RX ORDER — SIMVASTATIN 20 MG/1
40 TABLET, FILM COATED ORAL AT BEDTIME
Refills: 0 | Status: DISCONTINUED | OUTPATIENT
Start: 2022-10-19 | End: 2022-10-30

## 2022-10-19 RX ORDER — CHOLECALCIFEROL (VITAMIN D3) 125 MCG
1000 CAPSULE ORAL DAILY
Refills: 0 | Status: DISCONTINUED | OUTPATIENT
Start: 2022-10-19 | End: 2022-10-21

## 2022-10-19 RX ORDER — LABETALOL HCL 100 MG
200 TABLET ORAL ONCE
Refills: 0 | Status: DISCONTINUED | OUTPATIENT
Start: 2022-10-19 | End: 2022-10-19

## 2022-10-19 RX ORDER — MORPHINE SULFATE 50 MG/1
2 CAPSULE, EXTENDED RELEASE ORAL ONCE
Refills: 0 | Status: DISCONTINUED | OUTPATIENT
Start: 2022-10-19 | End: 2022-10-19

## 2022-10-19 RX ORDER — MAGNESIUM SULFATE 500 MG/ML
2 VIAL (ML) INJECTION EVERY 4 HOURS
Refills: 0 | Status: COMPLETED | OUTPATIENT
Start: 2022-10-19 | End: 2022-10-20

## 2022-10-19 RX ORDER — DEXTROSE 50 % IN WATER 50 %
25 SYRINGE (ML) INTRAVENOUS ONCE
Refills: 0 | Status: DISCONTINUED | OUTPATIENT
Start: 2022-10-19 | End: 2022-10-30

## 2022-10-19 RX ORDER — LABETALOL HCL 100 MG
200 TABLET ORAL ONCE
Refills: 0 | Status: COMPLETED | OUTPATIENT
Start: 2022-10-19 | End: 2022-10-19

## 2022-10-19 RX ORDER — KETOROLAC TROMETHAMINE 30 MG/ML
15 SYRINGE (ML) INJECTION THREE TIMES A DAY
Refills: 0 | Status: DISCONTINUED | OUTPATIENT
Start: 2022-10-19 | End: 2022-10-19

## 2022-10-19 RX ORDER — NIFEDIPINE 30 MG
30 TABLET, EXTENDED RELEASE 24 HR ORAL ONCE
Refills: 0 | Status: COMPLETED | OUTPATIENT
Start: 2022-10-19 | End: 2022-10-19

## 2022-10-19 RX ORDER — SODIUM CHLORIDE 9 MG/ML
1000 INJECTION, SOLUTION INTRAVENOUS
Refills: 0 | Status: COMPLETED | OUTPATIENT
Start: 2022-10-19 | End: 2022-10-20

## 2022-10-19 RX ORDER — HYDROMORPHONE HYDROCHLORIDE 2 MG/ML
0.5 INJECTION INTRAMUSCULAR; INTRAVENOUS; SUBCUTANEOUS EVERY 6 HOURS
Refills: 0 | Status: DISCONTINUED | OUTPATIENT
Start: 2022-10-19 | End: 2022-10-20

## 2022-10-19 RX ORDER — ONDANSETRON 8 MG/1
8 TABLET, FILM COATED ORAL EVERY 8 HOURS
Refills: 0 | Status: DISCONTINUED | OUTPATIENT
Start: 2022-10-19 | End: 2022-10-30

## 2022-10-19 RX ORDER — LOSARTAN POTASSIUM 100 MG/1
100 TABLET, FILM COATED ORAL DAILY
Refills: 0 | Status: DISCONTINUED | OUTPATIENT
Start: 2022-10-19 | End: 2022-10-29

## 2022-10-19 RX ORDER — LABETALOL HCL 100 MG
20 TABLET ORAL ONCE
Refills: 0 | Status: COMPLETED | OUTPATIENT
Start: 2022-10-19 | End: 2022-10-19

## 2022-10-19 RX ORDER — ASPIRIN/CALCIUM CARB/MAGNESIUM 324 MG
81 TABLET ORAL DAILY
Refills: 0 | Status: DISCONTINUED | OUTPATIENT
Start: 2022-10-19 | End: 2022-10-20

## 2022-10-19 RX ORDER — RIVAROXABAN 15 MG-20MG
20 KIT ORAL
Refills: 0 | Status: DISCONTINUED | OUTPATIENT
Start: 2022-10-19 | End: 2022-10-19

## 2022-10-19 RX ORDER — DEXTROSE 50 % IN WATER 50 %
12.5 SYRINGE (ML) INTRAVENOUS ONCE
Refills: 0 | Status: DISCONTINUED | OUTPATIENT
Start: 2022-10-19 | End: 2022-10-30

## 2022-10-19 RX ORDER — LABETALOL HCL 100 MG
100 TABLET ORAL ONCE
Refills: 0 | Status: COMPLETED | OUTPATIENT
Start: 2022-10-19 | End: 2022-10-19

## 2022-10-19 RX ADMIN — PREGABALIN 1000 MICROGRAM(S): 225 CAPSULE ORAL at 11:37

## 2022-10-19 RX ADMIN — MORPHINE SULFATE 4 MILLIGRAM(S): 50 CAPSULE, EXTENDED RELEASE ORAL at 00:00

## 2022-10-19 RX ADMIN — Medication 2: at 17:32

## 2022-10-19 RX ADMIN — Medication 30 MILLIGRAM(S): at 07:25

## 2022-10-19 RX ADMIN — Medication 15 MILLIGRAM(S): at 09:49

## 2022-10-19 RX ADMIN — MORPHINE SULFATE 4 MILLIGRAM(S): 50 CAPSULE, EXTENDED RELEASE ORAL at 01:00

## 2022-10-19 RX ADMIN — Medication 1000 UNIT(S): at 11:36

## 2022-10-19 RX ADMIN — MORPHINE SULFATE 2 MILLIGRAM(S): 50 CAPSULE, EXTENDED RELEASE ORAL at 05:58

## 2022-10-19 RX ADMIN — Medication 25 GRAM(S): at 17:34

## 2022-10-19 RX ADMIN — MORPHINE SULFATE 4 MILLIGRAM(S): 50 CAPSULE, EXTENDED RELEASE ORAL at 00:51

## 2022-10-19 RX ADMIN — ENOXAPARIN SODIUM 40 MILLIGRAM(S): 100 INJECTION SUBCUTANEOUS at 17:33

## 2022-10-19 RX ADMIN — Medication 200 MILLIGRAM(S): at 17:32

## 2022-10-19 RX ADMIN — LOSARTAN POTASSIUM 100 MILLIGRAM(S): 100 TABLET, FILM COATED ORAL at 11:37

## 2022-10-19 RX ADMIN — Medication 20 MILLIGRAM(S): at 11:37

## 2022-10-19 RX ADMIN — SIMVASTATIN 40 MILLIGRAM(S): 20 TABLET, FILM COATED ORAL at 21:50

## 2022-10-19 RX ADMIN — Medication 100 MILLIGRAM(S): at 07:25

## 2022-10-19 RX ADMIN — SODIUM CHLORIDE 100 MILLILITER(S): 9 INJECTION, SOLUTION INTRAVENOUS at 11:43

## 2022-10-19 RX ADMIN — Medication 25 GRAM(S): at 21:50

## 2022-10-19 RX ADMIN — AMPICILLIN SODIUM AND SULBACTAM SODIUM 100 GRAM(S): 250; 125 INJECTION, POWDER, FOR SUSPENSION INTRAMUSCULAR; INTRAVENOUS at 18:09

## 2022-10-19 RX ADMIN — Medication 81 MILLIGRAM(S): at 11:43

## 2022-10-19 RX ADMIN — TAMSULOSIN HYDROCHLORIDE 0.4 MILLIGRAM(S): 0.4 CAPSULE ORAL at 21:50

## 2022-10-19 RX ADMIN — Medication 20 MILLIGRAM(S): at 05:26

## 2022-10-19 RX ADMIN — SODIUM CHLORIDE 1000 MILLILITER(S): 9 INJECTION INTRAMUSCULAR; INTRAVENOUS; SUBCUTANEOUS at 00:00

## 2022-10-19 RX ADMIN — MORPHINE SULFATE 2 MILLIGRAM(S): 50 CAPSULE, EXTENDED RELEASE ORAL at 05:23

## 2022-10-19 NOTE — ED PROVIDER NOTE - ATTENDING APP SHARED VISIT CONTRIBUTION OF CARE
71-year-old male initially presented to the ED with complaints of abdominal pain in the bilateral flanks radiating across the lower abdomen as well as hypertension.  Initial vitals noted to have a systolic of 239.    Physical exam noted to have bilateral lateral abdominal pain.  Otherwise physical exam is unremarkable.  Blood pressures bilaterally with a 20 point difference.  We had a concern for dissection so we obtained a CT angio of the aorta with and without IV contrast.  CT angio negative for dissection.      However is noted to have an enlarged gallbladder with a stone at the neck of the gallbladder.  Additionally concern for cholelithiasis as there is stranding around the organ.  Surgery consulted for cholelithiasis pending right upper quadrant ultrasound.

## 2022-10-19 NOTE — PROGRESS NOTE ADULT - ASSESSMENT
ASSESSMENT:  71M w/ PMH of HLD, HTN, MI, DM, CAD s/p CABG, previous DVT/PE on Xarelto presents for hypotensionand kidney stones presents to ED with complaints of abdominal pain in the bilateral flanks radiating across the lower abdomen as well as hypertension. Initial vitals noted to have a systolic of 239. A CT showed gallstones and enlarged gallbladder with a stone at the neck.   An US of RUQ was ordered to further delineate if pain is due to biliary pathology. US shows a right renal calculi measuring 1.2 cm and gallbladder edema. On exam, pt complains of right flank pain. Physical exam findings, imaging, and labs as documented above.       PLAN:  - Patient is admitted to medicine, surgery will follow.   - given lack of fever, white count, transaminitis, and clear cut imaging evidence for cholecystitis and pt h/o bouts of kidney stones this picture  was felt to favor nephrolithiasis/renal colic or biliary colic; however the HIDA scan is now done  -pt with extensive PMH including CABG, MI, massive P.E. and on A.C., poor surgical candidate ;  - could consider HIDA scan if clinical suspicion remains high for acute cholecystitis  - if HIDA+, a perc yefri would be the safer option for such a patient  - presented with severe HTN, needs controlled    Above plan discussed with Attending Surgeon Dr. Crouch ,and   10-19-22 @ 12:36  HTN Urgency:  - likely 2/2 severe abd pain, last [id number]  - no evidence of emergency, labs wnl, no cp, ams, n/v, sob 2/2 abd pain vs opioid SE?  - c/w home dose labetalol 200mg BID, Losartan 100 QD, Lasix 20 QD  - trend bp, if not improving may need addition of nifedipine    #hx of DVT/PE  #hx of MI/CABG  - on Xarelto 20mg QD, hold for now pending HIDA scan  - Lovenox 40 BID--make FULL DOSE  - c/w ASA 81 QD  - will ask Cardio to see to assess surgical risk;   - since his Dr has retired, then ask Dr Hernandes to see    #DM2  - on Meformin 1g BID at home  - start on SS   - monitor FS and adjust as needed

## 2022-10-19 NOTE — ED PROVIDER NOTE - CARE PLAN
Principal Discharge DX:	Abdominal pain  Secondary Diagnosis:	Cholecystitis   1 Principal Discharge DX:	Abdominal pain  Secondary Diagnosis:	Cholelithiasis

## 2022-10-19 NOTE — ED PROVIDER NOTE - TOBACCO USE
Orders received, chart reviewed and patient evaluated by occupational therapy. Pending progression with skilled acute occupational therapy, recommend:  Occupational therapy at least 2 days/week in the home      Recommend with nursing ADLs with supervision/setup, OOB to chair 3x/day and toileting via functional mobility to and from bathroom x1 assist. Thank you for completing as able in order to maintain patient strength, endurance and independence. Patient is cleared from OT standpoint for discharge home with family support. Full evaluation to follow.      Nicki Nj MS, OTR/L Former smoker

## 2022-10-19 NOTE — CONSULT NOTE ADULT - ATTENDING COMMENTS
above noted discussed case with DR Nicholson , surgical resident abdomen soft ct scan and sonogram noted needs HIDA scan labs noted

## 2022-10-19 NOTE — H&P ADULT - TIME BILLING
Chart reviewed, patient examined. Pertinent results reviewed.  Case discussed with HO multiple times; specialist f/u reviewed and spoke w surgical attending

## 2022-10-19 NOTE — H&P ADULT - NSHPREVIEWOFSYSTEMS_GEN_ALL_CORE
REVIEW OF SYSTEMS:  CONSTITUTIONAL: No weakness, fevers or chills  EYES/ENT: No visual changes;  No vertigo or throat pain   NECK: No pain or stiffness  RESPIRATORY: No cough, wheezing, hemoptysis; +sob  CARDIOVASCULAR: No chest pain or palpitations  GASTROINTESTINAL: +abd pain, constipation, No nausea, vomiting, or hematemesis; No diarrhea. No melena or hematochezia.  GENITOURINARY: No dysuria, frequency or hematuria  NEUROLOGICAL: No numbness or weakness  SKIN: No itching, rashes

## 2022-10-19 NOTE — H&P ADULT - ASSESSMENT
`71M w/ PMH of HLD, HTN, MI, DM, CAD s/p CABG, previous DVT/PE on Xarelto, recurrent kidney stones presents to ED with complaints of abdominal pain and hypertension. Initial vitals noted to have a systolic of 239. A CT showed gallstones and enlarged gallbladder with a stone at the neck. An US of RUQ was ordered to further delineate if pain is due to biliary pathology. US shows a right renal calculi measuring 1.2 cm and gallbladder edema. Patient is being admitted for further management.    #Abdominal Pain  #Hx of Kidney Stones  #Nonspecific Imaging Findings  #Biliary Colic vs. Nephrolithiasis vs. Cholecystitis  - hx of recurrent kidney stones s/p surgical removal  - PE unremarkable, mild ttp RUQ/flank  - CT A/P w/ pericholecystic stranding, US w/ cholelithiasis, GB distension, mild GB wall thickening `71M w/ PMH of HLD, HTN, MI, DM, CAD s/p CABG, previous DVT/PE on Xarelto, recurrent kidney stones presents to ED with complaints of abdominal pain and hypertension. Initial vitals noted to have a systolic of 239. A CT showed gallstones and enlarged gallbladder with a stone at the neck. An US of RUQ was ordered to further delineate if pain is due to biliary pathology. US shows a right renal calculi measuring 1.2 cm and gallbladder edema. Patient is being admitted for further management.    #Abdominal Pain  #Hx of Kidney Stones  #Nonspecific Imaging Findings  #Biliary Colic vs. Nephrolithiasis vs. Cholecystitis  - hx of recurrent kidney stones s/p surgical removal  - PE unremarkable, mild ttp RUQ/flank  - CT A/P w/ pericholecystic stranding, US w/ cholelithiasis, GB distension, mild GB wall thickening  - pt is afebrile, no leukocytosis, normal LFTs, TBili 1.8  - surgery recs appr: pt poor surgical candidate given multiple comorbidities, consider HIDA scan given equivocal findings, if + perc yefri would be safer option  - LR @ 100 maintenance, NPO for now, pain control morphine 10mg q6 PRN, zofran 8mg q8 PRN for nausea `71M w/ PMH of HLD, HTN, MI, DM, CAD s/p CABG, previous DVT/PE on Xarelto, recurrent kidney stones presents to ED with complaints of abdominal pain and hypertension. Initial vitals noted to have a systolic of 239. A CT showed gallstones and enlarged gallbladder with a stone at the neck. An US of RUQ was ordered to further delineate if pain is due to biliary pathology. US shows a right renal calculi measuring 1.2 cm and gallbladder edema. Patient is being admitted for further management.    #Abdominal Pain  #Hx of Kidney Stones  #Nonspecific Imaging Findings  #Biliary Colic vs. Nephrolithiasis vs. Cholecystitis  - hx of recurrent kidney stones s/p surgical removal  - PE unremarkable, mild ttp RUQ/flank  - CT A/P w/ pericholecystic stranding, US w/ cholelithiasis, GB distension, mild GB wall thickening  - pt is afebrile, no leukocytosis, normal LFTs, TBili 1.8  - surgery recs appr: pt poor surgical candidate given multiple comorbidities, consider HIDA scan given equivocal findings, if + perc yefri would be safer option  - LR @ 100 maintenance, NPO for now, pain control morphine 10mg q6 PRN, zofran 8mg q8 PRN for nausea    #HTN Urgency  - no evidence of emergency, labs wnl, no cp, ams, n/v, sob 2/2 abd pain  - c/w home dose labetalol 200mg BID, Losartan 100 QD, Lasix 20 QD  - trend bp    #hx of DVT/PE  #hx of MI/CABG  - c/w Xarelto 20mg QD  - c/w ASA 81 QD    #DM2  - on Meformin 1g BID at home  - start on basal/SS (need correct weight, ordered)  - monitor FS and adjust as needed    #HLD  - c/w simvastatin 40mg    #BPH  - c/w Flomax 0.4     `71M w/ PMH of HLD, HTN, MI, DM, CAD s/p CABG, previous DVT/PE on Xarelto, recurrent kidney stones presents to ED with complaints of abdominal pain and hypertension. Initial vitals noted to have a systolic of 239. A CT showed gallstones and enlarged gallbladder with a stone at the neck. An US of RUQ was ordered to further delineate if pain is due to biliary pathology. US shows a right renal calculi measuring 1.2 cm and gallbladder edema. Patient is being admitted for further management.    #Abdominal Pain  #Hx of Kidney Stones  #Nonspecific Imaging Findings  #Biliary Colic vs. Nephrolithiasis vs. Cholecystitis  - hx of recurrent kidney stones s/p surgical removal  - PE unremarkable, mild ttp RUQ/flank  - CT A/P w/ pericholecystic stranding, US w/ cholelithiasis, GB distension, mild GB wall thickening  - pt is afebrile, no leukocytosis, normal LFTs, TBili 1.8  - surgery recs appr: pt poor surgical candidate given multiple comorbidities, consider HIDA scan given equivocal findings, if + perc yefri would be safer option  - LR @ 100 maintenance, NPO for now, pain control morphine 10mg q6 PRN, zofran 8mg q8 PRN for nausea    #HTN Urgency  - no evidence of emergency, labs wnl, no cp, ams, n/v, sob 2/2 abd pain  - c/w home dose labetalol 200mg BID, Losartan 100 QD, Lasix 20 QD  - trend bp    #hx of DVT/PE  #hx of MI/CABG  - c/w Xarelto 20mg QD  - c/w ASA 81 QD    #DM2  - on Meformin 1g BID at home  - start on basal/SS (need correct weight, ordered)  - monitor FS and adjust as needed    #HLD  - c/w simvastatin 40mg    #BPH  - c/w Flomax 0.4    # Misc  - GI ppx: Protonix  - DVT ppx: Xarelto  - Diet: NPO for now  - Activity: AAT  - DISPO: acute    CODE STATUS:        `71M w/ PMH of HLD, HTN, MI, DM, CAD s/p CABG, previous DVT/PE on Xarelto, recurrent kidney stones presents to ED with complaints of abdominal pain and hypertension. Initial vitals noted to have a systolic of 239. A CT showed gallstones and enlarged gallbladder with a stone at the neck. An US of RUQ was ordered to further delineate if pain is due to biliary pathology. US shows a right renal calculi measuring 1.2 cm and gallbladder edema. Patient is being admitted for further management.    #Abdominal Pain  #Hx of Kidney Stones  #Nonspecific Imaging Findings  #Biliary Colic vs. Nephrolithiasis vs. Cholecystitis  - hx of recurrent kidney stones s/p surgical removal  - PE unremarkable, mild ttp RUQ/flank  - CT A/P w/ pericholecystic stranding, US w/ cholelithiasis, GB distension, mild GB wall thickening  - pt is afebrile, no leukocytosis, normal LFTs, TBili 1.8  - surgery recs appr: pt poor surgical candidate given multiple comorbidities, consider HIDA scan given equivocal findings, if + perc yefri would be safer option  - LR @ 100 maintenance, NPO for now, pain control morphine 10mg q6 PRN, zofran 8mg q8 PRN for nausea    #HTN Urgency  - likely 2/2 severe abd pain  - no evidence of emergency, labs wnl, no cp, ams, n/v, sob 2/2 abd pain  - c/w home dose labetalol 200mg BID, Losartan 100 QD, Lasix 20 QD  - trend bp    #hx of DVT/PE  #hx of MI/CABG  - c/w Xarelto 20mg QD  - c/w ASA 81 QD    #DM2  - on Meformin 1g BID at home  - start on basal/SS (need correct weight, ordered)  - monitor FS and adjust as needed    #HLD  - c/w simvastatin 40mg    #BPH  - c/w Flomax 0.4    # Misc  - GI ppx: Protonix  - DVT ppx: Xarelto  - Diet: NPO for now  - Activity: AAT  - DISPO: acute    CODE STATUS:        `71M w/ PMH of HLD, HTN, MI, DM, CAD s/p CABG, previous DVT/PE on Xarelto, recurrent kidney stones presents to ED with complaints of abdominal pain and hypertension. Initial vitals noted to have a systolic of 239. A CT showed gallstones and enlarged gallbladder with a stone at the neck. An US of RUQ was ordered to further delineate if pain is due to biliary pathology. US shows a right renal calculi measuring 1.2 cm and gallbladder edema. Patient is being admitted for further management.    #Abdominal Pain  #Hx of Kidney Stones  #Nonspecific Imaging Findings  #Biliary Colic vs. Nephrolithiasis vs. Cholecystitis  - hx of recurrent kidney stones s/p surgical removal  - PE unremarkable, mild ttp RUQ/flank  - CT A/P w/ pericholecystic stranding, US w/ cholelithiasis, GB distension, mild GB wall thickening  - pt is afebrile, no leukocytosis, normal LFTs, TBili 1.8  - surgery recs appr: pt poor surgical candidate given multiple comorbidities, consider HIDA scan given equivocal findings, if + perc yefri would be safer option  - LR @ 100 maintenance, NPO for now, pain control morphine 15mg q6 PRN, zofran 8mg q8 PRN for nausea    #HTN Urgency  - likely 2/2 severe abd pain  - no evidence of emergency, labs wnl, no cp, ams, n/v, sob 2/2 abd pain  - c/w home dose labetalol 200mg BID, Losartan 100 QD, Lasix 20 QD  - trend bp    #hx of DVT/PE  #hx of MI/CABG  - c/w Xarelto 20mg QD  - c/w ASA 81 QD    #DM2  - on Meformin 1g BID at home  - start on basal/SS (need correct weight, ordered)  - monitor FS and adjust as needed    #HLD  - c/w simvastatin 40mg    #BPH  - c/w Flomax 0.4    # Misc  - GI ppx: Protonix  - DVT ppx: Xarelto  - Diet: NPO for now  - Activity: AAT  - DISPO: acute    CODE STATUS:        `71M w/ PMH of HLD, HTN, MI, DM, CAD s/p CABG, previous DVT/PE on Xarelto, recurrent kidney stones presents to ED with complaints of abdominal pain and hypertension. Initial vitals noted to have a systolic of 239. A CT showed gallstones and enlarged gallbladder with a stone at the neck. An US of RUQ was ordered to further delineate if pain is due to biliary pathology. US shows a right renal calculi measuring 1.2 cm and gallbladder edema. Patient is being admitted for further management.    #Abdominal Pain  #Hx of Kidney Stones  #Nonspecific Imaging Findings  #Biliary Colic vs. Nephrolithiasis vs. Cholecystitis  - hx of recurrent kidney stones s/p surgical removal  - PE unremarkable, mild ttp RUQ/flank  - CT A/P w/ pericholecystic stranding, US w/ cholelithiasis, GB distension, mild GB wall thickening  - pt is afebrile, no leukocytosis, normal LFTs, TBili 1.8  - surgery recs appr: pt poor surgical candidate given multiple comorbidities, consider HIDA scan given equivocal findings, if + perc yefri would be safer option  - LR @ 100 maintenance, NPO for now, pain control Toradol 15mg IVP q8 PRN, zofran 8mg q8 PRN for nausea    #HTN Urgency  - likely 2/2 severe abd pain  - no evidence of emergency, labs wnl, no cp, ams, n/v, sob 2/2 abd pain vs opioid SE?  - c/w home dose labetalol 200mg BID, Losartan 100 QD, Lasix 20 QD  - trend bp    #hx of DVT/PE  #hx of MI/CABG  - c/w Xarelto 20mg QD  - c/w ASA 81 QD    #DM2  - on Meformin 1g BID at home  - start on basal/SS (need correct weight, ordered)  - monitor FS and adjust as needed    #HLD  - c/w simvastatin 40mg    #BPH  - c/w Flomax 0.4    # Misc  - GI ppx: Protonix  - DVT ppx: Xarelto  - Diet: NPO for now  - Activity: AAT  - DISPO: acute    CODE STATUS:        `71M w/ PMH of HLD, HTN, MI, DM, CAD s/p CABG, previous DVT/PE on Xarelto, recurrent kidney stones presents to ED with complaints of abdominal pain and hypertension. Initial vitals noted to have a systolic of 239. A CT showed gallstones and enlarged gallbladder with a stone at the neck. An US of RUQ was ordered to further delineate if pain is due to biliary pathology. US shows a right renal calculi measuring 1.2 cm and gallbladder edema. Patient is being admitted for further management.    #Abdominal Pain  #Hx of Kidney Stones  #Nonspecific Imaging Findings  #Biliary Colic vs. Nephrolithiasis vs. Cholecystitis  - hx of recurrent kidney stones s/p surgical removal  - PE unremarkable, mild ttp RUQ/flank  - CT A/P w/ pericholecystic stranding, US w/ cholelithiasis, GB distension, mild GB wall thickening  - pt is afebrile, no leukocytosis, normal LFTs, TBili 1.8  - surgery recs appr: pt poor surgical candidate given multiple comorbidities, consider HIDA scan given equivocal findings, if + perc yefri would be safer option  - LR @ 100 maintenance, NPO for now, pain control Toradol 15mg IVP q8 PRN, zofran 8mg q8 PRN for nausea    #HTN Urgency  - likely 2/2 severe abd pain  - no evidence of emergency, labs wnl, no cp, ams, n/v, sob 2/2 abd pain vs opioid SE?  - c/w home dose labetalol 200mg BID, Losartan 100 QD, Lasix 20 QD  - trend bp, if not improving may need addition of nifedipine/drip    #hx of DVT/PE  #hx of MI/CABG  - on Xarelto 20mg QD, hold for now pending HIDA scan  - Lovenox 40 BID  - c/w ASA 81 QD    #DM2  - on Meformin 1g BID at home  - start on SS   - monitor FS and adjust as needed    #HLD  - c/w simvastatin 40mg    #BPH  - c/w Flomax 0.4    # Misc  - GI ppx: Protonix  - DVT ppx: Lovenox 40 BID  - Diet: NPO for now  - Activity: AAT  - DISPO: acute    CODE STATUS:        `71M w/ PMH of HLD, HTN, MI, DM, CAD s/p CABG, previous DVT/PE on Xarelto, recurrent kidney stones presents to ED with complaints of abdominal pain and hypertension. Initial vitals noted to have a systolic of 239. A CT showed gallstones and enlarged gallbladder with a stone at the neck. An US of RUQ was ordered to further delineate if pain is due to biliary pathology. US shows a right renal calculi measuring 1.2 cm and gallbladder edema. Patient is being admitted for further management.    #Abdominal Pain  #Hx of Kidney Stones  #Nonspecific Imaging Findings  #Biliary Colic vs. Nephrolithiasis vs. Cholecystitis  - hx of recurrent kidney stones s/p surgical removal  - PE unremarkable, mild ttp RUQ/flank  - CT A/P w/ pericholecystic stranding, US w/ cholelithiasis, GB distension, mild GB wall thickening  - pt is afebrile, no leukocytosis, normal LFTs, TBili 1.8  - surgery recs appr: pt poor surgical candidate given multiple comorbidities, consider HIDA scan given equivocal findings, if + perc yefri would be safer option  - LR @ 100 maintenance, NPO for now, pain control Toradol 15mg IVP q8 PRN, zofran 8mg q8 PRN for nausea    #HTN Urgency  - likely 2/2 severe abd pain, last bpnl391/86  - no evidence of emergency, labs wnl, no cp, ams, n/v, sob 2/2 abd pain vs opioid SE?  - c/w home dose labetalol 200mg BID, Losartan 100 QD, Lasix 20 QD  - trend bp, if not improving may need addition of nifedipine    #hx of DVT/PE  #hx of MI/CABG  - on Xarelto 20mg QD, hold for now pending HIDA scan  - Lovenox 40 BID  - c/w ASA 81 QD    #DM2  - on Meformin 1g BID at home  - start on SS   - monitor FS and adjust as needed    #HLD  - c/w simvastatin 40mg    #BPH  - c/w Flomax 0.4    # Misc  - GI ppx: Protonix  - DVT ppx: Lovenox 40 BID  - Diet: NPO for now  - Activity: AAT  - DISPO: acute    CODE STATUS:

## 2022-10-19 NOTE — H&P ADULT - HISTORY OF PRESENT ILLNESS
Patient is a 71-yo male with pmhx of htn, hld, DM2, MI s/p CABG, DVT/PE, recurrent kidney stones who presented with a chief complaint of abdominal pain and high blood pressure x 1 day. The pain started yesterday afternoon as a "horrible discomfort". It is hypogastric in anture, diffuse, encircling to patient's low back. It is constant, was a 10/10 prior to pain meds (now 7/10), no exacerbating factors. Patient denies other systemic sxs, including fever, n/v/d, cp, sob, chills, dysuria, hematuria, hematochezia, joint pains or recent sick contacts. Patient says the pain is similar to what he experienced with his kidney stones but that the pain was located flanks during those incidences.     ED Course:  Vitals- bp 229/95, hr 68, rr 20, t 35.6, 95% on RA  BP had 20 point difference between arms and there was a concern for dissection. A CT angio obtained was negative for dissection, but did show gallstones and enlarged gallbladder with a stone at the neck. An US of RUQ was ordered to further delineate if pain is due to biliary pathology. US shows a right renal calculi measuring 1.2 cm and gallbladder edema. Pt states he has never been told he has gallstones before.   Patient is a 71-yo male with pmhx of htn, hld, DM2, MI s/p CABG, DVT/PE, recurrent kidney stones who presented with a chief complaint of abdominal pain and high blood pressure x 1 day. The pain started yesterday afternoon as a "horrible discomfort". It is hypogastric in anture, diffuse, encircling to patient's low back. It is constant, was a 10/10 prior to pain meds (now 7/10), no exacerbating factors. Patient states he takes his bp daily and usually runs around 155/65, but found his systolic in the 200s so decided to come to hospital. Patient denies other systemic sxs, including fever, n/v/d, cp, sob, chills, dysuria, hematuria, hematochezia, joint pains or recent sick contacts. Patient says the pain is similar to what he experienced with his kidney stones but that the pain was located flanks during those incidences.     ED Course:  Vitals- bp 229/95, hr 68, rr 20, t 35.6, 95% on RA  Labs- wbc wnl, Hb 13.9, K 5.1 (hemolyzed), glucose 241, TBili 1.8  EKG- Normal sinus rhythm, Left ventricular hypertrophy with QRS widening and repolarization abnormality ( R in aVL , [place] product , Romhilt-Bowen ), Inferior infarct , age undetermined  BP had 20 point difference between arms and there was a concern for dissection. A CT angio obtained was negative for dissection, but did show gallstones and enlarged gallbladder with a stone at the neck. An US of RUQ was ordered to further delineate if pain is due to biliary pathology. US shows a right renal calculi measuring 1.2 cm and gallbladder edema. Pt states he has never been told he has gallstones before.    Patient has received total of labetalol 20 IVP/300 PO, nifedipine 30 PO, and morphine 10mg IVP.    He is being admitted for further management.   Patient is a 71-yo male with pmhx of htn, hld, DM2, MI s/p CABG, DVT/PE on Xarelto, recurrent kidney stones who presented with a chief complaint of abdominal pain and high blood pressure x 1 day. The pain started yesterday afternoon as a "horrible discomfort". It is hypogastric in anture, diffuse, encircling to patient's low back. It is constant, was a 10/10 prior to pain meds (now 7/10), no exacerbating factors. Patient states he takes his bp daily and usually runs around 155/65, but found his systolic in the 200s so decided to come to hospital. Patient denies other systemic sxs, including fever, n/v/d, cp, sob, chills, dysuria, hematuria, hematochezia, joint pains or recent sick contacts. Patient says the pain is similar to what he experienced with his kidney stones but that the pain was located flanks during those incidences.     ED Course:  Vitals- bp 229/95, hr 68, rr 20, t 35.6, 95% on RA  Labs- wbc wnl, Hb 13.9, K 5.1 (hemolyzed), glucose 241, TBili 1.8  EKG- Normal sinus rhythm, Left ventricular hypertrophy with QRS widening and repolarization abnormality ( R in aVL , [place] product , Romhilt-Bowen ), Inferior infarct , age undetermined  BP had 20 point difference between arms and there was a concern for dissection. A CT angio obtained was negative for dissection, but did show gallstones and enlarged gallbladder with a stone at the neck. An US of RUQ was ordered to further delineate if pain is due to biliary pathology. US shows a right renal calculi measuring 1.2 cm and gallbladder edema. Pt states he has never been told he has gallstones before.    Patient has received total of labetalol 20 IVP/300 PO, nifedipine 30 PO, and morphine 10mg IVP.    He is being admitted for further management.

## 2022-10-19 NOTE — H&P ADULT - ATTENDING COMMENTS
Chart reviewed, patient examined. Pertinent results reviewed.  Case discussed with HO; specialist f/u reviewed  HD#1   Patient admitted 2/2 severe abdominal pains and w/u is as stated. Known kidney stones, but pain different, and testing showing cholelithiasis with   gall bladder inflammation. HIDA scan requested and C/W Acute Cholecystitis.  Patient with multiple comorbidities as noted.    See progress note.  Agree w A&P  start antibiotics, pain control, control BS, continue AC Rx w Lovenox      & review w surgery - surgical risk/ASA, and timing of possible surgery     Have Cardio see for pre-op evaluation

## 2022-10-19 NOTE — H&P ADULT - NSHPLABSRESULTS_GEN_ALL_CORE
13.9   10.32 )-----------( 143      ( 18 Oct 2022 22:21 )             43.0       10-18    137  |  98  |  16  ----------------------------<  241<H>  5.1<H>   |  27  |  0.7    Ca    9.3      18 Oct 2022 22:21    TPro  6.9  /  Alb  4.4  /  TBili  1.8<H>  /  DBili  x   /  AST  28  /  ALT  14  /  AlkPhos  62  10-18                      Lactate Trend  10-18 @ 22:21 Lactate:1.6       CARDIAC MARKERS ( 18 Oct 2022 22:21 )  x     / <0.01 ng/mL / x     / x     / x            CAPILLARY BLOOD GLUCOSE    IMAGING STUDIES:  < from: US Abdomen Upper Quadrant Right (10.19.22 @ 02:03) >    US ABDOMEN RT UPR QUADRANT                          PROCEDURE DATE:  10/19/2022        < end of copied text >    < from: US Abdomen Upper Quadrant Right (10.19.22 @ 02:03) >    IMPRESSION:    Right renal calculi measuring up to 1.2 cm. No hydronephrosis.    Distended gallbladder with nonspecific mild gallbladder wall thickening.   Patient has known cholelithiasis as demonstrated on same day CT. Negative   reported sonographic Srinivasan's sign. Findings are nonspecific/equivocal.   If warranted, further evaluation with HIDA scan can be obtained.    < end of copied text >    < from: CT Angio Chest Aorta w/wo IV Cont (10.18.22 @ 22:49) >    ACC: [id number] EXAM:  CT ANGIO ABD PELV (W)AW IC                        ACC: [id number] EXAM:  CT ANGIO CHEST AORTA WAWIC               < end of copied text >    < from: CT Angio Chest Aorta w/wo IV Cont (10.18.22 @ 22:49) >    IMPRESSION:    1. Cholelithiasis. Gallbladder pericholecystic stranding. Findings may   represent acute cholecystitis in the appropriate clinical setting.    2. No evidence of acute intrathoracic pathology. No evidence of acute   aortic pathology. 13.9   10.32 )-----------( 143      ( 18 Oct 2022 22:21 )             43.0       10-18    137  |  98  |  16  ----------------------------<  241<H>  5.1<H>   |  27  |  0.7    Ca    9.3      18 Oct 2022 22:21    TPro  6.9  /  Alb  4.4  /  TBili  1.8<H>  /  DBili  x   /  AST  28  /  ALT  14  /  AlkPhos  62  10-18          ECHO- as OP in 4/22: LV EF 55-60%; + LAE, and Mod MV regurgitation            Lactate Trend  10-18 @ 22:21 Lactate:1.6       CARDIAC MARKERS ( 18 Oct 2022 22:21 )  x     / <0.01 ng/mL / x     / x     / x            CAPILLARY BLOOD GLUCOSE    IMAGING STUDIES:  < from: US Abdomen Upper Quadrant Right (10.19.22 @ 02:03) >    US ABDOMEN RT UPR QUADRANT                          PROCEDURE DATE:  10/19/2022        < end of copied text >    < from: US Abdomen Upper Quadrant Right (10.19.22 @ 02:03) >    IMPRESSION:    Right renal calculi measuring up to 1.2 cm. No hydronephrosis.    Distended gallbladder with nonspecific mild gallbladder wall thickening.   Patient has known cholelithiasis as demonstrated on same day CT. Negative   reported sonographic Srinivasan's sign. Findings are nonspecific/equivocal.   If warranted, further evaluation with HIDA scan can be obtained.    < end of copied text >    < from: CT Angio Chest Aorta w/wo IV Cont (10.18.22 @ 22:49) >    ACC: [id number] EXAM:  CT ANGIO ABD PELV (W)AW IC                        ACC: [id number] EXAM:  CT ANGIO CHEST AORTA WAWIC               < end of copied text >    < from: CT Angio Chest Aorta w/wo IV Cont (10.18.22 @ 22:49) >    IMPRESSION:    1. Cholelithiasis. Gallbladder pericholecystic stranding. Findings may   represent acute cholecystitis in the appropriate clinical setting.    2. No evidence of acute intrathoracic pathology. No evidence of acute   aortic pathology.

## 2022-10-19 NOTE — ED PROVIDER NOTE - PHYSICAL EXAMINATION
Gen: NAD, AOx3  Head: NCAT  HEENT: PERRL, oral mucosa moist, normal conjunctiva, oropharynx clear without exudate or erythema  Lung: CTAB, no respiratory distress, no wheezing, rales, rhonchi  CV: normal s1/s2, rrr, Normal perfusion, pulses 2+ throughout  Abd: soft, diffuse tenderness, ND, no CVA tenderness  Genitourinary: no pelvic tenderness  MSK: No edema, no visible deformities, full range of motion in all 4 extremities  Neuro: No focal neurologic deficits  Skin: No rash   Psych: normal affect

## 2022-10-19 NOTE — H&P ADULT - NSHPSOCIALHISTORY_GEN_ALL_CORE
Marital Status:  ( x  )    (   ) Single    (   )    (  )   Occupation:   Lives with: (  ) alone  ( x ) children   ( x ) spouse   (  ) parents  (  ) other  Recent Travel:     Substance Use (street drugs): ( x ) never used  (  ) other:  Tobacco Usage:  (   ) never smoked   ( x  ) former smoker (quit 20 years ago)   (   ) current smoker  (     ) pack year  Alcohol Usage: no

## 2022-10-19 NOTE — CONSULT NOTE ADULT - SUBJECTIVE AND OBJECTIVE BOX
GENERAL SURGERY CONSULT NOTE    Patient: MERARY GARCIA , 71y ([birth year])Male   MRN: [id number]  Location: [facility] ED  Visit: 10-18-22 Emergency  Date: 10-19-22 @ 03:25    HPI:  71M w/ PMH of HLD, HTN, CABG/MI and kidney stones presents to ED with complaints of abdominal pain in the bilateral flanks radiating across the lower abdomen as well as hypertension. Initial vitals noted to have a systolic of 239.  In ED, BP had 20 point difference between arms and there was a concern for dissection. A CT angio obtained was negative for dissection, but did show gallstones and enlarged gallbladder with a stone at the neck.     An US of RUQ was ordered to further delineate if pain is due to biliary pathology. US shows a right renal calculi measuring 1.2 cm and gallbladder edema. Pt states he has never been told he has gallstones before and he's only had pain like this in the past with kidney stones. On exam, pt complains of right flank pain.     PAST MEDICAL & SURGICAL HISTORY:  Hypertension, unspecified type  Type 2 diabetes mellitus with complication, unspecified long term insulin use status  High cholesterol  Kidney stones  MI (myocardial infarction)  DVT (deep venous thrombosis)  Acute massive pulmonary embolism  s/p tPA in 2018  Carotid atherosclerosis  H/O heart surgery  quadruple bypass 6 years ago  H/O cystoscopy    Home Medications:  Aspirin Enteric Coated 81 mg oral delayed release tablet: 1 tab(s) orally once a day (24 Mar 2021 16:36)  metFORMIN 1000 mg oral tablet: 1 tab(s) orally 2 times a day (24 Mar 2021 16:36)  simvastatin 40 mg oral tablet: 1 tab(s) orally once a day (at bedtime) (24 Mar 2021 16:36)  Xarelto 20 mg oral tablet: 1 tab(s) orally once a day (in the morning) (24 Mar 2021 16:36)    VITALS:  T(F): 97 (10-18-22 @ 23:32), Max: 97 (10-18-22 @ 23:32)  HR: 61 (10-18-22 @ 23:32) (61 - 68)  BP: 228/100 (10-18-22 @ 23:32) (228/100 - 229/95)  RR: 19 (10-18-22 @ 23:32) (19 - 20)  SpO2: 94% (10-18-22 @ 23:32) (94% - 95%)    PHYSICAL EXAM:  General Appearance: NAD  HEENT: Normocephalic, atraumatic, trachea midline  Heart: s1, s2  Lungs: No increased work of breathing or accessory muscle use. Symmetric chest wall rise and fall. Bilateral breath sounds  Abdomen:  Soft, nontender, nondistended. No rebound or guarding.  MSK/Extremities: Warm & well-perfused.   Skin: Warm, dry. No jaundice.     LAB/STUDIES:                   13.9   10.32 )-----------( 143      ( 18 Oct 2022 22:21 )             43.0     10-18    137  |  98  |  16  ----------------------------<  241<H>  5.1<H>   |  27  |  0.7    Ca    9.3      18 Oct 2022 22:21    TPro  6.9  /  Alb  4.4  /  TBili  1.8<H>  /  DBili  x   /  AST  28  /  ALT  14  /  AlkPhos  62  10-18      LIVER FUNCTIONS - ( 18 Oct 2022 22:21 )  Alb: 4.4 g/dL / Pro: 6.9 g/dL / ALK PHOS: 62 U/L / ALT: 14 U/L / AST: 28 U/L / GGT: x           CARDIAC MARKERS ( 18 Oct 2022 22:21 )  x     / <0.01 ng/mL / x     / x     / x                  IMAGING:  < from: CT Angio Abdomen and Pelvis w/ IV Cont (10.18.22 @ 22:49) >  1. Cholelithiasis. Gallbladder pericholecystic stranding. Findings may   represent acute cholecystitis in the appropriate clinical setting.    2. No evidence of acute intrathoracic pathology. No evidence of acute   aortic pathology.    --- End of Report ---    < end of copied text >  < from: US Abdomen Upper Quadrant Right (10.19.22 @ 02:03) >  IMPRESSION:    Right renal calculi measuring up to 1.2 cm.    Gallbladder wall edema.        ******PRELIMINARY REPORT******      < end of copied text >

## 2022-10-19 NOTE — ED PROVIDER NOTE - NS ED ATTENDING STATEMENT MOD
This was a shared visit with the TUCKER. I reviewed and verified the documentation and independently performed the documented:

## 2022-10-19 NOTE — PROGRESS NOTE ADULT - SUBJECTIVE AND OBJECTIVE BOX
Chart reviewed, patient examined. Pertinent results reviewed.  Case discussed with HO; specialist f/u reviewed  HD#1    Patient: MERARY GARCIA , 71y ([birth year])Male   MRN: [id number]  Location: [facility] 018 A  Visit: 10-19-22 Inpatient    HPI:  Patient is a 71-yo male with pmhx of htn, hld, DM2, MI s/p CABG, DVT/PE on Xarelto, recurrent kidney stones who presented with a chief complaint of abdominal pain and high blood pressure x 1 day. The pain started yesterday afternoon as a "horrible discomfort". It is hypogastric in nature, diffuse, encircling to patient's low back. It is constant, was a 10/10 prior to pain meds (now 7/10), no exacerbating factors. Patient states he takes his bp daily and usually runs around 155/65, but found his systolic in the 200s so decided to come to hospital. Patient denies other systemic sxs, including fever, n/v/d, cp, sob, chills, dysuria, hematuria, hematochezia, joint pains or recent sick contacts. Patient says the pain is similar to what he experienced with his kidney stones but that the pain was located flanks during those incidences.     ED Course:  Vitals- bp 229/95, hr 68, rr 20, t 35.6, 95% on RA  Labs- wbc wnl, Hb 13.9, K 5.1 (hemolyzed), glucose 241, TBili 1.8  EKG- Normal sinus rhythm, Left ventricular hypertrophy with QRS widening and repolarization abnormality ( R in aVL , [employer] product , Romhilt-Bowen ), Inferior infarct , age undetermined  BP had 20 point difference between arms and there was a concern for dissection. A CT angio obtained was negative for dissection, but did show gallstones and enlarged gallbladder with a stone at the neck. An US of RUQ was ordered to further delineate if pain is due to biliary pathology. US shows a right renal calculi measuring 1.2 cm and gallbladder edema. Pt states he has never been told he has gallstones before, but they were present on a prior CTAP.    Patient has received total of labetalol 20 IVP/300 PO, nifedipine 30 PO, and morphine 10mg IVP.    He is being admitted for further management.   (19 Oct 2022 08:09)      PAST MEDICAL & SURGICAL HISTORY:  Hypertension, unspecified type  Type 2 diabetes mellitus with complication, unspecified long term insulin use status  High cholesterol  Kidney stones  MI (myocardial infarction)  DVT (deep venous thrombosis)  Acute massive pulmonary embolism  s/p tPA in 2018  Carotid atherosclerosis  H/O heart surgery  quadruple bypass 6 years ago  H/O cystoscopy with lithotripsy          Home Medications:  Aspirin Enteric Coated 81 mg oral delayed release tablet: 1 tab(s) orally once a day (24 Mar 2021 16:36)  metFORMIN 1000 mg oral tablet: 1 tab(s) orally 2 times a day (24 Mar 2021 16:36)  simvastatin 40 mg oral tablet: 1 tab(s) orally once a day (at bedtime) (24 Mar 2021 16:36)  Xarelto 20 mg oral tablet: 1 tab(s) orally once a day (in the morning) (24 Mar 2021 16:36)        VITALS:  Vital Signs Last 24 Hrs  T(C): 36.1 (19 Oct 2022 15:57), Max: 36.1 (18 Oct 2022 23:32)  T(F): 97 (19 Oct 2022 15:57), Max: 97 (18 Oct 2022 23:32)  HR: 88 (19 Oct 2022 17:30) (61 - 88)  BP: 159/80 (19 Oct 2022 17:30) (159/80 - 241/107)  BP(mean): 143 (19 Oct 2022 08:40) (143 - 154)  RR: 18 (19 Oct 2022 17:30) (18 - 19)  SpO2: 94% (19 Oct 2022 17:30) (92% - 100%)    Parameters below as of 19 Oct 2022 17:30  Patient On (Oxygen Delivery Method): room air    PHYSICAL EXAM:  General Appearance: NAD;   HEENT: Normocephalic, atraumatic, trachea midline  Heart: scar; RRR, no MRG  Lungs: No increased work of breathing or accessory muscle use. Symmetric chest wall rise and fall. Bilateral breath sounds  Abdomen:  Soft, nondistended. Mildly TTOP in RUQ and R Mid abdomen; No rebound or guarding. No HSM; no CVAT  MSK/Extremities: Warm & well-perfused.  slight + B/L ankle edema  Skin: Warm, dry. No jaundice.     MEDICATIONS  (STANDING):  ampicillin/sulbactam  IVPB 1.5 Gram(s) IV Intermittent every 6 hours  aspirin  chewable 81 milliGRAM(s) Oral daily  cholecalciferol 1000 Unit(s) Oral daily  cyanocobalamin 1000 MICROGram(s) Oral daily  dextrose 5%. 1000 milliLiter(s) (50 mL/Hr) IV Continuous <Continuous>  dextrose 5%. 1000 milliLiter(s) (100 mL/Hr) IV Continuous <Continuous>  dextrose 50% Injectable 25 Gram(s) IV Push once  dextrose 50% Injectable 12.5 Gram(s) IV Push once  dextrose 50% Injectable 25 Gram(s) IV Push once  enoxaparin Injectable 40 milliGRAM(s) SubCutaneous every 12 hours  furosemide    Tablet 20 milliGRAM(s) Oral daily  glucagon  Injectable 1 milliGRAM(s) IntraMuscular once  insulin lispro (ADMELOG) corrective regimen sliding scale   SubCutaneous three times a day before meals  labetalol 200 milliGRAM(s) Oral two times a day  lactated ringers. 1000 milliLiter(s) (100 mL/Hr) IV Continuous <Continuous>  losartan 100 milliGRAM(s) Oral daily  magnesium sulfate  IVPB 2 Gram(s) IV Intermittent every 4 hours  simvastatin 40 milliGRAM(s) Oral at bedtime  tamsulosin 0.4 milliGRAM(s) Oral at bedtime    MEDICATIONS  (PRN):  dextrose Oral Gel 15 Gram(s) Oral once PRN Blood Glucose LESS THAN 70 milliGRAM(s)/deciliter  HYDROmorphone  Injectable 0.5 milliGRAM(s) IV Push every 6 hours PRN Severe Pain (7 - 10)  ondansetron    Tablet 8 milliGRAM(s) Oral every 8 hours PRN Nausea and/or Vomiting    LAB/STUDIES:                        13.9   10.32 )-----------( 143      ( 18 Oct 2022 22:21 )             43.0     10-18    137  |  98  |  16  ----------------------------<  241<H>  5.1<H>   |  27  |  0.7    Ca    9.3      18 Oct 2022 22:21    TPro  6.9  /  Alb  4.4  /  TBili  1.8<H>  /  DBili  x   /  AST  28  /  ALT  14  /  AlkPhos  62  10-18      LIVER FUNCTIONS - ( 18 Oct 2022 22:21 )  Alb: 4.4 g/dL / Pro: 6.9 g/dL / ALK PHOS: 62 U/L / ALT: 14 U/L / AST: 28 U/L / GGT: x             CARDIAC MARKERS ( 18 Oct 2022 22:21 )  x     / <0.01 ng/mL / x     / x     / x                  IMAGING:  < from: CT Angio Abdomen and Pelvis w/ IV Cont (10.18.22 @ 22:49) >  1. Cholelithiasis. Gallbladder pericholecystic stranding. Findings may   represent acute cholecystitis in the appropriate clinical setting.    2. No evidence of acute intrathoracic pathology. No evidence of acute   aortic pathology.    --- End of Report ---    < end of copied text >  < from: US Abdomen Upper Quadrant Right (10.19.22 @ 02:03) >  IMPRESSION:    Right renal calculi measuring up to 1.2 cm.    Gallbladder wall edema.  ******PRELIMINARY REPORT******      < end of copied text >    < from: NM Hepatobiliary Imaging (10.19.22 @ 16:35) >    ACC: [id number] EXAM:  NM HEPATOBILIARY IMG                          PROCEDURE DATE:  10/19/2022          INTERPRETATION:  HEPATOBILIARY IMAGES    REASON: Abdominal pain  COMPARISON CT abdomen pelvis 10/18/2022  Following the intravenous administration of 4.2 mCi 99m-Tc mebrofenin,   anterior images over the abdomen were acquired at 2, 5, 10, 15, 30, 45,   60 minutes,  2 hours, and 4 hours post injection Oblique and right   lateral views were obtained at 1,2, and 4 hours post-injection. A 14 cm   length size standardization marker was used.    These images reveal no definite visualization of the gallbladder through   4 hours post-injection, consistent with cholecystitis, and clinical   correlation is suggested. Biliary tree is visualized at 10 minutes, and   there is visualization of intestinal activity by 15 minutes post   injection.    < end of copied text >  < from: NM Hepatobiliary Imaging (10.19.22 @ 16:35) >      IMPRESSION:  NO DEFINITE VISUALIZATION OF THE GALLBLADDER THROUGH 4 HOURS   POST-INJECTION, CONSISTENT WITH CHOLECYSTITIS, AS DESCRIBED ABOVE.    CLINICAL CORRELATION IS SUGGESTED.        Dr. Pinky Zendejas discussed preliminary findings with FABRIZIO JULES on   10/19/2022 5:04 PM with readback.    --- End of Report ---            PINKY ZENDEJAS MD; Attending Radiologist  This document has been electronically signed. Oct 19 2022  5:05PM    < end of copied text >

## 2022-10-19 NOTE — CONSULT NOTE ADULT - ATTENDING COMMENTS
patient seen. has gallstoens. abdomen soft. admit to medicine for hypertensive emergency. less likey acute yefri, recommend HIDA.

## 2022-10-19 NOTE — CONSULT NOTE ADULT - SUBJECTIVE AND OBJECTIVE BOX
GENERAL SURGERY CONSULT NOTE    Patient: MERARY GARCIA , 71y ([birth year])Male   MRN: [id number]  Location: [facility] 018 A  Visit: 10-19-22 Inpatient  Date: 10-19-22 @ 12:36    HPI:  Patient is a 71-yo male with pmhx of htn, hld, DM2, MI s/p CABG, DVT/PE on Xarelto, recurrent kidney stones who presented with a chief complaint of abdominal pain and high blood pressure x 1 day. The pain started yesterday afternoon as a "horrible discomfort". It is hypogastric in anture, diffuse, encircling to patient's low back. It is constant, was a 10/10 prior to pain meds (now 7/10), no exacerbating factors. Patient states he takes his bp daily and usually runs around 155/65, but found his systolic in the 200s so decided to come to hospital. Patient denies other systemic sxs, including fever, n/v/d, cp, sob, chills, dysuria, hematuria, hematochezia, joint pains or recent sick contacts. Patient says the pain is similar to what he experienced with his kidney stones but that the pain was located flanks during those incidences.     ED Course:  Vitals- bp 229/95, hr 68, rr 20, t 35.6, 95% on RA  Labs- wbc wnl, Hb 13.9, K 5.1 (hemolyzed), glucose 241, TBili 1.8  EKG- Normal sinus rhythm, Left ventricular hypertrophy with QRS widening and repolarization abnormality ( R in aVL , [employer] product , Romhilt-Bowen ), Inferior infarct , age undetermined  BP had 20 point difference between arms and there was a concern for dissection. A CT angio obtained was negative for dissection, but did show gallstones and enlarged gallbladder with a stone at the neck. An US of RUQ was ordered to further delineate if pain is due to biliary pathology. US shows a right renal calculi measuring 1.2 cm and gallbladder edema. Pt states he has never been told he has gallstones before.    Patient has received total of labetalol 20 IVP/300 PO, nifedipine 30 PO, and morphine 10mg IVP.    He is being admitted for further management.   (19 Oct 2022 08:09)      PAST MEDICAL & SURGICAL HISTORY:  Hypertension, unspecified type      Type 2 diabetes mellitus with complication, unspecified long term insulin use status      High cholesterol      Kidney stones      MI (myocardial infarction)      DVT (deep venous thrombosis)      Acute massive pulmonary embolism  s/p tPA in 2018      Carotid atherosclerosis      H/O heart surgery  quadruple bypass 6 years ago      H/O cystoscopy          Home Medications:  Aspirin Enteric Coated 81 mg oral delayed release tablet: 1 tab(s) orally once a day (24 Mar 2021 16:36)  metFORMIN 1000 mg oral tablet: 1 tab(s) orally 2 times a day (24 Mar 2021 16:36)  simvastatin 40 mg oral tablet: 1 tab(s) orally once a day (at bedtime) (24 Mar 2021 16:36)  Xarelto 20 mg oral tablet: 1 tab(s) orally once a day (in the morning) (24 Mar 2021 16:36)        VITALS:  T(F): 96.2 (10-19-22 @ 11:35), Max: 97 (10-18-22 @ 23:32)  HR: 77 (10-19-22 @ 11:35) (61 - 77)  BP: 182/86 (10-19-22 @ 11:35) (182/86 - 241/107)  RR: 18 (10-19-22 @ 11:35) (18 - 20)  SpO2: 100% (10-19-22 @ 11:35) (93% - 100%)    PHYSICAL EXAM:  General Appearance: NAD  HEENT: Normocephalic, atraumatic, trachea midline  Heart: s1, s2  Lungs: No increased work of breathing or accessory muscle use. Symmetric chest wall rise and fall. Bilateral breath sounds  Abdomen:  Soft, nontender, nondistended. No rebound or guarding.  MSK/Extremities: Warm & well-perfused.   Skin: Warm, dry. No jaundice.     MEDICATIONS  (STANDING):  aspirin  chewable 81 milliGRAM(s) Oral daily  cholecalciferol 1000 Unit(s) Oral daily  cyanocobalamin 1000 MICROGram(s) Oral daily  dextrose 5%. 1000 milliLiter(s) (50 mL/Hr) IV Continuous <Continuous>  dextrose 5%. 1000 milliLiter(s) (100 mL/Hr) IV Continuous <Continuous>  dextrose 50% Injectable 25 Gram(s) IV Push once  dextrose 50% Injectable 12.5 Gram(s) IV Push once  dextrose 50% Injectable 25 Gram(s) IV Push once  enoxaparin Injectable 40 milliGRAM(s) SubCutaneous every 12 hours  furosemide    Tablet 20 milliGRAM(s) Oral daily  glucagon  Injectable 1 milliGRAM(s) IntraMuscular once  insulin lispro (ADMELOG) corrective regimen sliding scale   SubCutaneous three times a day before meals  labetalol 200 milliGRAM(s) Oral two times a day  lactated ringers. 1000 milliLiter(s) (100 mL/Hr) IV Continuous <Continuous>  losartan 100 milliGRAM(s) Oral daily  simvastatin 40 milliGRAM(s) Oral at bedtime  tamsulosin 0.4 milliGRAM(s) Oral at bedtime    MEDICATIONS  (PRN):  dextrose Oral Gel 15 Gram(s) Oral once PRN Blood Glucose LESS THAN 70 milliGRAM(s)/deciliter  ketorolac   Injectable 15 milliGRAM(s) IV Push three times a day PRN Moderate Pain (4 - 6)  ondansetron    Tablet 8 milliGRAM(s) Oral every 8 hours PRN Nausea and/or Vomiting      LAB/STUDIES:                        13.9   10.32 )-----------( 143      ( 18 Oct 2022 22:21 )             43.0     10-18    137  |  98  |  16  ----------------------------<  241<H>  5.1<H>   |  27  |  0.7    Ca    9.3      18 Oct 2022 22:21    TPro  6.9  /  Alb  4.4  /  TBili  1.8<H>  /  DBili  x   /  AST  28  /  ALT  14  /  AlkPhos  62  10-18      LIVER FUNCTIONS - ( 18 Oct 2022 22:21 )  Alb: 4.4 g/dL / Pro: 6.9 g/dL / ALK PHOS: 62 U/L / ALT: 14 U/L / AST: 28 U/L / GGT: x             CARDIAC MARKERS ( 18 Oct 2022 22:21 )  x     / <0.01 ng/mL / x     / x     / x                  IMAGING:  < from: CT Angio Abdomen and Pelvis w/ IV Cont (10.18.22 @ 22:49) >  1. Cholelithiasis. Gallbladder pericholecystic stranding. Findings may   represent acute cholecystitis in the appropriate clinical setting.    2. No evidence of acute intrathoracic pathology. No evidence of acute   aortic pathology.    --- End of Report ---    < end of copied text >  < from: US Abdomen Upper Quadrant Right (10.19.22 @ 02:03) >  IMPRESSION:    Right renal calculi measuring up to 1.2 cm.    Gallbladder wall edema.        ******PRELIMINARY REPORT******      < end of copied text >

## 2022-10-19 NOTE — CONSULT NOTE ADULT - ASSESSMENT
ASSESSMENT:  71M w/ PMH of HLD, HTN, MI, DM, CAD s/p CABG, previous DVT/PE on Xarelto presents for hypotensionand kidney stones presents to ED with complaints of abdominal pain in the bilateral flanks radiating across the lower abdomen as well as hypertension. Initial vitals noted to have a systolic of 239. A CT showed gallstones and enlarged gallbladder with a stone at the neck.   An US of RUQ was ordered to further delineate if pain is due to biliary pathology. US shows a right renal calculi measuring 1.2 cm and gallbladder edema. On exam, pt complains of right flank pain. Physical exam findings, imaging, and labs as documented above.       PLAN:  - Patient is admitted to medicine, surgery will follow.   - given lack of fever, white count, transaminitis, and clear cut imaging evidence for cholecystitis and pt h/o bouts of kidney stones this picture favors nephrolithiasis/renal colic or biliary colic  -pt with extensive PMH including CABG, MI, massive P.E. and on A.C., poor surgical candidate   - could consider HIDA scan if clinical suspicion remains high for acute cholecystitis  - if HIDA+, a perc yefri would be the safer option for such a patient  - presented with severe HTN, needs controlled    Above plan discussed with Attending Surgeon Dr. Crouch , patient, patient family, and Primary team  10-19-22 @ 12:36

## 2022-10-19 NOTE — CHART NOTE - NSCHARTNOTEFT_GEN_A_CORE
Radiology approached me for positive HIDA scan. Surgery team made aware of the findings. will follow.

## 2022-10-19 NOTE — H&P ADULT - NSHPPHYSICALEXAM_GEN_ALL_CORE
VITALS:   T(C): 36.1 (10-19-22 @ 05:23), Max: 36.1 (10-18-22 @ 23:32)  HR: 62 (10-19-22 @ 06:55) (61 - 68)  BP: 241/107 (10-19-22 @ 06:55) (228/100 - 241/107)  RR: 19 (10-19-22 @ 06:55) (18 - 20)  SpO2: 95% (10-19-22 @ 06:55) (93% - 95%)    GENERAL: NAD, lying in bed comfortably  HEAD: Atraumatic, Normocephalic  EYES: EOMI, PERRLA, conjunctiva and sclera clear  ENT: Moist mucous membranes  NECK: Supple, No JVD  CHEST/LUNG: Clear to auscultation bilaterally; No rales, rhonchi, wheezing, or rubs. shallow breaths  HEART: Regular rate and rhythm; No murmurs, rubs, or gallops  ABDOMEN: BSx4; Soft, nondistended, no ttp in hypogastrium with deep palpation, no CVA or suprapubic tenderness, "discomfort" with [place]  EXTREMITIES:  2+ Peripheral Pulses, brisk capillary refill. No clubbing, cyanosis, or edema  NERVOUS SYSTEM:  A&Ox3, no focal deficits   SKIN: No rashes or lesions

## 2022-10-19 NOTE — CONSULT NOTE ADULT - ASSESSMENT
71M w/ PMH of HLD, HTN, MI, DM, CAD s/p CABG, previous DVT/PE on Xarelto presents for hypotensionand kidney stones presents to ED with complaints of abdominal pain in the bilateral flanks radiating across the lower abdomen as well as hypertension. Initial vitals noted to have a systolic of 239. A CT showed gallstones and enlarged gallbladder with a stone at the neck.   An US of RUQ was ordered to further delineate if pain is due to biliary pathology. US shows a right renal calculi measuring 1.2 cm and gallbladder edema. On exam, pt complains of right flank pain. Physical exam findings, imaging, and labs as documented above.     PLAN:  - given lack of fever, white count, transaminitis, and clear cut imaging evidence for cholecystitis and pt h/o bouts of kidney stones this picture favors nephrolithiasis/renal colic or biliary colic  -pt with extensive PMH including CABG, MI, massive P.E. and on A.C., poor surgical candidate   - could consider HIDA scan if clinical suspicion remains high for acute cholecystitis  - if HIDA+, a perc yefri would be the safer option for such a patient  - presented with severe HTN, needs controlled  - surgery will follow if admitted    Above plan discussed with Attending Surgeon Dr. Paige  10-19-22 @ 03:25

## 2022-10-19 NOTE — H&P ADULT - NSICDXPASTSURGICALHX_GEN_ALL_CORE_FT
Medication: adderall 10 mg    Last visit: 5/10/19    Next visit: 9/20/19    Last refill: 5/10/19    Request forwarded to PCP for approval.    
Prescription sent  
PAST SURGICAL HISTORY:  H/O cystoscopy     H/O heart surgery quadruple bypass 6 years ago

## 2022-10-19 NOTE — ED ADULT NURSE NOTE - NSICDXFAMILYHX_GEN_ALL_CORE_FT
FAMILY HISTORY:  FH: myocardial infarction, mother at age 78    
without assistive device/independent

## 2022-10-19 NOTE — ED PROVIDER NOTE - OBJECTIVE STATEMENT
70 yo male with a pmh of HTN, HLD, DM, and MI presents c/o abdominal pain and HTN for one day. pt notes the pain diffusely throughout abdomen and describes it as a "discomfort". pt has experienced nausea with no vomiting. pt denies any other symptoms including fevers, chill, headache, recent illness/travel, cough, chest pain, or SOB.

## 2022-10-20 LAB
A1C WITH ESTIMATED AVERAGE GLUCOSE RESULT: 7.2 % — HIGH (ref 4–5.6)
ALBUMIN SERPL ELPH-MCNC: 3.8 G/DL — SIGNIFICANT CHANGE UP (ref 3.5–5.2)
ALBUMIN SERPL ELPH-MCNC: 3.8 G/DL — SIGNIFICANT CHANGE UP (ref 3.5–5.2)
ALP SERPL-CCNC: 56 U/L — SIGNIFICANT CHANGE UP (ref 30–115)
ALP SERPL-CCNC: 60 U/L — SIGNIFICANT CHANGE UP (ref 30–115)
ALT FLD-CCNC: 8 U/L — SIGNIFICANT CHANGE UP (ref 0–41)
ALT FLD-CCNC: 9 U/L — SIGNIFICANT CHANGE UP (ref 0–41)
ANION GAP SERPL CALC-SCNC: 14 MMOL/L — SIGNIFICANT CHANGE UP (ref 7–14)
AST SERPL-CCNC: 10 U/L — SIGNIFICANT CHANGE UP (ref 0–41)
AST SERPL-CCNC: 10 U/L — SIGNIFICANT CHANGE UP (ref 0–41)
BASOPHILS # BLD AUTO: 0.03 K/UL — SIGNIFICANT CHANGE UP (ref 0–0.2)
BASOPHILS NFR BLD AUTO: 0.2 % — SIGNIFICANT CHANGE UP (ref 0–1)
BILIRUB DIRECT SERPL-MCNC: 0.6 MG/DL — HIGH (ref 0–0.3)
BILIRUB DIRECT SERPL-MCNC: 0.6 MG/DL — HIGH (ref 0–0.3)
BILIRUB INDIRECT FLD-MCNC: 3 MG/DL — HIGH (ref 0.2–1.2)
BILIRUB INDIRECT FLD-MCNC: 3 MG/DL — HIGH (ref 0.2–1.2)
BILIRUB SERPL-MCNC: 3.2 MG/DL — HIGH (ref 0.2–1.2)
BILIRUB SERPL-MCNC: 3.6 MG/DL — HIGH (ref 0.2–1.2)
BILIRUB SERPL-MCNC: 3.6 MG/DL — HIGH (ref 0.2–1.2)
BUN SERPL-MCNC: 13 MG/DL — SIGNIFICANT CHANGE UP (ref 10–20)
CALCIUM SERPL-MCNC: 8.3 MG/DL — LOW (ref 8.4–10.5)
CHLORIDE SERPL-SCNC: 95 MMOL/L — LOW (ref 98–110)
CO2 SERPL-SCNC: 27 MMOL/L — SIGNIFICANT CHANGE UP (ref 17–32)
CREAT SERPL-MCNC: 0.7 MG/DL — SIGNIFICANT CHANGE UP (ref 0.7–1.5)
EGFR: 100 ML/MIN/1.73M2 — SIGNIFICANT CHANGE UP
EOSINOPHIL # BLD AUTO: 0.04 K/UL — SIGNIFICANT CHANGE UP (ref 0–0.7)
EOSINOPHIL NFR BLD AUTO: 0.2 % — SIGNIFICANT CHANGE UP (ref 0–8)
ESTIMATED AVERAGE GLUCOSE: 160 MG/DL — HIGH (ref 68–114)
GLUCOSE BLDC GLUCOMTR-MCNC: 191 MG/DL — HIGH (ref 70–99)
GLUCOSE BLDC GLUCOMTR-MCNC: 199 MG/DL — HIGH (ref 70–99)
GLUCOSE BLDC GLUCOMTR-MCNC: 222 MG/DL — HIGH (ref 70–99)
GLUCOSE BLDC GLUCOMTR-MCNC: 228 MG/DL — HIGH (ref 70–99)
GLUCOSE SERPL-MCNC: 215 MG/DL — HIGH (ref 70–99)
HCT VFR BLD CALC: 40.8 % — LOW (ref 42–52)
HGB BLD-MCNC: 13.4 G/DL — LOW (ref 14–18)
IMM GRANULOCYTES NFR BLD AUTO: 0.5 % — HIGH (ref 0.1–0.3)
LYMPHOCYTES # BLD AUTO: 1.03 K/UL — LOW (ref 1.2–3.4)
LYMPHOCYTES # BLD AUTO: 6.4 % — LOW (ref 20.5–51.1)
MAGNESIUM SERPL-MCNC: 1.7 MG/DL — LOW (ref 1.8–2.4)
MCHC RBC-ENTMCNC: 29.6 PG — SIGNIFICANT CHANGE UP (ref 27–31)
MCHC RBC-ENTMCNC: 32.8 G/DL — SIGNIFICANT CHANGE UP (ref 32–37)
MCV RBC AUTO: 90.1 FL — SIGNIFICANT CHANGE UP (ref 80–94)
MONOCYTES # BLD AUTO: 1.51 K/UL — HIGH (ref 0.1–0.6)
MONOCYTES NFR BLD AUTO: 9.4 % — HIGH (ref 1.7–9.3)
NEUTROPHILS # BLD AUTO: 13.39 K/UL — HIGH (ref 1.4–6.5)
NEUTROPHILS NFR BLD AUTO: 83.3 % — HIGH (ref 42.2–75.2)
NRBC # BLD: 0 /100 WBCS — SIGNIFICANT CHANGE UP (ref 0–0)
PLATELET # BLD AUTO: 162 K/UL — SIGNIFICANT CHANGE UP (ref 130–400)
POTASSIUM SERPL-MCNC: 3.8 MMOL/L — SIGNIFICANT CHANGE UP (ref 3.5–5)
POTASSIUM SERPL-SCNC: 3.8 MMOL/L — SIGNIFICANT CHANGE UP (ref 3.5–5)
PROT SERPL-MCNC: 5.7 G/DL — LOW (ref 6–8)
PROT SERPL-MCNC: 5.9 G/DL — LOW (ref 6–8)
RBC # BLD: 4.53 M/UL — LOW (ref 4.7–6.1)
RBC # FLD: 14.1 % — SIGNIFICANT CHANGE UP (ref 11.5–14.5)
SODIUM SERPL-SCNC: 136 MMOL/L — SIGNIFICANT CHANGE UP (ref 135–146)
WBC # BLD: 16.08 K/UL — HIGH (ref 4.8–10.8)
WBC # FLD AUTO: 16.08 K/UL — HIGH (ref 4.8–10.8)

## 2022-10-20 PROCEDURE — 99447 NTRPROF PH1/NTRNET/EHR 11-20: CPT

## 2022-10-20 PROCEDURE — 93010 ELECTROCARDIOGRAM REPORT: CPT

## 2022-10-20 RX ORDER — MAGNESIUM SULFATE 500 MG/ML
2 VIAL (ML) INJECTION ONCE
Refills: 0 | Status: COMPLETED | OUTPATIENT
Start: 2022-10-20 | End: 2022-10-20

## 2022-10-20 RX ORDER — MORPHINE SULFATE 50 MG/1
2 CAPSULE, EXTENDED RELEASE ORAL EVERY 4 HOURS
Refills: 0 | Status: DISCONTINUED | OUTPATIENT
Start: 2022-10-20 | End: 2022-10-20

## 2022-10-20 RX ADMIN — MORPHINE SULFATE 2 MILLIGRAM(S): 50 CAPSULE, EXTENDED RELEASE ORAL at 18:45

## 2022-10-20 RX ADMIN — AMPICILLIN SODIUM AND SULBACTAM SODIUM 100 GRAM(S): 250; 125 INJECTION, POWDER, FOR SUSPENSION INTRAMUSCULAR; INTRAVENOUS at 00:21

## 2022-10-20 RX ADMIN — PREGABALIN 1000 MICROGRAM(S): 225 CAPSULE ORAL at 11:16

## 2022-10-20 RX ADMIN — HYDROMORPHONE HYDROCHLORIDE 0.5 MILLIGRAM(S): 2 INJECTION INTRAMUSCULAR; INTRAVENOUS; SUBCUTANEOUS at 05:50

## 2022-10-20 RX ADMIN — MORPHINE SULFATE 2 MILLIGRAM(S): 50 CAPSULE, EXTENDED RELEASE ORAL at 23:58

## 2022-10-20 RX ADMIN — Medication 25 GRAM(S): at 02:38

## 2022-10-20 RX ADMIN — ENOXAPARIN SODIUM 40 MILLIGRAM(S): 100 INJECTION SUBCUTANEOUS at 18:15

## 2022-10-20 RX ADMIN — Medication 200 MILLIGRAM(S): at 18:15

## 2022-10-20 RX ADMIN — AMPICILLIN SODIUM AND SULBACTAM SODIUM 100 GRAM(S): 250; 125 INJECTION, POWDER, FOR SUSPENSION INTRAMUSCULAR; INTRAVENOUS at 05:37

## 2022-10-20 RX ADMIN — TAMSULOSIN HYDROCHLORIDE 0.4 MILLIGRAM(S): 0.4 CAPSULE ORAL at 21:02

## 2022-10-20 RX ADMIN — Medication 81 MILLIGRAM(S): at 11:16

## 2022-10-20 RX ADMIN — SODIUM CHLORIDE 100 MILLILITER(S): 9 INJECTION, SOLUTION INTRAVENOUS at 18:15

## 2022-10-20 RX ADMIN — AMPICILLIN SODIUM AND SULBACTAM SODIUM 100 GRAM(S): 250; 125 INJECTION, POWDER, FOR SUSPENSION INTRAMUSCULAR; INTRAVENOUS at 11:15

## 2022-10-20 RX ADMIN — SIMVASTATIN 40 MILLIGRAM(S): 20 TABLET, FILM COATED ORAL at 21:02

## 2022-10-20 RX ADMIN — Medication 1000 UNIT(S): at 11:16

## 2022-10-20 RX ADMIN — AMPICILLIN SODIUM AND SULBACTAM SODIUM 100 GRAM(S): 250; 125 INJECTION, POWDER, FOR SUSPENSION INTRAMUSCULAR; INTRAVENOUS at 23:00

## 2022-10-20 RX ADMIN — Medication 25 GRAM(S): at 12:23

## 2022-10-20 RX ADMIN — MORPHINE SULFATE 2 MILLIGRAM(S): 50 CAPSULE, EXTENDED RELEASE ORAL at 18:15

## 2022-10-20 RX ADMIN — LOSARTAN POTASSIUM 100 MILLIGRAM(S): 100 TABLET, FILM COATED ORAL at 05:37

## 2022-10-20 RX ADMIN — Medication 20 MILLIGRAM(S): at 05:37

## 2022-10-20 RX ADMIN — AMPICILLIN SODIUM AND SULBACTAM SODIUM 100 GRAM(S): 250; 125 INJECTION, POWDER, FOR SUSPENSION INTRAMUSCULAR; INTRAVENOUS at 18:15

## 2022-10-20 RX ADMIN — ENOXAPARIN SODIUM 40 MILLIGRAM(S): 100 INJECTION SUBCUTANEOUS at 05:37

## 2022-10-20 RX ADMIN — SODIUM CHLORIDE 100 MILLILITER(S): 9 INJECTION, SOLUTION INTRAVENOUS at 02:39

## 2022-10-20 RX ADMIN — Medication 200 MILLIGRAM(S): at 05:37

## 2022-10-20 NOTE — PROGRESS NOTE ADULT - ASSESSMENT
`71M w/ PMH of HLD, HTN, MI, DM, CAD s/p CABG, previous DVT/PE on Xarelto, recurrent kidney stones presents to ED with complaints of abdominal pain and hypertension.     # cholecystitis (confirmed on HIDA, suggested by RUQ US)  - poor candidate for surgery  - perc. cholecystectomy by IR on tuesday  - PE unremarkable, mild ttp RUQ/flank  - IV unasyn  - may advance diet    #HTN Urgency: resolved  - likely 2/2 severe abd pain, last [id number]  - no evidence of emergency, labs wnl, no cp, ams, n/v, sob 2/2 abd pain vs opioid SE?  - c/w home dose labetalol 200mg BID, Losartan 100 QD, Lasix 20 QD    #hx of DVT/PE  #hx of MI/CABG  - xarelto held, fu INR in the am  - Lovenox 40 BID  - c/w ASA 81 QD    #DM2  - SS insulin  - monitor FS     #HLD  - c/w simvastatin 40mg    #BPH  - c/w Flomax 0.4    # Misc  - GI ppx: Protonix  - DVT ppx: Lovenox 40 BID  - Diet: NPO for now  - Activity: AAT  - DISPO: acute    CODE STATUS:

## 2022-10-20 NOTE — CONSULT NOTE ADULT - SUBJECTIVE AND OBJECTIVE BOX
INTERVENTIONAL RADIOLOGY CONSULT:     Procedure Requested: Percutaneous cholecystostomy    HPI:  Patient is a 71-yo male with pmhx of htn, hld, DM2, MI s/p CABG, DVT/PE on Xarelto, recurrent kidney stones who presented with a chief complaint of abdominal pain and high blood pressure x 1 day. The pain started yesterday afternoon as a "horrible discomfort". It is hypogastric in anture, diffuse, encircling to patient's low back. It is constant, was a 10/10 prior to pain meds (now 7/10), no exacerbating factors. Patient states he takes his bp daily and usually runs around 155/65, but found his systolic in the 200s so decided to come to hospital. Patient denies other systemic sxs, including fever, n/v/d, cp, sob, chills, dysuria, hematuria, hematochezia, joint pains or recent sick contacts. Patient says the pain is similar to what he experienced with his kidney stones but that the pain was located flanks during those incidences.     ED Course:  Vitals- bp 229/95, hr 68, rr 20, t 35.6, 95% on RA  Labs- wbc wnl, Hb 13.9, K 5.1 (hemolyzed), glucose 241, TBili 1.8  EKG- Normal sinus rhythm, Left ventricular hypertrophy with QRS widening and repolarization abnormality ( R in aVL , [place] product , Romhilt-Bowen ), Inferior infarct , age undetermined  BP had 20 point difference between arms and there was a concern for dissection. A CT angio obtained was negative for dissection, but did show gallstones and enlarged gallbladder with a stone at the neck. An US of RUQ was ordered to further delineate if pain is due to biliary pathology. US shows a right renal calculi measuring 1.2 cm and gallbladder edema. Pt states he has never been told he has gallstones before.    Patient has received total of labetalol 20 IVP/300 PO, nifedipine 30 PO, and morphine 10mg IVP.    He is being admitted for further management.  (19 Oct 2022 08:09)      PAST MEDICAL & SURGICAL HISTORY:  Hypertension, unspecified type  Type 2 diabetes mellitus with complication, unspecified long term insulin use status  High cholesterol  Kidney stones  MI (myocardial infarction)  DVT (deep venous thrombosis)  Acute massive pulmonary embolism  s/p tPA in 2018  Carotid atherosclerosis  H/O heart surgery  quadruple bypass 6 years ago  H/O cystoscopy      MEDICATIONS  (STANDING):  ampicillin/sulbactam  IVPB 1.5 Gram(s) IV Intermittent every 6 hours  aspirin  chewable 81 milliGRAM(s) Oral daily  cholecalciferol 1000 Unit(s) Oral daily  cyanocobalamin 1000 MICROGram(s) Oral daily  dextrose 5%. 1000 milliLiter(s) (100 mL/Hr) IV Continuous <Continuous>  dextrose 5%. 1000 milliLiter(s) (50 mL/Hr) IV Continuous <Continuous>  dextrose 50% Injectable 25 Gram(s) IV Push once  dextrose 50% Injectable 12.5 Gram(s) IV Push once  dextrose 50% Injectable 25 Gram(s) IV Push once  enoxaparin Injectable 40 milliGRAM(s) SubCutaneous every 12 hours  furosemide    Tablet 20 milliGRAM(s) Oral daily  glucagon  Injectable 1 milliGRAM(s) IntraMuscular once  insulin lispro (ADMELOG) corrective regimen sliding scale   SubCutaneous three times a day before meals  labetalol 200 milliGRAM(s) Oral two times a day  lactated ringers. 1000 milliLiter(s) (100 mL/Hr) IV Continuous <Continuous>  losartan 100 milliGRAM(s) Oral daily  magnesium sulfate  IVPB 2 Gram(s) IV Intermittent once  simvastatin 40 milliGRAM(s) Oral at bedtime  tamsulosin 0.4 milliGRAM(s) Oral at bedtime    MEDICATIONS  (PRN):  dextrose Oral Gel 15 Gram(s) Oral once PRN Blood Glucose LESS THAN 70 milliGRAM(s)/deciliter  morphine  - Injectable 2 milliGRAM(s) IV Push every 4 hours PRN Moderate Pain (4 - 6)  ondansetron    Tablet 8 milliGRAM(s) Oral every 8 hours PRN Nausea and/or Vomiting      Allergies  No Known Allergies    Intolerances      FAMILY HISTORY:  FH: myocardial infarction  mother at age 78      Physical Exam:   Vital Signs Last 24 Hrs  T(C): 36.7 (20 Oct 2022 08:17), Max: 37.2 (19 Oct 2022 23:53)  T(F): 98 (20 Oct 2022 08:17), Max: 99 (19 Oct 2022 23:53)  HR: 77 (20 Oct 2022 10:24) (76 - 92)  BP: 122/59 (20 Oct 2022 10:24) (115/76 - 184/84)  BP(mean): --  RR: 17 (20 Oct 2022 10:24) (17 - 19)  SpO2: 95% (20 Oct 2022 10:24) (92% - 97%)    Parameters below as of 20 Oct 2022 10:24  Patient On (Oxygen Delivery Method): room air      Labs:                         13.4   16.08 )-----------( 162      ( 20 Oct 2022 08:28 )             40.8     10-20    136  |  95<L>  |  13  ----------------------------<  215<H>  3.8   |  27  |  0.7    Ca    8.3<L>      20 Oct 2022 08:28  Mg     1.7     10-20    TPro  5.9<L>  /  Alb  3.8  /  TBili  3.2<H>  /  DBili  x   /  AST  10  /  ALT  9   /  AlkPhos  60  10-20        Pertinent labs:                      13.4   16.08 )-----------( 162      ( 20 Oct 2022 08:28 )             40.8       10-20    136  |  95<L>  |  13  ----------------------------<  215<H>  3.8   |  27  |  0.7    Ca    8.3<L>      20 Oct 2022 08:28  Mg     1.7     10-20    TPro  5.9<L>  /  Alb  3.8  /  TBili  3.2<H>  /  DBili  x   /  AST  10  /  ALT  9   /  AlkPhos  60  10-20          Radiology & Additional Studies:   Radiology imaging reviewed.       ASSESSMENT/ PLAN:     72 y/o M w/ PMH of HLD, HTN, MI, DM, CAD s/p CABG, previous DVT/PE presents to ED with complaints of abdominal pain in the bilateral flanks radiating across the lower abdomen as well as hypertension. Imaging findings suggestive of cholecystitis.    -IR consulted for percutaneous cholecystostomy placement  -CT and US imaging reviewed, HIDA no visualization of gallbladder  -WBC 13.8, afebrile  -High BP on presentation (200s SBP)  -Surgery onboard, deemed patient poor surgical candidate  -Patient on Aspirin, last dose 10/19 AM, will need to be held for 5 days; please hold Aspirin until after procedure  -Will tentatively schedule patient for procedure on Tuesday 10/25 as schedule permits  -Hold Lovenox evening before and morning of procedure date  -CBC/CMP/Coagulation studies evening before procedure date  -NPO after midnight on day of procedure      Thank you for the courtesy of this consult, please call [phone] with any further questions.    INTERVENTIONAL RADIOLOGY CONSULT:     Procedure Requested: Percutaneous cholecystostomy    HPI:  Patient is a 71-yo male with pmhx of htn, hld, DM2, MI s/p CABG, DVT/PE on Xarelto, recurrent kidney stones who presented with a chief complaint of abdominal pain and high blood pressure x 1 day. The pain started yesterday afternoon as a "horrible discomfort". It is hypogastric in anture, diffuse, encircling to patient's low back. It is constant, was a 10/10 prior to pain meds (now 7/10), no exacerbating factors. Patient states he takes his bp daily and usually runs around 155/65, but found his systolic in the 200s so decided to come to hospital. Patient denies other systemic sxs, including fever, n/v/d, cp, sob, chills, dysuria, hematuria, hematochezia, joint pains or recent sick contacts. Patient says the pain is similar to what he experienced with his kidney stones but that the pain was located flanks during those incidences.     ED Course:  Vitals- bp 229/95, hr 68, rr 20, t 35.6, 95% on RA  Labs- wbc wnl, Hb 13.9, K 5.1 (hemolyzed), glucose 241, TBili 1.8  EKG- Normal sinus rhythm, Left ventricular hypertrophy with QRS widening and repolarization abnormality ( R in aVL , [place] product , Romhilt-Bowen ), Inferior infarct , age undetermined  BP had 20 point difference between arms and there was a concern for dissection. A CT angio obtained was negative for dissection, but did show gallstones and enlarged gallbladder with a stone at the neck. An US of RUQ was ordered to further delineate if pain is due to biliary pathology. US shows a right renal calculi measuring 1.2 cm and gallbladder edema. Pt states he has never been told he has gallstones before.    Patient has received total of labetalol 20 IVP/300 PO, nifedipine 30 PO, and morphine 10mg IVP.    He is being admitted for further management.  (19 Oct 2022 08:09)      PAST MEDICAL & SURGICAL HISTORY:  Hypertension, unspecified type  Type 2 diabetes mellitus with complication, unspecified long term insulin use status  High cholesterol  Kidney stones  MI (myocardial infarction)  DVT (deep venous thrombosis)  Acute massive pulmonary embolism  s/p tPA in 2018  Carotid atherosclerosis  H/O heart surgery  quadruple bypass 6 years ago  H/O cystoscopy      MEDICATIONS  (STANDING):  ampicillin/sulbactam  IVPB 1.5 Gram(s) IV Intermittent every 6 hours  aspirin  chewable 81 milliGRAM(s) Oral daily  cholecalciferol 1000 Unit(s) Oral daily  cyanocobalamin 1000 MICROGram(s) Oral daily  dextrose 5%. 1000 milliLiter(s) (100 mL/Hr) IV Continuous <Continuous>  dextrose 5%. 1000 milliLiter(s) (50 mL/Hr) IV Continuous <Continuous>  dextrose 50% Injectable 25 Gram(s) IV Push once  dextrose 50% Injectable 12.5 Gram(s) IV Push once  dextrose 50% Injectable 25 Gram(s) IV Push once  enoxaparin Injectable 40 milliGRAM(s) SubCutaneous every 12 hours  furosemide    Tablet 20 milliGRAM(s) Oral daily  glucagon  Injectable 1 milliGRAM(s) IntraMuscular once  insulin lispro (ADMELOG) corrective regimen sliding scale   SubCutaneous three times a day before meals  labetalol 200 milliGRAM(s) Oral two times a day  lactated ringers. 1000 milliLiter(s) (100 mL/Hr) IV Continuous <Continuous>  losartan 100 milliGRAM(s) Oral daily  magnesium sulfate  IVPB 2 Gram(s) IV Intermittent once  simvastatin 40 milliGRAM(s) Oral at bedtime  tamsulosin 0.4 milliGRAM(s) Oral at bedtime    MEDICATIONS  (PRN):  dextrose Oral Gel 15 Gram(s) Oral once PRN Blood Glucose LESS THAN 70 milliGRAM(s)/deciliter  morphine  - Injectable 2 milliGRAM(s) IV Push every 4 hours PRN Moderate Pain (4 - 6)  ondansetron    Tablet 8 milliGRAM(s) Oral every 8 hours PRN Nausea and/or Vomiting      Allergies  No Known Allergies    Intolerances      FAMILY HISTORY:  FH: myocardial infarction  mother at age 78      Physical Exam:   Vital Signs Last 24 Hrs  T(C): 36.7 (20 Oct 2022 08:17), Max: 37.2 (19 Oct 2022 23:53)  T(F): 98 (20 Oct 2022 08:17), Max: 99 (19 Oct 2022 23:53)  HR: 77 (20 Oct 2022 10:24) (76 - 92)  BP: 122/59 (20 Oct 2022 10:24) (115/76 - 184/84)  BP(mean): --  RR: 17 (20 Oct 2022 10:24) (17 - 19)  SpO2: 95% (20 Oct 2022 10:24) (92% - 97%)    Parameters below as of 20 Oct 2022 10:24  Patient On (Oxygen Delivery Method): room air      Labs:                         13.4   16.08 )-----------( 162      ( 20 Oct 2022 08:28 )             40.8     10-20    136  |  95<L>  |  13  ----------------------------<  215<H>  3.8   |  27  |  0.7    Ca    8.3<L>      20 Oct 2022 08:28  Mg     1.7     10-20    TPro  5.9<L>  /  Alb  3.8  /  TBili  3.2<H>  /  DBili  x   /  AST  10  /  ALT  9   /  AlkPhos  60  10-20        Pertinent labs:                      13.4   16.08 )-----------( 162      ( 20 Oct 2022 08:28 )             40.8       10-20    136  |  95<L>  |  13  ----------------------------<  215<H>  3.8   |  27  |  0.7    Ca    8.3<L>      20 Oct 2022 08:28  Mg     1.7     10-20    TPro  5.9<L>  /  Alb  3.8  /  TBili  3.2<H>  /  DBili  x   /  AST  10  /  ALT  9   /  AlkPhos  60  10-20          Radiology & Additional Studies:   Radiology imaging reviewed.       ASSESSMENT/ PLAN:     70 y/o M w/ PMH of HLD, HTN, MI, DM, CAD s/p CABG, previous DVT/PE presents to ED with complaints of abdominal pain in the bilateral flanks radiating across the lower abdomen as well as hypertension. Imaging findings suggestive of cholecystitis.    -IR consulted for percutaneous cholecystostomy placement  -CT and US imaging reviewed, HIDA no visualization of gallbladder  -WBC 13.8, afebrile  -High BP on presentation (200s SBP)  -Surgery onboard, deemed patient poor surgical candidate  -Patient on Aspirin, last dose 10/19 AM, will need to be held for 5 days; please hold Aspirin until after procedure  -Will tentatively schedule patient for procedure on Tuesday 10/25 as schedule permits  -Patient will need COVID test within 5 days of procedure; please order covid test approximately 24-48 hours prior to procedure  -Hold Lovenox evening before and morning of procedure date  -CBC/CMP/Coagulation studies evening before procedure date  -NPO after midnight on day of procedure      Thank you for the courtesy of this consult, please call [phone] with any further questions.    INTERVENTIONAL RADIOLOGY CONSULT:     Procedure Requested: Percutaneous cholecystostomy    HPI:  Patient is a 71-yo male with pmhx of htn, hld, DM2, MI s/p CABG, DVT/PE on Xarelto, recurrent kidney stones who presented with a chief complaint of abdominal pain and high blood pressure x 1 day. The pain started yesterday afternoon as a "horrible discomfort". It is hypogastric in anture, diffuse, encircling to patient's low back. It is constant, was a 10/10 prior to pain meds (now 7/10), no exacerbating factors. Patient states he takes his bp daily and usually runs around 155/65, but found his systolic in the 200s so decided to come to hospital. Patient denies other systemic sxs, including fever, n/v/d, cp, sob, chills, dysuria, hematuria, hematochezia, joint pains or recent sick contacts. Patient says the pain is similar to what he experienced with his kidney stones but that the pain was located flanks during those incidences.     ED Course:  Vitals- bp 229/95, hr 68, rr 20, t 35.6, 95% on RA  Labs- wbc wnl, Hb 13.9, K 5.1 (hemolyzed), glucose 241, TBili 1.8  EKG- Normal sinus rhythm, Left ventricular hypertrophy with QRS widening and repolarization abnormality ( R in aVL , [place] product , Romhilt-Bowen ), Inferior infarct , age undetermined  BP had 20 point difference between arms and there was a concern for dissection. A CT angio obtained was negative for dissection, but did show gallstones and enlarged gallbladder with a stone at the neck. An US of RUQ was ordered to further delineate if pain is due to biliary pathology. US shows a right renal calculi measuring 1.2 cm and gallbladder edema. Pt states he has never been told he has gallstones before.    Patient has received total of labetalol 20 IVP/300 PO, nifedipine 30 PO, and morphine 10mg IVP.    He is being admitted for further management.  (19 Oct 2022 08:09)      PAST MEDICAL & SURGICAL HISTORY:  Hypertension, unspecified type  Type 2 diabetes mellitus with complication, unspecified long term insulin use status  High cholesterol  Kidney stones  MI (myocardial infarction)  DVT (deep venous thrombosis)  Acute massive pulmonary embolism  s/p tPA in 2018  Carotid atherosclerosis  H/O heart surgery  quadruple bypass 6 years ago  H/O cystoscopy      MEDICATIONS  (STANDING):  ampicillin/sulbactam  IVPB 1.5 Gram(s) IV Intermittent every 6 hours  aspirin  chewable 81 milliGRAM(s) Oral daily  cholecalciferol 1000 Unit(s) Oral daily  cyanocobalamin 1000 MICROGram(s) Oral daily  dextrose 5%. 1000 milliLiter(s) (100 mL/Hr) IV Continuous <Continuous>  dextrose 5%. 1000 milliLiter(s) (50 mL/Hr) IV Continuous <Continuous>  dextrose 50% Injectable 25 Gram(s) IV Push once  dextrose 50% Injectable 12.5 Gram(s) IV Push once  dextrose 50% Injectable 25 Gram(s) IV Push once  enoxaparin Injectable 40 milliGRAM(s) SubCutaneous every 12 hours  furosemide    Tablet 20 milliGRAM(s) Oral daily  glucagon  Injectable 1 milliGRAM(s) IntraMuscular once  insulin lispro (ADMELOG) corrective regimen sliding scale   SubCutaneous three times a day before meals  labetalol 200 milliGRAM(s) Oral two times a day  lactated ringers. 1000 milliLiter(s) (100 mL/Hr) IV Continuous <Continuous>  losartan 100 milliGRAM(s) Oral daily  magnesium sulfate  IVPB 2 Gram(s) IV Intermittent once  simvastatin 40 milliGRAM(s) Oral at bedtime  tamsulosin 0.4 milliGRAM(s) Oral at bedtime    MEDICATIONS  (PRN):  dextrose Oral Gel 15 Gram(s) Oral once PRN Blood Glucose LESS THAN 70 milliGRAM(s)/deciliter  morphine  - Injectable 2 milliGRAM(s) IV Push every 4 hours PRN Moderate Pain (4 - 6)  ondansetron    Tablet 8 milliGRAM(s) Oral every 8 hours PRN Nausea and/or Vomiting      Allergies  No Known Allergies    Intolerances      FAMILY HISTORY:  FH: myocardial infarction  mother at age 78      Physical Exam:   Vital Signs Last 24 Hrs  T(C): 36.7 (20 Oct 2022 08:17), Max: 37.2 (19 Oct 2022 23:53)  T(F): 98 (20 Oct 2022 08:17), Max: 99 (19 Oct 2022 23:53)  HR: 77 (20 Oct 2022 10:24) (76 - 92)  BP: 122/59 (20 Oct 2022 10:24) (115/76 - 184/84)  BP(mean): --  RR: 17 (20 Oct 2022 10:24) (17 - 19)  SpO2: 95% (20 Oct 2022 10:24) (92% - 97%)    Parameters below as of 20 Oct 2022 10:24  Patient On (Oxygen Delivery Method): room air      Labs:                         13.4   16.08 )-----------( 162      ( 20 Oct 2022 08:28 )             40.8     10-20    136  |  95<L>  |  13  ----------------------------<  215<H>  3.8   |  27  |  0.7    Ca    8.3<L>      20 Oct 2022 08:28  Mg     1.7     10-20    TPro  5.9<L>  /  Alb  3.8  /  TBili  3.2<H>  /  DBili  x   /  AST  10  /  ALT  9   /  AlkPhos  60  10-20          Radiology & Additional Studies:   Radiology imaging reviewed.       ASSESSMENT/ PLAN:     70 y/o M w/ PMH of HLD, HTN, MI, DM, CAD s/p CABG, previous DVT/PE presents to ED with complaints of abdominal pain in the bilateral flanks radiating across the lower abdomen as well as hypertension. Imaging findings suggestive of cholecystitis.    -IR consulted for percutaneous cholecystostomy placement  -CT and US imaging reviewed, HIDA no visualization of gallbladder  -WBC 13.8, afebrile  -High BP on presentation (200s SBP)  -Surgery onboard, deemed patient poor surgical candidate  -Patient on Aspirin, last dose 10/19 AM, will need to be held for 5 days; please hold Aspirin until after procedure  -Will tentatively schedule patient for procedure on Tuesday 10/25 as schedule permits  -Patient will need COVID test within 5 days of procedure; please order covid test approximately 24-48 hours prior to procedure  -Hold Lovenox evening before and morning of procedure date  -CBC/CMP/Coagulation studies evening before procedure date  -NPO after midnight on day of procedure      Thank you for the courtesy of this consult, please call [phone] with any further questions.

## 2022-10-20 NOTE — PROGRESS NOTE ADULT - SUBJECTIVE AND OBJECTIVE BOX
MERARY GARCIA 71y Male  MRN#: [id number]   Hospital Day: 1d    SUBJECTIVE  Patient is a 71y old Male who presents with a chief complaint of abd pain, htn (20 Oct 2022 12:12)  Currently admitted to medicine with the primary diagnosis of Abdominal pain      INTERVAL HPI AND OVERNIGHT EVENTS:  Patient was examined and seen at bedside. This morning he is resting comfortably in bed and reports no issues or overnight events.    OBJECTIVE  PAST MEDICAL & SURGICAL HISTORY  Hypertension, unspecified type    Type 2 diabetes mellitus with complication, unspecified long term insulin use status    High cholesterol    Kidney stones    MI (myocardial infarction)    DVT (deep venous thrombosis)    Acute massive pulmonary embolism  s/p tPA in 2018    Carotid atherosclerosis    H/O heart surgery  quadruple bypass 6 years ago    H/O cystoscopy      ALLERGIES:  No Known Allergies    MEDICATIONS:  STANDING MEDICATIONS  ampicillin/sulbactam  IVPB 1.5 Gram(s) IV Intermittent every 6 hours  cholecalciferol 1000 Unit(s) Oral daily  cyanocobalamin 1000 MICROGram(s) Oral daily  dextrose 5%. 1000 milliLiter(s) IV Continuous <Continuous>  dextrose 5%. 1000 milliLiter(s) IV Continuous <Continuous>  dextrose 50% Injectable 25 Gram(s) IV Push once  dextrose 50% Injectable 12.5 Gram(s) IV Push once  dextrose 50% Injectable 25 Gram(s) IV Push once  enoxaparin Injectable 40 milliGRAM(s) SubCutaneous every 12 hours  furosemide    Tablet 20 milliGRAM(s) Oral daily  glucagon  Injectable 1 milliGRAM(s) IntraMuscular once  insulin lispro (ADMELOG) corrective regimen sliding scale   SubCutaneous three times a day before meals  labetalol 200 milliGRAM(s) Oral two times a day  lactated ringers. 1000 milliLiter(s) IV Continuous <Continuous>  losartan 100 milliGRAM(s) Oral daily  simvastatin 40 milliGRAM(s) Oral at bedtime  tamsulosin 0.4 milliGRAM(s) Oral at bedtime    PRN MEDICATIONS  dextrose Oral Gel 15 Gram(s) Oral once PRN  morphine  - Injectable 2 milliGRAM(s) IV Push every 4 hours PRN  ondansetron    Tablet 8 milliGRAM(s) Oral every 8 hours PRN      VITAL SIGNS: Last 24 Hours  T(C): 35.8 (20 Oct 2022 14:05), Max: 37.2 (19 Oct 2022 23:53)  T(F): 96.4 (20 Oct 2022 14:05), Max: 99 (19 Oct 2022 23:53)  HR: 79 (20 Oct 2022 14:05) (76 - 92)  BP: 137/63 (20 Oct 2022 14:05) (115/76 - 149/69)  BP(mean): --  RR: 16 (20 Oct 2022 14:05) (16 - 19)  SpO2: 95% (20 Oct 2022 10:24) (93% - 97%)    LABS:                        13.4   16.08 )-----------( 162      ( 20 Oct 2022 08:28 )             40.8     10-20    136  |  95<L>  |  13  ----------------------------<  215<H>  3.8   |  27  |  0.7    Ca    8.3<L>      20 Oct 2022 08:28  Mg     1.7     10-20    TPro  5.9<L>  /  Alb  3.8  /  TBili  3.2<H>  /  DBili  x   /  AST  10  /  ALT  9   /  AlkPhos  60  10-20              CARDIAC MARKERS ( 18 Oct 2022 22:21 )  x     / <0.01 ng/mL / x     / x     / x          RADIOLOGY:      PHYSICAL EXAM:  CONSTITUTIONAL: No acute distress, well-developed, well-groomed, AAOx3  HEAD: Atraumatic, normocephalic  EYES: EOM intact, PERRLA, conjunctiva and sclera clear  ENT: Supple, no masses, no thyromegaly, no bruits, no JVD; moist mucous membranes  PULMONARY: Clear to auscultation bilaterally; no wheezes, rales, or rhonchi  CARDIOVASCULAR: Regular rate and rhythm; no murmurs, rubs, or gallops  GASTROINTESTINAL: epigastric, RUQ, flank tenderness +  MUSCULOSKELETAL: 2+ peripheral pulses; no clubbing, no cyanosis, no edema  NEUROLOGY: non-focal  SKIN: No rashes or lesions; warm and dry

## 2022-10-20 NOTE — PROGRESS NOTE ADULT - SUBJECTIVE AND OBJECTIVE BOX
MERARY GARCIA  71y  Male  HPI:  Patient is a 71-yo male with pmhx of htn, hld, DM2, MI s/p CABG, DVT/PE on Xarelto, recurrent kidney stones who presented with a chief complaint of abdominal pain and high blood pressure x 1 day. The pain started yesterday afternoon as a "horrible discomfort". It is hypogastric in anture, diffuse, encircling to patient's low back. It is constant, was a 10/10 prior to pain meds (now 7/10), no exacerbating factors. Patient states he takes his bp daily and usually runs around 155/65, but found his systolic in the 200s so decided to come to hospital. Patient denies other systemic sxs, including fever, n/v/d, cp, sob, chills, dysuria, hematuria, hematochezia, joint pains or recent sick contacts. Patient says the pain is similar to what he experienced with his kidney stones but that the pain was located flanks during those incidences.     ED Course:  Vitals- bp 229/95, hr 68, rr 20, t 35.6, 95% on RA  Labs- wbc wnl, Hb 13.9, K 5.1 (hemolyzed), glucose 241, TBili 1.8  EKG- Normal sinus rhythm, Left ventricular hypertrophy with QRS widening and repolarization abnormality ( R in aVL , Shimon product , Romhilt-Bowen ), Inferior infarct , age undetermined  BP had 20 point difference between arms and there was a concern for dissection. A CT angio obtained was negative for dissection, but did show gallstones and enlarged gallbladder with a stone at the neck. An US of RUQ was ordered to further delineate if pain is due to biliary pathology. US shows a right renal calculi measuring 1.2 cm and gallbladder edema. Pt states he has never been told he has gallstones before.    Patient has received total of labetalol 20 IVP/300 PO, nifedipine 30 PO, and morphine 10mg IVP.    He is being admitted for further management.   (19 Oct 2022 08:09)    MEDICATIONS  (STANDING):  ampicillin/sulbactam  IVPB 1.5 Gram(s) IV Intermittent every 6 hours  cholecalciferol 1000 Unit(s) Oral daily  cyanocobalamin 1000 MICROGram(s) Oral daily  dextrose 5%. 1000 milliLiter(s) (50 mL/Hr) IV Continuous <Continuous>  dextrose 5%. 1000 milliLiter(s) (100 mL/Hr) IV Continuous <Continuous>  dextrose 50% Injectable 25 Gram(s) IV Push once  dextrose 50% Injectable 12.5 Gram(s) IV Push once  dextrose 50% Injectable 25 Gram(s) IV Push once  enoxaparin Injectable 40 milliGRAM(s) SubCutaneous every 12 hours  furosemide    Tablet 20 milliGRAM(s) Oral daily  glucagon  Injectable 1 milliGRAM(s) IntraMuscular once  insulin lispro (ADMELOG) corrective regimen sliding scale   SubCutaneous three times a day before meals  labetalol 200 milliGRAM(s) Oral two times a day  losartan 100 milliGRAM(s) Oral daily  simvastatin 40 milliGRAM(s) Oral at bedtime  tamsulosin 0.4 milliGRAM(s) Oral at bedtime    MEDICATIONS  (PRN):  dextrose Oral Gel 15 Gram(s) Oral once PRN Blood Glucose LESS THAN 70 milliGRAM(s)/deciliter  morphine  - Injectable 2 milliGRAM(s) IV Push every 4 hours PRN Moderate Pain (4 - 6)  ondansetron    Tablet 8 milliGRAM(s) Oral every 8 hours PRN Nausea and/or Vomiting    INTERVAL EVENTS: Patient seen today after near syncopal episode/ rapid response?    T(C): 36.3 (10-20-22 @ 20:57), Max: 37.2 (10-19-22 @ 23:53)  HR: 85 (10-20-22 @ 20:57) (76 - 92)  BP: 133/60 (10-20-22 @ 20:57) (115/76 - 161/71)  RR: 18 (10-20-22 @ 20:57) (16 - 19)  SpO2: 93% (10-20-22 @ 18:22) (93% - 97%)  Wt(kg): --Vital Signs Last 24 Hrs  T(C): 36.3 (20 Oct 2022 20:57), Max: 37.2 (19 Oct 2022 23:53)  T(F): 97.4 (20 Oct 2022 20:57), Max: 99 (19 Oct 2022 23:53)  HR: 85 (20 Oct 2022 20:57) (76 - 92)  BP: 133/60 (20 Oct 2022 20:57) (115/76 - 161/71)  BP(mean): --  RR: 18 (20 Oct 2022 20:57) (16 - 19)  SpO2: 93% (20 Oct 2022 18:22) (93% - 97%)    Parameters below as of 20 Oct 2022 18:22  Patient On (Oxygen Delivery Method): room air        PHYSICAL EXAM:  GENERAL: NAD, Sedated  NECK: Supple, No JVD  CHEST/LUNG: Clear; Decreased effort  HEART: S1, S2, Regular rate and rhythm  ABDOMEN: Soft, Tender, Nondistended; Bowel sounds present  EXTREMITIES: + edema, varicosities  SKIN: No rashes or lesions    LABS:                          13.4   16.08 )-----------( 162      ( 20 Oct 2022 08:28 )             40.8             10-20    136  |  95<L>  |  13  ----------------------------<  215<H>  3.8   |  27  |  0.7    Ca    8.3<L>      20 Oct 2022 08:28  Mg     1.7     10-20    TPro  5.7<L>  /  Alb  3.8  /  TBili  3.6<H>  /  DBili  0.6<H>  /  AST  10  /  ALT  8   /  AlkPhos  56  10-20    LIVER FUNCTIONS - ( 20 Oct 2022 18:17 )  Alb: 3.8 g/dL / Pro: 5.7 g/dL / ALK PHOS: 56 U/L / ALT: 8 U/L / AST: 10 U/L / GGT: x              RADIOLOGY & ADDITIONAL TESTS:               < from: CT Angio Abdomen and Pelvis w/ IV Cont (10.18.22 @ 22:49) >    INTERPRETATION:  CLINICAL HISTORY/REASON FOR EXAM: Abdominal pain.   Shortness of breath.    TECHNIQUE: Initial contiguous axial CT images of the chest without IV   contrast obtained. Post-administration of 100 cc Omnipaque 350   intravenous contrast, CT angiographic axial images of the chest, abdomen   and pelvis were obtained. Coronal and sagittal reformats were performed.   3D (MIP) reformats obtained.    COMPARISON: CT chest, abdomen and pelvis 12/1/2019, CT abdomen and pelvis   3/24/2021.    FINDINGS:    VASCULATURE: No evidence of aortic dissection, intramural hematoma or   aneurysmal dilation. Mixed plaque throughout aorta and its major   branches. Dilated main pulmonary artery at 3.4 cm, which may be seen in   pulmonary arterial hypertension. Patent mesenteric visceral arteries. No   evidence of central pulmonary embolus.    LUNGS, PLEURA, AIRWAYS: No lobar consolidation, mass, effusion, or   pneumothorax. No evidence of central endobronchial obstruction. No   bronchiectasis or honeycombing. No suspicious pulmonary nodule. Dependent   atelectasis.Paraseptal emphysema.    THORACIC NODES: Prominent subcentimeter short axis bilateral hilar and   mediastinal lymph nodes; correlate for possible CHF.    MEDIASTINUM: No pericardial effusion. Cardiomegaly. No adenopathy or mass.    HEPATOBILIARY: Cholelithiasis. Gallbladder pericholecystic stranding. No   biliary dilation. Liver unremarkable.    SPLEEN: Unremarkable.    PANCREAS: Unremarkable.    ADRENAL GLANDS: Unremarkable.    KIDNEYS: Symmetric pattern of renal enhancement. No hydronephrosis   bilaterally. Non obstructing bilateral renal calculi.    ABDOMINAL NODES: No lymphadenopathy.    VISUALIZED PERITONEUM /MESENTERY/BOWEL/PELVIS: Trace free pelvic fluid.   No bowel obstruction. Mild BPH. Urinary bladder partially distended.   Normal appendix.    BONES/SOFT TISSUES: Post median sternotomy. Degenerative change of spine.      IMPRESSION:    1. Cholelithiasis. Gallbladder pericholecystic stranding. Findings may   represent acute cholecystitis in the appropriate clinical setting.    2. No evidence of acute intrathoracic pathology. No evidence of acute   aortic pathology.    --- End of Report ---          < end of copied text >  < from: US Abdomen Upper Quadrant Right (10.19.22 @ 02:03) >  FINDINGS:    Liver: Partially visualized liver within normal limits.  Bile ducts: Normal caliber. Common bile duct measures 3 mm.  Gallbladder: Mild gallbladder wall thickening. Patient's known   cholelithiasis was difficult to visualize on the current exam. Negative   reported sonographic Srinivasan's sign.  Pancreas: Poorly visualized.  Right kidney: 11.6 cm. No hydronephrosis. Echogenic foci up to 1.2 cm,   likely representing calculi.  Ascites: None.  IVC: Not visualized      IMPRESSION:    Right renal calculi measuring up to 1.2 cm. No hydronephrosis.    Distended gallbladder with nonspecific mild gallbladder wall thickening.   Patient has known cholelithiasis as demonstrated on same day CT. Negative   reported sonographic Srinivasan's sign. Findings are nonspecific/equivocal.   If warranted, further evaluation with HIDA scan can be obtained.    --- End of Report ---          < end of copied text >  < from: US Abdomen Upper Quadrant Right (10.19.22 @ 02:03) >  FINDINGS:    Liver: Partially visualized liver within normal limits.  Bile ducts: Normal caliber. Common bile duct measures 3 mm.  Gallbladder: Mild gallbladder wall thickening. Patient's known   cholelithiasis was difficult to visualize on the current exam. Negative   reported sonographic Srinivasan's sign.  Pancreas: Poorly visualized.  Right kidney: 11.6 cm. No hydronephrosis. Echogenic foci up to 1.2 cm,   likely representing calculi.  Ascites: None.  IVC: Not visualized      IMPRESSION:    Right renal calculi measuring up to 1.2 cm. No hydronephrosis.    Distended gallbladder with nonspecific mild gallbladder wall thickening.   Patient has known cholelithiasis as demonstrated on same day CT. Negative   reported sonographic Srinivasan's sign. Findings are nonspecific/equivocal.   If warranted, further evaluation with HIDA scan can be obtained.    --- End of Report ---          < end of copied text >  < from: NM Hepatobiliary Imaging (10.19.22 @ 16:35) >  REASON: Abdominal pain  COMPARISON CT abdomen pelvis 10/18/2022  Following the intravenous administration of 4.2 mCi 99m-Tc mebrofenin,   anterior images over the abdomen were acquired at 2, 5, 10, 15, 30, 45,   60 minutes,  2 hours, and 4 hours post injection Oblique and right   lateral views were obtained at 1,2, and 4 hours post-injection. A 14 cm   length size standardization marker was used.    These images reveal no definite visualization of the gallbladder through   4 hours post-injection, consistent with cholecystitis, and clinical   correlation is suggested. Biliary tree is visualized at 10 minutes, and   there is visualization of intestinal activity by 15 minutes post   injection.      IMPRESSION:  NO DEFINITE VISUALIZATION OF THE GALLBLADDER THROUGH 4 HOURS   POST-INJECTION, CONSISTENT WITH CHOLECYSTITIS, AS DESCRIBED ABOVE.    CLINICAL CORRELATION IS SUGGESTED.      < end of copied text >

## 2022-10-20 NOTE — CONSULT NOTE ADULT - SUBJECTIVE AND OBJECTIVE BOX
Patient is a 71y old  Male who presents with a chief complaint of abd pain, htn (20 Oct 2022 18:03)      HPI:  Patient is a 71-yo male with pmhx of htn, hld, DM2, MI s/p CABG, DVT/PE on Xarelto, recurrent kidney stones who presented with a chief complaint of abdominal pain and high blood pressure x 1 day. The pain started yesterday afternoon as a "horrible discomfort". It is hypogastric in anture, diffuse, encircling to patient's low back. It is constant, was a 10/10 prior to pain meds (now 7/10), no exacerbating factors. Patient states he takes his bp daily and usually runs around 155/65, but found his systolic in the 200s so decided to come to hospital. Patient denies other systemic sxs, including fever, n/v/d, cp, sob, chills, dysuria, hematuria, hematochezia, joint pains or recent sick contacts. Patient says the pain is similar to what he experienced with his kidney stones but that the pain was located flanks during those incidences.     ED Course:  Vitals- bp 229/95, hr 68, rr 20, t 35.6, 95% on RA  Labs- wbc wnl, Hb 13.9, K 5.1 (hemolyzed), glucose 241, TBili 1.8  EKG- Normal sinus rhythm, Left ventricular hypertrophy with QRS widening and repolarization abnormality ( R in aVL , Melber product , Romhilt-Bowen ), Inferior infarct , age undetermined  BP had 20 point difference between arms and there was a concern for dissection. A CT angio obtained was negative for dissection, but did show gallstones and enlarged gallbladder with a stone at the neck. An US of RUQ was ordered to further delineate if pain is due to biliary pathology. US shows a right renal calculi measuring 1.2 cm and gallbladder edema. Pt states he has never been told he has gallstones before.    Patient has received total of labetalol 20 IVP/300 PO, nifedipine 30 PO, and morphine 10mg IVP.    He is being admitted for further management.   (19 Oct 2022 08:09)      PAST MEDICAL & SURGICAL HISTORY:  Hypertension, unspecified type      Type 2 diabetes mellitus with complication, unspecified long term insulin use status      High cholesterol      Kidney stones      MI (myocardial infarction)      DVT (deep venous thrombosis)      Acute massive pulmonary embolism  s/p tPA in 2018      Carotid atherosclerosis      H/O heart surgery  quadruple bypass 6 years ago      H/O cystoscopy                          PREVIOUS DIAGNOSTIC TESTING:      ECHO  FINDINGS:    STRESS  FINDINGS:    CATHETERIZATION  FINDINGS:    MEDICATIONS  (STANDING):  ampicillin/sulbactam  IVPB 1.5 Gram(s) IV Intermittent every 6 hours  cholecalciferol 1000 Unit(s) Oral daily  cyanocobalamin 1000 MICROGram(s) Oral daily  dextrose 5%. 1000 milliLiter(s) (100 mL/Hr) IV Continuous <Continuous>  dextrose 5%. 1000 milliLiter(s) (50 mL/Hr) IV Continuous <Continuous>  dextrose 50% Injectable 25 Gram(s) IV Push once  dextrose 50% Injectable 12.5 Gram(s) IV Push once  dextrose 50% Injectable 25 Gram(s) IV Push once  enoxaparin Injectable 40 milliGRAM(s) SubCutaneous every 12 hours  furosemide    Tablet 20 milliGRAM(s) Oral daily  glucagon  Injectable 1 milliGRAM(s) IntraMuscular once  insulin lispro (ADMELOG) corrective regimen sliding scale   SubCutaneous three times a day before meals  labetalol 200 milliGRAM(s) Oral two times a day  losartan 100 milliGRAM(s) Oral daily  simvastatin 40 milliGRAM(s) Oral at bedtime  tamsulosin 0.4 milliGRAM(s) Oral at bedtime    MEDICATIONS  (PRN):  dextrose Oral Gel 15 Gram(s) Oral once PRN Blood Glucose LESS THAN 70 milliGRAM(s)/deciliter  morphine  - Injectable 2 milliGRAM(s) IV Push every 4 hours PRN Moderate Pain (4 - 6)  ondansetron    Tablet 8 milliGRAM(s) Oral every 8 hours PRN Nausea and/or Vomiting      FAMILY HISTORY:  FH: myocardial infarction  mother at age 78        SOCIAL HISTORY:    CIGARETTES:    ALCOHOL:        Vital Signs Last 24 Hrs  T(C): 35.8 (20 Oct 2022 14:05), Max: 37.2 (19 Oct 2022 23:53)  T(F): 96.4 (20 Oct 2022 14:05), Max: 99 (19 Oct 2022 23:53)  HR: 83 (20 Oct 2022 18:22) (76 - 92)  BP: 161/71 (20 Oct 2022 18:22) (115/76 - 161/71)  BP(mean): --  RR: 17 (20 Oct 2022 18:22) (16 - 19)  SpO2: 93% (20 Oct 2022 18:22) (93% - 97%)    Parameters below as of 20 Oct 2022 18:22  Patient On (Oxygen Delivery Method): room air                INTERPRETATION OF TELEMETRY:    ECG:    I&O's Detail    20 Oct 2022 07:01  -  20 Oct 2022 18:43  --------------------------------------------------------  IN:  Total IN: 0 mL    OUT:    Voided (mL): 250 mL  Total OUT: 250 mL    Total NET: -250 mL          LABS:                        13.4   16.08 )-----------( 162      ( 20 Oct 2022 08:28 )             40.8     10-20    136  |  95<L>  |  13  ----------------------------<  215<H>  3.8   |  27  |  0.7    Ca    8.3<L>      20 Oct 2022 08:28  Mg     1.7     10-20    TPro  5.9<L>  /  Alb  3.8  /  TBili  3.2<H>  /  DBili  x   /  AST  10  /  ALT  9   /  AlkPhos  60  10-20    CARDIAC MARKERS ( 18 Oct 2022 22:21 )  x     / <0.01 ng/mL / x     / x     / x              I&O's Summary    20 Oct 2022 07:01  -  20 Oct 2022 18:43  --------------------------------------------------------  IN: 0 mL / OUT: 250 mL / NET: -250 mL      BNP  RADIOLOGY & ADDITIONAL STUDIES:

## 2022-10-20 NOTE — PROGRESS NOTE ADULT - ASSESSMENT
ASSESSMENT:  71M w/ PMH of HLD, HTN, MI, DM, CAD s/p CABG, previous DVT/PE on Xarelto presents for hypotensionand kidney stones presents to ED with complaints of abdominal pain in the bilateral flanks radiating across the lower abdomen as well as hypertension. Initial vitals noted to have a systolic of 239. A CT showed gallstones and enlarged gallbladder with a stone at the neck.   An US of RUQ was ordered to further delineate if pain is due to biliary pathology. US shows a right renal calculi measuring 1.2 cm and gallbladder edema. On exam, pt complains of right flank pain. Physical exam findings, imaging, and labs as documented above.     HTN Urgency:  - likely exacerbated  by severe abd pain  - on labetalol 200mg BID, Losartan 100 QD, Lasix 20 QD  - BP dropped today with additional     hx of DVT/PE  hx of MI/CABG  - Xarelto 20mg QD, on hold  - on Lovenox 40 BID until procedure  - hold ASA 81 QD  - will ask Cardio to see to assess surgical risk;     DM type 2  - on Meformin 1g BID at home, held during hospital stay  - start on SS   - monitor FS and adjust as needed    hx of Kidney Stones  - non obstructive kidney stone  - hydrate  - monitor

## 2022-10-20 NOTE — PATIENT PROFILE ADULT - FALL HARM RISK - HARM RISK INTERVENTIONS

## 2022-10-20 NOTE — CONSULT NOTE ADULT - ASSESSMENT
pt w/ hx mi,cad,cabg. pt states blevins w/o cp w/ activity. pt w/o overt failure. pt w/ at least moderate to high risk. check. echo. follow on med.

## 2022-10-21 LAB
ALBUMIN SERPL ELPH-MCNC: 3.4 G/DL — LOW (ref 3.5–5.2)
ALBUMIN SERPL ELPH-MCNC: 3.6 G/DL — SIGNIFICANT CHANGE UP (ref 3.5–5.2)
ALP SERPL-CCNC: 57 U/L — SIGNIFICANT CHANGE UP (ref 30–115)
ALP SERPL-CCNC: 59 U/L — SIGNIFICANT CHANGE UP (ref 30–115)
ALT FLD-CCNC: 8 U/L — SIGNIFICANT CHANGE UP (ref 0–41)
ALT FLD-CCNC: 8 U/L — SIGNIFICANT CHANGE UP (ref 0–41)
ANION GAP SERPL CALC-SCNC: 12 MMOL/L — SIGNIFICANT CHANGE UP (ref 7–14)
APPEARANCE UR: CLEAR — SIGNIFICANT CHANGE UP
AST SERPL-CCNC: 10 U/L — SIGNIFICANT CHANGE UP (ref 0–41)
AST SERPL-CCNC: 9 U/L — SIGNIFICANT CHANGE UP (ref 0–41)
BACTERIA # UR AUTO: NEGATIVE — SIGNIFICANT CHANGE UP
BASOPHILS # BLD AUTO: 0.03 K/UL — SIGNIFICANT CHANGE UP (ref 0–0.2)
BASOPHILS NFR BLD AUTO: 0.2 % — SIGNIFICANT CHANGE UP (ref 0–1)
BILIRUB DIRECT SERPL-MCNC: 0.8 MG/DL — HIGH (ref 0–0.3)
BILIRUB INDIRECT FLD-MCNC: 2.9 MG/DL — HIGH (ref 0.2–1.2)
BILIRUB SERPL-MCNC: 3.7 MG/DL — HIGH (ref 0.2–1.2)
BILIRUB SERPL-MCNC: 3.7 MG/DL — HIGH (ref 0.2–1.2)
BILIRUB UR-MCNC: NEGATIVE — SIGNIFICANT CHANGE UP
BUN SERPL-MCNC: 13 MG/DL — SIGNIFICANT CHANGE UP (ref 10–20)
CALCIUM SERPL-MCNC: 8 MG/DL — LOW (ref 8.4–10.5)
CHLORIDE SERPL-SCNC: 102 MMOL/L — SIGNIFICANT CHANGE UP (ref 98–110)
CHOLEST SERPL-MCNC: 104 MG/DL — SIGNIFICANT CHANGE UP
CO2 SERPL-SCNC: 27 MMOL/L — SIGNIFICANT CHANGE UP (ref 17–32)
COLOR SPEC: YELLOW — SIGNIFICANT CHANGE UP
CREAT SERPL-MCNC: 0.7 MG/DL — SIGNIFICANT CHANGE UP (ref 0.7–1.5)
DIFF PNL FLD: ABNORMAL
EGFR: 99 ML/MIN/1.73M2 — SIGNIFICANT CHANGE UP
EOSINOPHIL # BLD AUTO: 0.02 K/UL — SIGNIFICANT CHANGE UP (ref 0–0.7)
EOSINOPHIL NFR BLD AUTO: 0.1 % — SIGNIFICANT CHANGE UP (ref 0–8)
EPI CELLS # UR: 1 /HPF — SIGNIFICANT CHANGE UP (ref 0–5)
GLUCOSE BLDC GLUCOMTR-MCNC: 180 MG/DL — HIGH (ref 70–99)
GLUCOSE BLDC GLUCOMTR-MCNC: 213 MG/DL — HIGH (ref 70–99)
GLUCOSE SERPL-MCNC: 189 MG/DL — HIGH (ref 70–99)
GLUCOSE UR QL: SIGNIFICANT CHANGE UP
HCT VFR BLD CALC: 39.4 % — LOW (ref 42–52)
HDLC SERPL-MCNC: 40 MG/DL — LOW
HGB BLD-MCNC: 13.1 G/DL — LOW (ref 14–18)
HYALINE CASTS # UR AUTO: 2 /LPF — SIGNIFICANT CHANGE UP (ref 0–7)
IMM GRANULOCYTES NFR BLD AUTO: 0.6 % — HIGH (ref 0.1–0.3)
INR BLD: 2.57 RATIO — HIGH (ref 0.65–1.3)
KETONES UR-MCNC: ABNORMAL
LEUKOCYTE ESTERASE UR-ACNC: NEGATIVE — SIGNIFICANT CHANGE UP
LIPID PNL WITH DIRECT LDL SERPL: 49 MG/DL — SIGNIFICANT CHANGE UP
LYMPHOCYTES # BLD AUTO: 0.78 K/UL — LOW (ref 1.2–3.4)
LYMPHOCYTES # BLD AUTO: 4.9 % — LOW (ref 20.5–51.1)
MAGNESIUM SERPL-MCNC: 1.7 MG/DL — LOW (ref 1.8–2.4)
MCHC RBC-ENTMCNC: 30.2 PG — SIGNIFICANT CHANGE UP (ref 27–31)
MCHC RBC-ENTMCNC: 33.2 G/DL — SIGNIFICANT CHANGE UP (ref 32–37)
MCV RBC AUTO: 90.8 FL — SIGNIFICANT CHANGE UP (ref 80–94)
MONOCYTES # BLD AUTO: 1.21 K/UL — HIGH (ref 0.1–0.6)
MONOCYTES NFR BLD AUTO: 7.7 % — SIGNIFICANT CHANGE UP (ref 1.7–9.3)
NEUTROPHILS # BLD AUTO: 13.66 K/UL — HIGH (ref 1.4–6.5)
NEUTROPHILS NFR BLD AUTO: 86.5 % — HIGH (ref 42.2–75.2)
NITRITE UR-MCNC: NEGATIVE — SIGNIFICANT CHANGE UP
NON HDL CHOLESTEROL: 64 MG/DL — SIGNIFICANT CHANGE UP
NRBC # BLD: 0 /100 WBCS — SIGNIFICANT CHANGE UP (ref 0–0)
PH UR: 6 — SIGNIFICANT CHANGE UP (ref 5–8)
PLATELET # BLD AUTO: 153 K/UL — SIGNIFICANT CHANGE UP (ref 130–400)
POTASSIUM SERPL-MCNC: 4 MMOL/L — SIGNIFICANT CHANGE UP (ref 3.5–5)
POTASSIUM SERPL-SCNC: 4 MMOL/L — SIGNIFICANT CHANGE UP (ref 3.5–5)
PROT SERPL-MCNC: 5.5 G/DL — LOW (ref 6–8)
PROT SERPL-MCNC: 5.5 G/DL — LOW (ref 6–8)
PROT UR-MCNC: ABNORMAL
PROTHROM AB SERPL-ACNC: 30.2 SEC — HIGH (ref 9.95–12.87)
RBC # BLD: 4.34 M/UL — LOW (ref 4.7–6.1)
RBC # FLD: 14 % — SIGNIFICANT CHANGE UP (ref 11.5–14.5)
RBC CASTS # UR COMP ASSIST: 5 /HPF — HIGH (ref 0–4)
SODIUM SERPL-SCNC: 141 MMOL/L — SIGNIFICANT CHANGE UP (ref 135–146)
SP GR SPEC: 1.02 — SIGNIFICANT CHANGE UP (ref 1.01–1.03)
TRIGL SERPL-MCNC: 73 MG/DL — SIGNIFICANT CHANGE UP
TRIGL SERPL-MCNC: 75 MG/DL — SIGNIFICANT CHANGE UP
URATE CRY FLD QL MICRO: ABNORMAL
UROBILINOGEN FLD QL: ABNORMAL
WBC # BLD: 15.8 K/UL — HIGH (ref 4.8–10.8)
WBC # FLD AUTO: 15.8 K/UL — HIGH (ref 4.8–10.8)
WBC UR QL: 2 /HPF — SIGNIFICANT CHANGE UP (ref 0–5)

## 2022-10-21 PROCEDURE — 99223 1ST HOSP IP/OBS HIGH 75: CPT

## 2022-10-21 PROCEDURE — 93306 TTE W/DOPPLER COMPLETE: CPT | Mod: 26

## 2022-10-21 RX ORDER — I.V. FAT EMULSION 20 G/100ML
1.08 EMULSION INTRAVENOUS
Qty: 100 | Refills: 0 | Status: DISCONTINUED | OUTPATIENT
Start: 2022-10-21 | End: 2022-10-21

## 2022-10-21 RX ORDER — ELECTROLYTE SOLUTION,INJ
1 VIAL (ML) INTRAVENOUS
Refills: 0 | Status: DISCONTINUED | OUTPATIENT
Start: 2022-10-21 | End: 2022-10-21

## 2022-10-21 RX ORDER — DEXTROSE MONOHYDRATE, SODIUM CHLORIDE, AND POTASSIUM CHLORIDE 50; .745; 4.5 G/1000ML; G/1000ML; G/1000ML
1000 INJECTION, SOLUTION INTRAVENOUS
Refills: 0 | Status: DISCONTINUED | OUTPATIENT
Start: 2022-10-21 | End: 2022-10-21

## 2022-10-21 RX ORDER — MAGNESIUM SULFATE 500 MG/ML
2 VIAL (ML) INJECTION ONCE
Refills: 0 | Status: COMPLETED | OUTPATIENT
Start: 2022-10-21 | End: 2022-10-21

## 2022-10-21 RX ORDER — SODIUM CHLORIDE 9 MG/ML
1000 INJECTION, SOLUTION INTRAVENOUS
Refills: 0 | Status: DISCONTINUED | OUTPATIENT
Start: 2022-10-21 | End: 2022-10-21

## 2022-10-21 RX ORDER — PIPERACILLIN AND TAZOBACTAM 4; .5 G/20ML; G/20ML
3.38 INJECTION, POWDER, LYOPHILIZED, FOR SOLUTION INTRAVENOUS ONCE
Refills: 0 | Status: COMPLETED | OUTPATIENT
Start: 2022-10-21 | End: 2022-10-21

## 2022-10-21 RX ORDER — PIPERACILLIN AND TAZOBACTAM 4; .5 G/20ML; G/20ML
3.38 INJECTION, POWDER, LYOPHILIZED, FOR SOLUTION INTRAVENOUS EVERY 8 HOURS
Refills: 0 | Status: COMPLETED | OUTPATIENT
Start: 2022-10-21 | End: 2022-10-28

## 2022-10-21 RX ADMIN — PIPERACILLIN AND TAZOBACTAM 200 GRAM(S): 4; .5 INJECTION, POWDER, LYOPHILIZED, FOR SOLUTION INTRAVENOUS at 10:25

## 2022-10-21 RX ADMIN — PIPERACILLIN AND TAZOBACTAM 25 GRAM(S): 4; .5 INJECTION, POWDER, LYOPHILIZED, FOR SOLUTION INTRAVENOUS at 22:01

## 2022-10-21 RX ADMIN — I.V. FAT EMULSION 31.3 GM/KG/DAY: 20 EMULSION INTRAVENOUS at 20:23

## 2022-10-21 RX ADMIN — Medication 2: at 11:31

## 2022-10-21 RX ADMIN — LOSARTAN POTASSIUM 100 MILLIGRAM(S): 100 TABLET, FILM COATED ORAL at 05:59

## 2022-10-21 RX ADMIN — Medication 1 EACH: at 20:23

## 2022-10-21 RX ADMIN — Medication 1: at 08:06

## 2022-10-21 RX ADMIN — Medication 200 MILLIGRAM(S): at 05:59

## 2022-10-21 RX ADMIN — MORPHINE SULFATE 2 MILLIGRAM(S): 50 CAPSULE, EXTENDED RELEASE ORAL at 05:49

## 2022-10-21 RX ADMIN — Medication 20 MILLIGRAM(S): at 05:59

## 2022-10-21 RX ADMIN — AMPICILLIN SODIUM AND SULBACTAM SODIUM 100 GRAM(S): 250; 125 INJECTION, POWDER, FOR SUSPENSION INTRAMUSCULAR; INTRAVENOUS at 05:59

## 2022-10-21 RX ADMIN — ENOXAPARIN SODIUM 40 MILLIGRAM(S): 100 INJECTION SUBCUTANEOUS at 17:48

## 2022-10-21 RX ADMIN — SIMVASTATIN 40 MILLIGRAM(S): 20 TABLET, FILM COATED ORAL at 21:17

## 2022-10-21 RX ADMIN — DEXTROSE MONOHYDRATE, SODIUM CHLORIDE, AND POTASSIUM CHLORIDE 125 MILLILITER(S): 50; .745; 4.5 INJECTION, SOLUTION INTRAVENOUS at 10:13

## 2022-10-21 RX ADMIN — ENOXAPARIN SODIUM 40 MILLIGRAM(S): 100 INJECTION SUBCUTANEOUS at 05:59

## 2022-10-21 RX ADMIN — Medication 1000 UNIT(S): at 13:30

## 2022-10-21 RX ADMIN — Medication 25 GRAM(S): at 14:53

## 2022-10-21 RX ADMIN — PREGABALIN 1000 MICROGRAM(S): 225 CAPSULE ORAL at 13:30

## 2022-10-21 NOTE — PROGRESS NOTE ADULT - SUBJECTIVE AND OBJECTIVE BOX
GENERAL SURGERY PROGRESS NOTE    Patient: MERARY GARCIA , 72y ([birth year])Male   MRN: [id number]  Location: [address]  Visit: 10-19-22 Inpatient  Date: 10-21-22 @ 01:28    Procedure/Dx/Injuries: cholecystitis     Events of past 24 hours:   - BP improving.   - Patient reports RUQ pain.   - IR consulted for perc yefri placement- pt will need to be off ASA x 5 days before proceeding (scheduled for 10/25).   - T.Bili continues to rise. Recommend advanced GI consult for ERCP.     PAST MEDICAL & SURGICAL HISTORY:  Hypertension, unspecified type  Type 2 diabetes mellitus with complication, unspecified long term insulin use status  High cholesterol  Kidney stones  MI (myocardial infarction)  DVT (deep venous thrombosis)  Acute massive pulmonary embolism  s/p tPA in 2018  Carotid atherosclerosis  H/O heart surgery  quadruple bypass 6 years ago  H/O cystoscopy      Vitals:   T(F): 97.4 (10-20-22 @ 20:57), Max: 98.1 (10-20-22 @ 04:57)  HR: 85 (10-20-22 @ 20:57)  BP: 133/60 (10-20-22 @ 20:57)  RR: 18 (10-20-22 @ 20:57)  SpO2: 93% (10-20-22 @ 18:22)      Diet, NPO:   Except Medications      PHYSICAL EXAM:  General: NAD, mildly diaphoretic  Cardiac: RRR, S1/S2 identified, nmrg  Respiratory: unlabored breathing at rest, clear to auscultation bilaterally  Abdomen: Soft, non-distended, moderate RUQ/epigastric tenderness, no rebound  Musculoskeletal: FROM in b/l UE and LE  Neuro: Sensation grossly intact and equal throughout, no focal deficits  Skin: Warm/dry, normal color, no jaundice    MEDICATIONS  (STANDING):  ampicillin/sulbactam  IVPB 1.5 Gram(s) IV Intermittent every 6 hours  cholecalciferol 1000 Unit(s) Oral daily  cyanocobalamin 1000 MICROGram(s) Oral daily  dextrose 5%. 1000 milliLiter(s) (50 mL/Hr) IV Continuous <Continuous>  dextrose 5%. 1000 milliLiter(s) (100 mL/Hr) IV Continuous <Continuous>  dextrose 50% Injectable 25 Gram(s) IV Push once  dextrose 50% Injectable 12.5 Gram(s) IV Push once  dextrose 50% Injectable 25 Gram(s) IV Push once  enoxaparin Injectable 40 milliGRAM(s) SubCutaneous every 12 hours  furosemide    Tablet 20 milliGRAM(s) Oral daily  glucagon  Injectable 1 milliGRAM(s) IntraMuscular once  insulin lispro (ADMELOG) corrective regimen sliding scale   SubCutaneous three times a day before meals  labetalol 200 milliGRAM(s) Oral two times a day  losartan 100 milliGRAM(s) Oral daily  simvastatin 40 milliGRAM(s) Oral at bedtime  tamsulosin 0.4 milliGRAM(s) Oral at bedtime    MEDICATIONS  (PRN):  dextrose Oral Gel 15 Gram(s) Oral once PRN Blood Glucose LESS THAN 70 milliGRAM(s)/deciliter  morphine  - Injectable 2 milliGRAM(s) IV Push every 4 hours PRN Moderate Pain (4 - 6)  ondansetron    Tablet 8 milliGRAM(s) Oral every 8 hours PRN Nausea and/or Vomiting      DVT PROPHYLAXIS: enoxaparin Injectable 40 milliGRAM(s) SubCutaneous every 12 hours    ANTIBIOTICS:  ampicillin/sulbactam  IVPB 1.5 Gram(s)      LAB/STUDIES:  Labs:  CAPILLARY BLOOD GLUCOSE  POCT Blood Glucose.: 199 mg/dL (20 Oct 2022 21:12)  POCT Blood Glucose.: 191 mg/dL (20 Oct 2022 16:27)  POCT Blood Glucose.: 222 mg/dL (20 Oct 2022 11:14)  POCT Blood Glucose.: 228 mg/dL (20 Oct 2022 07:57)                        13.4   16.08 )-----------( 162      ( 20 Oct 2022 08:28 )             40.8       Auto Neutrophil %: 83.3 % (10-20-22 @ 08:28)  Auto Immature Granulocyte %: 0.5 % (10-20-22 @ 08:28)    10-20    136  |  95<L>  |  13  ----------------------------<  215<H>  3.8   |  27  |  0.7      Calcium, Total Serum: 8.3 mg/dL (10-20-22 @ 08:28)      LFTs:             5.7  | 3.6  | 10       ------------------[56      ( 20 Oct 2022 18:17 )  3.8  | 0.6  | 8           Lipase:x      Amylase:x         Lactate, Blood: 1.6 mmol/L (10-18-22 @ 22:21)      Culture - Blood (collected 19 Oct 2022 11:26)  Source: .Blood Blood-Peripheral  Preliminary Report (20 Oct 2022 23:01):    No growth to date.

## 2022-10-21 NOTE — PROGRESS NOTE ADULT - SUBJECTIVE AND OBJECTIVE BOX
SUBJ:No chest pain or shortness of breath      MEDICATIONS  (STANDING):  dextrose 5%. 1000 milliLiter(s) (100 mL/Hr) IV Continuous <Continuous>  dextrose 5%. 1000 milliLiter(s) (50 mL/Hr) IV Continuous <Continuous>  dextrose 50% Injectable 25 Gram(s) IV Push once  dextrose 50% Injectable 12.5 Gram(s) IV Push once  dextrose 50% Injectable 25 Gram(s) IV Push once  enoxaparin Injectable 40 milliGRAM(s) SubCutaneous every 12 hours  fat emulsion (Fish Oil and Plant Based) 20% Infusion 1.0753 Gm/kG/Day (31.3 mL/Hr) IV Continuous <Continuous>  furosemide    Tablet 20 milliGRAM(s) Oral daily  glucagon  Injectable 1 milliGRAM(s) IntraMuscular once  insulin lispro (ADMELOG) corrective regimen sliding scale   SubCutaneous three times a day before meals  labetalol 200 milliGRAM(s) Oral two times a day  losartan 100 milliGRAM(s) Oral daily  Parenteral Nutrition - Adult 1 Each (65 mL/Hr) TPN Continuous <Continuous>  piperacillin/tazobactam IVPB.. 3.375 Gram(s) IV Intermittent every 8 hours  simvastatin 40 milliGRAM(s) Oral at bedtime  tamsulosin 0.4 milliGRAM(s) Oral at bedtime    MEDICATIONS  (PRN):  dextrose Oral Gel 15 Gram(s) Oral once PRN Blood Glucose LESS THAN 70 milliGRAM(s)/deciliter  morphine  - Injectable 2 milliGRAM(s) IV Push every 4 hours PRN Moderate Pain (4 - 6)  ondansetron    Tablet 8 milliGRAM(s) Oral every 8 hours PRN Nausea and/or Vomiting            Vital Signs Last 24 Hrs  T(C): 37.3 (21 Oct 2022 21:00), Max: 37.3 (21 Oct 2022 21:00)  T(F): 99.2 (21 Oct 2022 21:00), Max: 99.2 (21 Oct 2022 21:00)  HR: 98 (21 Oct 2022 21:00) (98 - 99)  BP: 129/60 (21 Oct 2022 21:00) (129/60 - 131/61)  BP(mean): --  RR: 18 (21 Oct 2022 21:00) (18 - 18)  SpO2: --          ECG:NML    TTE:    LABS:                        13.1   15.80 )-----------( 153      ( 21 Oct 2022 09:46 )             39.4     10-21    141  |  102  |  13  ----------------------------<  189<H>  4.0   |  27  |  0.7    Ca    8.0<L>      21 Oct 2022 09:46  Mg     1.7     10-21    TPro  5.5<L>  /  Alb  3.6  /  TBili  3.7<H>  /  DBili  0.8<H>  /  AST  10  /  ALT  8   /  AlkPhos  57  10-21        PT/INR - ( 21 Oct 2022 09:46 )   PT: 30.20 sec;   INR: 2.57 ratio             I&O's Summary    20 Oct 2022 07:01  -  21 Oct 2022 07:00  --------------------------------------------------------  IN: 0 mL / OUT: 250 mL / NET: -250 mL    21 Oct 2022 07:01  -  21 Oct 2022 21:47  --------------------------------------------------------  IN: 0 mL / OUT: 300 mL / NET: -300 mL      BNP

## 2022-10-21 NOTE — PROGRESS NOTE ADULT - SUBJECTIVE AND OBJECTIVE BOX
MERARY GARCIA  72y  Male  HPI:  Patient is a 71-yo male with pmhx of htn, hld, DM2, MI s/p CABG, DVT/PE on Xarelto, recurrent kidney stones who presented with a chief complaint of abdominal pain and high blood pressure x 1 day. The pain started yesterday afternoon as a "horrible discomfort". It is hypogastric in anture, diffuse, encircling to patient's low back. It is constant, was a 10/10 prior to pain meds (now 7/10), no exacerbating factors. Patient states he takes his bp daily and usually runs around 155/65, but found his systolic in the 200s so decided to come to hospital. Patient denies other systemic sxs, including fever, n/v/d, cp, sob, chills, dysuria, hematuria, hematochezia, joint pains or recent sick contacts. Patient says the pain is similar to what he experienced with his kidney stones but that the pain was located flanks during those incidences.     ED Course:  Vitals- bp 229/95, hr 68, rr 20, t 35.6, 95% on RA  Labs- wbc wnl, Hb 13.9, K 5.1 (hemolyzed), glucose 241, TBili 1.8  EKG- Normal sinus rhythm, Left ventricular hypertrophy with QRS widening and repolarization abnormality ( R in aVL , Shimon product , Romhilt-Bowen ), Inferior infarct , age undetermined  BP had 20 point difference between arms and there was a concern for dissection. A CT angio obtained was negative for dissection, but did show gallstones and enlarged gallbladder with a stone at the neck. An US of RUQ was ordered to further delineate if pain is due to biliary pathology. US shows a right renal calculi measuring 1.2 cm and gallbladder edema. Pt states he has never been told he has gallstones before.    Patient has received total of labetalol 20 IVP/300 PO, nifedipine 30 PO, and morphine 10mg IVP.    He is being admitted for further management.   (19 Oct 2022 08:09)    MEDICATIONS  (STANDING):  cholecalciferol 1000 Unit(s) Oral daily  cyanocobalamin 1000 MICROGram(s) Oral daily  dextrose 5% + sodium chloride 0.45% with potassium chloride 20 mEq/L 1000 milliLiter(s) (125 mL/Hr) IV Continuous <Continuous>  dextrose 5%. 1000 milliLiter(s) (50 mL/Hr) IV Continuous <Continuous>  dextrose 5%. 1000 milliLiter(s) (100 mL/Hr) IV Continuous <Continuous>  dextrose 50% Injectable 25 Gram(s) IV Push once  dextrose 50% Injectable 12.5 Gram(s) IV Push once  dextrose 50% Injectable 25 Gram(s) IV Push once  enoxaparin Injectable 40 milliGRAM(s) SubCutaneous every 12 hours  furosemide    Tablet 20 milliGRAM(s) Oral daily  glucagon  Injectable 1 milliGRAM(s) IntraMuscular once  insulin lispro (ADMELOG) corrective regimen sliding scale   SubCutaneous three times a day before meals  labetalol 200 milliGRAM(s) Oral two times a day  losartan 100 milliGRAM(s) Oral daily  piperacillin/tazobactam IVPB.. 3.375 Gram(s) IV Intermittent every 8 hours  simvastatin 40 milliGRAM(s) Oral at bedtime  tamsulosin 0.4 milliGRAM(s) Oral at bedtime    MEDICATIONS  (PRN):  dextrose Oral Gel 15 Gram(s) Oral once PRN Blood Glucose LESS THAN 70 milliGRAM(s)/deciliter  morphine  - Injectable 2 milliGRAM(s) IV Push every 4 hours PRN Moderate Pain (4 - 6)  ondansetron    Tablet 8 milliGRAM(s) Oral every 8 hours PRN Nausea and/or Vomiting    INTERVAL EVENTS: Patient seen today without distress, brother at bedside.     T(C): 36.3 (10-21-22 @ 06:16), Max: 36.3 (10-20-22 @ 20:57)  HR: 99 (10-21-22 @ 06:16) (79 - 99)  BP: 131/61 (10-21-22 @ 06:16) (131/61 - 161/71)  RR: 18 (10-21-22 @ 06:16) (16 - 18)  SpO2: 93% (10-20-22 @ 18:22) (93% - 93%)  Wt(kg): --Vital Signs Last 24 Hrs  T(C): 36.3 (21 Oct 2022 06:16), Max: 36.3 (20 Oct 2022 20:57)  T(F): 97.4 (21 Oct 2022 06:16), Max: 97.4 (20 Oct 2022 20:57)  HR: 99 (21 Oct 2022 06:16) (79 - 99)  BP: 131/61 (21 Oct 2022 06:16) (131/61 - 161/71)  BP(mean): --  RR: 18 (21 Oct 2022 06:16) (16 - 18)  SpO2: 93% (20 Oct 2022 18:22) (93% - 93%)    Parameters below as of 20 Oct 2022 18:22  Patient On (Oxygen Delivery Method): room air        PHYSICAL EXAM:  GENERAL:  NAD  NECK: Supple, No JVD  CHEST/LUNG: Clear  HEART: S1, S2, Regular   ABDOMEN: Soft, Nontender, Nondistended; Bowel sounds present  EXTREMITIES: No edema  SKIN: reddened face, peeling skin to the bridge of the nose    LABS:                        13.1   15.80 )-----------( 153      ( 21 Oct 2022 09:46 )             39.4             10-20    136  |  95<L>  |  13  ----------------------------<  215<H>  3.8   |  27  |  0.7    Ca    8.3<L>      20 Oct 2022 08:28  Mg     1.7     10-20    TPro  5.7<L>  /  Alb  3.8  /  TBili  3.6<H>  /  DBili  0.6<H>  /  AST  10  /  ALT  8   /  AlkPhos  56  10-20    LIVER FUNCTIONS - ( 20 Oct 2022 18:17 )  Alb: 3.8 g/dL / Pro: 5.7 g/dL / ALK PHOS: 56 U/L / ALT: 8 U/L / AST: 10 U/L / GGT: x                     PT/INR - ( 21 Oct 2022 09:46 )   PT: 30.20 sec;   INR: 2.57 ratio        Culture - Blood (collected 19 Oct 2022 11:26)  Source: .Blood Blood-Peripheral  Preliminary Report (20 Oct 2022 23:01):    No growth to date.      RADIOLOGY & ADDITIONAL TESTS:     MERARY GARCIA  72y  Male  HPI:  Patient is a 71-yo male with pmhx of htn, hld, DM2, MI s/p CABG, DVT/PE on Xarelto, recurrent kidney stones who presented with a chief complaint of abdominal pain and high blood pressure x 1 day. The pain started yesterday afternoon as a "horrible discomfort". It is hypogastric in anture, diffuse, encircling to patient's low back. It is constant, was a 10/10 prior to pain meds (now 7/10), no exacerbating factors. Patient states he takes his bp daily and usually runs around 155/65, but found his systolic in the 200s so decided to come to hospital. Patient denies other systemic sxs, including fever, n/v/d, cp, sob, chills, dysuria, hematuria, hematochezia, joint pains or recent sick contacts. Patient says the pain is similar to what he experienced with his kidney stones but that the pain was located flanks during those incidences.     ED Course:  Vitals- bp 229/95, hr 68, rr 20, t 35.6, 95% on RA  Labs- wbc wnl, Hb 13.9, K 5.1 (hemolyzed), glucose 241, TBili 1.8  EKG- Normal sinus rhythm, Left ventricular hypertrophy with QRS widening and repolarization abnormality ( R in aVL , Shimon product , Romhilt-Bowen ), Inferior infarct , age undetermined  BP had 20 point difference between arms and there was a concern for dissection. A CT angio obtained was negative for dissection, but did show gallstones and enlarged gallbladder with a stone at the neck. An US of RUQ was ordered to further delineate if pain is due to biliary pathology. US shows a right renal calculi measuring 1.2 cm and gallbladder edema. Pt states he has never been told he has gallstones before.    Patient has received total of labetalol 20 IVP/300 PO, nifedipine 30 PO, and morphine 10mg IVP.    He is being admitted for further management.   (19 Oct 2022 08:09)    MEDICATIONS  (STANDING):  cholecalciferol 1000 Unit(s) Oral daily  cyanocobalamin 1000 MICROGram(s) Oral daily  dextrose 5% + sodium chloride 0.45% with potassium chloride 20 mEq/L 1000 milliLiter(s) (125 mL/Hr) IV Continuous <Continuous>  dextrose 5%. 1000 milliLiter(s) (50 mL/Hr) IV Continuous <Continuous>  dextrose 5%. 1000 milliLiter(s) (100 mL/Hr) IV Continuous <Continuous>  dextrose 50% Injectable 25 Gram(s) IV Push once  dextrose 50% Injectable 12.5 Gram(s) IV Push once  dextrose 50% Injectable 25 Gram(s) IV Push once  enoxaparin Injectable 40 milliGRAM(s) SubCutaneous every 12 hours  furosemide    Tablet 20 milliGRAM(s) Oral daily  glucagon  Injectable 1 milliGRAM(s) IntraMuscular once  insulin lispro (ADMELOG) corrective regimen sliding scale   SubCutaneous three times a day before meals  labetalol 200 milliGRAM(s) Oral two times a day  losartan 100 milliGRAM(s) Oral daily  piperacillin/tazobactam IVPB.. 3.375 Gram(s) IV Intermittent every 8 hours  simvastatin 40 milliGRAM(s) Oral at bedtime  tamsulosin 0.4 milliGRAM(s) Oral at bedtime    MEDICATIONS  (PRN):  dextrose Oral Gel 15 Gram(s) Oral once PRN Blood Glucose LESS THAN 70 milliGRAM(s)/deciliter  morphine  - Injectable 2 milliGRAM(s) IV Push every 4 hours PRN Moderate Pain (4 - 6)  ondansetron    Tablet 8 milliGRAM(s) Oral every 8 hours PRN Nausea and/or Vomiting    INTERVAL EVENTS: Patient seen today without distress, brother at bedside.     T(C): 36.3 (10-21-22 @ 06:16), Max: 36.3 (10-20-22 @ 20:57)  HR: 99 (10-21-22 @ 06:16) (79 - 99)  BP: 131/61 (10-21-22 @ 06:16) (131/61 - 161/71)  RR: 18 (10-21-22 @ 06:16) (16 - 18)  SpO2: 93% (10-20-22 @ 18:22) (93% - 93%)  Wt(kg): --Vital Signs Last 24 Hrs  T(C): 36.3 (21 Oct 2022 06:16), Max: 36.3 (20 Oct 2022 20:57)  T(F): 97.4 (21 Oct 2022 06:16), Max: 97.4 (20 Oct 2022 20:57)  HR: 99 (21 Oct 2022 06:16) (79 - 99)  BP: 131/61 (21 Oct 2022 06:16) (131/61 - 161/71)  BP(mean): --  RR: 18 (21 Oct 2022 06:16) (16 - 18)  SpO2: 93% (20 Oct 2022 18:22) (93% - 93%)    Parameters below as of 20 Oct 2022 18:22  Patient On (Oxygen Delivery Method): room air        PHYSICAL EXAM:  GENERAL:  NAD  NECK: Supple, No JVD  CHEST/LUNG: Clear  HEART: S1, S2, Regular   ABDOMEN: Soft, Nontender, Nondistended; Bowel sounds present  EXTREMITIES: No edema  SKIN: reddened face, peeling skin to the bridge of the nose    LABS:                        13.1   15.80 )-----------( 153      ( 21 Oct 2022 09:46 )             39.4             10-20    136  |  95<L>  |  13  ----------------------------<  215<H>  3.8   |  27  |  0.7    Ca    8.3<L>      20 Oct 2022 08:28  Mg     1.7     10-20    TPro  5.7<L>  /  Alb  3.8  /  TBili  3.6<H>  /  DBili  0.6<H>  /  AST  10  /  ALT  8   /  AlkPhos  56  10-20    LIVER FUNCTIONS - ( 20 Oct 2022 18:17 )  Alb: 3.8 g/dL / Pro: 5.7 g/dL / ALK PHOS: 56 U/L / ALT: 8 U/L / AST: 10 U/L / GGT: x                     PT/INR - ( 21 Oct 2022 09:46 )   PT: 30.20 sec;   INR: 2.57 ratio        Culture - Blood (collected 19 Oct 2022 11:26)  Source: .Blood Blood-Peripheral  Preliminary Report (20 Oct 2022 23:01):    No growth to date.      RADIOLOGY & ADDITIONAL TESTS:  < from: NM Hepatobiliary Imaging (10.19.22 @ 16:35) >  REASON: Abdominal pain  COMPARISON CT abdomen pelvis 10/18/2022  Following the intravenous administration of 4.2 mCi 99m-Tc mebrofenin,   anterior images over the abdomen were acquired at 2, 5, 10, 15, 30, 45,   60 minutes,  2 hours, and 4 hours post injection Oblique and right   lateral views were obtained at 1,2, and 4 hours post-injection. A 14 cm   length size standardization marker was used.    These images reveal no definite visualization of the gallbladder through   4 hours post-injection, consistent with cholecystitis, and clinical   correlation is suggested. Biliary tree is visualized at 10 minutes, and   there is visualization of intestinal activity by 15 minutes post   injection.      IMPRESSION:  NO DEFINITE VISUALIZATION OF THE GALLBLADDER THROUGH 4 HOURS   POST-INJECTION, CONSISTENT WITH CHOLECYSTITIS, AS DESCRIBED ABOVE.    CLINICAL CORRELATION IS SUGGESTED.        Dr. Pinky Dai discussed preliminary findings with FABRIZIO JULES on   10/19/2022 5:04 PM with readback.      < end of copied text >  < from: US Abdomen Upper Quadrant Right (10.19.22 @ 02:03) >  FINDINGS:    Liver: Partially visualized liver within normal limits.  Bile ducts: Normal caliber. Common bile duct measures 3 mm.  Gallbladder: Mild gallbladder wall thickening. Patient's known   cholelithiasis was difficult to visualize on the current exam. Negative   reported sonographic Srinivasan's sign.  Pancreas: Poorly visualized.  Right kidney: 11.6 cm. No hydronephrosis. Echogenic foci up to 1.2 cm,   likely representing calculi.  Ascites: None.  IVC: Not visualized      IMPRESSION:    Right renal calculi measuring up to 1.2 cm. No hydronephrosis.    Distended gallbladder with nonspecific mild gallbladder wall thickening.   Patient has known cholelithiasis as demonstrated on same day CT. Negative   reported sonographic Srinivasan's sign. Findings are nonspecific/equivocal.   If warranted, further evaluation with HIDA scan can be obtained.    --- End of Report ---          < end of copied text >

## 2022-10-21 NOTE — CONSULT NOTE ADULT - NS PANP COMMENT GEN_ALL_CORE FT
Patient seen and examined during the GI advanced endoscopy rounds, Acute Cholecystitis, very low suspicion of choledocholithiasis , plan as above

## 2022-10-21 NOTE — PROGRESS NOTE ADULT - ASSESSMENT
ASSESSMENT:  71M w/ PMH of HLD, HTN, MI, DM, CAD s/p CABG, previous DVT/PE on Xarelto presents for hypotensionand kidney stones presents to ED with complaints of abdominal pain in the bilateral flanks radiating across the lower abdomen as well as hypertension. Initial vitals noted to have a systolic of 239. A CT showed gallstones and enlarged gallbladder with a stone at the neck.   An US of RUQ was ordered to further delineate if pain is due to biliary pathology. US shows a right renal calculi measuring 1.2 cm and gallbladder edema. On exam, pt complains of right flank pain. Physical exam findings, imaging, and labs as documented above.     Acute Cholecystitis  - on IV Zosyn  - NPO  - asked nutrition to eval for PPN  - pain rx prn  - GI, Surgery and IR eval's noted    HTN Urgency:  - likely exacerbated  by severe abd pain  - on Labetalol 200mg BID, Losartan 100 QD, Lasix 20 QD  - BP better controlled today    hx of DVT/PE  hx of MI/CABG  - Xarelto 20mg QD, on hold  - on Lovenox 40 BID until procedure  - hold ASA 81 QD  - Cardio following    DM type 2  - on Metformin 1g BID at home, held during hospital stay  - start on SS   - monitor FS and adjust as needed    hx of Kidney Stones  - non obstructive kidney stone  - hydrate  - monitor      Diet NPO  DVT Prophylaxis on therapeutic dose  Patient is a full code

## 2022-10-21 NOTE — PROGRESS NOTE ADULT - SUBJECTIVE AND OBJECTIVE BOX
MERARY GARCIA 72y Male  MRN#: [id number]   Hospital Day: 2d    SUBJECTIVE  Patient is a 72y old Male who presents with a chief complaint of abd pain, htn (21 Oct 2022 11:15)  Currently admitted to medicine with the primary diagnosis of Abdominal pain      INTERVAL HPI AND OVERNIGHT EVENTS:  Patient was examined and seen at bedside. This morning he appears to be more lethargic when compared to yesterday, answers questions and follows commands.    OBJECTIVE  PAST MEDICAL & SURGICAL HISTORY  Hypertension, unspecified type    Type 2 diabetes mellitus with complication, unspecified long term insulin use status    High cholesterol    Kidney stones    MI (myocardial infarction)    DVT (deep venous thrombosis)    Acute massive pulmonary embolism  s/p tPA in 2018    Carotid atherosclerosis    H/O heart surgery  quadruple bypass 6 years ago    H/O cystoscopy      ALLERGIES:  No Known Allergies    MEDICATIONS:  STANDING MEDICATIONS  dextrose 5%. 1000 milliLiter(s) IV Continuous <Continuous>  dextrose 5%. 1000 milliLiter(s) IV Continuous <Continuous>  dextrose 50% Injectable 25 Gram(s) IV Push once  dextrose 50% Injectable 12.5 Gram(s) IV Push once  dextrose 50% Injectable 25 Gram(s) IV Push once  enoxaparin Injectable 40 milliGRAM(s) SubCutaneous every 12 hours  fat emulsion (Fish Oil and Plant Based) 20% Infusion 1.0753 Gm/kG/Day IV Continuous <Continuous>  furosemide    Tablet 20 milliGRAM(s) Oral daily  glucagon  Injectable 1 milliGRAM(s) IntraMuscular once  insulin lispro (ADMELOG) corrective regimen sliding scale   SubCutaneous three times a day before meals  labetalol 200 milliGRAM(s) Oral two times a day  losartan 100 milliGRAM(s) Oral daily  Parenteral Nutrition - Adult 1 Each TPN Continuous <Continuous>  piperacillin/tazobactam IVPB.. 3.375 Gram(s) IV Intermittent every 8 hours  simvastatin 40 milliGRAM(s) Oral at bedtime  tamsulosin 0.4 milliGRAM(s) Oral at bedtime    PRN MEDICATIONS  dextrose Oral Gel 15 Gram(s) Oral once PRN  morphine  - Injectable 2 milliGRAM(s) IV Push every 4 hours PRN  ondansetron    Tablet 8 milliGRAM(s) Oral every 8 hours PRN      VITAL SIGNS: Last 24 Hours  T(C): 36.3 (21 Oct 2022 06:16), Max: 36.3 (20 Oct 2022 20:57)  T(F): 97.4 (21 Oct 2022 06:16), Max: 97.4 (20 Oct 2022 20:57)  HR: 99 (21 Oct 2022 06:16) (83 - 99)  BP: 131/61 (21 Oct 2022 06:16) (131/61 - 161/71)  BP(mean): --  RR: 18 (21 Oct 2022 06:16) (17 - 18)  SpO2: 93% (20 Oct 2022 18:22) (93% - 93%)    LABS:                        13.1   15.80 )-----------( 153      ( 21 Oct 2022 09:46 )             39.4     10-21    141  |  102  |  13  ----------------------------<  189<H>  4.0   |  27  |  0.7    Ca    8.0<L>      21 Oct 2022 09:46  Mg     1.7     10-21    TPro  5.5<L>  /  Alb  3.6  /  TBili  3.7<H>  /  DBili  0.8<H>  /  AST  10  /  ALT  8   /  AlkPhos  57  10-21    PT/INR - ( 21 Oct 2022 09:46 )   PT: 30.20 sec;   INR: 2.57 ratio                   Culture - Blood (collected 19 Oct 2022 11:26)  Source: .Blood Blood-Peripheral  Preliminary Report (20 Oct 2022 23:01):    No growth to date.          RADIOLOGY:    REVIEW OF SYSTEMS:    CONSTITUTIONAL: lethargic when compared to yesterday, non febrile, AA*04  EYES/ENT: No visual changes;  No vertigo or throat pain   NECK: No pain or stiffness  RESPIRATORY: No cough, wheezing, hemoptysis; No shortness of breath  CARDIOVASCULAR: No chest pain or palpitations  GASTROINTESTINAL: Epigastric, RUQ & Rt Flank pain+ ,  No nausea, vomiting, or hematemesis; No diarrhea or constipation. No melena or hematochezia.  GENITOURINARY: No dysuria, frequency or hematuria  NEUROLOGICAL: No numbness or weakness  SKIN: No itching, rashes    PHYSICAL EXAM:  CONSTITUTIONAL: No acute distress, well-developed, well-groomed, AAOx3  HEAD: Atraumatic, normocephalic  EYES: EOM intact, PERRLA, conjunctiva and sclera clear  ENT: Supple, no masses, no thyromegaly, no bruits, no JVD; moist mucous membranes  PULMONARY: Clear to auscultation bilaterally; no wheezes, rales, or rhonchi  CARDIOVASCULAR: Regular rate and rhythm; no murmurs, rubs, or gallops  GASTROINTESTINAL: Soft, non-tender, non-distended; bowel sounds present  MUSCULOSKELETAL: 2+ peripheral pulses; no clubbing, no cyanosis, no edema  NEUROLOGY: non-focal  SKIN: No rashes or lesions; warm and dry

## 2022-10-21 NOTE — CONSULT NOTE ADULT - ASSESSMENT
Patient is a 70 y/o male male with PMHx of HTN, HLD, DM2, MI s/p CABG, DVT/PE on Xarelto, recurrent kidney stones who presented with a chief complaint of abdominal pain . Upon presentation he had severe epigastric pain with radiation to the back that was 10/10, severe, without alleviating factors. Patient admitted found to have Cholecystitis seen by surgery and IR. GI consulted for LFT abnormalities. Patient with CBD of 3, not concerning for CBD stone. Cholecystectomy would be ideal but if cannot be done Percutaneous drainage. No plan for endoscopic intervention at this time.    Cholecystitis  - Continue ABX  - Surgery and IR on board    - no plan for endoscopic procedure  - Recall GI as needed  Patient is a 72 y/o male male with PMHx of HTN, HLD, DM2, MI s/p CABG, DVT/PE on Xarelto, recurrent kidney stones who presented with a chief complaint of abdominal pain . Upon presentation he had severe epigastric pain with radiation to the back that was 10/10, severe, without alleviating factors. Patient admitted found to have Cholecystitis seen by surgery and IR. GI consulted for LFT abnormalities. Patient with CBD of 3, not concerning for CBD stone. Cholecystectomy would be ideal but if cannot be done Percutaneous drainage. No plan for endoscopic intervention at this time.    Acute Cholecystitis, very low suspicion of choledocholithiasis   - Continue ABX  - Surgery and IR on board    - no plan for endoscopic procedure  - Recall GI as needed

## 2022-10-21 NOTE — CONSULT NOTE ADULT - SUBJECTIVE AND OBJECTIVE BOX
Patient is a 70 y/o male male with PMHx of HTN, HLD, DM2, MI s/p CABG, DVT/PE on Xarelto, recurrent kidney stones who presented with a chief complaint of abdominal pain . Upon presentation he had severe epigastric pain with radiation to the back that was 10/10, severe, without alleviating factors.         PAST MEDICAL & SURGICAL HISTORY:  Hypertension, unspecified type      Type 2 diabetes mellitus with complication, unspecified long term insulin use status      High cholesterol      Kidney stones      MI (myocardial infarction)      DVT (deep venous thrombosis)      Acute massive pulmonary embolism  s/p tPA in 2018      Carotid atherosclerosis      H/O heart surgery  quadruple bypass 6 years ago      H/O cystoscopy            MEDICATIONS  (STANDING):  cholecalciferol 1000 Unit(s) Oral daily  cyanocobalamin 1000 MICROGram(s) Oral daily  dextrose 5% + sodium chloride 0.45% with potassium chloride 20 mEq/L 1000 milliLiter(s) (125 mL/Hr) IV Continuous <Continuous>  dextrose 5%. 1000 milliLiter(s) (50 mL/Hr) IV Continuous <Continuous>  dextrose 5%. 1000 milliLiter(s) (100 mL/Hr) IV Continuous <Continuous>  dextrose 50% Injectable 25 Gram(s) IV Push once  dextrose 50% Injectable 12.5 Gram(s) IV Push once  dextrose 50% Injectable 25 Gram(s) IV Push once  enoxaparin Injectable 40 milliGRAM(s) SubCutaneous every 12 hours  furosemide    Tablet 20 milliGRAM(s) Oral daily  glucagon  Injectable 1 milliGRAM(s) IntraMuscular once  insulin lispro (ADMELOG) corrective regimen sliding scale   SubCutaneous three times a day before meals  labetalol 200 milliGRAM(s) Oral two times a day  losartan 100 milliGRAM(s) Oral daily  piperacillin/tazobactam IVPB. 3.375 Gram(s) IV Intermittent once  piperacillin/tazobactam IVPB.- 3.375 Gram(s) IV Intermittent once  piperacillin/tazobactam IVPB.. 3.375 Gram(s) IV Intermittent every 8 hours  simvastatin 40 milliGRAM(s) Oral at bedtime  tamsulosin 0.4 milliGRAM(s) Oral at bedtime    MEDICATIONS  (PRN):  dextrose Oral Gel 15 Gram(s) Oral once PRN Blood Glucose LESS THAN 70 milliGRAM(s)/deciliter  morphine  - Injectable 2 milliGRAM(s) IV Push every 4 hours PRN Moderate Pain (4 - 6)  ondansetron    Tablet 8 milliGRAM(s) Oral every 8 hours PRN Nausea and/or Vomiting      Allergies    No Known Allergies    Intolerances        REVIEW OF SYSTEMS    [ ] A ten-point review of systems was otherwise negative except as noted.  [ ] Due to altered mental status/intubation, subjective information were not able to be obtained from the patient. History was obtained, to the extent possible, from review of the chart and collateral sources of information.      Vital Signs Last 24 Hrs  T(C): 36.3 (21 Oct 2022 06:16), Max: 36.3 (20 Oct 2022 20:57)  T(F): 97.4 (21 Oct 2022 06:16), Max: 97.4 (20 Oct 2022 20:57)  HR: 99 (21 Oct 2022 06:16) (77 - 99)  BP: 131/61 (21 Oct 2022 06:16) (122/59 - 161/71)  BP(mean): --  RR: 18 (21 Oct 2022 06:16) (16 - 18)  SpO2: 93% (20 Oct 2022 18:22) (93% - 95%)    Parameters below as of 20 Oct 2022 18:22  Patient On (Oxygen Delivery Method): room air        PHYSICAL EXAM:  GENERAL: NAD, well-appearing  CHEST/LUNG: Clear to auscultation bilaterally  HEART: Regular rate and rhythm  ABDOMEN: Soft, Nontender, Nondistended;   EXTREMITIES:  No clubbing, cyanosis, or edema      LABS:  Labs:  CAPILLARY BLOOD GLUCOSE      POCT Blood Glucose.: 180 mg/dL (21 Oct 2022 07:51)  POCT Blood Glucose.: 199 mg/dL (20 Oct 2022 21:12)  POCT Blood Glucose.: 191 mg/dL (20 Oct 2022 16:27)  POCT Blood Glucose.: 222 mg/dL (20 Oct 2022 11:14)                          13.4   16.08 )-----------( 162      ( 20 Oct 2022 08:28 )             40.8         10-20    136  |  95<L>  |  13  ----------------------------<  215<H>  3.8   |  27  |  0.7          LFTs:             5.7  | 3.6  | 10       ------------------[56      ( 20 Oct 2022 18:17 )  3.8  | 0.6  | 8           Lipase:x      Amylase:x         Lactate, Blood: 1.6 mmol/L (10-18-22 @ 22:21)      Coags:                Culture - Blood (collected 19 Oct 2022 11:26)  Source: .Blood Blood-Peripheral  Preliminary Report (20 Oct 2022 23:01):    No growth to date.          RADIOLOGY & ADDITIONAL STUDIES:   Patient is a 72 y/o male male with PMHx of HTN, HLD, DM2, MI s/p CABG, DVT/PE on Xarelto, recurrent kidney stones who presented with a chief complaint of abdominal pain . Upon presentation he had severe epigastric pain with radiation to the back that was 10/10, severe, without alleviating factors. Patient admitted found to have Cholecystitis seen by surgery and IR. GI consulted for LFT abnormalities. Patient currently tired, son-in law present.       PAST MEDICAL & SURGICAL HISTORY:  Hypertension, unspecified type  Type 2 diabetes mellitus with complication, unspecified long term insulin use status  High cholesterol  Kidney stones  MI (myocardial infarction)  DVT (deep venous thrombosis)  Acute massive pulmonary embolism s/p tPA in 2018  Carotid atherosclerosis  H/O heart surgery quadruple bypass 6 years ago  H/O cystoscopy    Social History  Denies Current Tobacco use  Denies Current ETOH use  Denies Current Illicit Drug use     Family Hx:  Father: Non Contributory   Mother: Non Contributory    MEDICATIONS  (STANDING):  cholecalciferol 1000 Unit(s) Oral daily  cyanocobalamin 1000 MICROGram(s) Oral daily  dextrose 5% + sodium chloride 0.45% with potassium chloride 20 mEq/L 1000 milliLiter(s) (125 mL/Hr) IV Continuous <Continuous>  dextrose 5%. 1000 milliLiter(s) (50 mL/Hr) IV Continuous <Continuous>  dextrose 5%. 1000 milliLiter(s) (100 mL/Hr) IV Continuous <Continuous>  dextrose 50% Injectable 25 Gram(s) IV Push once  dextrose 50% Injectable 12.5 Gram(s) IV Push once  dextrose 50% Injectable 25 Gram(s) IV Push once  enoxaparin Injectable 40 milliGRAM(s) SubCutaneous every 12 hours  furosemide    Tablet 20 milliGRAM(s) Oral daily  glucagon  Injectable 1 milliGRAM(s) IntraMuscular once  insulin lispro (ADMELOG) corrective regimen sliding scale   SubCutaneous three times a day before meals  labetalol 200 milliGRAM(s) Oral two times a day  losartan 100 milliGRAM(s) Oral daily  piperacillin/tazobactam IVPB. 3.375 Gram(s) IV Intermittent once  piperacillin/tazobactam IVPB.- 3.375 Gram(s) IV Intermittent once  piperacillin/tazobactam IVPB.. 3.375 Gram(s) IV Intermittent every 8 hours  simvastatin 40 milliGRAM(s) Oral at bedtime  tamsulosin 0.4 milliGRAM(s) Oral at bedtime    MEDICATIONS  (PRN):  dextrose Oral Gel 15 Gram(s) Oral once PRN Blood Glucose LESS THAN 70 milliGRAM(s)/deciliter  morphine  - Injectable 2 milliGRAM(s) IV Push every 4 hours PRN Moderate Pain (4 - 6)  ondansetron    Tablet 8 milliGRAM(s) Oral every 8 hours PRN Nausea and/or Vomiting      Allergies  No Known Allergies        Review of Systems  General:  Denies Fatigue, Denies Fever, Denies Weakness ,Denies Weight Loss   HEENT: Denies Trouble Swallowing ,Denies  Sore Throat , Denies Change in hearing/vision/speech ,Denies Dizziness    Cardio: Denies  Chest Pain , Palpitations    Respiratory: Denies worsening of SOB, Denies Cough  Abdomen: See detailed HPI  Neuro: Denies Headache Denies Dizziness, Denies Paresthesias  MSK: Denies pain in Bones/Joints/Muscles   Psych: Patient denies depression, denies suicidal or homicidal ideations  Integ: Patient Denies rash, or new skin lesions       Vital Signs Last 24 Hrs  T(C): 36.3 (21 Oct 2022 06:16), Max: 36.3 (20 Oct 2022 20:57)  T(F): 97.4 (21 Oct 2022 06:16), Max: 97.4 (20 Oct 2022 20:57)  HR: 99 (21 Oct 2022 06:16) (77 - 99)  BP: 131/61 (21 Oct 2022 06:16) (122/59 - 161/71)  BP(mean): --  RR: 18 (21 Oct 2022 06:16) (16 - 18)  SpO2: 93% (20 Oct 2022 18:22) (93% - 95%)    Parameters below as of 20 Oct 2022 18:22  Patient On (Oxygen Delivery Method): room air    Physical Exam  Gen: NAD  HEENT: NC/AT, Mucosal Membranes  Cardio: S1/S2 No S3/S4, Regular  Resp: CTA B/L  Abdomen: Soft, ND/TTP Right upper quadrant   Neuro: AAOx3  Extremities: FROM x 4  Skin: no jaundice         Labs:                   13.4   16.08 )-----------( 162      ( 20 Oct 2022 08:28 )             40.8         10-20    136  |  95<L>  |  13  ----------------------------<  215<H>  3.8   |  27  |  0.7        LFTs:             5.7  | 3.6  | 10       ------------------[56      ( 20 Oct 2022 18:17 )  3.8  | 0.6  | 8               Lactate, Blood: 1.6 mmol/L (10-18-22 @ 22:21)      Culture - Blood (collected 19 Oct 2022 11:26)  Source: .Blood Blood-Peripheral  Preliminary Report (20 Oct 2022 23:01):    No growth to date.        RADIOLOGY & ADDITIONAL STUDIES:  NM Hepatobiliary Imaging 10.19.22   IMPRESSION:  NO DEFINITE VISUALIZATION OF THE GALLBLADDER THROUGH 4 HOURS   POST-INJECTION, CONSISTENT WITH CHOLECYSTITIS, AS DESCRIBED ABOVE.    CLINICAL CORRELATION IS SUGGESTED.      US Abdomen Upper Quadrant Right 10.19.22   IMPRESSION:    Right renal calculi measuring up to 1.2 cm. No hydronephrosis.    Distended gallbladder with nonspecific mild gallbladder wall thickening.   Patient has known cholelithiasis as demonstrated on same day CT. Negative   reported sonographic Srinivasan's sign. Findings are nonspecific/equivocal.   If warranted, further evaluation with HIDA scan can be obtained.

## 2022-10-21 NOTE — CHART NOTE - NSCHARTNOTEFT_GEN_A_CORE
NUTRITION SUPPORT TEAM  -  CONSULT NOTE     ADMISSION HPI:  Patient is a 71-yo male with pmhx of htn, hld, DM2, MI s/p CABG, DVT/PE on Xarelto, recurrent kidney stones who presented with a chief complaint of abdominal pain and high blood pressure x 1 day. The pain started yesterday afternoon as a "horrible discomfort". It is hypogastric in anture, diffuse, encircling to patient's low back. It is constant, was a 10/10 prior to pain meds (now 7/10), no exacerbating factors. Patient states he takes his bp daily and usually runs around 155/65, but found his systolic in the 200s so decided to come to hospital. Patient denies other systemic sxs, including fever, n/v/d, cp, sob, chills, dysuria, hematuria, hematochezia, joint pains or recent sick contacts. Patient says the pain is similar to what he experienced with his kidney stones but that the pain was located flanks during those incidences.     ED Course:  Vitals- bp 229/95, hr 68, rr 20, t 35.6, 95% on RA  Labs- wbc wnl, Hb 13.9, K 5.1 (hemolyzed), glucose 241, TBili 1.8  EKG- Normal sinus rhythm, Left ventricular hypertrophy with QRS widening and repolarization abnormality ( R in aVL , [place] product , Romhilt-Bowen ), Inferior infarct , age undetermined  BP had 20 point difference between arms and there was a concern for dissection. A CT angio obtained was negative for dissection, but did show gallstones and enlarged gallbladder with a stone at the neck. An US of RUQ was ordered to further delineate if pain is due to biliary pathology. US shows a right renal calculi measuring 1.2 cm and gallbladder edema. Pt states he has never been told he has gallstones before.    Patient has received total of labetalol 20 IVP/300 PO, nifedipine 30 PO, and morphine 10mg IVP.  He is being admitted for further management.   (19 Oct 2022 08:09)      NUTRITION SUPPORT NOTE:    +cholecystitis, percutaneous cholecystostomy planned by IR 10/25  d/w Dr. Mariscal    REVIEW OF SYSTEMS:  Negative except as noted above.     PAST MEDICAL/SURGICAL HISTORY:   Hypertension, unspecified type  Type 2 diabetes mellitus with complication, unspecified long term insulin use status  High cholesterol  Kidney stones  MI (myocardial infarction)  DVT (deep venous thrombosis)  Acute massive pulmonary embolism  s/p tPA in 2018  Carotid atherosclerosis  H/O heart surgery  quadruple bypass 6 years ago  H/O cystoscopy    ALLERGIES:  No Known Allergies    VITALS:  T(F): 97.4 (10-21 @ 06:16), Max: 97.4 (10-21 @ 06:16)  HR: 99 (10-21 @ 06:16) (99 - 99)  BP: 131/61 (10-21 @ 06:16) (131/61 - 131/61)  RR: 18 (10-21 @ 06:16) (18 - 18)    HEIGHT/WEIGHT/BMI:   Height (cm): 185.4 (10-20), 185.4 (09-15)  Weight (kg): 93 (10-20), 90.7 (09-15)  BMI (kg/m2): 27.1 (10-20), 26.4 (09-15)    PHYSICAL EXAM:   GENERAL: NAD, well-groomed, well-developed  HEENT: Moist mucous membranes, Good dentition, No lesions  ABDOMEN: Soft, Nontender, Nondistended  EXTREMITIES:  No clubbing, cyanosis, or edema  SKIN: warm and well perfused; No obvious rashes or lesions  IV ACCESS: peripheral  ENTERAL ACCESS: none    I/Os:     10-20-22 @ 07:01  -  10-21-22 @ 07:00  --------------------------------------------------------  IN:  Total IN: 0 mL    OUT:    Voided (mL): 250 mL  Total OUT: 250 mL    Total NET: -250 mL    STANDING MEDICATIONS:   cholecalciferol 1000 Unit(s) Oral daily  cyanocobalamin 1000 MICROGram(s) Oral daily  dextrose 5% + sodium chloride 0.45% with potassium chloride 20 mEq/L 1000 milliLiter(s) IV Continuous <Continuous>  dextrose 5%. 1000 milliLiter(s) IV Continuous <Continuous>  dextrose 5%. 1000 milliLiter(s) IV Continuous <Continuous>  dextrose 50% Injectable 25 Gram(s) IV Push once  dextrose 50% Injectable 12.5 Gram(s) IV Push once  dextrose 50% Injectable 25 Gram(s) IV Push once  dextrose Oral Gel 15 Gram(s) Oral once PRN  enoxaparin Injectable 40 milliGRAM(s) SubCutaneous every 12 hours  furosemide    Tablet 20 milliGRAM(s) Oral daily  glucagon  Injectable 1 milliGRAM(s) IntraMuscular once  insulin lispro (ADMELOG) corrective regimen sliding scale   SubCutaneous three times a day before meals  labetalol 200 milliGRAM(s) Oral two times a day  losartan 100 milliGRAM(s) Oral daily  morphine  - Injectable 2 milliGRAM(s) IV Push every 4 hours PRN  ondansetron    Tablet 8 milliGRAM(s) Oral every 8 hours PRN  piperacillin/tazobactam IVPB.. 3.375 Gram(s) IV Intermittent every 8 hours  simvastatin 40 milliGRAM(s) Oral at bedtime  tamsulosin 0.4 milliGRAM(s) Oral at bedtime    LABS:                         13.1   15.80 )-----------( 153      ( 21 Oct 2022 09:46 )             39.4     136  |  95<L>  |  13  ----------------------------<  215<H>          (10-20-22 @ 08:28)  3.8   |  27  |  0.7    Ca    8.3<L>          (10-20-22 @ 08:28)  Mg     1.7         (10-20-22 @ 08:28)    TPro  5.7<L>  /  Alb  3.8  /  TBili  3.6<H>  /  DBili  0.6<H>  /  AST  10  /  ALT  8   /  AlkPhos  56       10-20-22 @ 18:17    A1c: 7.2 % (10-20-22 @ 08:28)    Blood Glucose (Past 24 hours):  180 mg/dL (10-21 @ 07:51)  199 mg/dL (10-20 @ 21:12)  191 mg/dL (10-20 @ 16:27)  222 mg/dL (10-20 @ 11:14)    DIET:   Diet, NPO:   Except Medications (10-20-22 @ 06:48) [Active]    RADIOLOGY:   < from: CT Angio Abdomen and Pelvis w/ IV Cont (10.18.22 @ 22:49) >    ACC: [id number] EXAM:  CT ANGIO ABD PELV (W)AW IC                        ACC: [id number] EXAM:  CT ANGIO CHEST AORTA [facility]                          PROCEDURE DATE:  10/18/2022      INTERPRETATION:  CLINICAL HISTORY/REASON FOR EXAM: Abdominal pain.   Shortness of breath.    TECHNIQUE: Initial contiguous axial CT images of the chest without IV   contrast obtained. Post-administration of 100 cc Omnipaque 350   intravenous contrast, CT angiographic axial images of the chest, abdomen   and pelvis were obtained. Coronal and sagittal reformats were performed.   3D (MIP) reformats obtained.    COMPARISON: CT chest, abdomen and pelvis 12/1/2019, CT abdomen and pelvis   3/24/2021.    FINDINGS:    VASCULATURE: No evidence of aortic dissection, intramural hematoma or   aneurysmal dilation. Mixed plaque throughout aorta and its major   branches. Dilated main pulmonary artery at 3.4 cm, which may be seen in   pulmonary arterial hypertension. Patent mesenteric visceral arteries. No   evidence of central pulmonary embolus.    LUNGS, PLEURA, AIRWAYS: No lobar consolidation, mass, effusion, or   pneumothorax. No evidence of central endobronchial obstruction. No   bronchiectasis or honeycombing. No suspicious pulmonary nodule. Dependent   atelectasis.Paraseptal emphysema.    THORACIC NODES: Prominent subcentimeter short axis bilateral hilar and   mediastinal lymph nodes; correlate for possible CHF.    MEDIASTINUM: No pericardial effusion. Cardiomegaly. No adenopathy or mass.    HEPATOBILIARY: Cholelithiasis. Gallbladder pericholecystic stranding. No   biliary dilation. Liver unremarkable.    SPLEEN: Unremarkable.    PANCREAS: Unremarkable.    ADRENAL GLANDS: Unremarkable.    KIDNEYS: Symmetric pattern of renal enhancement. No hydronephrosis   bilaterally. Nonobstructing bilateral renal calculi.    ABDOMINAL NODES: No lymphadenopathy.    VISUALIZED PERITONEUM /MESENTERY/BOWEL/PELVIS: Trace free pelvic fluid.   No bowel obstruction. Mild BPH. Urinary bladder partially distended.   Normal appendix.    BONES/SOFT TISSUES: Post median sternotomy. Degenerative change of spine.    IMPRESSION:    1. Cholelithiasis. Gallbladder pericholecystic stranding. Findings may   represent acute cholecystitis in the appropriate clinical setting.    2. No evidence of acute intrathoracic pathology. No evidence of acute   aortic pathology.    --- End of Report ---  < end of copied text >    Diet, NPO:   Except Medications (10-20-22 @ 06:48) [Active]      ASSESSMENT  71M w/ PMH of HLD, HTN, MI, DM, CAD s/p CABG, previous DVT/PE on Xarelto presents for hypotension and kidney stones presents to ED with complaints of abdominal pain in the bilateral flanks radiating across the lower abdomen as well as hypertension. Initial vitals noted to have a systolic of 239. A CT showed gallstones and enlarged gallbladder with a stone at the neck.   An US of RUQ was ordered to further delineate if pain is due to biliary pathology. US shows a right renal calculi measuring 1.2 cm and gallbladder edema. On exam, pt complains of right flank pain. Physical exam findings, imaging, and labs as documented above.     - cholecystitis  - HTN urgency  - DM, A1c 7.2  - hypomagnesemia    PLAN  - keeping NPO until IR procedure 10/25  - check baseline TG level today  - start PPN tonight  - d/c IVF's when PPN begins  - daily bmp, in phos, mg while on parenteral nutrition  - will follow NUTRITION SUPPORT TEAM  -  CONSULT NOTE     ADMISSION HPI:  Patient is a 71-yo male with pmhx of htn, hld, DM2, MI s/p CABG, DVT/PE on Xarelto, recurrent kidney stones who presented with a chief complaint of abdominal pain and high blood pressure x 1 day. The pain started yesterday afternoon as a "horrible discomfort". It is hypogastric in anture, diffuse, encircling to patient's low back. It is constant, was a 10/10 prior to pain meds (now 7/10), no exacerbating factors. Patient states he takes his bp daily and usually runs around 155/65, but found his systolic in the 200s so decided to come to hospital. Patient denies other systemic sxs, including fever, n/v/d, cp, sob, chills, dysuria, hematuria, hematochezia, joint pains or recent sick contacts. Patient says the pain is similar to what he experienced with his kidney stones but that the pain was located flanks during those incidences.     ED Course:  Vitals- bp 229/95, hr 68, rr 20, t 35.6, 95% on RA  Labs- wbc wnl, Hb 13.9, K 5.1 (hemolyzed), glucose 241, TBili 1.8  EKG- Normal sinus rhythm, Left ventricular hypertrophy with QRS widening and repolarization abnormality ( R in aVL , [place] product , Romhilt-Bowen ), Inferior infarct , age undetermined  BP had 20 point difference between arms and there was a concern for dissection. A CT angio obtained was negative for dissection, but did show gallstones and enlarged gallbladder with a stone at the neck. An US of RUQ was ordered to further delineate if pain is due to biliary pathology. US shows a right renal calculi measuring 1.2 cm and gallbladder edema. Pt states he has never been told he has gallstones before.    Patient has received total of labetalol 20 IVP/300 PO, nifedipine 30 PO, and morphine 10mg IVP.  He is being admitted for further management.   (19 Oct 2022 08:09)      NUTRITION SUPPORT NOTE:    +cholecystitis, percutaneous cholecystostomy planned by IR 10/25  d/w Dr. Mariscal      REVIEW OF SYSTEMS:  Negative except as noted above.     PAST MEDICAL/SURGICAL HISTORY:   Hypertension, unspecified type  Type 2 diabetes mellitus with complication, unspecified long term insulin use status  High cholesterol  Kidney stones  MI (myocardial infarction)  DVT (deep venous thrombosis)  Acute massive pulmonary embolism  s/p tPA in 2018  Carotid atherosclerosis  H/O heart surgery  quadruple bypass 6 years ago  H/O cystoscopy    ALLERGIES:  No Known Allergies    VITALS:  T(F): 97.4 (10-21 @ 06:16), Max: 97.4 (10-21 @ 06:16)  HR: 99 (10-21 @ 06:16) (99 - 99)  BP: 131/61 (10-21 @ 06:16) (131/61 - 131/61)  RR: 18 (10-21 @ 06:16) (18 - 18)    HEIGHT/WEIGHT/BMI:   Height (cm): 185.4 (10-20), 185.4 (09-15)  Weight (kg): 93 (10-20), 90.7 (09-15)  BMI (kg/m2): 27.1 (10-20), 26.4 (09-15)    PHYSICAL EXAM:   GENERAL: NAD, well-groomed, well-developed. Lethargic, weak  HEENT: Moist mucous membranes, Good dentition, No lesions  ABDOMEN: Soft, Nondistended, tenderness RUQ  EXTREMITIES:  No clubbing, cyanosis, + edema  SKIN: warm and well perfused; No obvious rashes or lesions  IV ACCESS: peripheral Rt arm  ENTERAL ACCESS: none    I/Os:     10-20-22 @ 07:01  -  10-21-22 @ 07:00  --------------------------------------------------------  IN:  Total IN: 0 mL    OUT:    Voided (mL): 250 mL  Total OUT: 250 mL    Total NET: -250 mL    STANDING MEDICATIONS:   cholecalciferol 1000 Unit(s) Oral daily  cyanocobalamin 1000 MICROGram(s) Oral daily  dextrose 5% + sodium chloride 0.45% with potassium chloride 20 mEq/L 1000 milliLiter(s) IV Continuous <Continuous>  dextrose 5%. 1000 milliLiter(s) IV Continuous <Continuous>  dextrose 5%. 1000 milliLiter(s) IV Continuous <Continuous>  dextrose 50% Injectable 25 Gram(s) IV Push once  dextrose 50% Injectable 12.5 Gram(s) IV Push once  dextrose 50% Injectable 25 Gram(s) IV Push once  dextrose Oral Gel 15 Gram(s) Oral once PRN  enoxaparin Injectable 40 milliGRAM(s) SubCutaneous every 12 hours  furosemide    Tablet 20 milliGRAM(s) Oral daily  glucagon  Injectable 1 milliGRAM(s) IntraMuscular once  insulin lispro (ADMELOG) corrective regimen sliding scale   SubCutaneous three times a day before meals  labetalol 200 milliGRAM(s) Oral two times a day  losartan 100 milliGRAM(s) Oral daily  morphine  - Injectable 2 milliGRAM(s) IV Push every 4 hours PRN  ondansetron    Tablet 8 milliGRAM(s) Oral every 8 hours PRN  piperacillin/tazobactam IVPB.. 3.375 Gram(s) IV Intermittent every 8 hours  simvastatin 40 milliGRAM(s) Oral at bedtime  tamsulosin 0.4 milliGRAM(s) Oral at bedtime    LABS:                         13.1   15.80 )-----------( 153      ( 21 Oct 2022 09:46 )             39.4     136  |  95<L>  |  13  ----------------------------<  215<H>          (10-20-22 @ 08:28)  3.8   |  27  |  0.7    Ca    8.3<L>          (10-20-22 @ 08:28)  Mg     1.7         (10-20-22 @ 08:28)    TPro  5.7<L>  /  Alb  3.8  /  TBili  3.6<H>  /  DBili  0.6<H>  /  AST  10  /  ALT  8   /  AlkPhos  56       10-20-22 @ 18:17    A1c: 7.2 % (10-20-22 @ 08:28)    Blood Glucose (Past 24 hours):  180 mg/dL (10-21 @ 07:51)  199 mg/dL (10-20 @ 21:12)  191 mg/dL (10-20 @ 16:27)  222 mg/dL (10-20 @ 11:14)    DIET:   Diet, NPO:   Except Medications (10-20-22 @ 06:48) [Active]    RADIOLOGY:   < from: CT Angio Abdomen and Pelvis w/ IV Cont (10.18.22 @ 22:49) >    ACC: [id number] EXAM:  CT ANGIO ABD PELV (W)AW IC                        ACC: [id number] EXAM:  CT ANGIO CHEST AORTA [facility]                          PROCEDURE DATE:  10/18/2022      INTERPRETATION:  CLINICAL HISTORY/REASON FOR EXAM: Abdominal pain.   Shortness of breath.    TECHNIQUE: Initial contiguous axial CT images of the chest without IV   contrast obtained. Post-administration of 100 cc Omnipaque 350   intravenous contrast, CT angiographic axial images of the chest, abdomen   and pelvis were obtained. Coronal and sagittal reformats were performed.   3D (MIP) reformats obtained.    COMPARISON: CT chest, abdomen and pelvis 12/1/2019, CT abdomen and pelvis   3/24/2021.    FINDINGS:    VASCULATURE: No evidence of aortic dissection, intramural hematoma or   aneurysmal dilation. Mixed plaque throughout aorta and its major   branches. Dilated main pulmonary artery at 3.4 cm, which may be seen in   pulmonary arterial hypertension. Patent mesenteric visceral arteries. No   evidence of central pulmonary embolus.    LUNGS, PLEURA, AIRWAYS: No lobar consolidation, mass, effusion, or   pneumothorax. No evidence of central endobronchial obstruction. No   bronchiectasis or honeycombing. No suspicious pulmonary nodule. Dependent   atelectasis.Paraseptal emphysema.    THORACIC NODES: Prominent subcentimeter short axis bilateral hilar and   mediastinal lymph nodes; correlate for possible CHF.    MEDIASTINUM: No pericardial effusion. Cardiomegaly. No adenopathy or mass.    HEPATOBILIARY: Cholelithiasis. Gallbladder pericholecystic stranding. No   biliary dilation. Liver unremarkable.    SPLEEN: Unremarkable.    PANCREAS: Unremarkable.    ADRENAL GLANDS: Unremarkable.    KIDNEYS: Symmetric pattern of renal enhancement. No hydronephrosis   bilaterally. Nonobstructing bilateral renal calculi.    ABDOMINAL NODES: No lymphadenopathy.    VISUALIZED PERITONEUM /MESENTERY/BOWEL/PELVIS: Trace free pelvic fluid.   No bowel obstruction. Mild BPH. Urinary bladder partially distended.   Normal appendix.    BONES/SOFT TISSUES: Post median sternotomy. Degenerative change of spine.    IMPRESSION:    1. Cholelithiasis. Gallbladder pericholecystic stranding. Findings may   represent acute cholecystitis in the appropriate clinical setting.    2. No evidence of acute intrathoracic pathology. No evidence of acute   aortic pathology.    --- End of Report ---  < end of copied text >    Diet, NPO:   Except Medications (10-20-22 @ 06:48) [Active]      ASSESSMENT  71M w/ PMH of HLD, HTN, MI, DM, CAD s/p CABG, previous DVT/PE on Xarelto presents for hypotension and kidney stones presents to ED with complaints of abdominal pain in the bilateral flanks radiating across the lower abdomen as well as hypertension. Initial vitals noted to have a systolic of 239. A CT showed gallstones and enlarged gallbladder with a stone at the neck.   An US of RUQ was ordered to further delineate if pain is due to biliary pathology. US shows a right renal calculi measuring 1.2 cm and gallbladder edema. On exam, pt complains of right flank pain. Physical exam findings, imaging, and labs as documented above.     - cholecystitis  - HTN urgency  - DM, A1c 7.2  - hypomagnesemia    PLAN  - keeping NPO until IR procedure 10/25  - check baseline TG level today  - start PPN tonight  - d/c IVF's when PPN begins  - d/c oral cholecalciferol and cyanocobalamin while on ppn  - daily bmp, in phos, mg while on parenteral nutrition  - will follow NUTRITION SUPPORT TEAM  -  CONSULT NOTE     ADMISSION HPI:  Patient is a 71-yo male with pmhx of htn, hld, DM2, MI s/p CABG, DVT/PE on Xarelto, recurrent kidney stones who presented with a chief complaint of abdominal pain and high blood pressure x 1 day. The pain started yesterday afternoon as a "horrible discomfort". It is hypogastric in anture, diffuse, encircling to patient's low back. It is constant, was a 10/10 prior to pain meds (now 7/10), no exacerbating factors. Patient states he takes his bp daily and usually runs around 155/65, but found his systolic in the 200s so decided to come to hospital. Patient denies other systemic sxs, including fever, n/v/d, cp, sob, chills, dysuria, hematuria, hematochezia, joint pains or recent sick contacts. Patient says the pain is similar to what he experienced with his kidney stones but that the pain was located flanks during those incidences.     ED Course:  Vitals- bp 229/95, hr 68, rr 20, t 35.6, 95% on RA  Labs- wbc wnl, Hb 13.9, K 5.1 (hemolyzed), glucose 241, TBili 1.8  EKG- Normal sinus rhythm, Left ventricular hypertrophy with QRS widening and repolarization abnormality ( R in aVL , [place] product , Romhilt-Bowen ), Inferior infarct , age undetermined  BP had 20 point difference between arms and there was a concern for dissection. A CT angio obtained was negative for dissection, but did show gallstones and enlarged gallbladder with a stone at the neck. An US of RUQ was ordered to further delineate if pain is due to biliary pathology. US shows a right renal calculi measuring 1.2 cm and gallbladder edema. Pt states he has never been told he has gallstones before.    Patient has received total of labetalol 20 IVP/300 PO, nifedipine 30 PO, and morphine 10mg IVP.  He is being admitted for further management.   (19 Oct 2022 08:09)      NUTRITION SUPPORT NOTE:    +cholecystitis, percutaneous cholecystostomy planned by IR 10/25  d/w Dr. Mariscal, consult requested      REVIEW OF SYSTEMS:  Negative except as noted above.     PAST MEDICAL/SURGICAL HISTORY:   Hypertension, unspecified type  Type 2 diabetes mellitus with complication, unspecified long term insulin use status  High cholesterol  Kidney stones  MI (myocardial infarction)  DVT (deep venous thrombosis)  Acute massive pulmonary embolism  s/p tPA in 2018  Carotid atherosclerosis  H/O heart surgery  quadruple bypass 6 years ago  H/O cystoscopy    ALLERGIES:  No Known Allergies    VITALS:  T(F): 97.4 (10-21 @ 06:16), Max: 97.4 (10-21 @ 06:16)  HR: 99 (10-21 @ 06:16) (99 - 99)  BP: 131/61 (10-21 @ 06:16) (131/61 - 131/61)  RR: 18 (10-21 @ 06:16) (18 - 18)    HEIGHT/WEIGHT/BMI:   Height (cm): 185.4 (10-20), 185.4 (09-15)  Weight (kg): 93 (10-20), 90.7 (09-15)  BMI (kg/m2): 27.1 (10-20), 26.4 (09-15)    PHYSICAL EXAM:   GENERAL: NAD, well-groomed, well-developed. Lethargic, weak  HEENT: Moist mucous membranes, Good dentition, No lesions  ABDOMEN: Soft, Nondistended, tenderness RUQ  EXTREMITIES:  No clubbing, cyanosis, + edema  SKIN: warm and well perfused; No obvious rashes or lesions  IV ACCESS: peripheral Rt arm  ENTERAL ACCESS: none    I/Os:     10-20-22 @ 07:01  -  10-21-22 @ 07:00  --------------------------------------------------------  IN:  Total IN: 0 mL    OUT:    Voided (mL): 250 mL  Total OUT: 250 mL    Total NET: -250 mL    STANDING MEDICATIONS:   cholecalciferol 1000 Unit(s) Oral daily  cyanocobalamin 1000 MICROGram(s) Oral daily  dextrose 5% + sodium chloride 0.45% with potassium chloride 20 mEq/L 1000 milliLiter(s) IV Continuous <Continuous>  dextrose 5%. 1000 milliLiter(s) IV Continuous <Continuous>  dextrose 5%. 1000 milliLiter(s) IV Continuous <Continuous>  dextrose 50% Injectable 25 Gram(s) IV Push once  dextrose 50% Injectable 12.5 Gram(s) IV Push once  dextrose 50% Injectable 25 Gram(s) IV Push once  dextrose Oral Gel 15 Gram(s) Oral once PRN  enoxaparin Injectable 40 milliGRAM(s) SubCutaneous every 12 hours  furosemide    Tablet 20 milliGRAM(s) Oral daily  glucagon  Injectable 1 milliGRAM(s) IntraMuscular once  insulin lispro (ADMELOG) corrective regimen sliding scale   SubCutaneous three times a day before meals  labetalol 200 milliGRAM(s) Oral two times a day  losartan 100 milliGRAM(s) Oral daily  morphine  - Injectable 2 milliGRAM(s) IV Push every 4 hours PRN  ondansetron    Tablet 8 milliGRAM(s) Oral every 8 hours PRN  piperacillin/tazobactam IVPB.. 3.375 Gram(s) IV Intermittent every 8 hours  simvastatin 40 milliGRAM(s) Oral at bedtime  tamsulosin 0.4 milliGRAM(s) Oral at bedtime    LABS:                         13.1   15.80 )-----------( 153      ( 21 Oct 2022 09:46 )             39.4     136  |  95<L>  |  13  ----------------------------<  215<H>          (10-20-22 @ 08:28)  3.8   |  27  |  0.7    Ca    8.3<L>          (10-20-22 @ 08:28)  Mg     1.7         (10-20-22 @ 08:28)    TPro  5.7<L>  /  Alb  3.8  /  TBili  3.6<H>  /  DBili  0.6<H>  /  AST  10  /  ALT  8   /  AlkPhos  56       10-20-22 @ 18:17    A1c: 7.2 % (10-20-22 @ 08:28)    Blood Glucose (Past 24 hours):  180 mg/dL (10-21 @ 07:51)  199 mg/dL (10-20 @ 21:12)  191 mg/dL (10-20 @ 16:27)  222 mg/dL (10-20 @ 11:14)    DIET:   Diet, NPO:   Except Medications (10-20-22 @ 06:48) [Active]    RADIOLOGY:   < from: CT Angio Abdomen and Pelvis w/ IV Cont (10.18.22 @ 22:49) >    ACC: [id number] EXAM:  CT ANGIO ABD PELV (W)AW IC                        ACC: [id number] EXAM:  CT ANGIO CHEST AORTA [facility]                          PROCEDURE DATE:  10/18/2022      INTERPRETATION:  CLINICAL HISTORY/REASON FOR EXAM: Abdominal pain.   Shortness of breath.    TECHNIQUE: Initial contiguous axial CT images of the chest without IV   contrast obtained. Post-administration of 100 cc Omnipaque 350   intravenous contrast, CT angiographic axial images of the chest, abdomen   and pelvis were obtained. Coronal and sagittal reformats were performed.   3D (MIP) reformats obtained.    COMPARISON: CT chest, abdomen and pelvis 12/1/2019, CT abdomen and pelvis   3/24/2021.    FINDINGS:    VASCULATURE: No evidence of aortic dissection, intramural hematoma or   aneurysmal dilation. Mixed plaque throughout aorta and its major   branches. Dilated main pulmonary artery at 3.4 cm, which may be seen in   pulmonary arterial hypertension. Patent mesenteric visceral arteries. No   evidence of central pulmonary embolus.    LUNGS, PLEURA, AIRWAYS: No lobar consolidation, mass, effusion, or   pneumothorax. No evidence of central endobronchial obstruction. No   bronchiectasis or honeycombing. No suspicious pulmonary nodule. Dependent   atelectasis.Paraseptal emphysema.    THORACIC NODES: Prominent subcentimeter short axis bilateral hilar and   mediastinal lymph nodes; correlate for possible CHF.    MEDIASTINUM: No pericardial effusion. Cardiomegaly. No adenopathy or mass.    HEPATOBILIARY: Cholelithiasis. Gallbladder pericholecystic stranding. No   biliary dilation. Liver unremarkable.    SPLEEN: Unremarkable.    PANCREAS: Unremarkable.    ADRENAL GLANDS: Unremarkable.    KIDNEYS: Symmetric pattern of renal enhancement. No hydronephrosis   bilaterally. Nonobstructing bilateral renal calculi.    ABDOMINAL NODES: No lymphadenopathy.    VISUALIZED PERITONEUM /MESENTERY/BOWEL/PELVIS: Trace free pelvic fluid.   No bowel obstruction. Mild BPH. Urinary bladder partially distended.   Normal appendix.    BONES/SOFT TISSUES: Post median sternotomy. Degenerative change of spine.    IMPRESSION:    1. Cholelithiasis. Gallbladder pericholecystic stranding. Findings may   represent acute cholecystitis in the appropriate clinical setting.    2. No evidence of acute intrathoracic pathology. No evidence of acute   aortic pathology.    --- End of Report ---  < end of copied text >    Diet, NPO:   Except Medications (10-20-22 @ 06:48) [Active]      ASSESSMENT  71M w/ PMH of HLD, HTN, MI, DM, CAD s/p CABG, previous DVT/PE on Xarelto presents for hypotension and kidney stones presents to ED with complaints of abdominal pain in the bilateral flanks radiating across the lower abdomen as well as hypertension. Initial vitals noted to have a systolic of 239. A CT showed gallstones and enlarged gallbladder with a stone at the neck.   An US of RUQ was ordered to further delineate if pain is due to biliary pathology. US shows a right renal calculi measuring 1.2 cm and gallbladder edema. On exam, pt complains of right flank pain. Physical exam findings, imaging, and labs as documented above.     - cholecystitis  - HTN urgency  - DM, A1c 7.2  - hypomagnesemia    PLAN  - keeping NPO until IR procedure 10/25  - check baseline TG level today  - start PPN tonight  - d/c IVF's when PPN begins  - d/c oral cholecalciferol and cyanocobalamin while on ppn  - daily bmp, in phos, mg while on parenteral nutrition  - will follow NUTRITION SUPPORT TEAM  -  CONSULT NOTE     ADMISSION HPI:  Patient is a 71-yo male with pmhx of htn, hld, DM2, MI s/p CABG, DVT/PE on Xarelto, recurrent kidney stones who presented with a chief complaint of abdominal pain and high blood pressure x 1 day. The pain started yesterday afternoon as a "horrible discomfort". It is hypogastric in anture, diffuse, encircling to patient's low back. It is constant, was a 10/10 prior to pain meds (now 7/10), no exacerbating factors. Patient states he takes his bp daily and usually runs around 155/65, but found his systolic in the 200s so decided to come to hospital. Patient denies other systemic sxs, including fever, n/v/d, cp, sob, chills, dysuria, hematuria, hematochezia, joint pains or recent sick contacts. Patient says the pain is similar to what he experienced with his kidney stones but that the pain was located flanks during those incidences.     ED Course:  Vitals- bp 229/95, hr 68, rr 20, t 35.6, 95% on RA  Labs- wbc wnl, Hb 13.9, K 5.1 (hemolyzed), glucose 241, TBili 1.8  EKG- Normal sinus rhythm, Left ventricular hypertrophy with QRS widening and repolarization abnormality ( R in aVL , [place] product , Romhilt-Bowen ), Inferior infarct , age undetermined  BP had 20 point difference between arms and there was a concern for dissection. A CT angio obtained was negative for dissection, but did show gallstones and enlarged gallbladder with a stone at the neck. An US of RUQ was ordered to further delineate if pain is due to biliary pathology. US shows a right renal calculi measuring 1.2 cm and gallbladder edema. Pt states he has never been told he has gallstones before.    Patient has received total of labetalol 20 IVP/300 PO, nifedipine 30 PO, and morphine 10mg IVP.  He is being admitted for further management.   (19 Oct 2022 08:09)    NUTRITION SUPPORT NOTE:    +cholecystitis, percutaneous cholecystostomy planned by IR 10/25  d/w Dr. Mariscal, consult requested    REVIEW OF SYSTEMS:  Negative except as noted above.     PAST MEDICAL/SURGICAL HISTORY:   Hypertension, unspecified type  Type 2 diabetes mellitus with complication, unspecified long term insulin use status  High cholesterol  Kidney stones  MI (myocardial infarction)  DVT (deep venous thrombosis)  Acute massive pulmonary embolism  s/p tPA in 2018  Carotid atherosclerosis  H/O heart surgery,  quadruple bypass 6 years ago  H/O cystoscopy    ALLERGIES:  No Known Allergies    VITALS:  T(F): 97.4 (10-21 @ 06:16), Max: 97.4 (10-21 @ 06:16)  HR: 99 (10-21 @ 06:16) (99 - 99)  BP: 131/61 (10-21 @ 06:16) (131/61 - 131/61)  RR: 18 (10-21 @ 06:16) (18 - 18)    HEIGHT/WEIGHT/BMI:   Height (cm): 185.4 (10-20), 185.4 (09-15)  Weight (kg): 93 (10-20), 90.7 (09-15)  BMI (kg/m2): 27.1 (10-20), 26.4 (09-15)    PHYSICAL EXAM:   GENERAL: NAD, well-groomed, well-developed. Lethargic, weak  HEENT: Moist mucous membranes, Good dentition, No lesions  ABDOMEN: Soft, Nondistended, tenderness RUQ  EXTREMITIES:  No clubbing, cyanosis, + edema  SKIN: warm and well perfused; No obvious rashes or lesions  IV ACCESS: peripheral Rt arm  ENTERAL ACCESS: none    I/Os:     10-20-22 @ 07:01  -  10-21-22 @ 07:00  --------------------------------------------------------  IN:  Total IN: 0 mL    OUT:    Voided (mL): 250 mL  Total OUT: 250 mL    Total NET: -250 mL    STANDING MEDICATIONS:   cholecalciferol 1000 Unit(s) Oral daily  cyanocobalamin 1000 MICROGram(s) Oral daily  dextrose 5% + sodium chloride 0.45% with potassium chloride 20 mEq/L 1000 milliLiter(s) IV Continuous <Continuous>  enoxaparin Injectable 40 milliGRAM(s) SubCutaneous every 12 hours  furosemide    Tablet 20 milliGRAM(s) Oral daily  insulin lispro (ADMELOG) corrective regimen sliding scale   SubCutaneous three times a day before meals  labetalol 200 milliGRAM(s) Oral two times a day  losartan 100 milliGRAM(s) Oral daily  morphine  - Injectable 2 milliGRAM(s) IV Push every 4 hours PRN  ondansetron    Tablet 8 milliGRAM(s) Oral every 8 hours PRN  piperacillin/tazobactam IVPB.. 3.375 Gram(s) IV Intermittent every 8 hours  simvastatin 40 milliGRAM(s) Oral at bedtime  tamsulosin 0.4 milliGRAM(s) Oral at bedtime    LABS:                         13.1   15.80 )-----------( 153      ( 21 Oct 2022 09:46 )             39.4     136  |  95<L>  |  13  ----------------------------<  215<H>          (10-20-22 @ 08:28)  3.8   |  27  |  0.7    Ca    8.3<L>          (10-20-22 @ 08:28)  Mg     1.7         (10-20-22 @ 08:28)    TPro  5.7<L>  /  Alb  3.8  /  TBili  3.6<H>  /  DBili  0.6<H>  /  AST  10  /  ALT  8   /  AlkPhos  56       10-20-22 @ 18:17    A1c: 7.2 % (10-20-22 @ 08:28)    Blood Glucose (Past 24 hours):  180 mg/dL (10-21 @ 07:51)  199 mg/dL (10-20 @ 21:12)  191 mg/dL (10-20 @ 16:27)  222 mg/dL (10-20 @ 11:14)    DIET:   Diet, NPO:   Except Medications (10-20-22 @ 06:48) [Active]    RADIOLOGY:   < from: CT Angio Abdomen and Pelvis w/ IV Cont (10.18.22 @ 22:49) >    ACC: [id number] EXAM:  CT ANGIO ABD PELV (W)AW IC                        ACC: [id number] EXAM:  CT ANGIO CHEST AORTA [facility]                          PROCEDURE DATE:  10/18/2022      INTERPRETATION:  CLINICAL HISTORY/REASON FOR EXAM: Abdominal pain.   Shortness of breath.    TECHNIQUE: Initial contiguous axial CT images of the chest without IV   contrast obtained. Post-administration of 100 cc Omnipaque 350   intravenous contrast, CT angiographic axial images of the chest, abdomen   and pelvis were obtained. Coronal and sagittal reformats were performed.   3D (MIP) reformats obtained.    COMPARISON: CT chest, abdomen and pelvis 12/1/2019, CT abdomen and pelvis   3/24/2021.    FINDINGS:    VASCULATURE: No evidence of aortic dissection, intramural hematoma or   aneurysmal dilation. Mixed plaque throughout aorta and its major   branches. Dilated main pulmonary artery at 3.4 cm, which may be seen in   pulmonary arterial hypertension. Patent mesenteric visceral arteries. No   evidence of central pulmonary embolus.    LUNGS, PLEURA, AIRWAYS: No lobar consolidation, mass, effusion, or   pneumothorax. No evidence of central endobronchial obstruction. No   bronchiectasis or honeycombing. No suspicious pulmonary nodule. Dependent   atelectasis.Paraseptal emphysema.    THORACIC NODES: Prominent subcentimeter short axis bilateral hilar and   mediastinal lymph nodes; correlate for possible CHF.    MEDIASTINUM: No pericardial effusion. Cardiomegaly. No adenopathy or mass.    HEPATOBILIARY: Cholelithiasis. Gallbladder pericholecystic stranding. No   biliary dilation. Liver unremarkable.    SPLEEN: Unremarkable.    PANCREAS: Unremarkable.    ADRENAL GLANDS: Unremarkable.    KIDNEYS: Symmetric pattern of renal enhancement. No hydronephrosis   bilaterally. Nonobstructing bilateral renal calculi.    ABDOMINAL NODES: No lymphadenopathy.    VISUALIZED PERITONEUM /MESENTERY/BOWEL/PELVIS: Trace free pelvic fluid.   No bowel obstruction. Mild BPH. Urinary bladder partially distended.   Normal appendix.    BONES/SOFT TISSUES: Post median sternotomy. Degenerative change of spine.    IMPRESSION:    1. Cholelithiasis. Gallbladder pericholecystic stranding. Findings may   represent acute cholecystitis in the appropriate clinical setting.    2. No evidence of acute intrathoracic pathology. No evidence of acute   aortic pathology.    --- End of Report ---  < end of copied text >    Diet, NPO:   Except Medications (10-20-22 @ 06:48) [Active]      ASSESSMENT  71M w/ PMH of HLD, HTN, MI, DM, CAD s/p CABG, previous DVT/PE on Xarelto presents for hypotension and kidney stones presents to ED with complaints of abdominal pain in the bilateral flanks radiating across the lower abdomen as well as hypertension. Initial vitals noted to have a systolic of 239. A CT showed gallstones and enlarged gallbladder with a stone at the neck.   An US of RUQ was ordered to further delineate if pain is due to biliary pathology. US shows a right renal calculi measuring 1.2 cm and gallbladder edema. On exam, pt complains of right flank pain. Physical exam findings, imaging, and labs as documented above.     - cholecystitis  - HTN urgency  - DM, A1c 7.2  - hypomagnesemia    PLAN  - keeping NPO until IR procedure 10/25  - check baseline TG level today  - start PPN tonight  - d/c IVF's when PPN begins  - d/c oral cholecalciferol and cyanocobalamin while on ppn - should check B12 and 25oh vit D levels and re-assess  - daily bmp, in phos, mg while on parenteral nutrition  - will follow

## 2022-10-21 NOTE — PROGRESS NOTE ADULT - ASSESSMENT
ASSESSMENT:  71M w/ PMH of HLD, HTN, MI, DM, CAD s/p CABG, previous DVT/PE on Xarelto presents for hypotensionand kidney stones presents to ED with complaints of abdominal pain in the bilateral flanks radiating across the lower abdomen as well as hypertension. Initial vitals noted to have a systolic of 239. A CT showed gallstones and enlarged gallbladder with a stone at the neck.   An US of RUQ was ordered to further delineate if pain is due to biliary pathology. US shows a right renal calculi measuring 1.2 cm and gallbladder edema. HIDA scan is suggestive of cholecystitis.     PLAN:   - Hypertensive urgency resolving, however patient still has extensive PMH including CABG, MI, massive P.E. previously on A.C., poor surgical candidate   - IR consult for percutaneous cholecystostomy:  pt will need to be off ASA x 5 days before proceeding (tentatively scheduled for 10/25).   - T.Bili continues to rise. Recommend advanced GI consult for ERCP.  - IV Abx   - NPO, IVF  - Strict I/O  - Continue BP control  - DVT ppx  - Surgery following    [id number] ASSESSMENT:  71M w/ PMH of HLD, HTN, MI, DM, CAD s/p CABG, previous DVT/PE on Xarelto presents for hypotensionand kidney stones presents to ED with complaints of abdominal pain in the bilateral flanks radiating across the lower abdomen as well as hypertension. Initial vitals noted to have a systolic of 239. A CT showed gallstones and enlarged gallbladder with a stone at the neck.   An US of RUQ was ordered to further delineate if pain is due to biliary pathology. US shows a right renal calculi measuring 1.2 cm and gallbladder edema. HIDA scan is suggestive of cholecystitis.     PLAN:   - Hypertensive urgency resolving, however patient still has extensive PMH including CABG, MI, massive P.E. previously on A.C., poor surgical candidate   - IR consult for percutaneous cholecystostomy:  pt will need to be off ASA x 5 days before proceeding (tentatively scheduled for 10/25).   - T.Bili continues to rise  - Recommend obtaining LDH, reticulocyte count, and haptoglobin to further assess unconjugated hyperbilirubinemia  - IV Abx   - NPO, IVF  - Strict I/O  - Continue BP control  - DVT ppx  - Surgery following    [id number]

## 2022-10-21 NOTE — PROGRESS NOTE ADULT - ASSESSMENT
`71M w/ PMH of HLD, HTN, MI, DM, CAD s/p CABG, previous DVT/PE on Xarelto, recurrent kidney stones presents to ED with complaints of abdominal pain and hypertension.     # cholecystitis (confirmed on HIDA, suggested by RUQ US)  - poor candidate for surgery, mod. to High risk for Sx as per cardiology, fu echo  - perc. cholecystectomy by IR on tuesday  - IV zosyn 3.375g q8h  - pt acute    #HTN Urgency: resolved  - likely 2/2 severe abd pain, last [id number]  - c/w home dose labetalol 200mg BID, Losartan 100 QD, Lasix 20 QD    #hx of DVT/PE  #hx of MI/CABG  - xarelto held, fu INR in the am  - Lovenox 40 BID  - c/w ASA 81 QD    #DM2  - SS insulin  - monitor FS     #HLD  - c/w simvastatin 40mg    #BPH  - c/w Flomax 0.4    # Misc  - GI ppx: Protonix  - DVT ppx: Lovenox 40 BID  - Diet: NPO for now, PPN from tonight  - Activity: AAT  - DISPO: acute    CODE STATUS:        `71M w/ PMH of HLD, HTN, MI, DM, CAD s/p CABG, previous DVT/PE on Xarelto, recurrent kidney stones presents to ED with complaints of abdominal pain and hypertension.     # cholecystitis (confirmed on HIDA, suggested by RUQ US)  - poor candidate for surgery, mod. to High risk for Sx as per cardiology, fu echo  - perc. cholecystectomy by IR on tuesday  - IV zosyn 3.375g q8h  - pt acute    #HTN Urgency: resolved  - likely 2/2 severe abd pain, last [id number]  - c/w home dose labetalol 200mg BID, Losartan 100 QD, Lasix 20 QD    #hx of DVT/PE  #hx of MI/CABG  - xarelto held  - Lovenox 40 BID  - c/w ASA 81 QD    #DM2  - SS insulin  - monitor FS     #HLD  - c/w simvastatin 40mg    #BPH  - c/w Flomax 0.4    # Misc  - GI ppx: Protonix  - DVT ppx: Lovenox 40 BID  - Diet: NPO for now, PPN from tonight  - Activity: AAT  - DISPO: acute    CODE STATUS:

## 2022-10-22 LAB
24R-OH-CALCIDIOL SERPL-MCNC: 45 NG/ML — SIGNIFICANT CHANGE UP (ref 30–80)
ALBUMIN SERPL ELPH-MCNC: 3.2 G/DL — LOW (ref 3.5–5.2)
ALP SERPL-CCNC: 66 U/L — SIGNIFICANT CHANGE UP (ref 30–115)
ALT FLD-CCNC: 7 U/L — SIGNIFICANT CHANGE UP (ref 0–41)
ANION GAP SERPL CALC-SCNC: 13 MMOL/L — SIGNIFICANT CHANGE UP (ref 7–14)
AST SERPL-CCNC: 11 U/L — SIGNIFICANT CHANGE UP (ref 0–41)
BASOPHILS # BLD AUTO: 0.02 K/UL — SIGNIFICANT CHANGE UP (ref 0–0.2)
BASOPHILS NFR BLD AUTO: 0.2 % — SIGNIFICANT CHANGE UP (ref 0–1)
BILIRUB SERPL-MCNC: 2.6 MG/DL — HIGH (ref 0.2–1.2)
BUN SERPL-MCNC: 17 MG/DL — SIGNIFICANT CHANGE UP (ref 10–20)
CALCIUM SERPL-MCNC: 8 MG/DL — LOW (ref 8.4–10.4)
CHLORIDE SERPL-SCNC: 100 MMOL/L — SIGNIFICANT CHANGE UP (ref 98–110)
CHOLEST SERPL-MCNC: 96 MG/DL — SIGNIFICANT CHANGE UP
CO2 SERPL-SCNC: 26 MMOL/L — SIGNIFICANT CHANGE UP (ref 17–32)
CREAT SERPL-MCNC: 0.8 MG/DL — SIGNIFICANT CHANGE UP (ref 0.7–1.5)
EGFR: 95 ML/MIN/1.73M2 — SIGNIFICANT CHANGE UP
EOSINOPHIL # BLD AUTO: 0.05 K/UL — SIGNIFICANT CHANGE UP (ref 0–0.7)
EOSINOPHIL NFR BLD AUTO: 0.4 % — SIGNIFICANT CHANGE UP (ref 0–8)
GLUCOSE BLDC GLUCOMTR-MCNC: 218 MG/DL — HIGH (ref 70–99)
GLUCOSE BLDC GLUCOMTR-MCNC: 220 MG/DL — HIGH (ref 70–99)
GLUCOSE BLDC GLUCOMTR-MCNC: 227 MG/DL — HIGH (ref 70–99)
GLUCOSE BLDC GLUCOMTR-MCNC: 250 MG/DL — HIGH (ref 70–99)
GLUCOSE BLDC GLUCOMTR-MCNC: 252 MG/DL — HIGH (ref 70–99)
GLUCOSE SERPL-MCNC: 251 MG/DL — HIGH (ref 70–99)
HCT VFR BLD CALC: 39 % — LOW (ref 42–52)
HDLC SERPL-MCNC: 30 MG/DL — LOW
HGB BLD-MCNC: 12.5 G/DL — LOW (ref 14–18)
IMM GRANULOCYTES NFR BLD AUTO: 0.6 % — HIGH (ref 0.1–0.3)
LIPID PNL WITH DIRECT LDL SERPL: 43 MG/DL — SIGNIFICANT CHANGE UP
LYMPHOCYTES # BLD AUTO: 1.24 K/UL — SIGNIFICANT CHANGE UP (ref 1.2–3.4)
LYMPHOCYTES # BLD AUTO: 9.4 % — LOW (ref 20.5–51.1)
MAGNESIUM SERPL-MCNC: 2.1 MG/DL — SIGNIFICANT CHANGE UP (ref 1.8–2.4)
MCHC RBC-ENTMCNC: 30.2 PG — SIGNIFICANT CHANGE UP (ref 27–31)
MCHC RBC-ENTMCNC: 32.1 G/DL — SIGNIFICANT CHANGE UP (ref 32–37)
MCV RBC AUTO: 94.2 FL — HIGH (ref 80–94)
MONOCYTES # BLD AUTO: 0.68 K/UL — HIGH (ref 0.1–0.6)
MONOCYTES NFR BLD AUTO: 5.2 % — SIGNIFICANT CHANGE UP (ref 1.7–9.3)
NEUTROPHILS # BLD AUTO: 11.1 K/UL — HIGH (ref 1.4–6.5)
NEUTROPHILS NFR BLD AUTO: 84.2 % — HIGH (ref 42.2–75.2)
NON HDL CHOLESTEROL: 66 MG/DL — SIGNIFICANT CHANGE UP
NRBC # BLD: 0 /100 WBCS — SIGNIFICANT CHANGE UP (ref 0–0)
PHOSPHATE SERPL-MCNC: 1.9 MG/DL — LOW (ref 2.1–4.9)
PLATELET # BLD AUTO: 143 K/UL — SIGNIFICANT CHANGE UP (ref 130–400)
POTASSIUM SERPL-MCNC: 3.8 MMOL/L — SIGNIFICANT CHANGE UP (ref 3.5–5)
POTASSIUM SERPL-SCNC: 3.8 MMOL/L — SIGNIFICANT CHANGE UP (ref 3.5–5)
PROT SERPL-MCNC: 5.4 G/DL — LOW (ref 6–8)
RBC # BLD: 4.14 M/UL — LOW (ref 4.7–6.1)
RBC # BLD: 4.19 M/UL — LOW (ref 4.7–6.1)
RBC # FLD: 13.7 % — SIGNIFICANT CHANGE UP (ref 11.5–14.5)
RETICS #: 92.2 K/UL — SIGNIFICANT CHANGE UP (ref 25–125)
RETICS/RBC NFR: 2.2 % — HIGH (ref 0.5–1.5)
SODIUM SERPL-SCNC: 139 MMOL/L — SIGNIFICANT CHANGE UP (ref 135–146)
TRIGL SERPL-MCNC: 116 MG/DL — SIGNIFICANT CHANGE UP
VIT B12 SERPL-MCNC: 1407 PG/ML — HIGH (ref 232–1245)
WBC # BLD: 13.17 K/UL — HIGH (ref 4.8–10.8)
WBC # FLD AUTO: 13.17 K/UL — HIGH (ref 4.8–10.8)

## 2022-10-22 PROCEDURE — 93010 ELECTROCARDIOGRAM REPORT: CPT

## 2022-10-22 RX ORDER — POTASSIUM PHOSPHATE, MONOBASIC POTASSIUM PHOSPHATE, DIBASIC 236; 224 MG/ML; MG/ML
12 INJECTION, SOLUTION INTRAVENOUS ONCE
Refills: 0 | Status: COMPLETED | OUTPATIENT
Start: 2022-10-22 | End: 2022-10-22

## 2022-10-22 RX ORDER — ENOXAPARIN SODIUM 100 MG/ML
40 INJECTION SUBCUTANEOUS EVERY 24 HOURS
Refills: 0 | Status: COMPLETED | OUTPATIENT
Start: 2022-10-22 | End: 2022-10-23

## 2022-10-22 RX ORDER — ELECTROLYTE SOLUTION,INJ
1 VIAL (ML) INTRAVENOUS
Refills: 0 | Status: DISCONTINUED | OUTPATIENT
Start: 2022-10-22 | End: 2022-10-22

## 2022-10-22 RX ORDER — I.V. FAT EMULSION 20 G/100ML
1.08 EMULSION INTRAVENOUS
Qty: 100 | Refills: 0 | Status: DISCONTINUED | OUTPATIENT
Start: 2022-10-22 | End: 2022-10-22

## 2022-10-22 RX ORDER — ONDANSETRON 8 MG/1
4 TABLET, FILM COATED ORAL ONCE
Refills: 0 | Status: COMPLETED | OUTPATIENT
Start: 2022-10-22 | End: 2022-10-22

## 2022-10-22 RX ADMIN — Medication 200 MILLIGRAM(S): at 05:50

## 2022-10-22 RX ADMIN — PIPERACILLIN AND TAZOBACTAM 25 GRAM(S): 4; .5 INJECTION, POWDER, LYOPHILIZED, FOR SOLUTION INTRAVENOUS at 21:04

## 2022-10-22 RX ADMIN — I.V. FAT EMULSION 31.3 GM/KG/DAY: 20 EMULSION INTRAVENOUS at 21:43

## 2022-10-22 RX ADMIN — Medication 2: at 12:03

## 2022-10-22 RX ADMIN — ENOXAPARIN SODIUM 40 MILLIGRAM(S): 100 INJECTION SUBCUTANEOUS at 12:03

## 2022-10-22 RX ADMIN — Medication 1 EACH: at 21:43

## 2022-10-22 RX ADMIN — Medication 3: at 08:12

## 2022-10-22 RX ADMIN — ONDANSETRON 4 MILLIGRAM(S): 8 TABLET, FILM COATED ORAL at 22:51

## 2022-10-22 RX ADMIN — PIPERACILLIN AND TAZOBACTAM 25 GRAM(S): 4; .5 INJECTION, POWDER, LYOPHILIZED, FOR SOLUTION INTRAVENOUS at 15:20

## 2022-10-22 RX ADMIN — Medication 2: at 16:50

## 2022-10-22 RX ADMIN — ENOXAPARIN SODIUM 40 MILLIGRAM(S): 100 INJECTION SUBCUTANEOUS at 05:50

## 2022-10-22 RX ADMIN — POTASSIUM PHOSPHATE, MONOBASIC POTASSIUM PHOSPHATE, DIBASIC 41.67 MILLIMOLE(S): 236; 224 INJECTION, SOLUTION INTRAVENOUS at 12:02

## 2022-10-22 RX ADMIN — PIPERACILLIN AND TAZOBACTAM 25 GRAM(S): 4; .5 INJECTION, POWDER, LYOPHILIZED, FOR SOLUTION INTRAVENOUS at 05:49

## 2022-10-22 RX ADMIN — LOSARTAN POTASSIUM 100 MILLIGRAM(S): 100 TABLET, FILM COATED ORAL at 05:50

## 2022-10-22 RX ADMIN — Medication 20 MILLIGRAM(S): at 05:50

## 2022-10-22 RX ADMIN — Medication 200 MILLIGRAM(S): at 18:44

## 2022-10-22 NOTE — PROGRESS NOTE ADULT - ASSESSMENT
ASSESSMENT:  71M w/ PMH of HLD, HTN, MI, DM, CAD s/p CABG, previous DVT/PE on Xarelto presents for hypotensionand kidney stones presents to ED with complaints of abdominal pain in the bilateral flanks radiating across the lower abdomen as well as hypertension. Initial vitals noted to have a systolic of 239. A CT showed gallstones and enlarged gallbladder with a stone at the neck. An US of RUQ was ordered to further delineate if pain is due to biliary pathology. US shows a right renal calculi measuring 1.2 cm and gallbladder edema. HIDA scan is suggestive of cholecystitis.     PLAN:   - Pt has extensive PMH including CABG, MI, massive P.E. previously on A.C., HTN urgency this admission - pt is poor surgical candidate   - IR: percutaneous cholecystostomy tentatively scheduled for Tuesday 10/2. Per IR, hold ASA x 5 days before procedure  - Trend LFTs  - Recommend obtaining LDH, reticulocyte count, and haptoglobin to further assess unconjugated hyperbilirubinemia  - Advanced GI consult appreciated   - IV Abx   - NPO, IVF  - Strict I/O  - Continue BP control  - DVT ppx  - Surgery following    [id number]

## 2022-10-22 NOTE — PROGRESS NOTE ADULT - SUBJECTIVE AND OBJECTIVE BOX
Chart reviewed, patient examined. Pertinent results reviewed.  Case discussed with HO; specialist f/u reviewed  HD#4 (ED 10/18)  MERARY GARCIA    HPI:  Patient is a 72-yo male with pmhx of htn, hld, DM2, MI s/p CABG, DVT/PE on Xarelto, recurrent kidney stones who presented with a chief complaint of abdominal pain and high blood pressure x 1 day. The pain started yesterday afternoon as a "horrible discomfort". It is hypogastric in anture, diffuse, encircling to patient's low back. It is constant, was a 10/10 prior to pain meds (now 7/10), no exacerbating factors. Patient states he takes his bp daily and usually runs around 155/65, but found his systolic in the 200s so decided to come to hospital. Patient denies other systemic sxs, including fever, n/v/d, cp, sob, chills, dysuria, hematuria, hematochezia, joint pains or recent sick contacts. Patient says the pain is similar to what he experienced with his kidney stones but that the pain was located flanks during those incidences.     ED Course:  Vitals- bp 229/95, hr 68, rr 20, t 35.6, 95% on RA  Labs- wbc wnl, Hb 13.9, K 5.1 (hemolyzed), glucose 241, TBili 1.8  EKG- Normal sinus rhythm, Left ventricular hypertrophy with QRS widening and repolarization abnormality ( R in aVL , [place] product , Romhilt-Bowen ), Inferior infarct , age undetermined  BP had 20 point difference between arms and there was a concern for dissection. A CT angio obtained was negative for dissection, but did show gallstones and enlarged gallbladder with a stone at the neck. An US of RUQ was ordered to further delineate if pain is due to biliary pathology. US shows a right renal calculi measuring 1.2 cm and gallbladder edema. Pt states he has never been told he has gallstones before.    Patient has received total of labetalol 20 IVP/300 PO, nifedipine 30 PO, and morphine 10mg IVP.    He is being admitted for further management. W/U has revealed + Acute Cholecystitis.   (19 Oct 2022 08:09)    INTERVAL EVENTS: Patient seen today without distress, brother at bedside.     MEDICATIONS  (STANDING):  dextrose 5%. 1000 milliLiter(s) (50 mL/Hr) IV Continuous <Continuous>  dextrose 5%. 1000 milliLiter(s) (100 mL/Hr) IV Continuous <Continuous>  dextrose 50% Injectable 25 Gram(s) IV Push once  dextrose 50% Injectable 12.5 Gram(s) IV Push once  dextrose 50% Injectable 25 Gram(s) IV Push once  enoxaparin Injectable 40 milliGRAM(s) SubCutaneous every 12 hours  fat emulsion (Fish Oil and Plant Based) 20% Infusion 1.0753 Gm/kG/Day (31.3 mL/Hr) IV Continuous <Continuous>  furosemide    Tablet 20 milliGRAM(s) Oral daily  glucagon  Injectable 1 milliGRAM(s) IntraMuscular once  insulin lispro (ADMELOG) corrective regimen sliding scale   SubCutaneous three times a day before meals  labetalol 200 milliGRAM(s) Oral two times a day  losartan 100 milliGRAM(s) Oral daily  Parenteral Nutrition - Adult 1 Each (65 mL/Hr) TPN Continuous <Continuous>  piperacillin/tazobactam IVPB.. 3.375 Gram(s) IV Intermittent every 8 hours  simvastatin 40 milliGRAM(s) Oral at bedtime  tamsulosin 0.4 milliGRAM(s) Oral at bedtime    MEDICATIONS  (PRN):  dextrose Oral Gel 15 Gram(s) Oral once PRN Blood Glucose LESS THAN 70 milliGRAM(s)/deciliter  morphine  - Injectable 2 milliGRAM(s) IV Push every 4 hours PRN Moderate Pain (4 - 6)  ondansetron    Tablet 8 milliGRAM(s) Oral every 8 hours PRN Nausea and/or Vomiting    Vital Signs Last 24 Hrs  T(C): 36.8 (22 Oct 2022 05:25), Max: 37.3 (21 Oct 2022 21:00)  T(F): 98.3 (22 Oct 2022 05:25), Max: 99.2 (21 Oct 2022 21:00)  HR: 99 (22 Oct 2022 05:25) (98 - 99)  BP: 149/70 (22 Oct 2022 05:25) (129/60 - 149/70)  BP(mean): --  RR: 18 (22 Oct 2022 05:25) (18 - 18)  SpO2: --      PHYSICAL EXAM:  GENERAL:  NAD  NECK: Supple, No JVD  CHEST/LUNG: Clear  HEART: S1, S2, Regular   ABDOMEN: Soft, Nontender, Nondistended; Bowel sounds present  EXTREMITIES: No edema  SKIN: reddened face, peeling skin to the bridge of the nose    LABS:                        13.1   15.80 )-----------( 153      ( 21 Oct 2022 09:46 )             39.4             10-20    136  |  95<L>  |  13  ----------------------------<  215<H>  3.8   |  27  |  0.7    Ca    8.3<L>      20 Oct 2022 08:28  Mg     1.7     10-20    TPro  5.7<L>  /  Alb  3.8  /  TBili  3.6<H>  /  DBili  0.6<H>  /  AST  10  /  ALT  8   /  AlkPhos  56  10-20    LIVER FUNCTIONS - ( 20 Oct 2022 18:17 )  Alb: 3.8 g/dL / Pro: 5.7 g/dL / ALK PHOS: 56 U/L / ALT: 8 U/L / AST: 10 U/L / GGT: x                     PT/INR - ( 21 Oct 2022 09:46 )   PT: 30.20 sec;   INR: 2.57 ratio        Culture - Blood (collected 19 Oct 2022 11:26)  Source: .Blood Blood-Peripheral  Preliminary Report (20 Oct 2022 23:01):    No growth to date.      RADIOLOGY & ADDITIONAL TESTS:  < from: NM Hepatobiliary Imaging (10.19.22 @ 16:35) >  REASON: Abdominal pain  COMPARISON CT abdomen pelvis 10/18/2022  Following the intravenous administration of 4.2 mCi 99m-Tc mebrofenin,   anterior images over the abdomen were acquired at 2, 5, 10, 15, 30, 45,   60 minutes,  2 hours, and 4 hours post injection Oblique and right   lateral views were obtained at 1,2, and 4 hours post-injection. A 14 cm   length size standardization marker was used.    These images reveal no definite visualization of the gallbladder through   4 hours post-injection, consistent with cholecystitis, and clinical   correlation is suggested. Biliary tree is visualized at 10 minutes, and   there is visualization of intestinal activity by 15 minutes post   injection.      IMPRESSION:  NO DEFINITE VISUALIZATION OF THE GALLBLADDER THROUGH 4 HOURS   POST-INJECTION, CONSISTENT WITH CHOLECYSTITIS, AS DESCRIBED ABOVE.    CLINICAL CORRELATION IS SUGGESTED.        Dr. Pinky Dai discussed preliminary findings with FABRIZIO JULES on   10/19/2022 5:04 PM with readback.      < end of copied text >  < from: US Abdomen Upper Quadrant Right (10.19.22 @ 02:03) >  FINDINGS:    Liver: Partially visualized liver within normal limits.  Bile ducts: Normal caliber. Common bile duct measures 3 mm.  Gallbladder: Mild gallbladder wall thickening. Patient's known   cholelithiasis was difficult to visualize on the current exam. Negative   reported sonographic Srinivasan's sign.  Pancreas: Poorly visualized.  Right kidney: 11.6 cm. No hydronephrosis. Echogenic foci up to 1.2 cm,   likely representing calculi.  Ascites: None.  IVC: Not visualized      IMPRESSION:    Right renal calculi measuring up to 1.2 cm. No hydronephrosis.    Distended gallbladder with nonspecific mild gallbladder wall thickening.   Patient has known cholelithiasis as demonstrated on same day CT. Negative   reported sonographic Srinivasan's sign. Findings are nonspecific/equivocal.   If warranted, further evaluation with HIDA scan can be obtained.    --- End of Report ---          < end of copied text >   Chart reviewed, patient examined. Pertinent results reviewed.  Case discussed with HO; specialist f/u reviewed  HD#4 (ED 10/18)  MERARY GARCIA    HPI:  Patient is a 72-yo male with pmhx of htn, hld, DM2, MI s/p CABG, DVT/PE on Xarelto, recurrent kidney stones who presented with a chief complaint of abdominal pain and high blood pressure x 1 day. The pain started yesterday afternoon as a "horrible discomfort". It is hypogastric in anture, diffuse, encircling to patient's low back. It is constant, was a 10/10 prior to pain meds (now 7/10), no exacerbating factors. Patient states he takes his bp daily and usually runs around 155/65, but found his systolic in the 200s so decided to come to hospital. Patient denies other systemic sxs, including fever, n/v/d, cp, sob, chills, dysuria, hematuria, hematochezia, joint pains or recent sick contacts. Patient says the pain is similar to what he experienced with his kidney stones but that the pain was located flanks during those episodes.  ED Course:  Vitals- bp 229/95, hr 68, rr 20, t 35.6, 95% on RA  Labs- wbc wnl, Hb 13.9, K 5.1 (hemolyzed), glucose 241, TBili 1.8  EKG- Normal sinus rhythm, Left ventricular hypertrophy with QRS widening and repolarization abnormality ( R in aVL , [place] product , Romhilt-Bowen ), Inferior infarct , age undetermined  BP had 20 point difference between arms and there was a concern for dissection. A CT angio obtained was negative for dissection, but did show gallstones and enlarged gallbladder with a stone at the neck. An US of RUQ was ordered to further delineate if pain is due to biliary pathology. US shows a right renal calculi measuring 1.2 cm and gallbladder edema. Pt states he has never been told he has gallstones before.    Patient has received total of labetalol 20 IVP/300 PO, nifedipine 30 PO, and morphine 10mg IVP.    He is being admitted for further management. W/U has revealed + Acute Cholecystitis.   (19 Oct 2022 08:09)    INTERVAL EVENTS: Patient seen today without distress, brother at bedside.     MEDICATIONS  (STANDING):  dextrose 5%. 1000 milliLiter(s) (50 mL/Hr) IV Continuous <Continuous>  dextrose 5%. 1000 milliLiter(s) (100 mL/Hr) IV Continuous <Continuous>  dextrose 50% Injectable 25 Gram(s) IV Push once  dextrose 50% Injectable 12.5 Gram(s) IV Push once  dextrose 50% Injectable 25 Gram(s) IV Push once  enoxaparin Injectable 40 milliGRAM(s) SubCutaneous every 12 hours  fat emulsion (Fish Oil and Plant Based) 20% Infusion 1.0753 Gm/kG/Day (31.3 mL/Hr) IV Continuous <Continuous>  furosemide    Tablet 20 milliGRAM(s) Oral daily  glucagon  Injectable 1 milliGRAM(s) IntraMuscular once  insulin lispro (ADMELOG) corrective regimen sliding scale   SubCutaneous three times a day before meals  labetalol 200 milliGRAM(s) Oral two times a day  losartan 100 milliGRAM(s) Oral daily  Parenteral Nutrition - Adult 1 Each (65 mL/Hr) TPN Continuous <Continuous>  piperacillin/tazobactam IVPB.. 3.375 Gram(s) IV Intermittent every 8 hours  simvastatin 40 milliGRAM(s) Oral at bedtime  tamsulosin 0.4 milliGRAM(s) Oral at bedtime    MEDICATIONS  (PRN):  dextrose Oral Gel 15 Gram(s) Oral once PRN Blood Glucose LESS THAN 70 milliGRAM(s)/deciliter  morphine  - Injectable 2 milliGRAM(s) IV Push every 4 hours PRN Moderate Pain (4 - 6)  ondansetron    Tablet 8 milliGRAM(s) Oral every 8 hours PRN Nausea and/or Vomiting    Vital Signs Last 24 Hrs  T(C): 36.8 (22 Oct 2022 05:25), Max: 37.3 (21 Oct 2022 21:00)  T(F): 98.3 (22 Oct 2022 05:25), Max: 99.2 (21 Oct 2022 21:00)  HR: 99 (22 Oct 2022 05:25) (98 - 99)  BP: 149/70 (22 Oct 2022 05:25) (129/60 - 149/70)  BP(mean): --  RR: 18 (22 Oct 2022 05:25) (18 - 18)  SpO2: --      PHYSICAL EXAM:  GENERAL:  NAD; weak older male  NECK: Supple, No JVD  CHEST/LUNG: Clear  HEART: RRR, no MRG  ABDOMEN: Soft, Nondistended; Bowel sounds present; min TTP in epigastrium-RUQ  EXTREMITIES: No edema; L calf + TTP, no cord or erythema  SKIN: reddened face, peeling skin to the bridge of the nose    LABS:                           12.5   13.17 )-----------( 143      ( 22 Oct 2022 05:50 )             39.0                      13.1   15.80 )-----------( 153      ( 21 Oct 2022 09:46 )             39.4     10-22    139  |  100  |  17  ----------------------------<  251<H>  3.8   |  26  |  0.8    Ca    8.0<L>      22 Oct 2022 05:50  Phos  1.9     10-22  Mg     2.1     10-22    TPro  5.4<L>  /  Alb  3.2<L>  /  TBili  2.6<H>  /  DBili  x   /  AST  11  /  ALT  7   /  AlkPhos  66  10-22              10-20    136  |  95<L>  |  13  ----------------------------<  215<H>  3.8   |  27  |  0.7    Ca    8.3<L>      20 Oct 2022 08:28  Mg     1.7     10-20    TPro  5.7<L>  /  Alb  3.8  /  TBili  3.6<H>  /  DBili  0.6<H>  /  AST  10  /  ALT  8   /  AlkPhos  56  10-20    LIVER FUNCTIONS - ( 20 Oct 2022 18:17 )  Alb: 3.8 g/dL / Pro: 5.7 g/dL / ALK PHOS: 56 U/L / ALT: 8 U/L / AST: 10 U/L / GGT: x                     PT/INR - ( 21 Oct 2022 09:46 )   PT: 30.20 sec;   INR: 2.57 ratio        Culture - Blood (collected 19 Oct 2022 11:26)  Source: .Blood Blood-Peripheral  Preliminary Report (20 Oct 2022 23:01):    No growth to date.      RADIOLOGY & ADDITIONAL TESTS:  < from: NM Hepatobiliary Imaging (10.19.22 @ 16:35) >  REASON: Abdominal pain  COMPARISON CT abdomen pelvis 10/18/2022  Following the intravenous administration of 4.2 mCi 99m-Tc mebrofenin,   anterior images over the abdomen were acquired at 2, 5, 10, 15, 30, 45,   60 minutes,  2 hours, and 4 hours post injection Oblique and right   lateral views were obtained at 1,2, and 4 hours post-injection. A 14 cm   length size standardization marker was used.    These images reveal no definite visualization of the gallbladder through   4 hours post-injection, consistent with cholecystitis, and clinical   correlation is suggested. Biliary tree is visualized at 10 minutes, and   there is visualization of intestinal activity by 15 minutes post   injection.      IMPRESSION:  NO DEFINITE VISUALIZATION OF THE GALLBLADDER THROUGH 4 HOURS   POST-INJECTION, CONSISTENT WITH CHOLECYSTITIS, AS DESCRIBED ABOVE.    CLINICAL CORRELATION IS SUGGESTED.        Dr. Pinky Dai discussed preliminary findings with FABRIZIO JULES on   10/19/2022 5:04 PM with readback.      < end of copied text >  < from: US Abdomen Upper Quadrant Right (10.19.22 @ 02:03) >  FINDINGS:    Liver: Partially visualized liver within normal limits.  Bile ducts: Normal caliber. Common bile duct measures 3 mm.  Gallbladder: Mild gallbladder wall thickening. Patient's known   cholelithiasis was difficult to visualize on the current exam. Negative   reported sonographic Srinivasan's sign.  Pancreas: Poorly visualized.  Right kidney: 11.6 cm. No hydronephrosis. Echogenic foci up to 1.2 cm,   likely representing calculi.  Ascites: None.  IVC: Not visualized      IMPRESSION:    Right renal calculi measuring up to 1.2 cm. No hydronephrosis.    Distended gallbladder with nonspecific mild gallbladder wall thickening.   Patient has known cholelithiasis as demonstrated on same day CT. Negative   reported sonographic Srinivasan's sign. Findings are nonspecific/equivocal.   If warranted, further evaluation with HIDA scan can be obtained.    --- End of Report ---          < end of copied text >

## 2022-10-22 NOTE — PROGRESS NOTE ADULT - SUBJECTIVE AND OBJECTIVE BOX
`71M w/ PMH of HLD, HTN, MI, DM, CAD s/p CABG, previous DVT/PE on Xarelto, recurrent kidney stones presents to ED with complaints of abdominal pain and hypertension.     # cholecystitis (confirmed on HIDA, suggested by RUQ US)  - poor candidate for surgery, mod. to High risk for Sx as per cardiology, fu echo  - perc. cholecystectomy by IR on tuesday  - IV zosyn 3.375g q8h  - pt acute    #calf pain  - fu duplex    #HTN Urgency: resolved  - likely 2/2 severe abd pain, last [id number]  - c/w home dose labetalol 200mg BID, Losartan 100 QD, Lasix 20 QD    #hx of DVT/PE  #hx of MI/CABG  - xarelto held  - Lovenox 40 BID  - c/w ASA 81 QD    #DM2  - SS insulin  - monitor FS     #HLD  - c/w simvastatin 40mg    #BPH  - c/w Flomax 0.4    # Misc  - GI ppx: Protonix  - DVT ppx: Lovenox 40 BID  - Diet: NPO for now, PPN from tonight  - Activity: AAT  - DISPO: acute    CODE STATUS:       pending: duplex, IR percutaneous cholecystotomy on tuesday   MERARY GARCIA 72y Male  MRN#: [id number]   Hospital Day: 3d    SUBJECTIVE  Patient is a 72y old Male who presents with a chief complaint of abd pain, htn (22 Oct 2022 12:52)  Currently admitted to medicine with the primary diagnosis of Abdominal pain      INTERVAL HPI AND OVERNIGHT EVENTS:  Patient was examined and seen at bedside. This morning he is resting comfortably in bed and reports no issues or overnight events.    OBJECTIVE  PAST MEDICAL & SURGICAL HISTORY  Hypertension, unspecified type    Type 2 diabetes mellitus with complication, unspecified long term insulin use status    High cholesterol    Kidney stones    MI (myocardial infarction)    DVT (deep venous thrombosis)    Acute massive pulmonary embolism  s/p tPA in 2018    Carotid atherosclerosis    H/O heart surgery  quadruple bypass 6 years ago    H/O cystoscopy      ALLERGIES:  No Known Allergies    MEDICATIONS:  STANDING MEDICATIONS  dextrose 5%. 1000 milliLiter(s) IV Continuous <Continuous>  dextrose 5%. 1000 milliLiter(s) IV Continuous <Continuous>  dextrose 50% Injectable 25 Gram(s) IV Push once  dextrose 50% Injectable 12.5 Gram(s) IV Push once  dextrose 50% Injectable 25 Gram(s) IV Push once  enoxaparin Injectable 40 milliGRAM(s) SubCutaneous every 24 hours  fat emulsion (Fish Oil and Plant Based) 20% Infusion 1.0753 Gm/kG/Day IV Continuous <Continuous>  fat emulsion (Fish Oil and Plant Based) 20% Infusion 1.0753 Gm/kG/Day IV Continuous <Continuous>  furosemide    Tablet 20 milliGRAM(s) Oral daily  glucagon  Injectable 1 milliGRAM(s) IntraMuscular once  insulin lispro (ADMELOG) corrective regimen sliding scale   SubCutaneous three times a day before meals  labetalol 200 milliGRAM(s) Oral two times a day  losartan 100 milliGRAM(s) Oral daily  Parenteral Nutrition - Adult 1 Each TPN Continuous <Continuous>  Parenteral Nutrition - Adult 1 Each TPN Continuous <Continuous>  piperacillin/tazobactam IVPB.. 3.375 Gram(s) IV Intermittent every 8 hours  simvastatin 40 milliGRAM(s) Oral at bedtime  tamsulosin 0.4 milliGRAM(s) Oral at bedtime    PRN MEDICATIONS  dextrose Oral Gel 15 Gram(s) Oral once PRN  morphine  - Injectable 2 milliGRAM(s) IV Push every 4 hours PRN  ondansetron    Tablet 8 milliGRAM(s) Oral every 8 hours PRN      VITAL SIGNS: Last 24 Hours  T(C): 36.8 (22 Oct 2022 05:25), Max: 37.3 (21 Oct 2022 21:00)  T(F): 98.3 (22 Oct 2022 05:25), Max: 99.2 (21 Oct 2022 21:00)  HR: 99 (22 Oct 2022 05:25) (98 - 99)  BP: 149/70 (22 Oct 2022 05:25) (129/60 - 149/70)  BP(mean): --  RR: 18 (22 Oct 2022 05:25) (18 - 18)  SpO2: --    LABS:                        12.5   13.17 )-----------( 143      ( 22 Oct 2022 05:50 )             39.0     10    139  |  100  |  17  ----------------------------<  251<H>  3.8   |  26  |  0.8    Ca    8.0<L>      22 Oct 2022 05:50  Phos  1.9     10  Mg     2.1     10-22    TPro  5.4<L>  /  Alb  3.2<L>  /  TBili  2.6<H>  /  DBili  x   /  AST  11  /  ALT  7   /  AlkPhos  66  10-22    PT/INR - ( 21 Oct 2022 09:46 )   PT: 30.20 sec;   INR: 2.57 ratio           Urinalysis Basic - ( 21 Oct 2022 18:42 )    Color: Yellow / Appearance: Clear / S.022 / pH: x  Gluc: x / Ketone: Small  / Bili: Negative / Urobili: 3 mg/dL   Blood: x / Protein: 30 mg/dL / Nitrite: Negative   Leuk Esterase: Negative / RBC: 5 /HPF / WBC 2 /HPF   Sq Epi: x / Non Sq Epi: 1 /HPF / Bacteria: Negative                RADIOLOGY:      PHYSICAL EXAM:  CONSTITUTIONAL: less lethargic when compared to yesterday  HEAD: Atraumatic, normocephalic  EYES: EOM intact, PERRLA, conjunctiva and sclera clear  ENT: Supple, no masses, no thyromegaly, no bruits, no JVD; moist mucous membranes  PULMONARY: Clear to auscultation bilaterally; no wheezes, rales, or rhonchi  CARDIOVASCULAR: Regular rate and rhythm; no murmurs, rubs, or gallops  GASTROINTESTINAL: RUQ, Flank & epigastric tenderness  MUSCULOSKELETAL: 2+ peripheral pulses; no clubbing, no cyanosis, no edema  NEUROLOGY: non-focal  SKIN: No rashes or lesions; warm and dry

## 2022-10-22 NOTE — PROGRESS NOTE ADULT - ASSESSMENT
71M w/ PMH of HLD, HTN, MI, DM, CAD s/p CABG, previous DVT/PE on Xarelto, recurrent kidney stones presents to ED with complaints of abdominal pain and hypertension.     # cholecystitis (confirmed on HIDA, suggested by RUQ US)  - poor candidate for surgery, mod. to High risk for Sx as per cardiology, fu echo  - perc. cholecystectomy by IR on tuesday  - IV zosyn 3.375g q8h  - pt acute    #calf pain  - fu duplex    #HTN Urgency: resolved  - likely 2/2 severe abd pain, last [id number]  - c/w home dose labetalol 200mg BID, Losartan 100 QD, Lasix 20 QD    #hx of DVT/PE  #hx of MI/CABG  - xarelto held  - Lovenox 40 BID  - c/w ASA 81 QD    #DM2  - SS insulin  - monitor FS     #HLD  - c/w simvastatin 40mg    #BPH  - c/w Flomax 0.4    # Misc  - GI ppx: Protonix  - DVT ppx: Lovenox 40 BID  - Diet: NPO for now, PPN from tonight  - Activity: AAT  - DISPO: acute    CODE STATUS:       pending: duplex, IR percutaneous cholecystotomy on tuesday, LDH, Haptoglobin, Reticulocyte count

## 2022-10-22 NOTE — PROGRESS NOTE ADULT - ASSESSMENT
ASSESSMENT:  71M w/ PMH of HLD, HTN, MI, DM, CAD s/p CABG, previous DVT/PE on Xarelto presents for hypotensionand kidney stones presents to ED with complaints of abdominal pain in the bilateral flanks radiating across the lower abdomen as well as hypertension. Initial vitals noted to have a systolic of 239. A CT showed gallstones and enlarged gallbladder with a stone at the neck.   An US of RUQ was ordered to further delineate if pain is due to biliary pathology. US shows a right renal calculi measuring 1.2 cm and gallbladder edema. On exam, pt complains of right flank pain. Physical exam findings, imaging, and labs as documented above.     Acute Cholecystitis  - on IV Zosyn  - NPO  - asked nutrition to eval for PPN  - pain rx prn  - GI, Surgery and IR eval's noted    HTN Urgency:  - likely exacerbated  by severe abd pain  - on Labetalol 200mg BID, Losartan 100 QD, Lasix 20 QD  - BP better controlled today    hx of DVT/PE  hx of MI/CABG  - Xarelto 20mg QD, on hold  - on Lovenox 40 BID until procedure  - hold ASA 81 QD  - Cardio following    DM type 2  - on Metformin 1g BID at home, held during hospital stay  - start on SS   - monitor FS and adjust as needed    hx of Kidney Stones  - non obstructive kidney stone  - hydrate  - monitor      Diet NPO  DVT Prophylaxis on therapeutic dose  Patient is a full code ASSESSMENT:  71M w/ PMH of HLD, HTN, MI, DM, CAD s/p CABG, previous DVT/PE on Xarelto presents for hypotensionand kidney stones presents to ED with complaints of abdominal pain in the bilateral flanks radiating across the lower abdomen as well as hypertension. Initial vitals noted to have a systolic of 239. A CT showed gallstones and enlarged gallbladder with a stone at the neck.   An US of RUQ was ordered to further delineate if pain is due to biliary pathology. US shows a right renal calculi measuring 1.2 cm and gallbladder edema. On exam, pt complains of right flank pain. Physical exam findings, imaging, and labs as documented above.     Acute Cholecystitis- as per US and HIDA  - on IV Zosyn  - NPO, PPN started  - pain rx prn  - GI, Surgery and IR eval's noted; Plan is for PC drainage in 3 days; AC now only prophylactic till then    HTN Urgency:  - likely exacerbated  by severe abd pain  - on Labetalol 200mg BID, Losartan 100 QD, Lasix 20 QD  - BP better controlled today    hx of DVT/PE  hx of MI/CABG  - Xarelto 20mg QD, on hold  - on Lovenox 40 BID until procedure  - hold ASA 81 QD  - Cardio following    DM type 2  - on Metformin 1g BID at home, held during hospital stay  - start on SS   - monitor FS and adjust as needed    hx of Kidney Stones  - non obstructive kidney stone  - hydrate  - monitor    Deconditioning from all  - call to PPT to  evaluate and start RX      Diet NPO  DVT Prophylaxis  Patient is a full code

## 2022-10-22 NOTE — PROGRESS NOTE ADULT - SUBJECTIVE AND OBJECTIVE BOX
GENERAL SURGERY PROGRESS NOTE    Patient: MERARY GARCIA , 72y ([birth year])Male   MRN: [id number]  Location: [address]  Visit: 10-19-22 Inpatient  Date: 10-22-22 @ 01:58    Procedure/Dx/Injuries: cholecystitis     Events of past 24 hours: Patient seen and examined at bedside. BP improving.  Patient still reports RUQ pain, appears more lethargic than prior exams. Remains afebrile and hemodynamically stable. On IV Zosyn   - IR for perc cholecystostomy on Tuesday 10/25  - T.Bili continues to rise. Advanced GI consult appreciated--> no endoscopic intervention at this time.        PAST MEDICAL & SURGICAL HISTORY:  Hypertension, unspecified type  Type 2 diabetes mellitus with complication, unspecified long term insulin use status  High cholesterol  Kidney stones  MI (myocardial infarction)  DVT (deep venous thrombosis)  Acute massive pulmonary embolism  s/p tPA in 2018  Carotid atherosclerosis  H/O heart surgery  quadruple bypass 6 years ago  H/O cystoscopy      Vitals:   T(F): 99.2 (10-21-22 @ 21:00), Max: 99.2 (10-21-22 @ 21:00)  HR: 98 (10-21-22 @ 21:00)  BP: 129/60 (10-21-22 @ 21:00)  RR: 18 (10-21-22 @ 21:00)  SpO2: --      Diet, NPO:   Except Medications      Fluids:     I & O's:    10-20-22 @ 07:01  -  10-21-22 @ 07:00  --------------------------------------------------------  IN:  Total IN: 0 mL    OUT:    Voided (mL): 250 mL  Total OUT: 250 mL    Total NET: -250 mL        PHYSICAL EXAM:  General: NAD, mildly diaphoretic  Cardiac: RRR, S1/S2 identified, nmrg  Respiratory: unlabored breathing at rest, clear to auscultation bilaterally  Abdomen: Soft, non-distended, mild RUQ/epigastric tenderness, no rebound  Musculoskeletal: FROM in b/l UE and LE  Neuro: Sensation grossly intact and equal throughout, no focal deficits  Skin: Warm/dry, normal color, no jaundice      MEDICATIONS  (STANDING):  dextrose 5%. 1000 milliLiter(s) (100 mL/Hr) IV Continuous <Continuous>  dextrose 5%. 1000 milliLiter(s) (50 mL/Hr) IV Continuous <Continuous>  dextrose 50% Injectable 25 Gram(s) IV Push once  dextrose 50% Injectable 12.5 Gram(s) IV Push once  dextrose 50% Injectable 25 Gram(s) IV Push once  enoxaparin Injectable 40 milliGRAM(s) SubCutaneous every 12 hours  fat emulsion (Fish Oil and Plant Based) 20% Infusion 1.0753 Gm/kG/Day (31.3 mL/Hr) IV Continuous <Continuous>  furosemide    Tablet 20 milliGRAM(s) Oral daily  glucagon  Injectable 1 milliGRAM(s) IntraMuscular once  insulin lispro (ADMELOG) corrective regimen sliding scale   SubCutaneous three times a day before meals  labetalol 200 milliGRAM(s) Oral two times a day  losartan 100 milliGRAM(s) Oral daily  Parenteral Nutrition - Adult 1 Each (65 mL/Hr) TPN Continuous <Continuous>  piperacillin/tazobactam IVPB.. 3.375 Gram(s) IV Intermittent every 8 hours  simvastatin 40 milliGRAM(s) Oral at bedtime  tamsulosin 0.4 milliGRAM(s) Oral at bedtime    MEDICATIONS  (PRN):  dextrose Oral Gel 15 Gram(s) Oral once PRN Blood Glucose LESS THAN 70 milliGRAM(s)/deciliter  morphine  - Injectable 2 milliGRAM(s) IV Push every 4 hours PRN Moderate Pain (4 - 6)  ondansetron    Tablet 8 milliGRAM(s) Oral every 8 hours PRN Nausea and/or Vomiting      DVT PROPHYLAXIS: enoxaparin Injectable 40 milliGRAM(s) SubCutaneous every 12 hours    ANTIBIOTICS:  piperacillin/tazobactam IVPB.. 3.375 Gram(s)      LAB/STUDIES:  Labs:  CAPILLARY BLOOD GLUCOSE  POCT Blood Glucose.: 213 mg/dL (21 Oct 2022 11:25)  POCT Blood Glucose.: 180 mg/dL (21 Oct 2022 07:51)                        13.1   15.80 )-----------( 153      ( 21 Oct 2022 09:46 )             39.4       Auto Neutrophil %: 86.5 % (10-21-22 @ 09:46)  Auto Immature Granulocyte %: 0.6 % (10-21-22 @ 09:46)    10-21    141  |  102  |  13  ----------------------------<  189<H>  4.0   |  27  |  0.7      Calcium, Total Serum: 8.0 mg/dL (10-21-22 @ 09:46)      LFTs:             5.5  | 3.7  | 10       ------------------[57      ( 21 Oct 2022 14:15 )  3.6  | 0.8  | 8           Lipase:x      Amylase:x          Coags:     30.20  ----< 2.57    ( 21 Oct 2022 09:46 )     x           Urinalysis Basic - ( 21 Oct 2022 18:42 )    Color: Yellow / Appearance: Clear / S.022 / pH: x  Gluc: x / Ketone: Small  / Bili: Negative / Urobili: 3 mg/dL   Blood: x / Protein: 30 mg/dL / Nitrite: Negative   Leuk Esterase: Negative / RBC: 5 /HPF / WBC 2 /HPF   Sq Epi: x / Non Sq Epi: 1 /HPF / Bacteria: Negative    Culture - Blood (collected 19 Oct 2022 11:26)  Source: .Blood Blood-Peripheral  Preliminary Report (20 Oct 2022 23:01):    No growth to date.

## 2022-10-23 LAB
ALBUMIN SERPL ELPH-MCNC: 2.9 G/DL — LOW (ref 3.5–5.2)
ALP SERPL-CCNC: 69 U/L — SIGNIFICANT CHANGE UP (ref 30–115)
ALT FLD-CCNC: 12 U/L — SIGNIFICANT CHANGE UP (ref 0–41)
ANION GAP SERPL CALC-SCNC: 15 MMOL/L — HIGH (ref 7–14)
APTT BLD: 29.6 SEC — SIGNIFICANT CHANGE UP (ref 27–39.2)
AST SERPL-CCNC: 17 U/L — SIGNIFICANT CHANGE UP (ref 0–41)
BASOPHILS # BLD AUTO: 0.03 K/UL — SIGNIFICANT CHANGE UP (ref 0–0.2)
BASOPHILS NFR BLD AUTO: 0.3 % — SIGNIFICANT CHANGE UP (ref 0–1)
BILIRUB SERPL-MCNC: 1.6 MG/DL — HIGH (ref 0.2–1.2)
BLD GP AB SCN SERPL QL: SIGNIFICANT CHANGE UP
BUN SERPL-MCNC: 17 MG/DL — SIGNIFICANT CHANGE UP (ref 10–20)
CALCIUM SERPL-MCNC: 7.9 MG/DL — LOW (ref 8.4–10.5)
CHLORIDE SERPL-SCNC: 107 MMOL/L — SIGNIFICANT CHANGE UP (ref 98–110)
CO2 SERPL-SCNC: 25 MMOL/L — SIGNIFICANT CHANGE UP (ref 17–32)
CREAT SERPL-MCNC: 0.7 MG/DL — SIGNIFICANT CHANGE UP (ref 0.7–1.5)
EGFR: 99 ML/MIN/1.73M2 — SIGNIFICANT CHANGE UP
EOSINOPHIL # BLD AUTO: 0.27 K/UL — SIGNIFICANT CHANGE UP (ref 0–0.7)
EOSINOPHIL NFR BLD AUTO: 2.4 % — SIGNIFICANT CHANGE UP (ref 0–8)
GLUCOSE BLDC GLUCOMTR-MCNC: 221 MG/DL — HIGH (ref 70–99)
GLUCOSE BLDC GLUCOMTR-MCNC: 253 MG/DL — HIGH (ref 70–99)
GLUCOSE BLDC GLUCOMTR-MCNC: 258 MG/DL — HIGH (ref 70–99)
GLUCOSE BLDC GLUCOMTR-MCNC: 259 MG/DL — HIGH (ref 70–99)
GLUCOSE SERPL-MCNC: 273 MG/DL — HIGH (ref 70–99)
HAPTOGLOB SERPL-MCNC: 319 MG/DL — HIGH (ref 34–200)
HCT VFR BLD CALC: 38.4 % — LOW (ref 42–52)
HGB BLD-MCNC: 12.4 G/DL — LOW (ref 14–18)
IMM GRANULOCYTES NFR BLD AUTO: 0.8 % — HIGH (ref 0.1–0.3)
INR BLD: 1.98 RATIO — HIGH (ref 0.65–1.3)
LYMPHOCYTES # BLD AUTO: 0.66 K/UL — LOW (ref 1.2–3.4)
LYMPHOCYTES # BLD AUTO: 5.8 % — LOW (ref 20.5–51.1)
MAGNESIUM SERPL-MCNC: 1.7 MG/DL — LOW (ref 1.8–2.4)
MCHC RBC-ENTMCNC: 29.9 PG — SIGNIFICANT CHANGE UP (ref 27–31)
MCHC RBC-ENTMCNC: 32.3 G/DL — SIGNIFICANT CHANGE UP (ref 32–37)
MCV RBC AUTO: 92.5 FL — SIGNIFICANT CHANGE UP (ref 80–94)
MONOCYTES # BLD AUTO: 1.02 K/UL — HIGH (ref 0.1–0.6)
MONOCYTES NFR BLD AUTO: 9 % — SIGNIFICANT CHANGE UP (ref 1.7–9.3)
NEUTROPHILS # BLD AUTO: 9.25 K/UL — HIGH (ref 1.4–6.5)
NEUTROPHILS NFR BLD AUTO: 81.7 % — HIGH (ref 42.2–75.2)
NRBC # BLD: 0 /100 WBCS — SIGNIFICANT CHANGE UP (ref 0–0)
PHOSPHATE SERPL-MCNC: 2.5 MG/DL — SIGNIFICANT CHANGE UP (ref 2.1–4.9)
PLATELET # BLD AUTO: 166 K/UL — SIGNIFICANT CHANGE UP (ref 130–400)
POTASSIUM SERPL-MCNC: 4.6 MMOL/L — SIGNIFICANT CHANGE UP (ref 3.5–5)
POTASSIUM SERPL-SCNC: 4.6 MMOL/L — SIGNIFICANT CHANGE UP (ref 3.5–5)
PROT SERPL-MCNC: 5.2 G/DL — LOW (ref 6–8)
PROTHROM AB SERPL-ACNC: 23.1 SEC — HIGH (ref 9.95–12.87)
RBC # BLD: 4.15 M/UL — LOW (ref 4.7–6.1)
RBC # FLD: 13.7 % — SIGNIFICANT CHANGE UP (ref 11.5–14.5)
SARS-COV-2 RNA SPEC QL NAA+PROBE: SIGNIFICANT CHANGE UP
SODIUM SERPL-SCNC: 147 MMOL/L — HIGH (ref 135–146)
WBC # BLD: 11.32 K/UL — HIGH (ref 4.8–10.8)
WBC # FLD AUTO: 11.32 K/UL — HIGH (ref 4.8–10.8)

## 2022-10-23 PROCEDURE — 93970 EXTREMITY STUDY: CPT | Mod: 26

## 2022-10-23 RX ORDER — ELECTROLYTE SOLUTION,INJ
1 VIAL (ML) INTRAVENOUS
Refills: 0 | Status: DISCONTINUED | OUTPATIENT
Start: 2022-10-23 | End: 2022-10-23

## 2022-10-23 RX ORDER — I.V. FAT EMULSION 20 G/100ML
1.08 EMULSION INTRAVENOUS
Qty: 100 | Refills: 0 | Status: DISCONTINUED | OUTPATIENT
Start: 2022-10-23 | End: 2022-10-23

## 2022-10-23 RX ADMIN — ENOXAPARIN SODIUM 40 MILLIGRAM(S): 100 INJECTION SUBCUTANEOUS at 13:11

## 2022-10-23 RX ADMIN — Medication 1 EACH: at 21:34

## 2022-10-23 RX ADMIN — Medication 3: at 17:03

## 2022-10-23 RX ADMIN — Medication 3: at 13:11

## 2022-10-23 RX ADMIN — PIPERACILLIN AND TAZOBACTAM 25 GRAM(S): 4; .5 INJECTION, POWDER, LYOPHILIZED, FOR SOLUTION INTRAVENOUS at 21:34

## 2022-10-23 RX ADMIN — Medication 3: at 08:03

## 2022-10-23 RX ADMIN — PIPERACILLIN AND TAZOBACTAM 25 GRAM(S): 4; .5 INJECTION, POWDER, LYOPHILIZED, FOR SOLUTION INTRAVENOUS at 13:12

## 2022-10-23 RX ADMIN — PIPERACILLIN AND TAZOBACTAM 25 GRAM(S): 4; .5 INJECTION, POWDER, LYOPHILIZED, FOR SOLUTION INTRAVENOUS at 05:22

## 2022-10-23 RX ADMIN — TAMSULOSIN HYDROCHLORIDE 0.4 MILLIGRAM(S): 0.4 CAPSULE ORAL at 21:34

## 2022-10-23 RX ADMIN — ONDANSETRON 8 MILLIGRAM(S): 8 TABLET, FILM COATED ORAL at 13:11

## 2022-10-23 RX ADMIN — LOSARTAN POTASSIUM 100 MILLIGRAM(S): 100 TABLET, FILM COATED ORAL at 05:21

## 2022-10-23 RX ADMIN — SIMVASTATIN 40 MILLIGRAM(S): 20 TABLET, FILM COATED ORAL at 21:34

## 2022-10-23 RX ADMIN — Medication 20 MILLIGRAM(S): at 05:22

## 2022-10-23 RX ADMIN — I.V. FAT EMULSION 31.3 GM/KG/DAY: 20 EMULSION INTRAVENOUS at 21:34

## 2022-10-23 RX ADMIN — Medication 200 MILLIGRAM(S): at 17:02

## 2022-10-23 RX ADMIN — Medication 200 MILLIGRAM(S): at 05:22

## 2022-10-23 NOTE — PROGRESS NOTE ADULT - ASSESSMENT
71M w/ PMH of HLD, HTN, MI, DM, CAD s/p CABG, previous DVT/PE on Xarelto, recurrent kidney stones presents to ED with complaints of abdominal pain and hypertension.     # cholecystitis (confirmed on HIDA, suggested by RUQ US)  - poor candidate for surgery, mod. to High risk for Sx as per cardiology, fu echo  - perc. cholecystectomy by IR on tuesday  - IV zosyn 3.375g q8h  - pt acute    #calf pain  - fu duplex    #HTN Urgency: resolved  - likely 2/2 severe abd pain, last [id number]  - c/w home dose labetalol 200mg BID, Losartan 100 QD, Lasix 20 QD    #hx of DVT/PE  #hx of MI/CABG  - xarelto held  - Lovenox 40 BID  - c/w ASA 81 QD    #DM2  - SS insulin  - monitor FS     #HLD  - c/w simvastatin 40mg    #BPH  - c/w Flomax 0.4    # Misc  - GI ppx: Protonix  - DVT ppx: Lovenox 40 BID  - Diet: PPN  - Activity: AAT  - DISPO: acute    pending: duplex, IR percutaneous cholecystotomy on tuesday

## 2022-10-23 NOTE — PROGRESS NOTE ADULT - ASSESSMENT
ASSESSMENT:  71M w/ PMH of HLD, HTN, MI, DM, CAD s/p CABG, previous DVT/PE on Xarelto presents for hypotensionand kidney stones presents to ED with complaints of abdominal pain in the bilateral flanks radiating across the lower abdomen as well as hypertension. Initial vitals noted to have a systolic of 239. A CT showed gallstones and enlarged gallbladder with a stone at the neck.   An US of RUQ was ordered to further delineate if pain is due to biliary pathology. US shows a right renal calculi measuring 1.2 cm and gallbladder edema. On exam, pt complains of right flank pain. Physical exam findings, imaging, and labs as documented above.     Acute Cholecystitis- as per US and HIDA  - on IV Zosyn  - NPO, PPN started  - pain rx prn  - GI, Surgery and IR eval's noted; Plan is for PC drainage in 3 days; AC now only prophylactic till then    HTN Urgency:  - likely exacerbated  by severe abd pain  - on Labetalol 200mg BID, Losartan 100 QD, Lasix 20 QD  - BP better controlled today    hx of DVT/PE  hx of MI/CABG  - Xarelto 20mg QD, on hold  - on Lovenox 40 BID until procedure  - hold ASA 81 QD  - Cardio following    DM type 2  - on Metformin 1g BID at home, held during hospital stay  - start on SS   - monitor FS and adjust as needed    hx of Kidney Stones  - non obstructive kidney stone  - hydrate  - monitor    Deconditioning from all  - call to PPT to  evaluate and start RX      Diet NPO  DVT Prophylaxis  Patient is a full code ASSESSMENT:  71M w/ PMH of HLD, HTN, MI, DM, CAD s/p CABG, previous DVT/PE on Xarelto presents for hypotensionand kidney stones presents to ED with complaints of abdominal pain in the bilateral flanks radiating across the lower abdomen as well as hypertension. Initial vitals noted to have a systolic of 239. A CT showed gallstones and enlarged gallbladder with a stone at the neck.   An US of RUQ was ordered to further delineate if pain is due to biliary pathology. US shows a right renal calculi measuring 1.2 cm and gallbladder edema. On exam, pt complains of right flank pain. Physical exam findings, imaging, and labs as documented above.     Acute Cholecystitis- as per US and HIDA  - on IV Zosyn  - NPO, PPN started  - pain rx prn  - GI, Surgery and IR eval's noted; Plan is for PC drainage in 2 days; AC was only prophylactic - now w + Sx, & Venous duplex as noted- POS for Chronic DVT in          LLE.    HTN Urgency:  - likely exacerbated  by severe abd pain  - on Labetalol 200mg BID, Losartan 100 QD, Lasix 20 QD  - BP better controlled today    hx of DVT/PE  hx of MI/CABG  - Xarelto 20mg QD, on hold  - was on Lovenox 40 BID until procedure- go to therapeutic dose W + duplex and Sx; will ask vascular to see; and hold as per IR- starting 10/24-PM        for 10/25 procedure  - hold ASA 81 QD  - Cardio following    DM type 2  - on Metformin 1g BID at home, held during hospital stay  - start on SS   - monitor FS and adjust as needed    hx of Kidney Stones  - non obstructive kidney stone  - hydrate  - monitor    Deconditioning from all  - call to PPT to  evaluate and start RX      Diet NPO  DVT Prophylaxis  Patient is a full code

## 2022-10-23 NOTE — PROGRESS NOTE ADULT - SUBJECTIVE AND OBJECTIVE BOX
GENERAL SURGERY PROGRESS NOTE    Hospital Day: 6    24 Hour Events:  No acute events. Patient seen resting comfortably in bed.  Patient remains on PPN. Abdominal pain improving.   Patient's glucose remains high.     Vitals:  T(F): 97.6 (10-22-22 @ 19:00), Max: 98.3 (10-22-22 @ 05:25)  HR: 94 (10-22-22 @ 19:00)  BP: 132/65 (10-22-22 @ 19:00)  RR: 18 (10-22-22 @ 19:00)  SpO2: --    Diet, NPO:   Except Medications    Fluids:     I & O's:    10-21-22 @ 07:01  -  10-22-22 @ 07:00  --------------------------------------------------------  IN:  Total IN: 0 mL    OUT:    Voided (mL): 300 mL  Total OUT: 300 mL    Total NET: -300 mL    PHYSICAL EXAM:  General: NAD  Cardiac: RRR, S1/S2 identified, nmrg  Respiratory: unlabored breathing at rest, clear to auscultation bilaterally  Abdomen: Soft, non-distended, non-tender, no rebound, no guarding.  Musculoskeletal: FROM in b/l UE and LE  Neuro: Sensation grossly intact and equal throughout, no focal deficits  Skin: Warm/dry, normal color, no jaundice    MEDICATIONS  (STANDING):  dextrose 5%. 1000 milliLiter(s) (50 mL/Hr) IV Continuous <Continuous>  dextrose 5%. 1000 milliLiter(s) (100 mL/Hr) IV Continuous <Continuous>  dextrose 50% Injectable 25 Gram(s) IV Push once  dextrose 50% Injectable 12.5 Gram(s) IV Push once  dextrose 50% Injectable 25 Gram(s) IV Push once  enoxaparin Injectable 40 milliGRAM(s) SubCutaneous every 24 hours  fat emulsion (Fish Oil and Plant Based) 20% Infusion 1.0753 Gm/kG/Day (31.3 mL/Hr) IV Continuous <Continuous>  furosemide    Tablet 20 milliGRAM(s) Oral daily  glucagon  Injectable 1 milliGRAM(s) IntraMuscular once  insulin lispro (ADMELOG) corrective regimen sliding scale   SubCutaneous three times a day before meals  labetalol 200 milliGRAM(s) Oral two times a day  losartan 100 milliGRAM(s) Oral daily  Parenteral Nutrition - Adult 1 Each (65 mL/Hr) TPN Continuous <Continuous>  piperacillin/tazobactam IVPB.. 3.375 Gram(s) IV Intermittent every 8 hours  simvastatin 40 milliGRAM(s) Oral at bedtime  tamsulosin 0.4 milliGRAM(s) Oral at bedtime    MEDICATIONS  (PRN):  dextrose Oral Gel 15 Gram(s) Oral once PRN Blood Glucose LESS THAN 70 milliGRAM(s)/deciliter  morphine  - Injectable 2 milliGRAM(s) IV Push every 4 hours PRN Moderate Pain (4 - 6)  ondansetron    Tablet 8 milliGRAM(s) Oral every 8 hours PRN Nausea and/or Vomiting    DVT PROPHYLAXIS: enoxaparin Injectable 40 milliGRAM(s) SubCutaneous every 24 hours    GI PROPHYLAXIS:   ANTICOAGULATION:   ANTIBIOTICS:  piperacillin/tazobactam IVPB.. 3.375 Gram(s)    LAB/STUDIES:  Labs:  CAPILLARY BLOOD GLUCOSE      POCT Blood Glucose.: 220 mg/dL (22 Oct 2022 21:15)  POCT Blood Glucose.: 218 mg/dL (22 Oct 2022 20:29)  POCT Blood Glucose.: 227 mg/dL (22 Oct 2022 16:27)  POCT Blood Glucose.: 250 mg/dL (22 Oct 2022 11:36)  POCT Blood Glucose.: 252 mg/dL (22 Oct 2022 07:55)                          12.5   13.17 )-----------( 143      ( 22 Oct 2022 05:50 )             39.0       Auto Neutrophil %: 84.2 % (10-22-22 @ 05:50)  Auto Immature Granulocyte %: 0.6 % (10-22-22 @ 05:50)    10-22    139  |  100  |  17  ----------------------------<  251<H>  3.8   |  26  |  0.8      Magnesium, Serum: 2.1 mg/dL (10-22-22 @ 05:50)      LFTs:             5.4  | 2.6  | 11       ------------------[66      ( 22 Oct 2022 05:50 )  3.2  | x    | 7           Lipase:x      Amylase:x           Coags:     30.20  ----< 2.57    ( 21 Oct 2022 09:46 )     x         Urinalysis Basic - ( 21 Oct 2022 18:42 )    Color: Yellow / Appearance: Clear / S.022 / pH: x  Gluc: x / Ketone: Small  / Bili: Negative / Urobili: 3 mg/dL   Blood: x / Protein: 30 mg/dL / Nitrite: Negative   Leuk Esterase: Negative / RBC: 5 /HPF / WBC 2 /HPF   Sq Epi: x / Non Sq Epi: 1 /HPF / Bacteria: Negative

## 2022-10-23 NOTE — PROGRESS NOTE ADULT - ASSESSMENT
ASSESSMENT:  71M w/ PMH of HLD, HTN, MI, DM, CAD s/p CABG, previous DVT/PE on Xarelto presents for hypotensionand kidney stones presents to ED with complaints of abdominal pain in the bilateral flanks radiating across the lower abdomen as well as hypertension. Initial vitals noted to have a systolic of 239. A CT showed gallstones and enlarged gallbladder with a stone at the neck. An US of RUQ was ordered to further delineate if pain is due to biliary pathology. US shows a right renal calculi measuring 1.2 cm and gallbladder edema. HIDA scan is suggestive of cholecystitis.     PLAN:   - Pt has extensive PMH including CABG, MI, massive P.E. previously on A.C., HTN urgency this admission - pt remains a poor surgical candidate   - IR: percutaneous cholecystostomy tentatively scheduled for Tuesday 10/2. Per IR, hold ASA x 5 days before procedure  - Trend LFTs  - Recommend obtaining LDH, reticulocyte count, and haptoglobin to further assess unconjugated hyperbilirubinemia  - IV Abx   - NPO, IVF, continue PPN  - Strict I/O  - Continue BP control (losartan, labetalol)  - DVT ppx  - Surgery following    SPECTRA [id number]

## 2022-10-23 NOTE — PROGRESS NOTE ADULT - ASSESSMENT
ASSESSMENT:  71M w/ PMH of HLD, HTN, MI, DM, CAD s/p CABG, previous DVT/PE on Xarelto presents for hypotensionand kidney stones presents to ED with complaints of abdominal pain in the bilateral flanks radiating across the lower abdomen as well as hypertension. Initial vitals noted to have a systolic of 239. A CT showed gallstones and enlarged gallbladder with a stone at the neck.   An US of RUQ was ordered to further delineate if pain is due to biliary pathology. US shows a right renal calculi measuring 1.2 cm and gallbladder edema. On exam, pt complains of right flank pain. Physical exam findings, imaging, and labs as documented above.     Acute Cholecystitis- as per US and HIDA  - on IV Zosyn  - NPO, PPN started  - pain rx prn  - GI, Surgery and IR eval's noted; Plan is for PC drainage in 3 days; AC now only prophylactic till then    HTN Urgency:  - likely exacerbated  by severe abd pain  - on Labetalol 200mg BID, Losartan 100 QD, Lasix 20 QD  - BP better controlled today    hx of DVT/PE  hx of MI/CABG  - Xarelto 20mg QD, on hold  - on Lovenox 40 BID until procedure  - hold ASA 81 QD  - Cardio following    DM type 2  - on Metformin 1g BID at home, held during hospital stay  - start on SS   - monitor FS and adjust as needed    hx of Kidney Stones  - non obstructive kidney stone  - hydrate  - monitor    Deconditioning from all  - call to PPT to  evaluate and start RX      Diet NPO  DVT Prophylaxis  Patient is a full code ASSESSMENT:  71M w/ PMH of HLD, HTN, MI, DM, CAD s/p CABG, previous DVT/PE on Xarelto presents for hypotensionand kidney stones presents to ED with complaints of abdominal pain in the bilateral flanks radiating across the lower abdomen as well as hypertension. Initial vitals noted to have a systolic of 239. A CT showed gallstones and enlarged gallbladder with a stone at the neck.   An US of RUQ was ordered to further delineate if pain is due to biliary pathology. US shows a right renal calculi measuring 1.2 cm and gallbladder edema. On exam, pt complains of right flank pain. Physical exam findings, imaging, and labs as documented above.     Acute Cholecystitis- as per US and HIDA  - on IV Zosyn  - NPO, PPN started  - pain rx prn  - GI, Surgery and IR eval's noted; Plan is for PC drainage in 3 days; AC now only prophylactic till then    HTN Urgency:  - likely exacerbated  by severe abd pain  - on Labetalol 200mg BID, Losartan 100 QD, Lasix 20 QD  - BP better controlled today    hx of DVT/PE  hx of MI/CABG  - Xarelto 20mg QD, on hold  - on Lovenox 40 BID until procedure- SEE PRIOR NOTE  - hold ASA 81 QD  - Cardio following    DM type 2  - on Metformin 1g BID at home, held during hospital stay  - start on SS   - monitor FS and adjust as needed    hx of Kidney Stones  - non obstructive kidney stone  - hydrate  - monitor    Deconditioning from all  - call to PPT to  evaluate and start RX    SEE PRIOR COMPLETED NOTE  Diet NPO  DVT Prophylaxis  Patient is a full code

## 2022-10-23 NOTE — PROGRESS NOTE ADULT - SUBJECTIVE AND OBJECTIVE BOX
MERARY GARCIA 72y Male  MRN#: [id number]   Hospital Day: 4d    SUBJECTIVE  Patient is a 72y old Male who presents with a chief complaint of abd pain, htn (23 Oct 2022 02:16)  Currently admitted to medicine with the primary diagnosis of Abdominal pain      INTERVAL HPI AND OVERNIGHT EVENTS:  Patient was examined and seen at bedside. This morning he is resting comfortably in bed and no overnight events to report.    OBJECTIVE  PAST MEDICAL & SURGICAL HISTORY  Hypertension, unspecified type    Type 2 diabetes mellitus with complication, unspecified long term insulin use status    High cholesterol    Kidney stones    MI (myocardial infarction)    DVT (deep venous thrombosis)    Acute massive pulmonary embolism  s/p tPA in 2018    Carotid atherosclerosis    H/O heart surgery  quadruple bypass 6 years ago    H/O cystoscopy      ALLERGIES:  No Known Allergies    MEDICATIONS:  STANDING MEDICATIONS  dextrose 5%. 1000 milliLiter(s) IV Continuous <Continuous>  dextrose 5%. 1000 milliLiter(s) IV Continuous <Continuous>  dextrose 50% Injectable 25 Gram(s) IV Push once  dextrose 50% Injectable 12.5 Gram(s) IV Push once  dextrose 50% Injectable 25 Gram(s) IV Push once  enoxaparin Injectable 40 milliGRAM(s) SubCutaneous every 24 hours  fat emulsion (Fish Oil and Plant Based) 20% Infusion 1.0753 Gm/kG/Day IV Continuous <Continuous>  furosemide    Tablet 20 milliGRAM(s) Oral daily  glucagon  Injectable 1 milliGRAM(s) IntraMuscular once  insulin lispro (ADMELOG) corrective regimen sliding scale   SubCutaneous three times a day before meals  labetalol 200 milliGRAM(s) Oral two times a day  losartan 100 milliGRAM(s) Oral daily  Parenteral Nutrition - Adult 1 Each TPN Continuous <Continuous>  piperacillin/tazobactam IVPB.. 3.375 Gram(s) IV Intermittent every 8 hours  simvastatin 40 milliGRAM(s) Oral at bedtime  tamsulosin 0.4 milliGRAM(s) Oral at bedtime    PRN MEDICATIONS  dextrose Oral Gel 15 Gram(s) Oral once PRN  morphine  - Injectable 2 milliGRAM(s) IV Push every 4 hours PRN  ondansetron    Tablet 8 milliGRAM(s) Oral every 8 hours PRN      VITAL SIGNS: Last 24 Hours  T(C): 36.4 (22 Oct 2022 19:00), Max: 36.8 (22 Oct 2022 05:25)  T(F): 97.6 (22 Oct 2022 19:00), Max: 98.3 (22 Oct 2022 05:25)  HR: 94 (22 Oct 2022 19:00) (93 - 99)  BP: 132/65 (22 Oct 2022 19:00) (132/65 - 149/70)  BP(mean): --  RR: 18 (22 Oct 2022 19:00) (18 - 18)  SpO2: --    LABS:                        12.5   13.17 )-----------( 143      ( 22 Oct 2022 05:50 )             39.0     10-    139  |  100  |  17  ----------------------------<  251<H>  3.8   |  26  |  0.8    Ca    8.0<L>      22 Oct 2022 05:50  Phos  1.9     10  Mg     2.1     10-22    TPro  5.4<L>  /  Alb  3.2<L>  /  TBili  2.6<H>  /  DBili  x   /  AST  11  /  ALT  7   /  AlkPhos  66  10-22    PT/INR - ( 21 Oct 2022 09:46 )   PT: 30.20 sec;   INR: 2.57 ratio           Urinalysis Basic - ( 21 Oct 2022 18:42 )    Color: Yellow / Appearance: Clear / S.022 / pH: x  Gluc: x / Ketone: Small  / Bili: Negative / Urobili: 3 mg/dL   Blood: x / Protein: 30 mg/dL / Nitrite: Negative   Leuk Esterase: Negative / RBC: 5 /HPF / WBC 2 /HPF   Sq Epi: x / Non Sq Epi: 1 /HPF / Bacteria: Negative                RADIOLOGY:      PHYSICAL EXAM:  CONSTITUTIONAL: No acute distress  HEAD: Atraumatic, normocephalic  ENT: moist mucous membranes  PULMONARY: No respiratory distress  SKIN: warm and dry

## 2022-10-23 NOTE — PROGRESS NOTE ADULT - SUBJECTIVE AND OBJECTIVE BOX
Chart reviewed, patient examined. Pertinent results reviewed.  Case discussed with HO; specialist f/u reviewed  HD#5 (ED 10/18)  MERARY GARCIA    HPI:  Patient is a 72-yo male with pmhx of htn, hld, DM2, MI s/p CABG, DVT/PE on Xarelto, recurrent kidney stones who presented with a chief complaint of abdominal pain and high blood pressure x 1 day. The pain started yesterday afternoon as a "horrible discomfort". It is hypogastric in anture, diffuse, encircling to patient's low back. It is constant, was a 10/10 prior to pain meds (now 7/10), no exacerbating factors. Patient states he takes his bp daily and usually runs around 155/65, but found his systolic in the 200s so decided to come to hospital. Patient denies other systemic sxs, including fever, n/v/d, cp, sob, chills, dysuria, hematuria, hematochezia, joint pains or recent sick contacts. Patient says the pain is similar to what he experienced with his kidney stones but that the pain was located flanks during those episodes.  ED Course:  Vitals- bp 229/95, hr 68, rr 20, t 35.6, 95% on RA  Labs- wbc wnl, Hb 13.9, K 5.1 (hemolyzed), glucose 241, TBili 1.8  EKG- Normal sinus rhythm, Left ventricular hypertrophy with QRS widening and repolarization abnormality ( R in aVL , [place] product , Romhilt-Bowen ), Inferior infarct , age undetermined  BP had 20 point difference between arms and there was a concern for dissection. A CT angio obtained was negative for dissection, but did show gallstones and enlarged gallbladder with a stone at the neck. An US of RUQ was ordered to further delineate if pain is due to biliary pathology. US shows a right renal calculi measuring 1.2 cm and gallbladder edema. Pt states he has never been told he has gallstones before.    Patient has received total of labetalol 20 IVP/300 PO, nifedipine 30 PO, and morphine 10mg IVP.    He is being admitted for further management. W/U has revealed + Acute Cholecystitis.   (19 Oct 2022 08:09)    INTERVAL EVENTS: Patient seen today without distress, brother at bedside.     MEDICATIONS  (STANDING):  dextrose 5%. 1000 milliLiter(s) (50 mL/Hr) IV Continuous <Continuous>  dextrose 5%. 1000 milliLiter(s) (100 mL/Hr) IV Continuous <Continuous>  dextrose 50% Injectable 25 Gram(s) IV Push once  dextrose 50% Injectable 12.5 Gram(s) IV Push once  dextrose 50% Injectable 25 Gram(s) IV Push once  enoxaparin Injectable 40 milliGRAM(s) SubCutaneous every 24 hours  fat emulsion (Fish Oil and Plant Based) 20% Infusion 1.0753 Gm/kG/Day (31.3 mL/Hr) IV Continuous <Continuous>  furosemide    Tablet 20 milliGRAM(s) Oral daily  glucagon  Injectable 1 milliGRAM(s) IntraMuscular once  insulin lispro (ADMELOG) corrective regimen sliding scale   SubCutaneous three times a day before meals  labetalol 200 milliGRAM(s) Oral two times a day  losartan 100 milliGRAM(s) Oral daily  Parenteral Nutrition - Adult 1 Each (65 mL/Hr) TPN Continuous <Continuous>  piperacillin/tazobactam IVPB.. 3.375 Gram(s) IV Intermittent every 8 hours  simvastatin 40 milliGRAM(s) Oral at bedtime  tamsulosin 0.4 milliGRAM(s) Oral at bedtime    MEDICATIONS  (PRN):  dextrose Oral Gel 15 Gram(s) Oral once PRN Blood Glucose LESS THAN 70 milliGRAM(s)/deciliter  morphine  - Injectable 2 milliGRAM(s) IV Push every 4 hours PRN Moderate Pain (4 - 6)  ondansetron    Tablet 8 milliGRAM(s) Oral every 8 hours PRN Nausea and/or Vomiting    Vital Signs Last 24 Hrs  T(C): 36.1 (23 Oct 2022 05:39), Max: 36.4 (22 Oct 2022 19:00)  T(F): 97 (23 Oct 2022 05:39), Max: 97.6 (22 Oct 2022 19:00)  HR: 76 (23 Oct 2022 05:39) (76 - 94)  BP: 156/82 (23 Oct 2022 05:39) (132/65 - 156/82)  BP(mean): 108 (23 Oct 2022 05:39) (108 - 108)  RR: 18 (23 Oct 2022 05:39) (18 - 18)  SpO2: --    PHYSICAL EXAM:  GENERAL:  NAD; weak older male  NECK: Supple, No JVD  CHEST/LUNG: Clear  HEART: RRR, no MRG  ABDOMEN: Soft, Nondistended; Bowel sounds present; min TTP in epigastrium-RUQ  EXTREMITIES: No edema; L calf + TTP, no cord or erythema  SKIN: reddened face, peeling skin to the bridge of the nose    LABS:                           12.5   13.17 )-----------( 143      ( 22 Oct 2022 05:50 )             39.0                      13.1   15.80 )-----------( 153      ( 21 Oct 2022 09:46 )             39.4     10-22    139  |  100  |  17  ----------------------------<  251<H>  3.8   |  26  |  0.8    Ca    8.0<L>      22 Oct 2022 05:50  Phos  1.9     10-22  Mg     2.1     10-22    TPro  5.4<L>  /  Alb  3.2<L>  /  TBili  2.6<H>  /  DBili  x   /  AST  11  /  ALT  7   /  AlkPhos  66  10-22              10-20    136  |  95<L>  |  13  ----------------------------<  215<H>  3.8   |  27  |  0.7    Ca    8.3<L>      20 Oct 2022 08:28  Mg     1.7     10-20    TPro  5.7<L>  /  Alb  3.8  /  TBili  3.6<H>  /  DBili  0.6<H>  /  AST  10  /  ALT  8   /  AlkPhos  56  10-20    LIVER FUNCTIONS - ( 20 Oct 2022 18:17 )  Alb: 3.8 g/dL / Pro: 5.7 g/dL / ALK PHOS: 56 U/L / ALT: 8 U/L / AST: 10 U/L / GGT: x                     PT/INR - ( 21 Oct 2022 09:46 )   PT: 30.20 sec;   INR: 2.57 ratio        Culture - Blood (collected 19 Oct 2022 11:26)  Source: .Blood Blood-Peripheral  Preliminary Report (20 Oct 2022 23:01):    No growth to date.      RADIOLOGY & ADDITIONAL TESTS:  < from: NM Hepatobiliary Imaging (10.19.22 @ 16:35) >  REASON: Abdominal pain  COMPARISON CT abdomen pelvis 10/18/2022  Following the intravenous administration of 4.2 mCi 99m-Tc mebrofenin,   anterior images over the abdomen were acquired at 2, 5, 10, 15, 30, 45,   60 minutes,  2 hours, and 4 hours post injection Oblique and right   lateral views were obtained at 1,2, and 4 hours post-injection. A 14 cm   length size standardization marker was used.    These images reveal no definite visualization of the gallbladder through   4 hours post-injection, consistent with cholecystitis, and clinical   correlation is suggested. Biliary tree is visualized at 10 minutes, and   there is visualization of intestinal activity by 15 minutes post   injection.      IMPRESSION:  NO DEFINITE VISUALIZATION OF THE GALLBLADDER THROUGH 4 HOURS   POST-INJECTION, CONSISTENT WITH CHOLECYSTITIS, AS DESCRIBED ABOVE.    CLINICAL CORRELATION IS SUGGESTED.        Dr. Pinky Dai discussed preliminary findings with FABRIZIO JUELS on   10/19/2022 5:04 PM with readback.      < end of copied text >  < from: US Abdomen Upper Quadrant Right (10.19.22 @ 02:03) >  FINDINGS:    Liver: Partially visualized liver within normal limits.  Bile ducts: Normal caliber. Common bile duct measures 3 mm.  Gallbladder: Mild gallbladder wall thickening. Patient's known   cholelithiasis was difficult to visualize on the current exam. Negative   reported sonographic Srinivasan's sign.  Pancreas: Poorly visualized.  Right kidney: 11.6 cm. No hydronephrosis. Echogenic foci up to 1.2 cm,   likely representing calculi.  Ascites: None.  IVC: Not visualized      IMPRESSION:    Right renal calculi measuring up to 1.2 cm. No hydronephrosis.    Distended gallbladder with nonspecific mild gallbladder wall thickening.   Patient has known cholelithiasis as demonstrated on same day CT. Negative   reported sonographic Srinivasan's sign. Findings are nonspecific/equivocal.   If warranted, further evaluation with HIDA scan can be obtained.    --- End of Report ---          < end of copied text >   Chart reviewed, patient examined. Pertinent results reviewed.  Case discussed with HO; specialist f/u reviewed  HD#5 (ED 10/18)  MERARY GARCIA    HPI:  Patient is a 72-yo male with pmhx of htn, hld, DM2, MI s/p CABG, DVT/PE on Xarelto, recurrent kidney stones who presented with a chief complaint of abdominal pain and high blood pressure x 1 day. The pain started yesterday afternoon as a "horrible discomfort". It is hypogastric in anture, diffuse, encircling to patient's low back. It is constant, was a 10/10 prior to pain meds (now 7/10), no exacerbating factors. Patient states he takes his bp daily and usually runs around 155/65, but found his systolic in the 200s so decided to come to hospital. Patient denies other systemic sxs, including fever, n/v/d, cp, sob, chills, dysuria, hematuria, hematochezia, joint pains or recent sick contacts. Patient says the pain is similar to what he experienced with his kidney stones but that the pain was located flanks during those episodes.  ED Course:  Vitals- bp 229/95, hr 68, rr 20, t 35.6, 95% on RA  Labs- wbc wnl, Hb 13.9, K 5.1 (hemolyzed), glucose 241, TBili 1.8  EKG- Normal sinus rhythm, Left ventricular hypertrophy with QRS widening and repolarization abnormality ( R in aVL , [place] product , Romhilt-Bowen ), Inferior infarct , age undetermined  BP had 20 point difference between arms and there was a concern for dissection. A CT angio obtained was negative for dissection, but did show gallstones and enlarged gallbladder with a stone at the neck. An US of RUQ was ordered to further delineate if pain is due to biliary pathology. US shows a right renal calculi measuring 1.2 cm and gallbladder edema. Pt states he has never been told he has gallstones before.    Patient has received total of labetalol 20 IVP/300 PO, nifedipine 30 PO, and morphine 10mg IVP.    He is being admitted for further management. W/U has revealed + Acute Cholecystitis.   (19 Oct 2022 08:09)    INTERVAL EVENTS: Patient seen today without distress, brother at bedside.     MEDICATIONS  (STANDING):  dextrose 5%. 1000 milliLiter(s) (50 mL/Hr) IV Continuous <Continuous>  dextrose 5%. 1000 milliLiter(s) (100 mL/Hr) IV Continuous <Continuous>  dextrose 50% Injectable 25 Gram(s) IV Push once  dextrose 50% Injectable 12.5 Gram(s) IV Push once  dextrose 50% Injectable 25 Gram(s) IV Push once  enoxaparin Injectable 40 milliGRAM(s) SubCutaneous every 24 hours  fat emulsion (Fish Oil and Plant Based) 20% Infusion 1.0753 Gm/kG/Day (31.3 mL/Hr) IV Continuous <Continuous>  furosemide    Tablet 20 milliGRAM(s) Oral daily  glucagon  Injectable 1 milliGRAM(s) IntraMuscular once  insulin lispro (ADMELOG) corrective regimen sliding scale   SubCutaneous three times a day before meals  labetalol 200 milliGRAM(s) Oral two times a day  losartan 100 milliGRAM(s) Oral daily  Parenteral Nutrition - Adult 1 Each (65 mL/Hr) TPN Continuous <Continuous>  piperacillin/tazobactam IVPB.. 3.375 Gram(s) IV Intermittent every 8 hours  simvastatin 40 milliGRAM(s) Oral at bedtime  tamsulosin 0.4 milliGRAM(s) Oral at bedtime    MEDICATIONS  (PRN):  dextrose Oral Gel 15 Gram(s) Oral once PRN Blood Glucose LESS THAN 70 milliGRAM(s)/deciliter  morphine  - Injectable 2 milliGRAM(s) IV Push every 4 hours PRN Moderate Pain (4 - 6)  ondansetron    Tablet 8 milliGRAM(s) Oral every 8 hours PRN Nausea and/or Vomiting    Vital Signs Last 24 Hrs  T(C): 36.1 (23 Oct 2022 05:39), Max: 36.4 (22 Oct 2022 19:00)  T(F): 97 (23 Oct 2022 05:39), Max: 97.6 (22 Oct 2022 19:00)  HR: 76 (23 Oct 2022 05:39) (76 - 94)  BP: 156/82 (23 Oct 2022 05:39) (132/65 - 156/82)  BP(mean): 108 (23 Oct 2022 05:39) (108 - 108)  RR: 18 (23 Oct 2022 05:39) (18 - 18)  SpO2: --    PHYSICAL EXAM:  GENERAL:  NAD; weak older male  NECK: Supple, No JVD  CHEST/LUNG: Clear  HEART: RRR, no MRG  ABDOMEN: Soft, Nondistended; Bowel sounds present; min TT Deep palpation in -RUQ; no R,G or srinivasan's   EXTREMITIES: No edema; L calf + TTP, no cord or erythema  SKIN: reddened face, peeling skin to the bridge of the nose    LABS:                           12.4   11.32 )-----------( 166      ( 23 Oct 2022 08:34 )             38.4                         12.5   13.17 )-----------( 143      ( 22 Oct 2022 05:50 )             39.0                      13.1   15.80 )-----------( 153      ( 21 Oct 2022 09:46 )             39.4     10-22    139  |  100  |  17  ----------------------------<  251<H>  3.8   |  26  |  0.8    Ca    8.0<L>      22 Oct 2022 05:50  Phos  1.9     10-22  Mg     2.1     10-22    TPro  5.4<L>  /  Alb  3.2<L>  /  TBili  2.6<H>  /  DBili  x   /  AST  11  /  ALT  7   /  AlkPhos  66  10-22              10-20    136  |  95<L>  |  13  ----------------------------<  215<H>  3.8   |  27  |  0.7    Ca    8.3<L>      20 Oct 2022 08:28  Mg     1.7     10-20    TPro  5.7<L>  /  Alb  3.8  /  TBili  3.6<H>  /  DBili  0.6<H>  /  AST  10  /  ALT  8   /  AlkPhos  56  10-20    LIVER FUNCTIONS - ( 20 Oct 2022 18:17 )  Alb: 3.8 g/dL / Pro: 5.7 g/dL / ALK PHOS: 56 U/L / ALT: 8 U/L / AST: 10 U/L / GGT: x                     PT/INR - ( 21 Oct 2022 09:46 )   PT: 30.20 sec;   INR: 2.57 ratio        Culture - Blood (collected 19 Oct 2022 11:26)  Source: .Blood Blood-Peripheral  Preliminary Report (20 Oct 2022 23:01):    No growth to date.      RADIOLOGY & ADDITIONAL TESTS:  < from: NM Hepatobiliary Imaging (10.19.22 @ 16:35) >  REASON: Abdominal pain  COMPARISON CT abdomen pelvis 10/18/2022  Following the intravenous administration of 4.2 mCi 99m-Tc mebrofenin,   anterior images over the abdomen were acquired at 2, 5, 10, 15, 30, 45,   60 minutes,  2 hours, and 4 hours post injection Oblique and right   lateral views were obtained at 1,2, and 4 hours post-injection. A 14 cm   length size standardization marker was used.    These images reveal no definite visualization of the gallbladder through   4 hours post-injection, consistent with cholecystitis, and clinical   correlation is suggested. Biliary tree is visualized at 10 minutes, and   there is visualization of intestinal activity by 15 minutes post   injection.      IMPRESSION:  NO DEFINITE VISUALIZATION OF THE GALLBLADDER THROUGH 4 HOURS   POST-INJECTION, CONSISTENT WITH CHOLECYSTITIS, AS DESCRIBED ABOVE.    CLINICAL CORRELATION IS SUGGESTED.        Dr. Pinky Dai discussed preliminary findings with FABRIZIO JULES on   10/19/2022 5:04 PM with readback.      < end of copied text >  < from: US Abdomen Upper Quadrant Right (10.19.22 @ 02:03) >  FINDINGS:    Liver: Partially visualized liver within normal limits.  Bile ducts: Normal caliber. Common bile duct measures 3 mm.  Gallbladder: Mild gallbladder wall thickening. Patient's known   cholelithiasis was difficult to visualize on the current exam. Negative   reported sonographic Srinivasan's sign.  Pancreas: Poorly visualized.  Right kidney: 11.6 cm. No hydronephrosis. Echogenic foci up to 1.2 cm,   likely representing calculi.  Ascites: None.  IVC: Not visualized      IMPRESSION:    Right renal calculi measuring up to 1.2 cm. No hydronephrosis.    Distended gallbladder with nonspecific mild gallbladder wall thickening.   Patient has known cholelithiasis as demonstrated on same day CT. Negative   reported sonographic Srinivasan's sign. Findings are nonspecific/equivocal.   If warranted, further evaluation with HIDA scan can be obtained.    --- End of Report ---          < end of copied text >   details…

## 2022-10-23 NOTE — PROGRESS NOTE ADULT - SUBJECTIVE AND OBJECTIVE BOX
Chart reviewed, patient examined. Pertinent results reviewed.  Case discussed with HO; specialist f/u reviewed  HD#5 (ED 10/18)  MERARY GARCIA    HPI:  Patient is a 72-yo male with pmhx of htn, hld, DM2, MI s/p CABG, DVT/PE on Xarelto, recurrent kidney stones who presented with a chief complaint of abdominal pain and high blood pressure x 1 day. The pain started yesterday afternoon as a "horrible discomfort". It is hypogastric in anture, diffuse, encircling to patient's low back. It is constant, was a 10/10 prior to pain meds (now 7/10), no exacerbating factors. Patient states he takes his bp daily and usually runs around 155/65, but found his systolic in the 200s so decided to come to hospital. Patient denies other systemic sxs, including fever, n/v/d, cp, sob, chills, dysuria, hematuria, hematochezia, joint pains or recent sick contacts. Patient says the pain is similar to what he experienced with his kidney stones but that the pain was located flanks during those episodes.  ED Course:  Vitals- bp 229/95, hr 68, rr 20, t 35.6, 95% on RA  Labs- wbc wnl, Hb 13.9, K 5.1 (hemolyzed), glucose 241, TBili 1.8  EKG- Normal sinus rhythm, Left ventricular hypertrophy with QRS widening and repolarization abnormality ( R in aVL , [place] product , Romhilt-Bowen ), Inferior infarct , age undetermined  BP had 20 point difference between arms and there was a concern for dissection. A CT angio obtained was negative for dissection, but did show gallstones and enlarged gallbladder with a stone at the neck. An US of RUQ was ordered to further delineate if pain is due to biliary pathology. US shows a right renal calculi measuring 1.2 cm and gallbladder edema. Pt states he has never been told he has gallstones before.    Patient has received total of labetalol 20 IVP/300 PO, nifedipine 30 PO, and morphine 10mg IVP.    He is being admitted for further management. W/U has revealed + Acute Cholecystitis.   (19 Oct 2022 08:09)    INTERVAL EVENTS: Patient seen today without distress, brother at bedside.     MEDICATIONS  (STANDING):  dextrose 5%. 1000 milliLiter(s) (50 mL/Hr) IV Continuous <Continuous>  dextrose 5%. 1000 milliLiter(s) (100 mL/Hr) IV Continuous <Continuous>  dextrose 50% Injectable 25 Gram(s) IV Push once  dextrose 50% Injectable 12.5 Gram(s) IV Push once  dextrose 50% Injectable 25 Gram(s) IV Push once  enoxaparin Injectable 40 milliGRAM(s) SubCutaneous every 24 hours  fat emulsion (Fish Oil and Plant Based) 20% Infusion 1.0753 Gm/kG/Day (31.3 mL/Hr) IV Continuous <Continuous>  furosemide    Tablet 20 milliGRAM(s) Oral daily  glucagon  Injectable 1 milliGRAM(s) IntraMuscular once  insulin lispro (ADMELOG) corrective regimen sliding scale   SubCutaneous three times a day before meals  labetalol 200 milliGRAM(s) Oral two times a day  losartan 100 milliGRAM(s) Oral daily  Parenteral Nutrition - Adult 1 Each (65 mL/Hr) TPN Continuous <Continuous>  piperacillin/tazobactam IVPB.. 3.375 Gram(s) IV Intermittent every 8 hours  simvastatin 40 milliGRAM(s) Oral at bedtime  tamsulosin 0.4 milliGRAM(s) Oral at bedtime    MEDICATIONS  (PRN):  dextrose Oral Gel 15 Gram(s) Oral once PRN Blood Glucose LESS THAN 70 milliGRAM(s)/deciliter  morphine  - Injectable 2 milliGRAM(s) IV Push every 4 hours PRN Moderate Pain (4 - 6)  ondansetron    Tablet 8 milliGRAM(s) Oral every 8 hours PRN Nausea and/or Vomiting    Vital Signs Last 24 Hrs  T(C): 36.1 (23 Oct 2022 05:39), Max: 36.4 (22 Oct 2022 19:00)  T(F): 97 (23 Oct 2022 05:39), Max: 97.6 (22 Oct 2022 19:00)  HR: 76 (23 Oct 2022 05:39) (76 - 94)  BP: 156/82 (23 Oct 2022 05:39) (132/65 - 156/82)  BP(mean): 108 (23 Oct 2022 05:39) (108 - 108)  RR: 18 (23 Oct 2022 05:39) (18 - 18)  SpO2: --    PHYSICAL EXAM:  GENERAL:  NAD; weak older male  NECK: Supple, No JVD  CHEST/LUNG: Clear  HEART: RRR, no MRG  ABDOMEN: Soft, Nondistended; Bowel sounds present; min TTP in epigastrium-RUQ  EXTREMITIES: No edema; L calf + TTP, no cord or erythema  SKIN: reddened face, peeling skin to the bridge of the nose    LABS:                           12.5   13.17 )-----------( 143      ( 22 Oct 2022 05:50 )             39.0                      13.1   15.80 )-----------( 153      ( 21 Oct 2022 09:46 )             39.4     10-22    139  |  100  |  17  ----------------------------<  251<H>  3.8   |  26  |  0.8    Ca    8.0<L>      22 Oct 2022 05:50  Phos  1.9     10-22  Mg     2.1     10-22    TPro  5.4<L>  /  Alb  3.2<L>  /  TBili  2.6<H>  /  DBili  x   /  AST  11  /  ALT  7   /  AlkPhos  66  10-22              10-20    136  |  95<L>  |  13  ----------------------------<  215<H>  3.8   |  27  |  0.7    Ca    8.3<L>      20 Oct 2022 08:28  Mg     1.7     10-20    TPro  5.7<L>  /  Alb  3.8  /  TBili  3.6<H>  /  DBili  0.6<H>  /  AST  10  /  ALT  8   /  AlkPhos  56  10-20    LIVER FUNCTIONS - ( 20 Oct 2022 18:17 )  Alb: 3.8 g/dL / Pro: 5.7 g/dL / ALK PHOS: 56 U/L / ALT: 8 U/L / AST: 10 U/L / GGT: x                     PT/INR - ( 21 Oct 2022 09:46 )   PT: 30.20 sec;   INR: 2.57 ratio        Culture - Blood (collected 19 Oct 2022 11:26)  Source: .Blood Blood-Peripheral  Preliminary Report (20 Oct 2022 23:01):    No growth to date.      RADIOLOGY & ADDITIONAL TESTS:  < from: NM Hepatobiliary Imaging (10.19.22 @ 16:35) >  REASON: Abdominal pain  COMPARISON CT abdomen pelvis 10/18/2022  Following the intravenous administration of 4.2 mCi 99m-Tc mebrofenin,   anterior images over the abdomen were acquired at 2, 5, 10, 15, 30, 45,   60 minutes,  2 hours, and 4 hours post injection Oblique and right   lateral views were obtained at 1,2, and 4 hours post-injection. A 14 cm   length size standardization marker was used.    These images reveal no definite visualization of the gallbladder through   4 hours post-injection, consistent with cholecystitis, and clinical   correlation is suggested. Biliary tree is visualized at 10 minutes, and   there is visualization of intestinal activity by 15 minutes post   injection.      IMPRESSION:  NO DEFINITE VISUALIZATION OF THE GALLBLADDER THROUGH 4 HOURS   POST-INJECTION, CONSISTENT WITH CHOLECYSTITIS, AS DESCRIBED ABOVE.    CLINICAL CORRELATION IS SUGGESTED.        Dr. Pinky Dai discussed preliminary findings with FABRIZIO JULES on   10/19/2022 5:04 PM with readback.      < end of copied text >  < from: US Abdomen Upper Quadrant Right (10.19.22 @ 02:03) >  FINDINGS:    Liver: Partially visualized liver within normal limits.  Bile ducts: Normal caliber. Common bile duct measures 3 mm.  Gallbladder: Mild gallbladder wall thickening. Patient's known   cholelithiasis was difficult to visualize on the current exam. Negative   reported sonographic Srinivasan's sign.  Pancreas: Poorly visualized.  Right kidney: 11.6 cm. No hydronephrosis. Echogenic foci up to 1.2 cm,   likely representing calculi.  Ascites: None.  IVC: Not visualized      IMPRESSION:    Right renal calculi measuring up to 1.2 cm. No hydronephrosis.    Distended gallbladder with nonspecific mild gallbladder wall thickening.   Patient has known cholelithiasis as demonstrated on same day CT. Negative   reported sonographic Srinivasan's sign. Findings are nonspecific/equivocal.   If warranted, further evaluation with HIDA scan can be obtained.    --- End of Report ---          < end of copied text >   Chart reviewed, patient examined. Pertinent results reviewed.  SEE PRIOR COMPLETED NOTE  Case discussed with HO; specialist f/u reviewed  HD#5 (ED 10/18)  MERARY GARCIA    HPI:  Patient is a 72-yo male with pmhx of htn, hld, DM2, MI s/p CABG, DVT/PE on Xarelto, recurrent kidney stones who presented with a chief complaint of abdominal pain and high blood pressure x 1 day. The pain started yesterday afternoon as a "horrible discomfort". It is hypogastric in anture, diffuse, encircling to patient's low back. It is constant, was a 10/10 prior to pain meds (now 7/10), no exacerbating factors. Patient states he takes his bp daily and usually runs around 155/65, but found his systolic in the 200s so decided to come to hospital. Patient denies other systemic sxs, including fever, n/v/d, cp, sob, chills, dysuria, hematuria, hematochezia, joint pains or recent sick contacts. Patient says the pain is similar to what he experienced with his kidney stones but that the pain was located flanks during those episodes.  ED Course:  Vitals- bp 229/95, hr 68, rr 20, t 35.6, 95% on RA  Labs- wbc wnl, Hb 13.9, K 5.1 (hemolyzed), glucose 241, TBili 1.8  EKG- Normal sinus rhythm, Left ventricular hypertrophy with QRS widening and repolarization abnormality ( R in aVL , [place] product , Romhilt-Bowen ), Inferior infarct , age undetermined  BP had 20 point difference between arms and there was a concern for dissection. A CT angio obtained was negative for dissection, but did show gallstones and enlarged gallbladder with a stone at the neck. An US of RUQ was ordered to further delineate if pain is due to biliary pathology. US shows a right renal calculi measuring 1.2 cm and gallbladder edema. Pt states he has never been told he has gallstones before.    Patient has received total of labetalol 20 IVP/300 PO, nifedipine 30 PO, and morphine 10mg IVP.    He is being admitted for further management. W/U has revealed + Acute Cholecystitis.   (19 Oct 2022 08:09)    INTERVAL EVENTS: Patient seen today without distress, brother at bedside.     MEDICATIONS  (STANDING):  dextrose 5%. 1000 milliLiter(s) (50 mL/Hr) IV Continuous <Continuous>  dextrose 5%. 1000 milliLiter(s) (100 mL/Hr) IV Continuous <Continuous>  dextrose 50% Injectable 25 Gram(s) IV Push once  dextrose 50% Injectable 12.5 Gram(s) IV Push once  dextrose 50% Injectable 25 Gram(s) IV Push once  enoxaparin Injectable 40 milliGRAM(s) SubCutaneous every 24 hours  fat emulsion (Fish Oil and Plant Based) 20% Infusion 1.0753 Gm/kG/Day (31.3 mL/Hr) IV Continuous <Continuous>  furosemide    Tablet 20 milliGRAM(s) Oral daily  glucagon  Injectable 1 milliGRAM(s) IntraMuscular once  insulin lispro (ADMELOG) corrective regimen sliding scale   SubCutaneous three times a day before meals  labetalol 200 milliGRAM(s) Oral two times a day  losartan 100 milliGRAM(s) Oral daily  Parenteral Nutrition - Adult 1 Each (65 mL/Hr) TPN Continuous <Continuous>  piperacillin/tazobactam IVPB.. 3.375 Gram(s) IV Intermittent every 8 hours  simvastatin 40 milliGRAM(s) Oral at bedtime  tamsulosin 0.4 milliGRAM(s) Oral at bedtime    MEDICATIONS  (PRN):  dextrose Oral Gel 15 Gram(s) Oral once PRN Blood Glucose LESS THAN 70 milliGRAM(s)/deciliter  morphine  - Injectable 2 milliGRAM(s) IV Push every 4 hours PRN Moderate Pain (4 - 6)  ondansetron    Tablet 8 milliGRAM(s) Oral every 8 hours PRN Nausea and/or Vomiting    Vital Signs Last 24 Hrs  T(C): 36.1 (23 Oct 2022 05:39), Max: 36.4 (22 Oct 2022 19:00)  T(F): 97 (23 Oct 2022 05:39), Max: 97.6 (22 Oct 2022 19:00)  HR: 76 (23 Oct 2022 05:39) (76 - 94)  BP: 156/82 (23 Oct 2022 05:39) (132/65 - 156/82)  BP(mean): 108 (23 Oct 2022 05:39) (108 - 108)  RR: 18 (23 Oct 2022 05:39) (18 - 18)  SpO2: --    PHYSICAL EXAM:  GENERAL:  NAD; weak older male  NECK: Supple, No JVD  CHEST/LUNG: Clear  HEART: RRR, no MRG  ABDOMEN: Soft, Nondistended; Bowel sounds present; min TTP in epigastrium-RUQ  EXTREMITIES: No edema; L calf + TTP, no cord or erythema  SKIN: reddened face, peeling skin to the bridge of the nose    LABS:                           12.5   13.17 )-----------( 143      ( 22 Oct 2022 05:50 )             39.0                      13.1   15.80 )-----------( 153      ( 21 Oct 2022 09:46 )             39.4     10-22    139  |  100  |  17  ----------------------------<  251<H>  3.8   |  26  |  0.8    Ca    8.0<L>      22 Oct 2022 05:50  Phos  1.9     10-22  Mg     2.1     10-22    TPro  5.4<L>  /  Alb  3.2<L>  /  TBili  2.6<H>  /  DBili  x   /  AST  11  /  ALT  7   /  AlkPhos  66  10-22              10-20    136  |  95<L>  |  13  ----------------------------<  215<H>  3.8   |  27  |  0.7    Ca    8.3<L>      20 Oct 2022 08:28  Mg     1.7     10-20    TPro  5.7<L>  /  Alb  3.8  /  TBili  3.6<H>  /  DBili  0.6<H>  /  AST  10  /  ALT  8   /  AlkPhos  56  10-20    LIVER FUNCTIONS - ( 20 Oct 2022 18:17 )  Alb: 3.8 g/dL / Pro: 5.7 g/dL / ALK PHOS: 56 U/L / ALT: 8 U/L / AST: 10 U/L / GGT: x                     PT/INR - ( 21 Oct 2022 09:46 )   PT: 30.20 sec;   INR: 2.57 ratio        Culture - Blood (collected 19 Oct 2022 11:26)  Source: .Blood Blood-Peripheral  Preliminary Report (20 Oct 2022 23:01):    No growth to date.      RADIOLOGY & ADDITIONAL TESTS:  < from: NM Hepatobiliary Imaging (10.19.22 @ 16:35) >  REASON: Abdominal pain  COMPARISON CT abdomen pelvis 10/18/2022  Following the intravenous administration of 4.2 mCi 99m-Tc mebrofenin,   anterior images over the abdomen were acquired at 2, 5, 10, 15, 30, 45,   60 minutes,  2 hours, and 4 hours post injection Oblique and right   lateral views were obtained at 1,2, and 4 hours post-injection. A 14 cm   length size standardization marker was used.    These images reveal no definite visualization of the gallbladder through   4 hours post-injection, consistent with cholecystitis, and clinical   correlation is suggested. Biliary tree is visualized at 10 minutes, and   there is visualization of intestinal activity by 15 minutes post   injection.      IMPRESSION:  NO DEFINITE VISUALIZATION OF THE GALLBLADDER THROUGH 4 HOURS   POST-INJECTION, CONSISTENT WITH CHOLECYSTITIS, AS DESCRIBED ABOVE.    CLINICAL CORRELATION IS SUGGESTED.        Dr. Pinky Dai discussed preliminary findings with FABRIZIO JULES on   10/19/2022 5:04 PM with readback.      < end of copied text >  < from: US Abdomen Upper Quadrant Right (10.19.22 @ 02:03) >  FINDINGS:    Liver: Partially visualized liver within normal limits.  Bile ducts: Normal caliber. Common bile duct measures 3 mm.  Gallbladder: Mild gallbladder wall thickening. Patient's known   cholelithiasis was difficult to visualize on the current exam. Negative   reported sonographic Srinivasan's sign.  Pancreas: Poorly visualized.  Right kidney: 11.6 cm. No hydronephrosis. Echogenic foci up to 1.2 cm,   likely representing calculi.  Ascites: None.  IVC: Not visualized      IMPRESSION:    Right renal calculi measuring up to 1.2 cm. No hydronephrosis.    Distended gallbladder with nonspecific mild gallbladder wall thickening.   Patient has known cholelithiasis as demonstrated on same day CT. Negative   reported sonographic Srinivasan's sign. Findings are nonspecific/equivocal.   If warranted, further evaluation with HIDA scan can be obtained.    --- End of Report ---          < end of copied text >

## 2022-10-24 LAB
ALBUMIN SERPL ELPH-MCNC: 3 G/DL — LOW (ref 3.5–5.2)
ALBUMIN SERPL ELPH-MCNC: 3.1 G/DL — LOW (ref 3.5–5.2)
ALP SERPL-CCNC: 60 U/L — SIGNIFICANT CHANGE UP (ref 30–115)
ALP SERPL-CCNC: 64 U/L — SIGNIFICANT CHANGE UP (ref 30–115)
ALT FLD-CCNC: 11 U/L — SIGNIFICANT CHANGE UP (ref 0–41)
ALT FLD-CCNC: 12 U/L — SIGNIFICANT CHANGE UP (ref 0–41)
ANION GAP SERPL CALC-SCNC: 12 MMOL/L — SIGNIFICANT CHANGE UP (ref 7–14)
ANION GAP SERPL CALC-SCNC: 9 MMOL/L — SIGNIFICANT CHANGE UP (ref 7–14)
APTT BLD: 26.3 SEC — LOW (ref 27–39.2)
AST SERPL-CCNC: 11 U/L — SIGNIFICANT CHANGE UP (ref 0–41)
AST SERPL-CCNC: 13 U/L — SIGNIFICANT CHANGE UP (ref 0–41)
BASOPHILS # BLD AUTO: 0.03 K/UL — SIGNIFICANT CHANGE UP (ref 0–0.2)
BASOPHILS # BLD AUTO: 0.04 K/UL — SIGNIFICANT CHANGE UP (ref 0–0.2)
BASOPHILS NFR BLD AUTO: 0.3 % — SIGNIFICANT CHANGE UP (ref 0–1)
BASOPHILS NFR BLD AUTO: 0.5 % — SIGNIFICANT CHANGE UP (ref 0–1)
BILIRUB SERPL-MCNC: 1 MG/DL — SIGNIFICANT CHANGE UP (ref 0.2–1.2)
BILIRUB SERPL-MCNC: 1 MG/DL — SIGNIFICANT CHANGE UP (ref 0.2–1.2)
BLD GP AB SCN SERPL QL: SIGNIFICANT CHANGE UP
BUN SERPL-MCNC: 15 MG/DL — SIGNIFICANT CHANGE UP (ref 10–20)
BUN SERPL-MCNC: 17 MG/DL — SIGNIFICANT CHANGE UP (ref 10–20)
CALCIUM SERPL-MCNC: 7.9 MG/DL — LOW (ref 8.4–10.5)
CALCIUM SERPL-MCNC: 8.1 MG/DL — LOW (ref 8.4–10.5)
CHLORIDE SERPL-SCNC: 101 MMOL/L — SIGNIFICANT CHANGE UP (ref 98–110)
CHLORIDE SERPL-SCNC: 102 MMOL/L — SIGNIFICANT CHANGE UP (ref 98–110)
CO2 SERPL-SCNC: 26 MMOL/L — SIGNIFICANT CHANGE UP (ref 17–32)
CO2 SERPL-SCNC: 29 MMOL/L — SIGNIFICANT CHANGE UP (ref 17–32)
CREAT SERPL-MCNC: 0.6 MG/DL — LOW (ref 0.7–1.5)
CREAT SERPL-MCNC: 0.6 MG/DL — LOW (ref 0.7–1.5)
CULTURE RESULTS: SIGNIFICANT CHANGE UP
EGFR: 104 ML/MIN/1.73M2 — SIGNIFICANT CHANGE UP
EGFR: 104 ML/MIN/1.73M2 — SIGNIFICANT CHANGE UP
EOSINOPHIL # BLD AUTO: 0.45 K/UL — SIGNIFICANT CHANGE UP (ref 0–0.7)
EOSINOPHIL # BLD AUTO: 0.46 K/UL — SIGNIFICANT CHANGE UP (ref 0–0.7)
EOSINOPHIL NFR BLD AUTO: 5.2 % — SIGNIFICANT CHANGE UP (ref 0–8)
EOSINOPHIL NFR BLD AUTO: 5.5 % — SIGNIFICANT CHANGE UP (ref 0–8)
GLUCOSE BLDC GLUCOMTR-MCNC: 223 MG/DL — HIGH (ref 70–99)
GLUCOSE BLDC GLUCOMTR-MCNC: 259 MG/DL — HIGH (ref 70–99)
GLUCOSE BLDC GLUCOMTR-MCNC: 259 MG/DL — HIGH (ref 70–99)
GLUCOSE BLDC GLUCOMTR-MCNC: 276 MG/DL — HIGH (ref 70–99)
GLUCOSE SERPL-MCNC: 248 MG/DL — HIGH (ref 70–99)
GLUCOSE SERPL-MCNC: 290 MG/DL — HIGH (ref 70–99)
HCT VFR BLD CALC: 36.4 % — LOW (ref 42–52)
HCT VFR BLD CALC: 36.7 % — LOW (ref 42–52)
HGB BLD-MCNC: 11.5 G/DL — LOW (ref 14–18)
HGB BLD-MCNC: 11.7 G/DL — LOW (ref 14–18)
IMM GRANULOCYTES NFR BLD AUTO: 0.7 % — HIGH (ref 0.1–0.3)
IMM GRANULOCYTES NFR BLD AUTO: 1.2 % — HIGH (ref 0.1–0.3)
INR BLD: 1.83 RATIO — HIGH (ref 0.65–1.3)
LDH PNL SERPL: 177 IU/L — SIGNIFICANT CHANGE UP (ref 121–224)
LDH1 SERPL-CCNC: 19 % — SIGNIFICANT CHANGE UP (ref 17–32)
LDH3 SERPL-CCNC: 20 % — SIGNIFICANT CHANGE UP (ref 17–27)
LDH3 SERPL-CCNC: 24 % — LOW (ref 25–40)
LDH4 SERPL-CCNC: 12 % — SIGNIFICANT CHANGE UP (ref 5–13)
LDH5 SERPL-CCNC: 25 % — HIGH (ref 4–20)
LYMPHOCYTES # BLD AUTO: 0.83 K/UL — LOW (ref 1.2–3.4)
LYMPHOCYTES # BLD AUTO: 0.99 K/UL — LOW (ref 1.2–3.4)
LYMPHOCYTES # BLD AUTO: 12.1 % — LOW (ref 20.5–51.1)
LYMPHOCYTES # BLD AUTO: 9.5 % — LOW (ref 20.5–51.1)
MAGNESIUM SERPL-MCNC: 1.6 MG/DL — LOW (ref 1.8–2.4)
MAGNESIUM SERPL-MCNC: 1.6 MG/DL — LOW (ref 1.8–2.4)
MCHC RBC-ENTMCNC: 30 PG — SIGNIFICANT CHANGE UP (ref 27–31)
MCHC RBC-ENTMCNC: 30.1 PG — SIGNIFICANT CHANGE UP (ref 27–31)
MCHC RBC-ENTMCNC: 31.6 G/DL — LOW (ref 32–37)
MCHC RBC-ENTMCNC: 31.9 G/DL — LOW (ref 32–37)
MCV RBC AUTO: 94.1 FL — HIGH (ref 80–94)
MCV RBC AUTO: 95.3 FL — HIGH (ref 80–94)
MONOCYTES # BLD AUTO: 0.66 K/UL — HIGH (ref 0.1–0.6)
MONOCYTES # BLD AUTO: 0.76 K/UL — HIGH (ref 0.1–0.6)
MONOCYTES NFR BLD AUTO: 8.1 % — SIGNIFICANT CHANGE UP (ref 1.7–9.3)
MONOCYTES NFR BLD AUTO: 8.7 % — SIGNIFICANT CHANGE UP (ref 1.7–9.3)
NEUTROPHILS # BLD AUTO: 5.93 K/UL — SIGNIFICANT CHANGE UP (ref 1.4–6.5)
NEUTROPHILS # BLD AUTO: 6.63 K/UL — HIGH (ref 1.4–6.5)
NEUTROPHILS NFR BLD AUTO: 72.6 % — SIGNIFICANT CHANGE UP (ref 42.2–75.2)
NEUTROPHILS NFR BLD AUTO: 75.6 % — HIGH (ref 42.2–75.2)
NRBC # BLD: 0 /100 WBCS — SIGNIFICANT CHANGE UP (ref 0–0)
NRBC # BLD: 0 /100 WBCS — SIGNIFICANT CHANGE UP (ref 0–0)
PHOSPHATE SERPL-MCNC: 2.5 MG/DL — SIGNIFICANT CHANGE UP (ref 2.1–4.9)
PLATELET # BLD AUTO: 164 K/UL — SIGNIFICANT CHANGE UP (ref 130–400)
PLATELET # BLD AUTO: 164 K/UL — SIGNIFICANT CHANGE UP (ref 130–400)
POTASSIUM SERPL-MCNC: 4.4 MMOL/L — SIGNIFICANT CHANGE UP (ref 3.5–5)
POTASSIUM SERPL-MCNC: 4.4 MMOL/L — SIGNIFICANT CHANGE UP (ref 3.5–5)
POTASSIUM SERPL-SCNC: 4.4 MMOL/L — SIGNIFICANT CHANGE UP (ref 3.5–5)
POTASSIUM SERPL-SCNC: 4.4 MMOL/L — SIGNIFICANT CHANGE UP (ref 3.5–5)
PROT SERPL-MCNC: 5 G/DL — LOW (ref 6–8)
PROT SERPL-MCNC: 5.2 G/DL — LOW (ref 6–8)
PROTHROM AB SERPL-ACNC: 21.3 SEC — HIGH (ref 9.95–12.87)
RBC # BLD: 3.82 M/UL — LOW (ref 4.7–6.1)
RBC # BLD: 3.9 M/UL — LOW (ref 4.7–6.1)
RBC # FLD: 13.3 % — SIGNIFICANT CHANGE UP (ref 11.5–14.5)
RBC # FLD: 13.7 % — SIGNIFICANT CHANGE UP (ref 11.5–14.5)
SODIUM SERPL-SCNC: 139 MMOL/L — SIGNIFICANT CHANGE UP (ref 135–146)
SODIUM SERPL-SCNC: 140 MMOL/L — SIGNIFICANT CHANGE UP (ref 135–146)
SPECIMEN SOURCE: SIGNIFICANT CHANGE UP
WBC # BLD: 8.17 K/UL — SIGNIFICANT CHANGE UP (ref 4.8–10.8)
WBC # BLD: 8.77 K/UL — SIGNIFICANT CHANGE UP (ref 4.8–10.8)
WBC # FLD AUTO: 8.17 K/UL — SIGNIFICANT CHANGE UP (ref 4.8–10.8)
WBC # FLD AUTO: 8.77 K/UL — SIGNIFICANT CHANGE UP (ref 4.8–10.8)

## 2022-10-24 PROCEDURE — 71045 X-RAY EXAM CHEST 1 VIEW: CPT | Mod: 26

## 2022-10-24 PROCEDURE — 93010 ELECTROCARDIOGRAM REPORT: CPT

## 2022-10-24 RX ORDER — MAGNESIUM SULFATE 500 MG/ML
2 VIAL (ML) INJECTION ONCE
Refills: 0 | Status: COMPLETED | OUTPATIENT
Start: 2022-10-24 | End: 2022-10-24

## 2022-10-24 RX ORDER — ENOXAPARIN SODIUM 100 MG/ML
40 INJECTION SUBCUTANEOUS EVERY 24 HOURS
Refills: 0 | Status: DISCONTINUED | OUTPATIENT
Start: 2022-10-24 | End: 2022-10-24

## 2022-10-24 RX ORDER — I.V. FAT EMULSION 20 G/100ML
1.08 EMULSION INTRAVENOUS
Qty: 100 | Refills: 0 | Status: DISCONTINUED | OUTPATIENT
Start: 2022-10-24 | End: 2022-10-24

## 2022-10-24 RX ORDER — SODIUM CHLORIDE 9 MG/ML
1000 INJECTION, SOLUTION INTRAVENOUS
Refills: 0 | Status: DISCONTINUED | OUTPATIENT
Start: 2022-10-25 | End: 2022-10-28

## 2022-10-24 RX ORDER — ELECTROLYTE SOLUTION,INJ
1 VIAL (ML) INTRAVENOUS
Refills: 0 | Status: DISCONTINUED | OUTPATIENT
Start: 2022-10-24 | End: 2022-10-24

## 2022-10-24 RX ADMIN — Medication 3: at 17:14

## 2022-10-24 RX ADMIN — Medication 25 GRAM(S): at 13:00

## 2022-10-24 RX ADMIN — PIPERACILLIN AND TAZOBACTAM 25 GRAM(S): 4; .5 INJECTION, POWDER, LYOPHILIZED, FOR SOLUTION INTRAVENOUS at 21:16

## 2022-10-24 RX ADMIN — Medication 3: at 08:35

## 2022-10-24 RX ADMIN — LOSARTAN POTASSIUM 100 MILLIGRAM(S): 100 TABLET, FILM COATED ORAL at 05:51

## 2022-10-24 RX ADMIN — Medication 200 MILLIGRAM(S): at 17:14

## 2022-10-24 RX ADMIN — Medication 200 MILLIGRAM(S): at 05:51

## 2022-10-24 RX ADMIN — TAMSULOSIN HYDROCHLORIDE 0.4 MILLIGRAM(S): 0.4 CAPSULE ORAL at 21:17

## 2022-10-24 RX ADMIN — SIMVASTATIN 40 MILLIGRAM(S): 20 TABLET, FILM COATED ORAL at 21:18

## 2022-10-24 RX ADMIN — I.V. FAT EMULSION 31.3 GM/KG/DAY: 20 EMULSION INTRAVENOUS at 20:40

## 2022-10-24 RX ADMIN — PIPERACILLIN AND TAZOBACTAM 25 GRAM(S): 4; .5 INJECTION, POWDER, LYOPHILIZED, FOR SOLUTION INTRAVENOUS at 05:52

## 2022-10-24 RX ADMIN — Medication 20 MILLIGRAM(S): at 05:51

## 2022-10-24 RX ADMIN — Medication 1 EACH: at 20:40

## 2022-10-24 RX ADMIN — Medication 3: at 12:01

## 2022-10-24 RX ADMIN — PIPERACILLIN AND TAZOBACTAM 25 GRAM(S): 4; .5 INJECTION, POWDER, LYOPHILIZED, FOR SOLUTION INTRAVENOUS at 13:51

## 2022-10-24 NOTE — PROGRESS NOTE ADULT - ASSESSMENT
ASSESSMENT:  71M w/ PMH of HLD, HTN, MI, DM, CAD s/p CABG, previous DVT/PE on Xarelto presents for hypotensionand kidney stones presents to ED with complaints of abdominal pain in the bilateral flanks radiating across the lower abdomen as well as hypertension. Initial vitals noted to have a systolic of 239. A CT showed gallstones and enlarged gallbladder with a stone at the neck.   An US of RUQ was ordered to further delineate if pain is due to biliary pathology. US shows a right renal calculi measuring 1.2 cm and gallbladder edema. On exam, pt complains of right flank pain. Physical exam findings, imaging, and labs as documented above.     Acute Cholecystitis- as per US and HIDA  - on IV Zosyn  - NPO, PPN started  - pain rx prn  - GI, Surgery and IR eval's noted; Plan is for PC drainage tomorrow by IR;      will follow IR's recommendations  - reviewed w Dr Crouch, surgical attending, this AM- he agrees with this plan    HTN Urgency:  - likely exacerbated  by severe abd pain  - on Labetalol 200mg BID, Losartan 100 QD, Lasix 20 QD  - BP has been better controlled since.    hx of DVT/PE  hx of MI/CABG  - Xarelto 20mg QD, on hold  - on Lovenox  BID until procedure- hold tonight and in AM  - hold ASA 81 QD  - Cardio following    DM type 2  - on Metformin 1g BID at home, held during hospital stay  - start on SS   - monitor FS and adjust as needed    hx of Kidney Stones  - non obstructive kidney stone  - hydrate  - monitor    Deconditioning from all  - call to PPT to  evaluate and start RX    SEE PRIOR COMPLETED NOTE  Diet NPO  DVT Prophylaxis  Patient is a full code

## 2022-10-24 NOTE — PROGRESS NOTE ADULT - SUBJECTIVE AND OBJECTIVE BOX
Interventional Radiology Follow-Up Note    HPI:  Patient is a 71-yo male with pmhx of htn, hld, DM2, MI s/p CABG, DVT/PE on Xarelto, recurrent kidney stones who presented with a chief complaint of abdominal pain and high blood pressure x 1 day. The pain started yesterday afternoon as a "horrible discomfort". It is hypogastric in anture, diffuse, encircling to patient's low back. It is constant, was a 10/10 prior to pain meds (now 7/10), no exacerbating factors. Patient states he takes his bp daily and usually runs around 155/65, but found his systolic in the 200s so decided to come to hospital. Patient denies other systemic sxs, including fever, n/v/d, cp, sob, chills, dysuria, hematuria, hematochezia, joint pains or recent sick contacts. Patient says the pain is similar to what he experienced with his kidney stones but that the pain was located flanks during those incidences.     ED Course:  Vitals- bp 229/95, hr 68, rr 20, t 35.6, 95% on RA  Labs- wbc wnl, Hb 13.9, K 5.1 (hemolyzed), glucose 241, TBili 1.8  EKG- Normal sinus rhythm, Left ventricular hypertrophy with QRS widening and repolarization abnormality ( R in aVL , [place] product , Romhilt-Bowen ), Inferior infarct , age undetermined  BP had 20 point difference between arms and there was a concern for dissection. A CT angio obtained was negative for dissection, but did show gallstones and enlarged gallbladder with a stone at the neck. An US of RUQ was ordered to further delineate if pain is due to biliary pathology. US shows a right renal calculi measuring 1.2 cm and gallbladder edema. Pt states he has never been told he has gallstones before.    Patient has received total of labetalol 20 IVP/300 PO, nifedipine 30 PO, and morphine 10mg IVP.    He is being admitted for further management.   (19 Oct 2022 08:09)      Vitals: T(F): 97 (10-24-22 @ 13:38), Max: 98.1 (10-23-22 @ 20:24)  HR: 80 (10-24-22 @ 13:38) (65 - 80)  BP: 129/76 (10-24-22 @ 13:38) (123/70 - 144/65)  RR: 19 (10-24-22 @ 05:00) (17 - 19)  SpO2: --  Wt(kg): --    LABS:                        11.7   8.77  )-----------( 164      ( 24 Oct 2022 08:32 )             36.7     10-24    139  |  101  |  17  ----------------------------<  290<H>  4.4   |  26  |  0.6<L>    Ca    8.1<L>      24 Oct 2022 08:32  Phos  2.5     10-24  Mg     1.6     10-24    TPro  5.2<L>  /  Alb  3.1<L>  /  TBili  1.0  /  DBili  x   /  AST  13  /  ALT  12  /  AlkPhos  64  10-24    PT/INR - ( 23 Oct 2022 08:34 )   PT: 23.10 sec;   INR: 1.98 ratio         PTT - ( 23 Oct 2022 08:34 )  PTT:29.6 sec    Assessment/Plan:     72 y/o M w/ PMH of HLD, HTN, MI, DM, CAD s/p CABG, previous DVT/PE presents to ED with complaints of abdominal pain in the bilateral flanks radiating across the lower abdomen as well as hypertension. Imaging findings suggestive of cholecystitis.    -IR consulted for percutaneous cholecystostomy placement, plan for procedure 10/25/22  -CT and US imaging reviewed, HIDA no visualization of gallbladder  - NPO after midnight on day of procedure  - INR 1.98, please repeat   - Hold Lovenox evening before and morning of procedure date  -Patient on Aspirin, last dose 10/19 AM, please continue to hold Aspirin until after procedure  - COVID negative 10/23  -WBC 8.7, afebrile    Please call Interventional Radiology [phone] with any questions, concerns, or issues regarding above.

## 2022-10-24 NOTE — PHYSICAL THERAPY INITIAL EVALUATION ADULT - SPECIFY REASON(S)
826  am Pt currently has no activity orders,  hold PT until appropriate. Will notify MD using Teams.

## 2022-10-24 NOTE — PROGRESS NOTE ADULT - SUBJECTIVE AND OBJECTIVE BOX
Chart reviewed, patient examined. Pertinent results reviewed.  SEE PRIOR COMPLETED NOTE  Case discussed with HO; specialist f/u reviewed  HD#6 (ED 10/18)  MERARY GARCIA    HPI:  Patient is a 72-yo male with pmhx of htn, hld, DM2, MI s/p CABG, DVT/PE on Xarelto, recurrent kidney stones who presented with a chief complaint of abdominal pain and high blood pressure x 1 day. The pain started yesterday afternoon as a "horrible discomfort". It is hypogastric in anture, diffuse, encircling to patient's low back. It is constant, was a 10/10 prior to pain meds (now 7/10), no exacerbating factors. Patient states he takes his bp daily and usually runs around 155/65, but found his systolic in the 200s so decided to come to hospital. Patient denies other systemic sxs, including fever, n/v/d, cp, sob, chills, dysuria, hematuria, hematochezia, joint pains or recent sick contacts. Patient says the pain is similar to what he experienced with his kidney stones but that the pain was located flanks during those episodes.  ED Course:  Vitals- bp 229/95, hr 68, rr 20, t 35.6, 95% on RA  Labs- wbc wnl, Hb 13.9, K 5.1 (hemolyzed), glucose 241, TBili 1.8  EKG- Normal sinus rhythm, Left ventricular hypertrophy with QRS widening and repolarization abnormality ( R in aVL , [place] product , Romhilt-Bowen ), Inferior infarct , age undetermined  BP had 20 point difference between arms and there was a concern for dissection. A CT angio obtained was negative for dissection, but did show gallstones and enlarged gallbladder with a stone at the neck. An US of RUQ was ordered to further delineate if pain is due to biliary pathology. US shows a right renal calculi measuring 1.2 cm and gallbladder edema. Pt states he has never been told he has gallstones before.    Patient has received total of labetalol 20 IVP/300 PO, nifedipine 30 PO, and morphine 10mg IVP.    He is being admitted for further management. W/U has revealed + Acute Cholecystitis.   (19 Oct 2022 08:09)    INTERVAL EVENTS: Patient seen today without distress, brother at bedside.     MEDICATIONS  (STANDING):  dextrose 5%. 1000 milliLiter(s) (100 mL/Hr) IV Continuous <Continuous>  dextrose 5%. 1000 milliLiter(s) (50 mL/Hr) IV Continuous <Continuous>  dextrose 50% Injectable 25 Gram(s) IV Push once  dextrose 50% Injectable 12.5 Gram(s) IV Push once  dextrose 50% Injectable 25 Gram(s) IV Push once  fat emulsion (Fish Oil and Plant Based) 20% Infusion 1.0753 Gm/kG/Day (31.3 mL/Hr) IV Continuous <Continuous>  furosemide    Tablet 20 milliGRAM(s) Oral daily  glucagon  Injectable 1 milliGRAM(s) IntraMuscular once  insulin lispro (ADMELOG) corrective regimen sliding scale   SubCutaneous three times a day before meals  labetalol 200 milliGRAM(s) Oral two times a day  losartan 100 milliGRAM(s) Oral daily  Parenteral Nutrition - Adult 1 Each (65 mL/Hr) TPN Continuous <Continuous>  piperacillin/tazobactam IVPB.. 3.375 Gram(s) IV Intermittent every 8 hours  simvastatin 40 milliGRAM(s) Oral at bedtime  tamsulosin 0.4 milliGRAM(s) Oral at bedtime    MEDICATIONS  (PRN):  dextrose Oral Gel 15 Gram(s) Oral once PRN Blood Glucose LESS THAN 70 milliGRAM(s)/deciliter  morphine  - Injectable 2 milliGRAM(s) IV Push every 4 hours PRN Moderate Pain (4 - 6)  ondansetron    Tablet 8 milliGRAM(s) Oral every 8 hours PRN Nausea and/or Vomiting    Vital Signs Last 24 Hrs  T(C): 36.7 (24 Oct 2022 05:00), Max: 36.7 (23 Oct 2022 20:24)  T(F): 98 (24 Oct 2022 05:00), Max: 98.1 (23 Oct 2022 20:24)  HR: 65 (24 Oct 2022 05:00) (65 - 77)  BP: 124/70 (24 Oct 2022 05:00) (123/70 - 171/72)  BP(mean): --  RR: 19 (24 Oct 2022 05:00) (17 - 19)  SpO2: --      PHYSICAL EXAM:  GENERAL:  NAD; AAOx4  NECK: Supple, No JVD  CHEST/LUNG: Clear  HEART: RRR, no MRG  ABDOMEN: Soft, Nondistended; Bowel sounds present; NT  EXTREMITIES: No edema; L calf + TTP, no cord or erythema  SKIN: reddened face, peeling skin to the bridge of the nose    LABS:                            12.4   11.32 )-----------( 166      ( 23 Oct 2022 08:34 )             38.4                        12.5   13.17 )-----------( 143      ( 22 Oct 2022 05:50 )             39.0                      13.1   15.80 )-----------( 153      ( 21 Oct 2022 09:46 )             39.4     10-23    147<H>  |  107  |  17  ----------------------------<  273<H>  4.6   |  25  |  0.7    Ca    7.9<L>      23 Oct 2022 08:34  Phos  2.5     10-23  Mg     1.7     10-23    TPro  5.2<L>  /  Alb  2.9<L>  /  TBili  1.6<H>  /  DBili  x   /  AST  17  /  ALT  12  /  AlkPhos  69  10-23    10-22    139  |  100  |  17  ----------------------------<  251<H>  3.8   |  26  |  0.8    Ca    8.0<L>      22 Oct 2022 05:50  Phos  1.9     10-22  Mg     2.1     10-22    TPro  5.4<L>  /  Alb  3.2<L>  /  TBili  2.6<H>  /  DBili  x   /  AST  11  /  ALT  7   /  AlkPhos  66  10-22              10-20    136  |  95<L>  |  13  ----------------------------<  215<H>  3.8   |  27  |  0.7    Ca    8.3<L>      20 Oct 2022 08:28  Mg     1.7     10-20    TPro  5.7<L>  /  Alb  3.8  /  TBili  3.6<H>  /  DBili  0.6<H>  /  AST  10  /  ALT  8   /  AlkPhos  56  10-20    LIVER FUNCTIONS - ( 20 Oct 2022 18:17 )  Alb: 3.8 g/dL / Pro: 5.7 g/dL / ALK PHOS: 56 U/L / ALT: 8 U/L / AST: 10 U/L / GGT: x                     PT/INR - ( 21 Oct 2022 09:46 )   PT: 30.20 sec;   INR: 2.57 ratio        Culture - Blood (collected 19 Oct 2022 11:26)  Source: .Blood Blood-Peripheral  Preliminary Report (20 Oct 2022 23:01):    No growth to date.      RADIOLOGY & ADDITIONAL TESTS:  < from: NM Hepatobiliary Imaging (10.19.22 @ 16:35) >  REASON: Abdominal pain  COMPARISON CT abdomen pelvis 10/18/2022  Following the intravenous administration of 4.2 mCi 99m-Tc mebrofenin,   anterior images over the abdomen were acquired at 2, 5, 10, 15, 30, 45,   60 minutes,  2 hours, and 4 hours post injection Oblique and right   lateral views were obtained at 1,2, and 4 hours post-injection. A 14 cm   length size standardization marker was used.    These images reveal no definite visualization of the gallbladder through   4 hours post-injection, consistent with cholecystitis, and clinical   correlation is suggested. Biliary tree is visualized at 10 minutes, and   there is visualization of intestinal activity by 15 minutes post   injection.      IMPRESSION:  NO DEFINITE VISUALIZATION OF THE GALLBLADDER THROUGH 4 HOURS   POST-INJECTION, CONSISTENT WITH CHOLECYSTITIS, AS DESCRIBED ABOVE.    CLINICAL CORRELATION IS SUGGESTED.        Dr. Pinky Dai discussed preliminary findings with FABRIZIO JLUES on   10/19/2022 5:04 PM with readback.      < end of copied text >  < from: US Abdomen Upper Quadrant Right (10.19.22 @ 02:03) >  FINDINGS:    Liver: Partially visualized liver within normal limits.  Bile ducts: Normal caliber. Common bile duct measures 3 mm.  Gallbladder: Mild gallbladder wall thickening. Patient's known   cholelithiasis was difficult to visualize on the current exam. Negative   reported sonographic Srinivasan's sign.  Pancreas: Poorly visualized.  Right kidney: 11.6 cm. No hydronephrosis. Echogenic foci up to 1.2 cm,   likely representing calculi.  Ascites: None.  IVC: Not visualized      IMPRESSION:    Right renal calculi measuring up to 1.2 cm. No hydronephrosis.    Distended gallbladder with nonspecific mild gallbladder wall thickening.   Patient has known cholelithiasis as demonstrated on same day CT. Negative   reported sonographic Srinivasan's sign. Findings are nonspecific/equivocal.   If warranted, further evaluation with HIDA scan can be obtained.    --- End of Report ---          < end of copied text >

## 2022-10-24 NOTE — PROGRESS NOTE ADULT - SUBJECTIVE AND OBJECTIVE BOX
SUBJ:No chest pain or shortness of breath      MEDICATIONS  (STANDING):  dextrose 5%. 1000 milliLiter(s) (100 mL/Hr) IV Continuous <Continuous>  dextrose 5%. 1000 milliLiter(s) (50 mL/Hr) IV Continuous <Continuous>  dextrose 50% Injectable 25 Gram(s) IV Push once  dextrose 50% Injectable 12.5 Gram(s) IV Push once  dextrose 50% Injectable 25 Gram(s) IV Push once  enoxaparin Injectable 40 milliGRAM(s) SubCutaneous every 24 hours  fat emulsion (Fish Oil and Plant Based) 20% Infusion 1.0753 Gm/kG/Day (31.3 mL/Hr) IV Continuous <Continuous>  fat emulsion (Fish Oil and Plant Based) 20% Infusion 1.0753 Gm/kG/Day (31.3 mL/Hr) IV Continuous <Continuous>  furosemide    Tablet 20 milliGRAM(s) Oral daily  glucagon  Injectable 1 milliGRAM(s) IntraMuscular once  insulin lispro (ADMELOG) corrective regimen sliding scale   SubCutaneous three times a day before meals  labetalol 200 milliGRAM(s) Oral two times a day  losartan 100 milliGRAM(s) Oral daily  Parenteral Nutrition - Adult 1 Each (65 mL/Hr) TPN Continuous <Continuous>  Parenteral Nutrition - Adult 1 Each (65 mL/Hr) TPN Continuous <Continuous>  piperacillin/tazobactam IVPB.. 3.375 Gram(s) IV Intermittent every 8 hours  simvastatin 40 milliGRAM(s) Oral at bedtime  tamsulosin 0.4 milliGRAM(s) Oral at bedtime    MEDICATIONS  (PRN):  dextrose Oral Gel 15 Gram(s) Oral once PRN Blood Glucose LESS THAN 70 milliGRAM(s)/deciliter  morphine  - Injectable 2 milliGRAM(s) IV Push every 4 hours PRN Moderate Pain (4 - 6)  ondansetron    Tablet 8 milliGRAM(s) Oral every 8 hours PRN Nausea and/or Vomiting            Vital Signs Last 24 Hrs  T(C): 36.1 (24 Oct 2022 13:38), Max: 36.7 (23 Oct 2022 20:24)  T(F): 97 (24 Oct 2022 13:38), Max: 98.1 (23 Oct 2022 20:24)  HR: 80 (24 Oct 2022 13:38) (65 - 80)  BP: 129/76 (24 Oct 2022 13:38) (123/70 - 129/76)  BP(mean): --  RR: 19 (24 Oct 2022 05:00) (19 - 19)  SpO2: --          ECG:NML    TTE:    LABS:                        11.7   8.77  )-----------( 164      ( 24 Oct 2022 08:32 )             36.7     10-24    139  |  101  |  17  ----------------------------<  290<H>  4.4   |  26  |  0.6<L>    Ca    8.1<L>      24 Oct 2022 08:32  Phos  2.5     10-24  Mg     1.6     10-24    TPro  5.2<L>  /  Alb  3.1<L>  /  TBili  1.0  /  DBili  x   /  AST  13  /  ALT  12  /  AlkPhos  64  10-24        PT/INR - ( 23 Oct 2022 08:34 )   PT: 23.10 sec;   INR: 1.98 ratio         PTT - ( 23 Oct 2022 08:34 )  PTT:29.6 sec    I&O's Summary    23 Oct 2022 07:01  -  24 Oct 2022 07:00  --------------------------------------------------------  IN: 1210.4 mL / OUT: 300 mL / NET: 910.4 mL    24 Oct 2022 07:01  -  24 Oct 2022 17:31  --------------------------------------------------------  IN: 806.5 mL / OUT: 1175 mL / NET: -368.5 mL      BNP

## 2022-10-24 NOTE — PROGRESS NOTE ADULT - ASSESSMENT
ASESSMENT:  70 y/o M w/ pmhx of HLD, HTN, MI, DM, CAD s/p CABG, previous DVT/PE on Xarelto with imaging findings concerning for cholecystitis. Patient scheduled for percutaneous cholecystostomy tube with interventional radiology on Tuesday.     PLAN:   - Hemodynamic monitoring   - Multimodal pain control   - Pending procedure with IR on 10/25  - Pre-op for procedure   - Strict I/Os  - Maintain UOP> 0.5  - Green Surgery Following, Spectra [phone] for questions or concerns    ASSESSMENT:  72 y/o M w/ pmhx of HLD, HTN, MI, DM, CAD s/p CABG, previous DVT/PE on Xarelto with imaging findings concerning for cholecystitis. Patient scheduled for percutaneous cholecystostomy tube with interventional radiology on Tuesday.     PLAN:   - Pending procedure with IR on 10/25  - Please pre-op patient for perc yefri tomorrow (CBC, BMP, coags, T&S, COVID, EKG, CXR)  - NPO after MN, IVF (LR @ 125)  - Hemodynamic monitoring   - Multimodal pain control   - Strict I/Os  - Maintain UOP> 0.5  - Green Surgery Following, Spectra [phone] for questions or concerns

## 2022-10-24 NOTE — CHART NOTE - NSCHARTNOTEFT_GEN_A_CORE
NUTRITION SUPPORT TEAM  -  PROGRESS NOTE     Interval Events:        REVIEW OF SYSTEMS:  Negative except as noted above.     VITALS:  T(F): 98 (10-24 @ 05:00), Max: 98 (10-24 @ 05:00)  HR: 65 (10-24 @ 05:00) (65 - 65)  BP: 124/70 (10-24 @ 05:00) (124/70 - 124/70)  RR: 19 (10-24 @ 05:00) (19 - 19)    HEIGHT/WEIGHT/BMI:   Height (cm): 185.4 (10-20), 185.4 (09-15)  Weight (kg): 93 (10-20), 90.7 (09-15)  BMI (kg/m2): 27.1 (10-20), 26.4 (09-15)    PHYSICAL EXAM:   GENERAL: NAD, well-groomed, well-developed  HEENT: Moist mucous membranes, Good dentition, No lesions  ABDOMEN: Soft, Nontender, Nondistended  EXTREMITIES:  No clubbing, cyanosis, or edema  SKIN: warm and well perfused; No obvious rashes or lesions  IV ACCESS:   ENTERAL ACCESS:     I/Os:     10-23-22 @ 07:01  -  10-24-22 @ 07:00  --------------------------------------------------------  IN:    Fat Emulsion (Fish Oil &amp; Plant Based) 20% Infusion: 345.4 mL    IV PiggyBack: 150 mL    PPN (Peripheral Parenteral Nutrition): 715 mL  Total IN: 1210.4 mL    OUT:    Voided (mL): 300 mL  Total OUT: 300 mL    Total NET: 910.4 mL      MEDICATIONS:   dextrose 5%. 1000 milliLiter(s) IV Continuous <Continuous>  dextrose 5%. 1000 milliLiter(s) IV Continuous <Continuous>  dextrose 50% Injectable 25 Gram(s) IV Push once  dextrose 50% Injectable 12.5 Gram(s) IV Push once  dextrose 50% Injectable 25 Gram(s) IV Push once  dextrose Oral Gel 15 Gram(s) Oral once PRN  enoxaparin Injectable 40 milliGRAM(s) SubCutaneous every 24 hours  fat emulsion (Fish Oil and Plant Based) 20% Infusion 1.0753 Gm/kG/Day IV Continuous <Continuous>  furosemide    Tablet 20 milliGRAM(s) Oral daily  glucagon  Injectable 1 milliGRAM(s) IntraMuscular once  insulin lispro (ADMELOG) corrective regimen sliding scale   SubCutaneous three times a day before meals  labetalol 200 milliGRAM(s) Oral two times a day  losartan 100 milliGRAM(s) Oral daily  morphine  - Injectable 2 milliGRAM(s) IV Push every 4 hours PRN  ondansetron    Tablet 8 milliGRAM(s) Oral every 8 hours PRN  Parenteral Nutrition - Adult 1 Each TPN Continuous <Continuous>  piperacillin/tazobactam IVPB.. 3.375 Gram(s) IV Intermittent every 8 hours  simvastatin 40 milliGRAM(s) Oral at bedtime  tamsulosin 0.4 milliGRAM(s) Oral at bedtime    LABS:                         11.7   8.77  )-----------( 164      ( 24 Oct 2022 08:32 )             36.7     147<H>  |  107  |  17  ----------------------------<  273<H>          (10-23-22 @ 08:34)  4.6   |  25  |  0.7    Ca    7.9<L>          (10-23-22 @ 08:34)  Phos  2.5         (10-23-22 @ 08:34)  Mg     1.7         (10-23-22 @ 08:34)    TPro  5.2<L>  /  Alb  2.9<L>  /  TBili  1.6<H>  /  DBili  x   /  AST  17  /  ALT  12  /  AlkPhos  69       10-23-22 @ 08:34    Triglycerides, Serum: 116 mg/dL (10-22 @ 05:50)    Vitamin D, 25-Hydroxy: 45 ng/mL (10-22 @ 05:50)     Vitamin B12, Serum: 1407 pg/mL (10-22 @ 05:50)    Blood Glucose (Past 24 hours):  259 mg/dL (10-24 @ 08:21)  221 mg/dL (10-23 @ 21:54)  258 mg/dL (10-23 @ 16:36)  253 mg/dL (10-23 @ 13:04)    DIET:   Diet, NPO:   Except Medications (10-20-22 @ 06:48) [Active]    fat emulsion (Fish Oil and Plant Based) 20% Infusion 1.0753 Gm/kG/Day (31.3 mL/Hr) IV Continuous <Continuous>, 10-23-22 @ 20:00, 20:00, Stop order after: 16 Hours  Parenteral Nutrition - Adult 1 Each (65 mL/Hr) TPN Continuous <Continuous>, 10-23-22 @ 20:00, 20:00, Stop order after: 1 Days      ASSESSMENT            PLAN NUTRITION SUPPORT TEAM  -  PROGRESS NOTE     Interval Events:    Much less lethargic today. No new complaints  On room air  Abd soft, ND; RUQ less tender  PPN infusing day#3  Peripheral IV Rt hand, RUE without infiltration  Remains NPO, IR planning cholecystostomy tomorrow    REVIEW OF SYSTEMS:  Negative except as noted above.     VITALS:  T(F): 98 (10-24 @ 05:00), Max: 98 (10-24 @ 05:00)  HR: 65 (10-24 @ 05:00) (65 - 65)  BP: 124/70 (10-24 @ 05:00) (124/70 - 124/70)  RR: 19 (10-24 @ 05:00) (19 - 19)    HEIGHT/WEIGHT/BMI:   Height (cm): 185.4 (10-20), 185.4 (09-15)  Weight (kg): 93 (10-20), 90.7 (09-15)  BMI (kg/m2): 27.1 (10-20), 26.4 (09-15)    I/Os:     10-23-22 @ 07:01  -  10-24-22 @ 07:00  --------------------------------------------------------  IN:    Fat Emulsion (Fish Oil &amp; Plant Based) 20% Infusion: 345.4 mL    IV PiggyBack: 150 mL    PPN (Peripheral Parenteral Nutrition): 715 mL  Total IN: 1210.4 mL    OUT:    Voided (mL): 300 mL  Total OUT: 300 mL    Total NET: 910.4 mL      MEDICATIONS:   dextrose 5%. 1000 milliLiter(s) IV Continuous <Continuous>  dextrose 5%. 1000 milliLiter(s) IV Continuous <Continuous>  dextrose 50% Injectable 25 Gram(s) IV Push once  dextrose 50% Injectable 12.5 Gram(s) IV Push once  dextrose 50% Injectable 25 Gram(s) IV Push once  dextrose Oral Gel 15 Gram(s) Oral once PRN  enoxaparin Injectable 40 milliGRAM(s) SubCutaneous every 24 hours  fat emulsion (Fish Oil and Plant Based) 20% Infusion 1.0753 Gm/kG/Day IV Continuous <Continuous>  furosemide    Tablet 20 milliGRAM(s) Oral daily  glucagon  Injectable 1 milliGRAM(s) IntraMuscular once  insulin lispro (ADMELOG) corrective regimen sliding scale   SubCutaneous three times a day before meals  labetalol 200 milliGRAM(s) Oral two times a day  losartan 100 milliGRAM(s) Oral daily  morphine  - Injectable 2 milliGRAM(s) IV Push every 4 hours PRN  ondansetron    Tablet 8 milliGRAM(s) Oral every 8 hours PRN  Parenteral Nutrition - Adult 1 Each TPN Continuous <Continuous>  piperacillin/tazobactam IVPB.. 3.375 Gram(s) IV Intermittent every 8 hours  simvastatin 40 milliGRAM(s) Oral at bedtime  tamsulosin 0.4 milliGRAM(s) Oral at bedtime    LABS:                         11.7   8.77  )-----------( 164      ( 24 Oct 2022 08:32 )             36.7     147<H>  |  107  |  17  ----------------------------<  273<H>          (10-23-22 @ 08:34)  4.6   |  25  |  0.7    Ca    7.9<L>          (10-23-22 @ 08:34)  Phos  2.5         (10-23-22 @ 08:34)  Mg     1.7         (10-23-22 @ 08:34)    TPro  5.2<L>  /  Alb  2.9<L>  /  TBili  1.6<H>  /  DBili  x   /  AST  17  /  ALT  12  /  AlkPhos  69       10-23-22 @ 08:34    Triglycerides, Serum: 116 mg/dL (10-22 @ 05:50)    Vitamin D, 25-Hydroxy: 45 ng/mL (10-22 @ 05:50)     Vitamin B12, Serum: 1407 pg/mL (10-22 @ 05:50)    Blood Glucose (Past 24 hours):  259 mg/dL (10-24 @ 08:21)  221 mg/dL (10-23 @ 21:54)  258 mg/dL (10-23 @ 16:36)  253 mg/dL (10-23 @ 13:04)    DIET:   Diet, NPO:   Except Medications (10-20-22 @ 06:48) [Active]    fat emulsion (Fish Oil and Plant Based) 20% Infusion 1.0753 Gm/kG/Day (31.3 mL/Hr) IV Continuous <Continuous>, 10-23-22 @ 20:00, 20:00, Stop order after: 16 Hours  Parenteral Nutrition - Adult 1 Each (65 mL/Hr) TPN Continuous <Continuous>, 10-23-22 @ 20:00, 20:00, Stop order after: 1 Days    ASSESSMENT  71M w/ PMH of HLD, HTN, MI, DM, CAD s/p CABG, previous DVT/PE on Xarelto presents for hypotension and kidney stones presents to ED with complaints of abdominal pain in the bilateral flanks radiating across the lower abdomen as well as hypertension. Initial vitals noted to have a systolic of 239. A CT showed gallstones and enlarged gallbladder with a stone at the neck.   An US of RUQ was ordered to further delineate if pain is due to biliary pathology. US shows a right renal calculi measuring 1.2 cm and gallbladder edema. On exam, pt complains of right flank pain. Physical exam findings, imaging, and labs as documented above.     - cholecystitis  - HTN urgency  - DM, A1c 7.2  - hypomagnesemia    PLAN  - cont PPN tonight  - daily bmp, in phos, mg while on parenteral nutrition  - will follow NUTRITION SUPPORT TEAM  -  PROGRESS NOTE     Interval Events:    Much less lethargic today. No new complaints  On room air  Abd soft, ND; RUQ less tender  PPN infusing day#3  Peripheral IV Rt hand, RUE without infiltration  Remains NPO, IR planning cholecystostomy tomorrow    REVIEW OF SYSTEMS:  Negative except as noted above.     VITALS:  T(F): 98 (10-24 @ 05:00), Max: 98 (10-24 @ 05:00)  HR: 65 (10-24 @ 05:00) (65 - 65)  BP: 124/70 (10-24 @ 05:00) (124/70 - 124/70)  RR: 19 (10-24 @ 05:00) (19 - 19)    HEIGHT/WEIGHT/BMI:   Height (cm): 185.4 (10-20), 185.4 (09-15)  Weight (kg): 93 (10-20), 90.7 (09-15)  BMI (kg/m2): 27.1 (10-20), 26.4 (09-15)    I/Os:     10-23-22 @ 07:01  -  10-24-22 @ 07:00  --------------------------------------------------------  IN:    Fat Emulsion (Fish Oil &amp; Plant Based) 20% Infusion: 345.4 mL    IV PiggyBack: 150 mL    PPN (Peripheral Parenteral Nutrition): 715 mL  Total IN: 1210.4 mL    OUT:    Voided (mL): 300 mL  Total OUT: 300 mL    Total NET: 910.4 mL      MEDICATIONS:   enoxaparin Injectable 40 milliGRAM(s) SubCutaneous every 24 hours  fat emulsion (Fish Oil and Plant Based) 20% Infusion 1.0753 Gm/kG/Day IV Continuous <Continuous>  furosemide    Tablet 20 milliGRAM(s) Oral daily  insulin lispro (ADMELOG) corrective regimen sliding scale   SubCutaneous three times a day before meals  labetalol 200 milliGRAM(s) Oral two times a day  losartan 100 milliGRAM(s) Oral daily  morphine  - Injectable 2 milliGRAM(s) IV Push every 4 hours PRN  ondansetron    Tablet 8 milliGRAM(s) Oral every 8 hours PRN  Parenteral Nutrition - Adult 1 Each TPN Continuous <Continuous>  piperacillin/tazobactam IVPB.. 3.375 Gram(s) IV Intermittent every 8 hours  simvastatin 40 milliGRAM(s) Oral at bedtime  tamsulosin 0.4 milliGRAM(s) Oral at bedtime    LABS:                         11.7   8.77  )-----------( 164      ( 24 Oct 2022 08:32 )             36.7     147<H>  |  107  |  17  ----------------------------<  273<H>          (10-23-22 @ 08:34)  4.6   |  25  |  0.7    Ca    7.9<L>          (10-23-22 @ 08:34)  Phos  2.5         (10-23-22 @ 08:34)  Mg     1.7         (10-23-22 @ 08:34)    TPro  5.2<L>  /  Alb  2.9<L>  /  TBili  1.6<H>  /  DBili  x   /  AST  17  /  ALT  12  /  AlkPhos  69       10-23-22 @ 08:34    Triglycerides, Serum: 116 mg/dL (10-22 @ 05:50)    Vitamin D, 25-Hydroxy: 45 ng/mL (10-22 @ 05:50)     Vitamin B12, Serum: 1407 pg/mL (10-22 @ 05:50)    Blood Glucose (Past 24 hours):  259 mg/dL (10-24 @ 08:21)  221 mg/dL (10-23 @ 21:54)  258 mg/dL (10-23 @ 16:36)  253 mg/dL (10-23 @ 13:04)    DIET:   Diet, NPO:   Except Medications (10-20-22 @ 06:48) [Active]    fat emulsion (Fish Oil and Plant Based) 20% Infusion 1.0753 Gm/kG/Day (31.3 mL/Hr) IV Continuous <Continuous>, 10-23-22 @ 20:00, 20:00, Stop order after: 16 Hours  Parenteral Nutrition - Adult 1 Each (65 mL/Hr) TPN Continuous <Continuous>, 10-23-22 @ 20:00, 20:00, Stop order after: 1 Days    ASSESSMENT  71M w/ PMH of HLD, HTN, MI, DM, CAD s/p CABG, previous DVT/PE on Xarelto presents for hypotension and kidney stones presents to ED with complaints of abdominal pain in the bilateral flanks radiating across the lower abdomen as well as hypertension. Initial vitals noted to have a systolic of 239. A CT showed gallstones and enlarged gallbladder with a stone at the neck.   An US of RUQ was ordered to further delineate if pain is due to biliary pathology. US shows a right renal calculi measuring 1.2 cm and gallbladder edema. On exam, pt complains of right flank pain. Physical exam findings, imaging, and labs as documented above.     - cholecystitis  - HTN urgency  - DM, A1c 7.2 with persistent hyperglycemia  - hypomagnesemia    PLAN  - cont PPN tonight (note that there is minimal dextrose in the PPN)  - glycemic control, please  - daily bmp, in phos, mg while on parenteral nutrition  - will follow

## 2022-10-24 NOTE — PROGRESS NOTE ADULT - SUBJECTIVE AND OBJECTIVE BOX
DAILY PROGRESS NOTE  ===========================================================    Patient Information:  MERARY GARCIA  /  72y  /  Male  /  MRN#: [id number]    Patient is a 72y old Male who presents with a chief complaint of abd pain, htn (23 Oct 2022 11:16)      Hospital Day: 5d     |:::::::::::::::::::::::::::| SUBJECTIVE |:::::::::::::::::::::::::::|    OVERNIGHT EVENTS: none.  TODAY: Patient was seen today at bedside. Review of systems is otherwise negative.    |:::::::::::::::::::::::::::| OBJECTIVE |:::::::::::::::::::::::::::|    VITAL SIGNS: Last 24 Hours  T(C): 36.7 (24 Oct 2022 05:00), Max: 36.7 (23 Oct 2022 20:24)  T(F): 98 (24 Oct 2022 05:00), Max: 98.1 (23 Oct 2022 20:24)  HR: 65 (24 Oct 2022 05:00) (65 - 77)  BP: 124/70 (24 Oct 2022 05:00) (123/70 - 171/72)  BP(mean): --  RR: 19 (24 Oct 2022 05:00) (17 - 19)  SpO2: --    10-23-22 @ 07:01  -  10-24-22 @ 07:00  --------------------------------------------------------  IN: 1210.4 mL / OUT: 300 mL / NET: 910.4 mL      PHYSICAL EXAM:  GENERAL: Awake, alert; NAD.  HEENT: Head NC/AT; Conjunctivae pink, Sclera anicteric; Oral mucosa moist.  CARDIO: Regular rate; Regular rhythm; S1 & S2.  RESP: No rales, wheezing, or rhonchi appreciated.  GI: Soft; NT/ND; BS; No guarding; No rebound tenderness.  EXT: Strength UE 5/5; Strength LE 5/5; No edema.   SKIN: Intact, no rashes, no erythema    LAB RESULTS:                        12.4   11.32 )-----------( 166      ( 23 Oct 2022 08:34 )             38.4     10-23    147<H>  |  107  |  17  ----------------------------<  273<H>  4.6   |  25  |  0.7    Ca    7.9<L>      23 Oct 2022 08:34  Phos  2.5     10-23  Mg     1.7     10-23    TPro  5.2<L>  /  Alb  2.9<L>  /  TBili  1.6<H>  /  DBili  x   /  AST  17  /  ALT  12  /  AlkPhos  69  10-23    PT/INR - ( 23 Oct 2022 08:34 )   PT: 23.10 sec;   INR: 1.98 ratio         PTT - ( 23 Oct 2022 08:34 )  PTT:29.6 sec            MICROBIOLOGY:    RADIOLOGY:    ALLERGIES:  No Known Allergies    MEDICATIONS:  dextrose 5%. 1000 milliLiter(s) IV Continuous <Continuous>  dextrose 5%. 1000 milliLiter(s) IV Continuous <Continuous>  dextrose 50% Injectable 25 Gram(s) IV Push once  dextrose 50% Injectable 12.5 Gram(s) IV Push once  dextrose 50% Injectable 25 Gram(s) IV Push once  dextrose Oral Gel 15 Gram(s) Oral once PRN  fat emulsion (Fish Oil and Plant Based) 20% Infusion 1.0753 Gm/kG/Day IV Continuous <Continuous>  furosemide    Tablet 20 milliGRAM(s) Oral daily  glucagon  Injectable 1 milliGRAM(s) IntraMuscular once  insulin lispro (ADMELOG) corrective regimen sliding scale   SubCutaneous three times a day before meals  labetalol 200 milliGRAM(s) Oral two times a day  losartan 100 milliGRAM(s) Oral daily  morphine  - Injectable 2 milliGRAM(s) IV Push every 4 hours PRN  ondansetron    Tablet 8 milliGRAM(s) Oral every 8 hours PRN  Parenteral Nutrition - Adult 1 Each TPN Continuous <Continuous>  piperacillin/tazobactam IVPB.. 3.375 Gram(s) IV Intermittent every 8 hours  simvastatin 40 milliGRAM(s) Oral at bedtime  tamsulosin 0.4 milliGRAM(s) Oral at bedtime    ===========================================================

## 2022-10-24 NOTE — PROGRESS NOTE ADULT - SUBJECTIVE AND OBJECTIVE BOX
GENERAL SURGERY PROGRESS NOTE    Patient: MERARY GARCIA , 72y ([birth year])Male   MRN: [id number]  Location: [address]  Visit: 10-19-22 Inpatient  Date: 10-24-22 @ 09:33    Patient seen and evaluated at bedside. No acute issues reported. Pain is improved. Patient encouraged to ambulate and get out of bed. Denies nausea, vomiting, fever, and chills. Tolerating PPN    PAST MEDICAL & SURGICAL HISTORY:  Hypertension, unspecified type  Type 2 diabetes mellitus with complication, unspecified long term insulin use status  High cholesterol  Kidney stones  MI (myocardial infarction)  DVT (deep venous thrombosis)  Acute massive pulmonary embolism  s/p tPA in 2018  Carotid atherosclerosis  H/O heart surgery  quadruple bypass 6 years ago  H/O cystoscopy    Vitals:   T(F): 98 (10-24-22 @ 05:00), Max: 98.1 (10-23-22 @ 20:24)  HR: 65 (10-24-22 @ 05:00)  BP: 124/70 (10-24-22 @ 05:00)  RR: 19 (10-24-22 @ 05:00)  SpO2: --    Diet, NPO:   Except Medications    Fluids:     I & O's:    10-23-22 @ 07:01  -  10-24-22 @ 07:00  --------------------------------------------------------  IN:    Fat Emulsion (Fish Oil &amp; Plant Based) 20% Infusion: 345.4 mL    IV PiggyBack: 150 mL    PPN (Peripheral Parenteral Nutrition): 715 mL  Total IN: 1210.4 mL    OUT:    Voided (mL): 300 mL  Total OUT: 300 mL    Total NET: 910.4 mL    PHYSICAL EXAM:  General: NAD, AAOx3, calm and cooperative  HEENT: NCAT  Cardiac: No peripheral cyanosis or pallor, extremities well perfused   Respiratory: Non-labored breathing, equal chest rise bilaterally   Abdomen: Soft, non-distended, non-tender, no rebound, no guarding  Musculoskeletal: Strength 5/5 BL UE/LE, ROM intact, compartments soft  Skin: Warm/dry, normal color, no jaundice  Incision/wound: healing well, dressings in place, clean, dry and intact      MEDICATIONS  (STANDING):  dextrose 5%. 1000 milliLiter(s) (100 mL/Hr) IV Continuous <Continuous>  dextrose 5%. 1000 milliLiter(s) (50 mL/Hr) IV Continuous <Continuous>  dextrose 50% Injectable 25 Gram(s) IV Push once  dextrose 50% Injectable 12.5 Gram(s) IV Push once  dextrose 50% Injectable 25 Gram(s) IV Push once  fat emulsion (Fish Oil and Plant Based) 20% Infusion 1.0753 Gm/kG/Day (31.3 mL/Hr) IV Continuous <Continuous>  furosemide    Tablet 20 milliGRAM(s) Oral daily  glucagon  Injectable 1 milliGRAM(s) IntraMuscular once  insulin lispro (ADMELOG) corrective regimen sliding scale   SubCutaneous three times a day before meals  labetalol 200 milliGRAM(s) Oral two times a day  losartan 100 milliGRAM(s) Oral daily  Parenteral Nutrition - Adult 1 Each (65 mL/Hr) TPN Continuous <Continuous>  piperacillin/tazobactam IVPB.. 3.375 Gram(s) IV Intermittent every 8 hours  simvastatin 40 milliGRAM(s) Oral at bedtime  tamsulosin 0.4 milliGRAM(s) Oral at bedtime    MEDICATIONS  (PRN):  dextrose Oral Gel 15 Gram(s) Oral once PRN Blood Glucose LESS THAN 70 milliGRAM(s)/deciliter  morphine  - Injectable 2 milliGRAM(s) IV Push every 4 hours PRN Moderate Pain (4 - 6)  ondansetron    Tablet 8 milliGRAM(s) Oral every 8 hours PRN Nausea and/or Vomiting      DVT PROPHYLAXIS:   GI PROPHYLAXIS:   ANTICOAGULATION:   ANTIBIOTICS:  piperacillin/tazobactam IVPB.. 3.375 Gram(s)    LAB/STUDIES:  Labs:  CAPILLARY BLOOD GLUCOSE    POCT Blood Glucose.: 259 mg/dL (24 Oct 2022 08:21)  POCT Blood Glucose.: 221 mg/dL (23 Oct 2022 21:54)  POCT Blood Glucose.: 258 mg/dL (23 Oct 2022 16:36)  POCT Blood Glucose.: 253 mg/dL (23 Oct 2022 13:04)                          12.4   11.32 )-----------( 166      ( 23 Oct 2022 08:34 )             38.4         10-23    147<H>  |  107  |  17  ----------------------------<  273<H>  4.6   |  25  |  0.7          LFTs:             5.2  | 1.6  | 17       ------------------[69      ( 23 Oct 2022 08:34 )  2.9  | x    | 12          Lipase:x      Amylase:x        Coags:     23.10  ----< 1.98    ( 23 Oct 2022 08:34 )     29.6        IMAGING:      ACCESS/ DEVICES:  [x] Peripheral IV  [ ] Central Venous Line	[ ] R	[ ] L	[ ] IJ	[ ] Fem	[ ] SC	Placed:   [ ] Arterial Line		[ ] R	[ ] L	[ ] Fem	[ ] Rad	[ ] Ax	Placed:   [ ] PICC:					[ ] Mediport  [ ] Urinary Catheter,  Date Placed:   [ ] Chest tube: [ ] Right, [ ] Left  [ ] MAU/Je Drains      ASSESSMENT:  72y M w/ PMHx of  ****    PLAN:  -  -  -    Lines/Tubes: PIV, Midline, Central Line, A-Line, Chest tubes, Je/MAU drains, Whiting Catheter.   GENERAL SURGERY PROGRESS NOTE    Patient: MERARY GARCIA , 72y ([birth year])Male   MRN: [id number]  Location: [address]  Visit: 10-19-22 Inpatient  Date: 10-24-22 @ 09:33    Patient seen and evaluated at bedside. No acute issues reported. Pain is improved. Patient encouraged to ambulate and get out of bed. Denies nausea, vomiting, fever, and chills. Tolerating PPN    PAST MEDICAL & SURGICAL HISTORY:  Hypertension, unspecified type  Type 2 diabetes mellitus with complication, unspecified long term insulin use status  High cholesterol  Kidney stones  MI (myocardial infarction)  DVT (deep venous thrombosis)  Acute massive pulmonary embolism  s/p tPA in 2018  Carotid atherosclerosis  H/O heart surgery  quadruple bypass 6 years ago  H/O cystoscopy    Vitals:   T(F): 98 (10-24-22 @ 05:00), Max: 98.1 (10-23-22 @ 20:24)  HR: 65 (10-24-22 @ 05:00)  BP: 124/70 (10-24-22 @ 05:00)  RR: 19 (10-24-22 @ 05:00)  SpO2: --    Diet, NPO:   Except Medications    Fluids:     I & O's:    10-23-22 @ 07:01  -  10-24-22 @ 07:00  --------------------------------------------------------  IN:    Fat Emulsion (Fish Oil &amp; Plant Based) 20% Infusion: 345.4 mL    IV PiggyBack: 150 mL    PPN (Peripheral Parenteral Nutrition): 715 mL  Total IN: 1210.4 mL    OUT:    Voided (mL): 300 mL  Total OUT: 300 mL    Total NET: 910.4 mL    PHYSICAL EXAM:  General: NAD, AAOx3, calm and cooperative  HEENT: NCAT  Cardiac: No peripheral cyanosis or pallor, extremities well perfused   Respiratory: Non-labored breathing, equal chest rise bilaterally   Abdomen: Soft, non-distended, non-tender, no rebound, no guarding  Musculoskeletal: Strength 5/5 BL UE/LE, ROM intact, compartments soft  Skin: Warm/dry, normal color, no jaundice  Incision/wound: healing well, dressings in place, clean, dry and intact      MEDICATIONS  (STANDING):  dextrose 5%. 1000 milliLiter(s) (100 mL/Hr) IV Continuous <Continuous>  dextrose 5%. 1000 milliLiter(s) (50 mL/Hr) IV Continuous <Continuous>  dextrose 50% Injectable 25 Gram(s) IV Push once  dextrose 50% Injectable 12.5 Gram(s) IV Push once  dextrose 50% Injectable 25 Gram(s) IV Push once  fat emulsion (Fish Oil and Plant Based) 20% Infusion 1.0753 Gm/kG/Day (31.3 mL/Hr) IV Continuous <Continuous>  furosemide    Tablet 20 milliGRAM(s) Oral daily  glucagon  Injectable 1 milliGRAM(s) IntraMuscular once  insulin lispro (ADMELOG) corrective regimen sliding scale   SubCutaneous three times a day before meals  labetalol 200 milliGRAM(s) Oral two times a day  losartan 100 milliGRAM(s) Oral daily  Parenteral Nutrition - Adult 1 Each (65 mL/Hr) TPN Continuous <Continuous>  piperacillin/tazobactam IVPB.. 3.375 Gram(s) IV Intermittent every 8 hours  simvastatin 40 milliGRAM(s) Oral at bedtime  tamsulosin 0.4 milliGRAM(s) Oral at bedtime    MEDICATIONS  (PRN):  dextrose Oral Gel 15 Gram(s) Oral once PRN Blood Glucose LESS THAN 70 milliGRAM(s)/deciliter  morphine  - Injectable 2 milliGRAM(s) IV Push every 4 hours PRN Moderate Pain (4 - 6)  ondansetron    Tablet 8 milliGRAM(s) Oral every 8 hours PRN Nausea and/or Vomiting      DVT PROPHYLAXIS:   GI PROPHYLAXIS:   ANTICOAGULATION:   ANTIBIOTICS:  piperacillin/tazobactam IVPB.. 3.375 Gram(s)    LAB/STUDIES:  Labs:  CAPILLARY BLOOD GLUCOSE    POCT Blood Glucose.: 259 mg/dL (24 Oct 2022 08:21)  POCT Blood Glucose.: 221 mg/dL (23 Oct 2022 21:54)  POCT Blood Glucose.: 258 mg/dL (23 Oct 2022 16:36)  POCT Blood Glucose.: 253 mg/dL (23 Oct 2022 13:04)                          12.4   11.32 )-----------( 166      ( 23 Oct 2022 08:34 )             38.4         10-23    147<H>  |  107  |  17  ----------------------------<  273<H>  4.6   |  25  |  0.7          LFTs:             5.2  | 1.6  | 17       ------------------[69      ( 23 Oct 2022 08:34 )  2.9  | x    | 12          Lipase:x      Amylase:x        Coags:     23.10  ----< 1.98    ( 23 Oct 2022 08:34 )     29.6        IMAGING:      ACCESS/ DEVICES:  [x] Peripheral IV  [ ] Central Venous Line	[ ] R	[ ] L	[ ] IJ	[ ] Fem	[ ] SC	Placed:   [ ] Arterial Line		[ ] R	[ ] L	[ ] Fem	[ ] Rad	[ ] Ax	Placed:   [ ] PICC:					[ ] Mediport  [ ] Urinary Catheter,  Date Placed:   [ ] Chest tube: [ ] Right, [ ] Left  [ ] MAU/Je Drains      ASSESSMENT:  72y M w/ PMHx of  ****    PLAN:  -  -  -    Lines/Tubes: PIV, Midline, Central Line, A-Line, Chest tubes, Je/MAU drains, Whiting Catheter.

## 2022-10-24 NOTE — PROGRESS NOTE ADULT - ASSESSMENT
71M w/ PMH of HLD, HTN, MI, DM, CAD s/p CABG, previous DVT/PE on Xarelto presents for hypotensionand kidney stones presents to ED with complaints of abdominal pain in the bilateral flanks radiating across the lower abdomen as well as hypertension. Initial vitals noted to have a systolic of 239. A CT showed gallstones and enlarged gallbladder with a stone at the neck.   An US of RUQ was ordered to further delineate if pain is due to biliary pathology. US shows a right renal calculi measuring 1.2 cm and gallbladder edema. On exam, pt complains of right flank pain. Physical exam findings, imaging, and labs as documented above.     Acute Cholecystitis- as per US and HIDA  - on IV Zosyn  - NPO, PPN started  - pain rx prn  - GI, Surgery and IR eval's noted; Plan is for PC drainage in 3 days; AC now only prophylactic until then  - spoke to pt at bedside today, pt desires CCY, states will never be less high risk than he is currently, f/u with surgery to discuss w/ pt    HTN Urgency:  - likely exacerbated  by severe abd pain  - on Labetalol 200mg BID, Losartan 100 QD, Lasix 20 QD  - BP better controlled today, continue to monitor    hx of DVT/PE  hx of MI/CABG  - Xarelto 20mg QD, on hold  - on Lovenox 40 BID until procedure- SEE PRIOR NOTE  - hold ASA 81 QD  - Cardio following    DM type 2  - on Metformin 1g BID at home, held during hospital stay  - start on SS   - monitor FS and adjust as needed    hx of Kidney Stones  - non obstructive kidney stone  - hydrate  - monitor    Deconditioning from all  - call to PPT to  evaluate and start RX      # Misc  - GI ppx:    - DVT ppx: Lovenox  - Diet: NPO, PPN feeds  - Activity: AAT  - DISPO: acute

## 2022-10-25 LAB
ALBUMIN SERPL ELPH-MCNC: 3 G/DL — LOW (ref 3.5–5.2)
ALP SERPL-CCNC: 61 U/L — SIGNIFICANT CHANGE UP (ref 30–115)
ALT FLD-CCNC: 10 U/L — SIGNIFICANT CHANGE UP (ref 0–41)
ANION GAP SERPL CALC-SCNC: 9 MMOL/L — SIGNIFICANT CHANGE UP (ref 7–14)
APTT BLD: 28 SEC — SIGNIFICANT CHANGE UP (ref 27–39.2)
AST SERPL-CCNC: 11 U/L — SIGNIFICANT CHANGE UP (ref 0–41)
BASOPHILS # BLD AUTO: 0.05 K/UL — SIGNIFICANT CHANGE UP (ref 0–0.2)
BASOPHILS NFR BLD AUTO: 0.6 % — SIGNIFICANT CHANGE UP (ref 0–1)
BILIRUB SERPL-MCNC: 0.9 MG/DL — SIGNIFICANT CHANGE UP (ref 0.2–1.2)
BUN SERPL-MCNC: 12 MG/DL — SIGNIFICANT CHANGE UP (ref 10–20)
CALCIUM SERPL-MCNC: 7.9 MG/DL — LOW (ref 8.4–10.5)
CHLORIDE SERPL-SCNC: 102 MMOL/L — SIGNIFICANT CHANGE UP (ref 98–110)
CO2 SERPL-SCNC: 27 MMOL/L — SIGNIFICANT CHANGE UP (ref 17–32)
CREAT SERPL-MCNC: 0.6 MG/DL — LOW (ref 0.7–1.5)
EGFR: 104 ML/MIN/1.73M2 — SIGNIFICANT CHANGE UP
EOSINOPHIL # BLD AUTO: 0.49 K/UL — SIGNIFICANT CHANGE UP (ref 0–0.7)
EOSINOPHIL NFR BLD AUTO: 6.2 % — SIGNIFICANT CHANGE UP (ref 0–8)
GLUCOSE BLDC GLUCOMTR-MCNC: 232 MG/DL — HIGH (ref 70–99)
GLUCOSE BLDC GLUCOMTR-MCNC: 240 MG/DL — HIGH (ref 70–99)
GLUCOSE BLDC GLUCOMTR-MCNC: 250 MG/DL — HIGH (ref 70–99)
GLUCOSE SERPL-MCNC: 229 MG/DL — HIGH (ref 70–99)
HCT VFR BLD CALC: 36.6 % — LOW (ref 42–52)
HGB BLD-MCNC: 11.9 G/DL — LOW (ref 14–18)
IMM GRANULOCYTES NFR BLD AUTO: 1 % — HIGH (ref 0.1–0.3)
INR BLD: 1.68 RATIO — HIGH (ref 0.65–1.3)
INR BLD: 1.81 RATIO — HIGH (ref 0.65–1.3)
LYMPHOCYTES # BLD AUTO: 1.15 K/UL — LOW (ref 1.2–3.4)
LYMPHOCYTES # BLD AUTO: 14.6 % — LOW (ref 20.5–51.1)
MAGNESIUM SERPL-MCNC: 1.5 MG/DL — LOW (ref 1.8–2.4)
MCHC RBC-ENTMCNC: 30.3 PG — SIGNIFICANT CHANGE UP (ref 27–31)
MCHC RBC-ENTMCNC: 32.5 G/DL — SIGNIFICANT CHANGE UP (ref 32–37)
MCV RBC AUTO: 93.1 FL — SIGNIFICANT CHANGE UP (ref 80–94)
MONOCYTES # BLD AUTO: 0.74 K/UL — HIGH (ref 0.1–0.6)
MONOCYTES NFR BLD AUTO: 9.4 % — HIGH (ref 1.7–9.3)
NEUTROPHILS # BLD AUTO: 5.36 K/UL — SIGNIFICANT CHANGE UP (ref 1.4–6.5)
NEUTROPHILS NFR BLD AUTO: 68.2 % — SIGNIFICANT CHANGE UP (ref 42.2–75.2)
NRBC # BLD: 0 /100 WBCS — SIGNIFICANT CHANGE UP (ref 0–0)
PHOSPHATE SERPL-MCNC: 2.6 MG/DL — SIGNIFICANT CHANGE UP (ref 2.1–4.9)
PLATELET # BLD AUTO: 166 K/UL — SIGNIFICANT CHANGE UP (ref 130–400)
POTASSIUM SERPL-MCNC: 4.3 MMOL/L — SIGNIFICANT CHANGE UP (ref 3.5–5)
POTASSIUM SERPL-SCNC: 4.3 MMOL/L — SIGNIFICANT CHANGE UP (ref 3.5–5)
PROT SERPL-MCNC: 5 G/DL — LOW (ref 6–8)
PROTHROM AB SERPL-ACNC: 19.5 SEC — HIGH (ref 9.95–12.87)
PROTHROM AB SERPL-ACNC: 21 SEC — HIGH (ref 9.95–12.87)
RBC # BLD: 3.93 M/UL — LOW (ref 4.7–6.1)
RBC # FLD: 13.2 % — SIGNIFICANT CHANGE UP (ref 11.5–14.5)
SODIUM SERPL-SCNC: 138 MMOL/L — SIGNIFICANT CHANGE UP (ref 135–146)
WBC # BLD: 7.87 K/UL — SIGNIFICANT CHANGE UP (ref 4.8–10.8)
WBC # FLD AUTO: 7.87 K/UL — SIGNIFICANT CHANGE UP (ref 4.8–10.8)

## 2022-10-25 PROCEDURE — 99448 NTRPROF PH1/NTRNET/EHR 21-30: CPT

## 2022-10-25 RX ORDER — ELECTROLYTE SOLUTION,INJ
1 VIAL (ML) INTRAVENOUS
Refills: 0 | Status: DISCONTINUED | OUTPATIENT
Start: 2022-10-25 | End: 2022-10-26

## 2022-10-25 RX ORDER — PHYTONADIONE (VIT K1) 5 MG
5 TABLET ORAL ONCE
Refills: 0 | Status: COMPLETED | OUTPATIENT
Start: 2022-10-25 | End: 2022-10-25

## 2022-10-25 RX ORDER — MAGNESIUM OXIDE 400 MG ORAL TABLET 241.3 MG
400 TABLET ORAL
Refills: 0 | Status: DISCONTINUED | OUTPATIENT
Start: 2022-10-25 | End: 2022-10-30

## 2022-10-25 RX ORDER — MAGNESIUM SULFATE 500 MG/ML
2 VIAL (ML) INJECTION EVERY 4 HOURS
Refills: 0 | Status: COMPLETED | OUTPATIENT
Start: 2022-10-25 | End: 2022-10-25

## 2022-10-25 RX ORDER — I.V. FAT EMULSION 20 G/100ML
1.08 EMULSION INTRAVENOUS
Qty: 100 | Refills: 0 | Status: DISCONTINUED | OUTPATIENT
Start: 2022-10-25 | End: 2022-10-26

## 2022-10-25 RX ORDER — LABETALOL HCL 100 MG
100 TABLET ORAL ONCE
Refills: 0 | Status: COMPLETED | OUTPATIENT
Start: 2022-10-25 | End: 2022-10-25

## 2022-10-25 RX ADMIN — Medication 25 GRAM(S): at 13:44

## 2022-10-25 RX ADMIN — Medication 100 MILLIGRAM(S): at 14:51

## 2022-10-25 RX ADMIN — MAGNESIUM OXIDE 400 MG ORAL TABLET 400 MILLIGRAM(S): 241.3 TABLET ORAL at 17:50

## 2022-10-25 RX ADMIN — Medication 5 MILLIGRAM(S): at 21:17

## 2022-10-25 RX ADMIN — PIPERACILLIN AND TAZOBACTAM 25 GRAM(S): 4; .5 INJECTION, POWDER, LYOPHILIZED, FOR SOLUTION INTRAVENOUS at 05:49

## 2022-10-25 RX ADMIN — SODIUM CHLORIDE 125 MILLILITER(S): 9 INJECTION, SOLUTION INTRAVENOUS at 00:01

## 2022-10-25 RX ADMIN — Medication 25 GRAM(S): at 11:19

## 2022-10-25 RX ADMIN — Medication 200 MILLIGRAM(S): at 17:50

## 2022-10-25 RX ADMIN — TAMSULOSIN HYDROCHLORIDE 0.4 MILLIGRAM(S): 0.4 CAPSULE ORAL at 21:19

## 2022-10-25 RX ADMIN — PIPERACILLIN AND TAZOBACTAM 25 GRAM(S): 4; .5 INJECTION, POWDER, LYOPHILIZED, FOR SOLUTION INTRAVENOUS at 22:22

## 2022-10-25 RX ADMIN — Medication 2: at 11:15

## 2022-10-25 RX ADMIN — Medication 20 MILLIGRAM(S): at 05:51

## 2022-10-25 RX ADMIN — SIMVASTATIN 40 MILLIGRAM(S): 20 TABLET, FILM COATED ORAL at 21:19

## 2022-10-25 RX ADMIN — Medication 2: at 18:25

## 2022-10-25 RX ADMIN — Medication 200 MILLIGRAM(S): at 05:51

## 2022-10-25 RX ADMIN — PIPERACILLIN AND TAZOBACTAM 25 GRAM(S): 4; .5 INJECTION, POWDER, LYOPHILIZED, FOR SOLUTION INTRAVENOUS at 13:44

## 2022-10-25 RX ADMIN — LOSARTAN POTASSIUM 100 MILLIGRAM(S): 100 TABLET, FILM COATED ORAL at 05:51

## 2022-10-25 RX ADMIN — Medication 101 MILLIGRAM(S): at 11:12

## 2022-10-25 NOTE — PROGRESS NOTE ADULT - ASSESSMENT
71M w/ PMH of HLD, HTN, MI, DM, CAD s/p CABG, previous DVT/PE on Xarelto presents for hypotensionand kidney stones presents to ED with complaints of abdominal pain in the bilateral flanks radiating across the lower abdomen as well as hypertension. Initial vitals noted to have a systolic of 239. A CT showed gallstones and enlarged gallbladder with a stone at the neck.   An US of RUQ was ordered to further delineate if pain is due to biliary pathology. US shows a right renal calculi measuring 1.2 cm and gallbladder edema. On exam, pt complains of right flank pain. Physical exam findings, imaging, and labs as documented above.     Acute Cholecystitis- as per US and HIDA  - on IV Zosyn  - NPO, PPN started  - pain rx prn  - INR today 1.83, IR needs <1/5, will give vit K IVPB with INR check @4, will restart PPN, c/w AC hold  - f/u surgery for timeline of future CCY    HTN Urgency:  - likely exacerbated  by severe abd pain  - on Labetalol 200mg BID, Losartan 100 QD, Lasix 20 QD  - BP better controlled today, continue to monitor    hx of DVT/PE  hx of MI/CABG  - Xarelto 20mg QD, on hold  - on Lovenox 40 BID until procedure- SEE PRIOR NOTE  - hold ASA 81 QD  - Cardio following    DM type 2  - on Metformin 1g BID at home, held during hospital stay  - start on SS   - monitor FS and adjust as needed    hx of Kidney Stones  - non obstructive kidney stone  - hydrate  - monitor    Deconditioning from all  - call to PPT to  evaluate and start RX      # Misc  - GI ppx:    - DVT ppx:   - Diet: NPO, PPN feeds  - Activity: AAT  - DISPO: acute, pending perc yefri with IR

## 2022-10-25 NOTE — PROGRESS NOTE ADULT - ASSESSMENT
ASSESSMENT:  71M w/ PMH of HLD, HTN, MI, DM, CAD s/p CABG, previous DVT/PE on Xarelto presents for hypotensionand kidney stones presents to ED with complaints of abdominal pain in the bilateral flanks radiating across the lower abdomen as well as hypertension. Initial vitals noted to have a systolic of 239. A CT showed gallstones and enlarged gallbladder with a stone at the neck.   An US of RUQ was ordered to further delineate if pain is due to biliary pathology. US shows a right renal calculi measuring 1.2 cm and gallbladder edema. On exam, pt complains of right flank pain. Physical exam findings, imaging, and labs as documented above.     Acute Cholecystitis  - on IV Zosyn  - NPO  - on PPN  - pain rx prn  - GI, Surgery and IR f/u noted  - plan to reschedule procedure to tomorrow pending the following:  - correction of INR to < 1.5   - keep NPO after midnight tonight   - continue to hold Lovenox and Aspirin until after procedure       HTN Urgency:  - likely exacerbated  by severe abd pain  - on Labetalol 200mg BID, Losartan 100 QD, Lasix 20 QD  - BP better controlled today    hx of DVT/PE  hx of MI/CABG  - Xarelto 20mg QD, on hold  - on Lovenox 40 BID on hold until after the procedure  - hold ASA 81 QD  - Cardio following    DM type 2  - on Metformin 1g BID at home, held during hospital stay  - start on SS   - monitor FS and adjust as needed    hx of Kidney Stones  - non obstructive kidney stone  - hydrate  - monitor    Hypomagnesemia  - on replacement  - continue to monitor    Diet NPO  DVT Prophylaxis on therapeutic dose  Patient is a full code

## 2022-10-25 NOTE — PROGRESS NOTE ADULT - SUBJECTIVE AND OBJECTIVE BOX
DAILY PROGRESS NOTE  ===========================================================    Patient Information:  MERARY GARCIA  /  72y  /  Male  /  MRN#: [id number]    Patient is a 72y old Male who presents with a chief complaint of abd pain, htn (25 Oct 2022 08:58)      Hospital Day: 6d     |:::::::::::::::::::::::::::| SUBJECTIVE |:::::::::::::::::::::::::::|    OVERNIGHT EVENTS: none.  TODAY: Patient was seen today at bedside. Review of systems is otherwise negative.    |:::::::::::::::::::::::::::| OBJECTIVE |:::::::::::::::::::::::::::|    VITAL SIGNS: Last 24 Hours  T(C): 36.4 (25 Oct 2022 05:33), Max: 36.7 (24 Oct 2022 20:33)  T(F): 97.6 (25 Oct 2022 05:33), Max: 98.1 (24 Oct 2022 20:33)  HR: 75 (25 Oct 2022 05:33) (68 - 80)  BP: 151/71 (25 Oct 2022 05:33) (129/76 - 151/71)  BP(mean): --  RR: 18 (25 Oct 2022 05:33) (18 - 19)  SpO2: --    10-24-22 @ 07:01  -  10-25-22 @ 07:00  --------------------------------------------------------  IN: 1970.4 mL / OUT: 1525 mL / NET: 445.4 mL      PHYSICAL EXAM:  GENERAL: Awake, alert; NAD.  HEENT: Head NC/AT; Conjunctivae pink, Sclera anicteric; Oral mucosa moist.  CARDIO: Regular rate; Regular rhythm; S1 & S2.  RESP: No rales, wheezing, or rhonchi appreciated.  GI: Soft; NT/ND; BS; No guarding; No rebound tenderness.  EXT: Strength UE 5/5; Strength LE 5/5; No edema.   SKIN: Intact, no rashes, no erythema    LAB RESULTS:                        11.9   7.87  )-----------( 166      ( 25 Oct 2022 07:06 )             36.6     10-25    138  |  102  |  12  ----------------------------<  229<H>  4.3   |  27  |  0.6<L>    Ca    7.9<L>      25 Oct 2022 07:06  Phos  2.6     10-25  Mg     1.5     10-25    TPro  5.0<L>  /  Alb  3.0<L>  /  TBili  0.9  /  DBili  x   /  AST  11  /  ALT  10  /  AlkPhos  61  10-25    PT/INR - ( 25 Oct 2022 07:06 )   PT: 21.00 sec;   INR: 1.81 ratio         PTT - ( 24 Oct 2022 19:46 )  PTT:26.3 sec            MICROBIOLOGY:    RADIOLOGY:    ALLERGIES:  No Known Allergies    MEDICATIONS:  dextrose 5%. 1000 milliLiter(s) IV Continuous <Continuous>  dextrose 5%. 1000 milliLiter(s) IV Continuous <Continuous>  dextrose 50% Injectable 25 Gram(s) IV Push once  dextrose 50% Injectable 12.5 Gram(s) IV Push once  dextrose 50% Injectable 25 Gram(s) IV Push once  dextrose Oral Gel 15 Gram(s) Oral once PRN  fat emulsion (Fish Oil and Plant Based) 20% Infusion 1.0753 Gm/kG/Day IV Continuous <Continuous>  furosemide    Tablet 20 milliGRAM(s) Oral daily  glucagon  Injectable 1 milliGRAM(s) IntraMuscular once  insulin lispro (ADMELOG) corrective regimen sliding scale   SubCutaneous three times a day before meals  labetalol 200 milliGRAM(s) Oral two times a day  lactated ringers. 1000 milliLiter(s) IV Continuous <Continuous>  losartan 100 milliGRAM(s) Oral daily  magnesium sulfate  IVPB 2 Gram(s) IV Intermittent every 4 hours  morphine  - Injectable 2 milliGRAM(s) IV Push every 4 hours PRN  ondansetron    Tablet 8 milliGRAM(s) Oral every 8 hours PRN  Parenteral Nutrition - Adult 1 Each TPN Continuous <Continuous>  phytonadione  IVPB 5 milliGRAM(s) IV Intermittent once  piperacillin/tazobactam IVPB.. 3.375 Gram(s) IV Intermittent every 8 hours  simvastatin 40 milliGRAM(s) Oral at bedtime  tamsulosin 0.4 milliGRAM(s) Oral at bedtime    ===========================================================

## 2022-10-25 NOTE — PROGRESS NOTE ADULT - SUBJECTIVE AND OBJECTIVE BOX
GENERAL SURGERY PROGRESS NOTE    Patient: MERARY GARCIA , 72y ([birth year])Male   MRN: [id number]  Location: [address]  Visit: 10-19-22 Inpatient  Date: 10-25-22 @ 02:15      Events of past 24 hours: No acute events overnight. Patient is pre op for perc yefri     PAST MEDICAL & SURGICAL HISTORY:  Hypertension, unspecified type      Type 2 diabetes mellitus with complication, unspecified long term insulin use status      High cholesterol      Kidney stones      MI (myocardial infarction)      DVT (deep venous thrombosis)      Acute massive pulmonary embolism  s/p tPA in 2018      Carotid atherosclerosis      H/O heart surgery  quadruple bypass 6 years ago      H/O cystoscopy          Vitals:   T(F): 98.1 (10-24-22 @ 20:33), Max: 98.1 (10-24-22 @ 20:33)  HR: 68 (10-24-22 @ 20:33)  BP: 139/76 (10-24-22 @ 20:33)  RR: 19 (10-24-22 @ 20:33)  SpO2: --      Diet, NPO:   Except Medications      Fluids: lactated ringers.: Solution, 1000 milliLiter(s) infuse at 125 mL/Hr  Special Instructions: Please start at midnight for patient pending procedure      I & O's:    10-23-22 @ 07:01  -  10-24-22 @ 07:00  --------------------------------------------------------  IN:    Fat Emulsion (Fish Oil &amp; Plant Based) 20% Infusion: 345.4 mL    IV PiggyBack: 150 mL    PPN (Peripheral Parenteral Nutrition): 715 mL  Total IN: 1210.4 mL    OUT:    Voided (mL): 300 mL  Total OUT: 300 mL    Total NET: 910.4 mL        Bowel Movement: : [] YES [] NO  Flatus: : [] YES [] NO    PHYSICAL EXAM:  General: NAD, AAOx3, calm and cooperative  HEENT: NCAT  Cardiac: No peripheral cyanosis or pallor, extremities well perfused   Respiratory: Non-labored breathing, equal chest rise bilaterally   Abdomen: Soft, non-distended, non-tender, no rebound, no guarding  Musculoskeletal: Strength 5/5 BL UE/LE, ROM intact, compartments soft  Skin: Warm/dry, normal color, no jaundice  Incision/wound: healing well, dressings in place, clean, dry and intact        MEDICATIONS  (STANDING):  dextrose 5%. 1000 milliLiter(s) (50 mL/Hr) IV Continuous <Continuous>  dextrose 5%. 1000 milliLiter(s) (100 mL/Hr) IV Continuous <Continuous>  dextrose 50% Injectable 25 Gram(s) IV Push once  dextrose 50% Injectable 12.5 Gram(s) IV Push once  dextrose 50% Injectable 25 Gram(s) IV Push once  fat emulsion (Fish Oil and Plant Based) 20% Infusion 1.0753 Gm/kG/Day (31.3 mL/Hr) IV Continuous <Continuous>  furosemide    Tablet 20 milliGRAM(s) Oral daily  glucagon  Injectable 1 milliGRAM(s) IntraMuscular once  insulin lispro (ADMELOG) corrective regimen sliding scale   SubCutaneous three times a day before meals  labetalol 200 milliGRAM(s) Oral two times a day  lactated ringers. 1000 milliLiter(s) (125 mL/Hr) IV Continuous <Continuous>  losartan 100 milliGRAM(s) Oral daily  Parenteral Nutrition - Adult 1 Each (65 mL/Hr) TPN Continuous <Continuous>  piperacillin/tazobactam IVPB.. 3.375 Gram(s) IV Intermittent every 8 hours  simvastatin 40 milliGRAM(s) Oral at bedtime  tamsulosin 0.4 milliGRAM(s) Oral at bedtime    MEDICATIONS  (PRN):  dextrose Oral Gel 15 Gram(s) Oral once PRN Blood Glucose LESS THAN 70 milliGRAM(s)/deciliter  morphine  - Injectable 2 milliGRAM(s) IV Push every 4 hours PRN Moderate Pain (4 - 6)  ondansetron    Tablet 8 milliGRAM(s) Oral every 8 hours PRN Nausea and/or Vomiting      DVT PROPHYLAXIS:   GI PROPHYLAXIS:   ANTICOAGULATION:   ANTIBIOTICS:  piperacillin/tazobactam IVPB.. 3.375 Gram(s)            LAB/STUDIES:  Labs:  CAPILLARY BLOOD GLUCOSE      POCT Blood Glucose.: 223 mg/dL (24 Oct 2022 21:51)  POCT Blood Glucose.: 259 mg/dL (24 Oct 2022 16:15)  POCT Blood Glucose.: 276 mg/dL (24 Oct 2022 11:38)  POCT Blood Glucose.: 259 mg/dL (24 Oct 2022 08:21)                          11.5   8.17  )-----------( 164      ( 24 Oct 2022 19:46 )             36.4       Auto Neutrophil %: 72.6 % (10-24-22 @ 19:46)  Auto Immature Granulocyte %: 1.2 % (10-24-22 @ 19:46)  Auto Neutrophil %: 75.6 % (10-24-22 @ 08:32)  Auto Immature Granulocyte %: 0.7 % (10-24-22 @ 08:32)    10-24    140  |  102  |  15  ----------------------------<  248<H>  4.4   |  29  |  0.6<L>      Calcium, Total Serum: 7.9 mg/dL (10-24-22 @ 19:46)      LFTs:             5.0  | 1.0  | 11       ------------------[60      ( 24 Oct 2022 19:46 )  3.0  | x    | 11          Lipase:x      Amylase:x             Coags:     21.30  ----< 1.83    ( 24 Oct 2022 19:46 )     26.3                            IMAGING:

## 2022-10-25 NOTE — PROGRESS NOTE ADULT - SUBJECTIVE AND OBJECTIVE BOX
Interventional Radiology Follow- Up Note    HPI:  Patient is a 71-yo male with pmhx of htn, hld, DM2, MI s/p CABG, DVT/PE on Xarelto, recurrent kidney stones who presented with a chief complaint of abdominal pain and high blood pressure x 1 day. The pain started yesterday afternoon as a "horrible discomfort". It is hypogastric in anture, diffuse, encircling to patient's low back. It is constant, was a 10/10 prior to pain meds (now 7/10), no exacerbating factors. Patient states he takes his bp daily and usually runs around 155/65, but found his systolic in the 200s so decided to come to hospital. Patient denies other systemic sxs, including fever, n/v/d, cp, sob, chills, dysuria, hematuria, hematochezia, joint pains or recent sick contacts. Patient says the pain is similar to what he experienced with his kidney stones but that the pain was located flanks during those incidences.     Vitals: T(F): 97.6 (10-25-22 @ 05:33), Max: 98.1 (10-24-22 @ 20:33)  HR: 75 (10-25-22 @ 05:33) (68 - 80)  BP: 151/71 (10-25-22 @ 05:33) (129/76 - 151/71)  RR: 18 (10-25-22 @ 05:33) (18 - 19)  SpO2: --  Wt(kg): --    LABS:                        11.5   8.17  )-----------( 164      ( 24 Oct 2022 19:46 )             36.4     10-24    140  |  102  |  15  ----------------------------<  248<H>  4.4   |  29  |  0.6<L>    Ca    7.9<L>      24 Oct 2022 19:46  Phos  2.5     10-24  Mg     1.6     10-24    TPro  5.0<L>  /  Alb  3.0<L>  /  TBili  1.0  /  DBili  x   /  AST  11  /  ALT  11  /  AlkPhos  60  10-24    PT/INR - ( 24 Oct 2022 19:46 )   PT: 21.30 sec;   INR: 1.83 ratio         PTT - ( 24 Oct 2022 19:46 )  PTT:26.3 sec    Impression: 72y Male admitted with ABDOMINAL PAIN; CHOLECYSTITIS    Assessment/Plan:   70 y/o M w/ PMH of HLD, HTN, MI, DM, CAD s/p CABG, previous DVT/PE presents to ED with complaints of abdominal pain in the bilateral flanks radiating across the lower abdomen as well as hypertension. Imaging findings suggestive of cholecystitis. IR consulted for percutaneous cholecystostomy placement, planed for procedure 10/25. INR: 18.2 which is unsafe to proceed with procedure as procedure has a high bleeding risk.  - plan to reschedule procedure to tomorrow pending the following:  - correction of INR to < 1.5   - keep NPO after midnight tonight   - continue to hold Lovenox and Aspirin until after procedure   - COVID negative 10/23   - requesting team made aware     Please call Interventional Radiology [phone] with any questions, concerns, or issues regarding above.

## 2022-10-25 NOTE — PROGRESS NOTE ADULT - SUBJECTIVE AND OBJECTIVE BOX
SUBJ:No chest pain or shortness of breath      MEDICATIONS  (STANDING):  dextrose 5%. 1000 milliLiter(s) (50 mL/Hr) IV Continuous <Continuous>  dextrose 5%. 1000 milliLiter(s) (100 mL/Hr) IV Continuous <Continuous>  dextrose 50% Injectable 25 Gram(s) IV Push once  dextrose 50% Injectable 12.5 Gram(s) IV Push once  dextrose 50% Injectable 25 Gram(s) IV Push once  fat emulsion (Fish Oil and Plant Based) 20% Infusion 1.0753 Gm/kG/Day (31.3 mL/Hr) IV Continuous <Continuous>  furosemide    Tablet 20 milliGRAM(s) Oral daily  glucagon  Injectable 1 milliGRAM(s) IntraMuscular once  insulin lispro (ADMELOG) corrective regimen sliding scale   SubCutaneous three times a day before meals  labetalol 200 milliGRAM(s) Oral two times a day  lactated ringers. 1000 milliLiter(s) (125 mL/Hr) IV Continuous <Continuous>  losartan 100 milliGRAM(s) Oral daily  magnesium oxide 400 milliGRAM(s) Oral three times a day with meals  Parenteral Nutrition - Adult 1 Each (65 mL/Hr) TPN Continuous <Continuous>  Parenteral Nutrition - Adult 1 Each (65 mL/Hr) TPN Continuous <Continuous>  phytonadione   Solution 5 milliGRAM(s) Oral once  piperacillin/tazobactam IVPB.. 3.375 Gram(s) IV Intermittent every 8 hours  simvastatin 40 milliGRAM(s) Oral at bedtime  tamsulosin 0.4 milliGRAM(s) Oral at bedtime    MEDICATIONS  (PRN):  dextrose Oral Gel 15 Gram(s) Oral once PRN Blood Glucose LESS THAN 70 milliGRAM(s)/deciliter  morphine  - Injectable 2 milliGRAM(s) IV Push every 4 hours PRN Moderate Pain (4 - 6)  ondansetron    Tablet 8 milliGRAM(s) Oral every 8 hours PRN Nausea and/or Vomiting            Vital Signs Last 24 Hrs  T(C): 36.7 (25 Oct 2022 20:29), Max: 36.7 (25 Oct 2022 20:29)  T(F): 98.1 (25 Oct 2022 20:29), Max: 98.1 (25 Oct 2022 20:29)  HR: 65 (25 Oct 2022 20:29) (65 - 75)  BP: 175/82 (25 Oct 2022 20:29) (118/57 - 189/81)  BP(mean): --  RR: 18 (25 Oct 2022 20:29) (18 - 18)  SpO2: --          ECG:NML    TTE:    LABS:                        11.9   7.87  )-----------( 166      ( 25 Oct 2022 07:06 )             36.6     10-25    138  |  102  |  12  ----------------------------<  229<H>  4.3   |  27  |  0.6<L>    Ca    7.9<L>      25 Oct 2022 07:06  Phos  2.6     10-25  Mg     1.5     10-25    TPro  5.0<L>  /  Alb  3.0<L>  /  TBili  0.9  /  DBili  x   /  AST  11  /  ALT  10  /  AlkPhos  61  10-25        PT/INR - ( 25 Oct 2022 18:03 )   PT: 19.50 sec;   INR: 1.68 ratio         PTT - ( 25 Oct 2022 18:03 )  PTT:28.0 sec    I&O's Summary    24 Oct 2022 07:01  -  25 Oct 2022 07:00  --------------------------------------------------------  IN: 1970.4 mL / OUT: 1525 mL / NET: 445.4 mL    25 Oct 2022 07:01  -  25 Oct 2022 20:36  --------------------------------------------------------  IN: 0 mL / OUT: 725 mL / NET: -725 mL      BNP

## 2022-10-25 NOTE — PROGRESS NOTE ADULT - SUBJECTIVE AND OBJECTIVE BOX
GARCIAMERARY DAY  72y  Male  HPI:  Patient is a 71-yo male with pmhx of htn, hld, DM2, MI s/p CABG, DVT/PE on Xarelto, recurrent kidney stones who presented with a chief complaint of abdominal pain and high blood pressure x 1 day. The pain started yesterday afternoon as a "horrible discomfort". It is hypogastric in anture, diffuse, encircling to patient's low back. It is constant, was a 10/10 prior to pain meds (now 7/10), no exacerbating factors. Patient states he takes his bp daily and usually runs around 155/65, but found his systolic in the 200s so decided to come to hospital. Patient denies other systemic sxs, including fever, n/v/d, cp, sob, chills, dysuria, hematuria, hematochezia, joint pains or recent sick contacts. Patient says the pain is similar to what he experienced with his kidney stones but that the pain was located flanks during those incidences.     ED Course:  Vitals- bp 229/95, hr 68, rr 20, t 35.6, 95% on RA  Labs- wbc wnl, Hb 13.9, K 5.1 (hemolyzed), glucose 241, TBili 1.8  EKG- Normal sinus rhythm, Left ventricular hypertrophy with QRS widening and repolarization abnormality ( R in aVL , Shimon product , Romhilt-Bowen ), Inferior infarct , age undetermined  BP had 20 point difference between arms and there was a concern for dissection. A CT angio obtained was negative for dissection, but did show gallstones and enlarged gallbladder with a stone at the neck. An US of RUQ was ordered to further delineate if pain is due to biliary pathology. US shows a right renal calculi measuring 1.2 cm and gallbladder edema. Pt states he has never been told he has gallstones before.    Patient has received total of labetalol 20 IVP/300 PO, nifedipine 30 PO, and morphine 10mg IVP.    He is being admitted for further management.   (19 Oct 2022 08:09)    MEDICATIONS  (STANDING):  dextrose 5%. 1000 milliLiter(s) (50 mL/Hr) IV Continuous <Continuous>  dextrose 5%. 1000 milliLiter(s) (100 mL/Hr) IV Continuous <Continuous>  dextrose 50% Injectable 25 Gram(s) IV Push once  dextrose 50% Injectable 12.5 Gram(s) IV Push once  dextrose 50% Injectable 25 Gram(s) IV Push once  fat emulsion (Fish Oil and Plant Based) 20% Infusion 1.0753 Gm/kG/Day (31.3 mL/Hr) IV Continuous <Continuous>  furosemide    Tablet 20 milliGRAM(s) Oral daily  glucagon  Injectable 1 milliGRAM(s) IntraMuscular once  insulin lispro (ADMELOG) corrective regimen sliding scale   SubCutaneous three times a day before meals  labetalol 200 milliGRAM(s) Oral two times a day  lactated ringers. 1000 milliLiter(s) (125 mL/Hr) IV Continuous <Continuous>  losartan 100 milliGRAM(s) Oral daily  magnesium oxide 400 milliGRAM(s) Oral three times a day with meals  Parenteral Nutrition - Adult 1 Each (65 mL/Hr) TPN Continuous <Continuous>  Parenteral Nutrition - Adult 1 Each (65 mL/Hr) TPN Continuous <Continuous>  piperacillin/tazobactam IVPB.. 3.375 Gram(s) IV Intermittent every 8 hours  simvastatin 40 milliGRAM(s) Oral at bedtime  tamsulosin 0.4 milliGRAM(s) Oral at bedtime    MEDICATIONS  (PRN):  dextrose Oral Gel 15 Gram(s) Oral once PRN Blood Glucose LESS THAN 70 milliGRAM(s)/deciliter  morphine  - Injectable 2 milliGRAM(s) IV Push every 4 hours PRN Moderate Pain (4 - 6)  ondansetron    Tablet 8 milliGRAM(s) Oral every 8 hours PRN Nausea and/or Vomiting    INTERVAL EVENTS: Patient seen today without distress, upset about the delay in the procedure    T(C): 36.7 (10-25-22 @ 20:29), Max: 36.7 (10-25-22 @ 20:29)  HR: 65 (10-25-22 @ 20:29) (65 - 75)  BP: 175/82 (10-25-22 @ 20:29) (118/57 - 189/81)  RR: 18 (10-25-22 @ 20:29) (18 - 18)  SpO2: --  Wt(kg): --Vital Signs Last 24 Hrs  T(C): 36.7 (25 Oct 2022 20:29), Max: 36.7 (25 Oct 2022 20:29)  T(F): 98.1 (25 Oct 2022 20:29), Max: 98.1 (25 Oct 2022 20:29)  HR: 65 (25 Oct 2022 20:29) (65 - 75)  BP: 175/82 (25 Oct 2022 20:29) (118/57 - 189/81)  BP(mean): --  RR: 18 (25 Oct 2022 20:29) (18 - 18)  SpO2: --        PHYSICAL EXAM:  GENERAL: NAD  NECK: Supple, No JVD  CHEST/LUNG: Clear  HEART: S1, S2, Regular   ABDOMEN: Soft, Nondistended; Bowel sounds present  EXTREMITIES: Decreased edema    LABS:                          11.9   7.87  )-----------( 166      ( 25 Oct 2022 07:06 )             36.6             10-25    138  |  102  |  12  ----------------------------<  229<H>  4.3   |  27  |  0.6<L>    Ca    7.9<L>      25 Oct 2022 07:06  Phos  2.6     10-25  Mg     1.5     10-25    TPro  5.0<L>  /  Alb  3.0<L>  /  TBili  0.9  /  DBili  x   /  AST  11  /  ALT  10  /  AlkPhos  61  10-25    LIVER FUNCTIONS - ( 25 Oct 2022 07:06 )  Alb: 3.0 g/dL / Pro: 5.0 g/dL / ALK PHOS: 61 U/L / ALT: 10 U/L / AST: 11 U/L / GGT: x                   PT/INR - ( 25 Oct 2022 18:03 )   PT: 19.50 sec;   INR: 1.68 ratio         PTT - ( 25 Oct 2022 18:03 )  PTT:28.0 sec            RADIOLOGY & ADDITIONAL TESTS:

## 2022-10-25 NOTE — PROGRESS NOTE ADULT - ATTENDING COMMENTS
above noted discussed case with DR Nicholson , surgical resident abdomen soft ct scan and sonogram noted needs HIDA scan labs noted
above noted discussed case with surgical resident abdomen soft mild tenderness RUQ no distension medical follow up and labs noted awaiting drainage of GB by IR
above noted discussed case with surgical resident and pt abdomen soft tender RUQ no distension hida scan noted needs drainage of GB by IR
above noted discussed case with surgical resident abdomen soft mild tenderness RUQ labs and medical follow up noted for GB drainage by IR
above noted discussed case with surgical resident and pt abdomen soft no distension tender RUQ on deep palpation for GB drainage today
above noted discussed case with surgical resident abdomen soft mild tenderness RUQ no distension labs noted awaiting GB drainage by IR
above noted discussed case with DR Nicholson, surgical resident, pt and pt wife, abdomen soft mild tenderness RUQ no distension labs and medical follow up noted for drainage of GB in am

## 2022-10-25 NOTE — PROGRESS NOTE ADULT - ASSESSMENT
ASSESSMENT:  72 y/o M w/ pmhx of HLD, HTN, MI, DM, CAD s/p CABG, previous DVT/PE on Xarelto with imaging findings concerning for cholecystitis. Patient scheduled for percutaneous cholecystostomy tube with interventional radiology on Tuesday.       PLAN:  - Care as per primary team   - perc yefri with IR today   - Pain control   - Monitor vitals   - Monitor labs   - Strict Is and Os      GREEN TEAM SPECTRA: [id number]

## 2022-10-26 LAB
ANION GAP SERPL CALC-SCNC: 11 MMOL/L — SIGNIFICANT CHANGE UP (ref 7–14)
APTT BLD: 118.5 SEC — CRITICAL HIGH (ref 27–39.2)
APTT BLD: 28.6 SEC — SIGNIFICANT CHANGE UP (ref 27–39.2)
APTT BLD: 28.8 SEC — SIGNIFICANT CHANGE UP (ref 27–39.2)
BUN SERPL-MCNC: 10 MG/DL — SIGNIFICANT CHANGE UP (ref 10–20)
CALCIUM SERPL-MCNC: 8.3 MG/DL — LOW (ref 8.4–10.5)
CHLORIDE SERPL-SCNC: 99 MMOL/L — SIGNIFICANT CHANGE UP (ref 98–110)
CO2 SERPL-SCNC: 27 MMOL/L — SIGNIFICANT CHANGE UP (ref 17–32)
CREAT SERPL-MCNC: 0.6 MG/DL — LOW (ref 0.7–1.5)
EGFR: 104 ML/MIN/1.73M2 — SIGNIFICANT CHANGE UP
GLUCOSE BLDC GLUCOMTR-MCNC: 252 MG/DL — HIGH (ref 70–99)
GLUCOSE BLDC GLUCOMTR-MCNC: 259 MG/DL — HIGH (ref 70–99)
GLUCOSE BLDC GLUCOMTR-MCNC: 264 MG/DL — HIGH (ref 70–99)
GLUCOSE SERPL-MCNC: 281 MG/DL — HIGH (ref 70–99)
INR BLD: 1.78 RATIO — HIGH (ref 0.65–1.3)
INR BLD: 1.85 RATIO — HIGH (ref 0.65–1.3)
INR BLD: 1.9 RATIO — HIGH (ref 0.65–1.3)
MAGNESIUM SERPL-MCNC: 1.6 MG/DL — LOW (ref 1.8–2.4)
PHOSPHATE SERPL-MCNC: 3 MG/DL — SIGNIFICANT CHANGE UP (ref 2.1–4.9)
POTASSIUM SERPL-MCNC: 4.3 MMOL/L — SIGNIFICANT CHANGE UP (ref 3.5–5)
POTASSIUM SERPL-SCNC: 4.3 MMOL/L — SIGNIFICANT CHANGE UP (ref 3.5–5)
PROTHROM AB SERPL-ACNC: 20.7 SEC — HIGH (ref 9.95–12.87)
PROTHROM AB SERPL-ACNC: 21.5 SEC — HIGH (ref 9.95–12.87)
PROTHROM AB SERPL-ACNC: 22.1 SEC — HIGH (ref 9.95–12.87)
SARS-COV-2 RNA SPEC QL NAA+PROBE: SIGNIFICANT CHANGE UP
SODIUM SERPL-SCNC: 137 MMOL/L — SIGNIFICANT CHANGE UP (ref 135–146)
TROPONIN T SERPL-MCNC: <0.01 NG/ML — SIGNIFICANT CHANGE UP

## 2022-10-26 RX ORDER — MAGNESIUM SULFATE 500 MG/ML
2 VIAL (ML) INJECTION ONCE
Refills: 0 | Status: COMPLETED | OUTPATIENT
Start: 2022-10-26 | End: 2022-10-26

## 2022-10-26 RX ORDER — HEPARIN SODIUM 5000 [USP'U]/ML
INJECTION INTRAVENOUS; SUBCUTANEOUS
Qty: 25000 | Refills: 0 | Status: DISCONTINUED | OUTPATIENT
Start: 2022-10-26 | End: 2022-10-27

## 2022-10-26 RX ORDER — REGADENOSON 0.08 MG/ML
0.4 INJECTION, SOLUTION INTRAVENOUS ONCE
Refills: 0 | Status: DISCONTINUED | OUTPATIENT
Start: 2022-10-26 | End: 2022-10-30

## 2022-10-26 RX ORDER — PHYTONADIONE (VIT K1) 5 MG
10 TABLET ORAL ONCE
Refills: 0 | Status: DISCONTINUED | OUTPATIENT
Start: 2022-10-26 | End: 2022-10-26

## 2022-10-26 RX ORDER — ELECTROLYTE SOLUTION,INJ
1 VIAL (ML) INTRAVENOUS
Refills: 0 | Status: DISCONTINUED | OUTPATIENT
Start: 2022-10-26 | End: 2022-10-27

## 2022-10-26 RX ORDER — HEPARIN SODIUM 5000 [USP'U]/ML
7500 INJECTION INTRAVENOUS; SUBCUTANEOUS ONCE
Refills: 0 | Status: COMPLETED | OUTPATIENT
Start: 2022-10-26 | End: 2022-10-26

## 2022-10-26 RX ORDER — HEPARIN SODIUM 5000 [USP'U]/ML
7500 INJECTION INTRAVENOUS; SUBCUTANEOUS EVERY 6 HOURS
Refills: 0 | Status: DISCONTINUED | OUTPATIENT
Start: 2022-10-26 | End: 2022-10-27

## 2022-10-26 RX ORDER — I.V. FAT EMULSION 20 G/100ML
1.08 EMULSION INTRAVENOUS
Qty: 100 | Refills: 0 | Status: DISCONTINUED | OUTPATIENT
Start: 2022-10-26 | End: 2022-10-27

## 2022-10-26 RX ORDER — HEPARIN SODIUM 5000 [USP'U]/ML
3500 INJECTION INTRAVENOUS; SUBCUTANEOUS EVERY 6 HOURS
Refills: 0 | Status: DISCONTINUED | OUTPATIENT
Start: 2022-10-26 | End: 2022-10-27

## 2022-10-26 RX ORDER — PHYTONADIONE (VIT K1) 5 MG
5 TABLET ORAL ONCE
Refills: 0 | Status: DISCONTINUED | OUTPATIENT
Start: 2022-10-26 | End: 2022-10-26

## 2022-10-26 RX ADMIN — Medication 1 EACH: at 20:23

## 2022-10-26 RX ADMIN — Medication 3: at 17:22

## 2022-10-26 RX ADMIN — MAGNESIUM OXIDE 400 MG ORAL TABLET 400 MILLIGRAM(S): 241.3 TABLET ORAL at 17:22

## 2022-10-26 RX ADMIN — MAGNESIUM OXIDE 400 MG ORAL TABLET 400 MILLIGRAM(S): 241.3 TABLET ORAL at 13:39

## 2022-10-26 RX ADMIN — MAGNESIUM OXIDE 400 MG ORAL TABLET 400 MILLIGRAM(S): 241.3 TABLET ORAL at 07:45

## 2022-10-26 RX ADMIN — PIPERACILLIN AND TAZOBACTAM 25 GRAM(S): 4; .5 INJECTION, POWDER, LYOPHILIZED, FOR SOLUTION INTRAVENOUS at 21:12

## 2022-10-26 RX ADMIN — Medication 65 EACH: at 20:33

## 2022-10-26 RX ADMIN — Medication 25 GRAM(S): at 15:54

## 2022-10-26 RX ADMIN — HEPARIN SODIUM 1500 UNIT(S)/HR: 5000 INJECTION INTRAVENOUS; SUBCUTANEOUS at 20:24

## 2022-10-26 RX ADMIN — PIPERACILLIN AND TAZOBACTAM 25 GRAM(S): 4; .5 INJECTION, POWDER, LYOPHILIZED, FOR SOLUTION INTRAVENOUS at 05:26

## 2022-10-26 RX ADMIN — I.V. FAT EMULSION 31.3 GM/KG/DAY: 20 EMULSION INTRAVENOUS at 00:08

## 2022-10-26 RX ADMIN — Medication 3: at 07:55

## 2022-10-26 RX ADMIN — LOSARTAN POTASSIUM 100 MILLIGRAM(S): 100 TABLET, FILM COATED ORAL at 05:38

## 2022-10-26 RX ADMIN — Medication 20 MILLIGRAM(S): at 05:24

## 2022-10-26 RX ADMIN — I.V. FAT EMULSION 31.3 GM/KG/DAY: 20 EMULSION INTRAVENOUS at 20:23

## 2022-10-26 RX ADMIN — PIPERACILLIN AND TAZOBACTAM 25 GRAM(S): 4; .5 INJECTION, POWDER, LYOPHILIZED, FOR SOLUTION INTRAVENOUS at 15:56

## 2022-10-26 RX ADMIN — Medication 200 MILLIGRAM(S): at 05:25

## 2022-10-26 RX ADMIN — SODIUM CHLORIDE 125 MILLILITER(S): 9 INJECTION, SOLUTION INTRAVENOUS at 20:24

## 2022-10-26 RX ADMIN — HEPARIN SODIUM 1700 UNIT(S)/HR: 5000 INJECTION INTRAVENOUS; SUBCUTANEOUS at 15:47

## 2022-10-26 RX ADMIN — TAMSULOSIN HYDROCHLORIDE 0.4 MILLIGRAM(S): 0.4 CAPSULE ORAL at 21:12

## 2022-10-26 RX ADMIN — HEPARIN SODIUM 7500 UNIT(S): 5000 INJECTION INTRAVENOUS; SUBCUTANEOUS at 15:47

## 2022-10-26 RX ADMIN — Medication 200 MILLIGRAM(S): at 17:22

## 2022-10-26 RX ADMIN — SIMVASTATIN 40 MILLIGRAM(S): 20 TABLET, FILM COATED ORAL at 21:12

## 2022-10-26 NOTE — PROGRESS NOTE ADULT - SUBJECTIVE AND OBJECTIVE BOX
Interventional Radiology Follow- Up Note    HPI:  Patient is a 71-yo male with pmhx of htn, hld, DM2, MI s/p CABG, DVT/PE on Xarelto, recurrent kidney stones who presented with a chief complaint of abdominal pain and high blood pressure x 1 day. The pain started yesterday afternoon as a "horrible discomfort". It is hypogastric in anture, diffuse, encircling to patient's low back. It is constant, was a 10/10 prior to pain meds (now 7/10), no exacerbating factors. Patient states he takes his bp daily and usually runs around 155/65, but found his systolic in the 200s so decided to come to hospital. Patient denies other systemic sxs, including fever, n/v/d, cp, sob, chills, dysuria, hematuria, hematochezia, joint pains or recent sick contacts. Patient says the pain is similar to what he experienced with his kidney stones but that the pain was located flanks during those incidences.     Vitals: T(F): 96.6 (10-26-22 @ 12:14), Max: 98.1 (10-25-22 @ 20:29)  HR: 78 (10-26-22 @ 12:14) (65 - 82)  BP: 95/53 (10-26-22 @ 12:14) (95/53 - 189/81)  RR: 18 (10-26-22 @ 12:14) (18 - 18)  SpO2: --  Wt(kg): --    LABS:                        11.9   7.87  )-----------( 166      ( 25 Oct 2022 07:06 )             36.6     10-26    137  |  99  |  10  ----------------------------<  281<H>  4.3   |  27  |  0.6<L>    Ca    8.3<L>      26 Oct 2022 07:49  Phos  3.0     10-26  Mg     1.6     10-26    TPro  5.0<L>  /  Alb  3.0<L>  /  TBili  0.9  /  DBili  x   /  AST  11  /  ALT  10  /  AlkPhos  61  10-25    PT/INR - ( 26 Oct 2022 09:00 )   PT: 22.10 sec;   INR: 1.90 ratio         PTT - ( 26 Oct 2022 09:00 )  PTT:28.8 sec    PHYSICAL EXAM:  General: Nontoxic, in NAD  Neuro:  Alert & oriented x 3  Abdomen: soft, NTND      Impression: 72y Male admitted with ABDOMINAL PAIN; CHOLECYSTITIS    Assessment/Plan:   70 y/o M w/ PMH of HLD, HTN, MI, DM, CAD s/p CABG, previous DVT/PE presents to ED with complaints of abdominal pain in the bilateral flanks radiating across the lower abdomen as well as hypertension. Imaging findings suggestive of cholecystitis. IR consulted for percutaneous cholecystostomy placement, planed for procedure 10/25. INR: 18.2 which is unsafe to proceed with procedure as procedure has a high bleeding risk. Planned to reschedule procedure to today pending correction of INR. As per resident Vitamin K administered x 2, INR now 1.9. 2u FFP ordered, first hung ~11:45am. Patient seen at bedside, resting comfortably, afebrile, no WBC, no tachycardia. Patient denies N/V/D, abdominal pain, fever, or chills. Abdomen examined, soft, non-tender, no pain elicited from patient, negative [place] sign. Patient expressed concerns of not knowing plan. Inquired about getting a nuclear stress test as inpatient and discharge follow up with surgery for GB removal.   - due to patient HD stability, procedure is not emergently needed   - procedure still pending correction of INR before proceeding  - once INR is >1.5 can proceed with percutaneous cholecystitis   - will follow up with team post 1u FFP administration / repeat INR  - will likely reschedule for tomorrow 10/27  - keep NPO after midnight tonight   - patient now on heparin drip, will have to be held prior   - COVID negative 10/23      Please call Interventional Radiology [phone] with any questions, concerns, or issues regarding above.

## 2022-10-26 NOTE — CHART NOTE - NSCHARTNOTEFT_GEN_A_CORE
NUTRITION SUPPORT TEAM  -  PROGRESS NOTE     Interval Events:    Remains NPO. IR procedure on hold until INR <1.5, planning for today pending INR  PPN infusing day#5  Hyperglycemia on minimal dextrose (1L D5% in PPN daily)    REVIEW OF SYSTEMS:  Negative except as noted above.     VITALS:  T(F): 97.1 (10-26 @ 05:52), Max: 98.1 (10-25 @ 20:29)  HR: 82 (10-26 @ 05:52) (65 - 82)  BP: 160/73 (10-26 @ 05:52) (160/73 - 175/82)  RR: 18 (10-25 @ 20:29) (18 - 18)  SpO2: --    HEIGHT/WEIGHT/BMI:   Height (cm): 185.4 (10-20), 185.4 (09-15)  Weight (kg): 93 (10-20), 90.7 (09-15)  BMI (kg/m2): 27.1 (10-20), 26.4 (09-15)    I/Os:     10-25-22 @ 07:01  -  10-26-22 @ 07:00  --------------------------------------------------------  IN:  Total IN: 0 mL    OUT:    Stool (mL): 1 mL    Voided (mL): 1975 mL  Total OUT: 1976 mL    Total NET: -1976 mL    MEDICATIONS:   dextrose 5%. 1000 milliLiter(s) IV Continuous <Continuous>  dextrose 5%. 1000 milliLiter(s) IV Continuous <Continuous>  dextrose 50% Injectable 25 Gram(s) IV Push once  dextrose 50% Injectable 12.5 Gram(s) IV Push once  dextrose 50% Injectable 25 Gram(s) IV Push once  dextrose Oral Gel 15 Gram(s) Oral once PRN  fat emulsion (Fish Oil and Plant Based) 20% Infusion 1.0753 Gm/kG/Day IV Continuous <Continuous>  furosemide    Tablet 20 milliGRAM(s) Oral daily  glucagon  Injectable 1 milliGRAM(s) IntraMuscular once  insulin lispro (ADMELOG) corrective regimen sliding scale   SubCutaneous three times a day before meals  labetalol 200 milliGRAM(s) Oral two times a day  lactated ringers. 1000 milliLiter(s) IV Continuous <Continuous>  losartan 100 milliGRAM(s) Oral daily  magnesium oxide 400 milliGRAM(s) Oral three times a day with meals  morphine  - Injectable 2 milliGRAM(s) IV Push every 4 hours PRN  ondansetron    Tablet 8 milliGRAM(s) Oral every 8 hours PRN  Parenteral Nutrition - Adult 1 Each TPN Continuous <Continuous>  piperacillin/tazobactam IVPB.. 3.375 Gram(s) IV Intermittent every 8 hours  simvastatin 40 milliGRAM(s) Oral at bedtime  tamsulosin 0.4 milliGRAM(s) Oral at bedtime      LABS:                         11.9   7.87  )-----------( 166      ( 25 Oct 2022 07:06 )             36.6     138  |  102  |  12  ----------------------------<  229<H>          (10-25-22 @ 07:06)  4.3   |  27  |  0.6<L>    Ca    7.9<L>          (10-25-22 @ 07:06)  Phos  2.6         (10-25-22 @ 07:06)  Mg     1.5         (10-25-22 @ 07:06)    TPro  5.0<L>  /  Alb  3.0<L>  /  TBili  0.9  /  DBili  x   /  AST  11  /  ALT  10  /  AlkPhos  61       10-25-22 @ 07:06    Triglycerides, Serum: 116 mg/dL (10-22 @ 05:50)  Vitamin D, 25-Hydroxy: 45 ng/mL (10-22 @ 05:50)  Vitamin B12, Serum: 1407 pg/mL (10-22 @ 05:50)    Blood Glucose (Past 24 hours):  252 mg/dL (10-26 @ 07:51)  240 mg/dL (10-25 @ 18:13)  250 mg/dL (10-25 @ 11:12)  232 mg/dL (10-25 @ 08:38)    DIET:   Diet, NPO after Midnight:      NPO Start Date: 25-Oct-2022,   NPO Start Time: 23:59 (10-25-22 @ 09:34) [Active]  Diet, NPO:   Except Medications (10-20-22 @ 06:48) [Active]    fat emulsion (Fish Oil and Plant Based) 20% Infusion 1.0753 Gm/kG/Day (31.3 mL/Hr) IV Continuous <Continuous>, 10-25-22 @ 20:00, 20:00, Stop order after: 16 Hours  Parenteral Nutrition - Adult 1 Each (65 mL/Hr) TPN Continuous <Continuous>, 10-25-22 @ 20:00, 20:00, Stop order after: 1 Days     ASSESSMENT  71M w/ PMH of HLD, HTN, MI, DM, CAD s/p CABG, previous DVT/PE on Xarelto presents for hypotension and kidney stones presents to ED with complaints of abdominal pain in the bilateral flanks radiating across the lower abdomen as well as hypertension. Initial vitals noted to have a systolic of 239. A CT showed gallstones and enlarged gallbladder with a stone at the neck.   An US of RUQ was ordered to further delineate if pain is due to biliary pathology. US shows a right renal calculi measuring 1.2 cm and gallbladder edema. On exam, pt complains of right flank pain. Physical exam findings, imaging, and labs as documented above.     - cholecystitis  - HTN urgency  - DM, A1c 7.2 with persistent hyperglycemia  - hypomagnesemia    PLAN  - cont PPN tonight (note that there is minimal dextrose in the PPN)  - glycemic control  - daily bmp, in phos, mg while on parenteral nutrition  - IR for perc  - will follow

## 2022-10-26 NOTE — PHYSICAL THERAPY INITIAL EVALUATION ADULT - GENERAL OBSERVATIONS, REHAB EVAL
Pt encountered OOB sitting in chair A & O x 3 in NAD, +IV, +PPN, no c/o pain and agreeable with PT. Pt BP 95/53, HR 77 in sitting. Pt performed sit/stand transfer with CGA. Pt unable to ambulate at this time secondary c/o dizziness while standing ~8-10 sec. Pt left seated in chair at this time without c/o feeling dizzy, GERARDO Nance made aware.

## 2022-10-26 NOTE — PROGRESS NOTE ADULT - SUBJECTIVE AND OBJECTIVE BOX
Chart reviewed, patient examined. Pertinent results reviewed.  SEE PRIOR COMPLETED NOTE  Case discussed with HO; specialist f/u reviewed  HD#8 (ED 10/18)  MERARY GARCIA    HPI:  Patient is a 72-yo male with pmhx of htn, hld, DM2, MI s/p CABG, DVT/PE on Xarelto, recurrent kidney stones who presented with a chief complaint of abdominal pain and high blood pressure x 1 day. The pain started yesterday afternoon as a "horrible discomfort". It is hypogastric in anture, diffuse, encircling to patient's low back. It is constant, was a 10/10 prior to pain meds (now 7/10), no exacerbating factors. Patient states he takes his bp daily and usually runs around 155/65, but found his systolic in the 200s so decided to come to hospital. Patient denies other systemic sxs, including fever, n/v/d, cp, sob, chills, dysuria, hematuria, hematochezia, joint pains or recent sick contacts. Patient says the pain is similar to what he experienced with his kidney stones but that the pain was located flanks during those episodes.  ED Course:  Vitals- bp 229/95, hr 68, rr 20, t 35.6, 95% on RA  Labs- wbc wnl, Hb 13.9, K 5.1 (hemolyzed), glucose 241, TBili 1.8   A CT angio obtained was negative for dissection, but did show gallstones and enlarged gallbladder with a stone at the neck. An US of RUQ was ordered to further delineate if pain is due to biliary pathology. US shows a right renal calculi measuring 1.2 cm and gallbladder edema. Pt states he has never been told he has gallstones before.    Patient has received total of labetalol 20 IVP/300 PO, nifedipine 30 PO, and morphine 10mg IVP.    He was admitted for further management. W/U has revealed + Acute Cholecystitis.   (19 Oct 2022 08:09)    INTERVAL EVENTS: Patient seen today without distress, brother at bedside.IR procedure was delayed 2/2 high INR yesterday.      MEDICATIONS  (STANDING):  dextrose 5%. 1000 milliLiter(s) (50 mL/Hr) IV Continuous <Continuous>  dextrose 5%. 1000 milliLiter(s) (100 mL/Hr) IV Continuous <Continuous>  dextrose 50% Injectable 25 Gram(s) IV Push once  dextrose 50% Injectable 12.5 Gram(s) IV Push once  dextrose 50% Injectable 25 Gram(s) IV Push once  fat emulsion (Fish Oil and Plant Based) 20% Infusion 1.0753 Gm/kG/Day (31.3 mL/Hr) IV Continuous <Continuous>  furosemide    Tablet 20 milliGRAM(s) Oral daily  glucagon  Injectable 1 milliGRAM(s) IntraMuscular once  insulin lispro (ADMELOG) corrective regimen sliding scale   SubCutaneous three times a day before meals  labetalol 200 milliGRAM(s) Oral two times a day  lactated ringers. 1000 milliLiter(s) (125 mL/Hr) IV Continuous <Continuous>  losartan 100 milliGRAM(s) Oral daily  magnesium oxide 400 milliGRAM(s) Oral three times a day with meals  Parenteral Nutrition - Adult 1 Each (65 mL/Hr) TPN Continuous <Continuous>  piperacillin/tazobactam IVPB.. 3.375 Gram(s) IV Intermittent every 8 hours  simvastatin 40 milliGRAM(s) Oral at bedtime  tamsulosin 0.4 milliGRAM(s) Oral at bedtime    MEDICATIONS  (PRN):  dextrose Oral Gel 15 Gram(s) Oral once PRN Blood Glucose LESS THAN 70 milliGRAM(s)/deciliter  morphine  - Injectable 2 milliGRAM(s) IV Push every 4 hours PRN Moderate Pain (4 - 6)  ondansetron    Tablet 8 milliGRAM(s) Oral every 8 hours PRN Nausea and/or Vomiting      Vital Signs Last 24 Hrs  T(C): 36.2 (26 Oct 2022 05:52), Max: 36.7 (25 Oct 2022 20:29)  T(F): 97.1 (26 Oct 2022 05:52), Max: 98.1 (25 Oct 2022 20:29)  HR: 82 (26 Oct 2022 05:52) (65 - 82)  BP: 160/73 (26 Oct 2022 05:52) (118/57 - 189/81)  BP(mean): --  RR: 18 (25 Oct 2022 20:29) (18 - 18)  SpO2: --        PHYSICAL EXAM:  GENERAL:  NAD; AAOx4  NECK: Supple, No JVD  CHEST/LUNG: Clear  HEART: RRR, no MRG  ABDOMEN: Soft, Nondistended; Bowel sounds present; NT  EXTREMITIES: No edema; L calf + TTP, no cord or erythema  SKIN: reddened face, peeling skin to the bridge of the nose    LABS:                            12.4   11.32 )-----------( 166      ( 23 Oct 2022 08:34 )             38.4                        12.5   13.17 )-----------( 143      ( 22 Oct 2022 05:50 )             39.0                      13.1   15.80 )-----------( 153      ( 21 Oct 2022 09:46 )             39.4     10-23    147<H>  |  107  |  17  ----------------------------<  273<H>  4.6   |  25  |  0.7    Ca    7.9<L>      23 Oct 2022 08:34  Phos  2.5     10-23  Mg     1.7     10-23    TPro  5.2<L>  /  Alb  2.9<L>  /  TBili  1.6<H>  /  DBili  x   /  AST  17  /  ALT  12  /  AlkPhos  69  10-23    10-22    139  |  100  |  17  ----------------------------<  251<H>  3.8   |  26  |  0.8    Ca    8.0<L>      22 Oct 2022 05:50  Phos  1.9     10-22  Mg     2.1     10-22    TPro  5.4<L>  /  Alb  3.2<L>  /  TBili  2.6<H>  /  DBili  x   /  AST  11  /  ALT  7   /  AlkPhos  66  10-22              10-20    136  |  95<L>  |  13  ----------------------------<  215<H>  3.8   |  27  |  0.7    Ca    8.3<L>      20 Oct 2022 08:28  Mg     1.7     10-20    TPro  5.7<L>  /  Alb  3.8  /  TBili  3.6<H>  /  DBili  0.6<H>  /  AST  10  /  ALT  8   /  AlkPhos  56  10-20    LIVER FUNCTIONS - ( 20 Oct 2022 18:17 )  Alb: 3.8 g/dL / Pro: 5.7 g/dL / ALK PHOS: 56 U/L / ALT: 8 U/L / AST: 10 U/L / GGT: x                     PT/INR - ( 21 Oct 2022 09:46 )   PT: 30.20 sec;   INR: 2.57 ratio        Culture - Blood (collected 19 Oct 2022 11:26)  Source: .Blood Blood-Peripheral  Preliminary Report (20 Oct 2022 23:01):    No growth to date.      RADIOLOGY & ADDITIONAL TESTS:  < from: NM Hepatobiliary Imaging (10.19.22 @ 16:35) >  REASON: Abdominal pain  COMPARISON CT abdomen pelvis 10/18/2022  Following the intravenous administration of 4.2 mCi 99m-Tc mebrofenin,   anterior images over the abdomen were acquired at 2, 5, 10, 15, 30, 45,   60 minutes,  2 hours, and 4 hours post injection Oblique and right   lateral views were obtained at 1,2, and 4 hours post-injection. A 14 cm   length size standardization marker was used.    These images reveal no definite visualization of the gallbladder through   4 hours post-injection, consistent with cholecystitis, and clinical   correlation is suggested. Biliary tree is visualized at 10 minutes, and   there is visualization of intestinal activity by 15 minutes post   injection.      IMPRESSION:  NO DEFINITE VISUALIZATION OF THE GALLBLADDER THROUGH 4 HOURS   POST-INJECTION, CONSISTENT WITH CHOLECYSTITIS, AS DESCRIBED ABOVE.    CLINICAL CORRELATION IS SUGGESTED.        Dr. Pinky Dai discussed preliminary findings with FABRIZIO JULES on   10/19/2022 5:04 PM with readback.      < end of copied text >  < from: US Abdomen Upper Quadrant Right (10.19.22 @ 02:03) >  FINDINGS:    Liver: Partially visualized liver within normal limits.  Bile ducts: Normal caliber. Common bile duct measures 3 mm.  Gallbladder: Mild gallbladder wall thickening. Patient's known   cholelithiasis was difficult to visualize on the current exam. Negative   reported sonographic Srinivasan's sign.  Pancreas: Poorly visualized.  Right kidney: 11.6 cm. No hydronephrosis. Echogenic foci up to 1.2 cm,   likely representing calculi.  Ascites: None.  IVC: Not visualized      IMPRESSION:    Right renal calculi measuring up to 1.2 cm. No hydronephrosis.    Distended gallbladder with nonspecific mild gallbladder wall thickening.   Patient has known cholelithiasis as demonstrated on same day CT. Negative   reported sonographic Srinivasan's sign. Findings are nonspecific/equivocal.   If warranted, further evaluation with HIDA scan can be obtained.    --- End of Report ---          < end of copied text >   Chart reviewed, patient examined. Pertinent results reviewed.  SEE PRIOR COMPLETED NOTE  Case discussed with HO; specialist f/u reviewed  HD#8 (ED 10/18)  MERARY GARCIA    HPI:  Patient is a 72-yo male with pmhx of htn, hld, DM2, MI s/p CABG, DVT/PE on Xarelto, recurrent kidney stones who presented with a chief complaint of abdominal pain and high blood pressure x 1 day. The pain started yesterday afternoon as a "horrible discomfort". It is hypogastric in anture, diffuse, encircling to patient's low back. It is constant, was a 10/10 prior to pain meds (now 7/10), no exacerbating factors. Patient states he takes his bp daily and usually runs around 155/65, but found his systolic in the 200s so decided to come to hospital. Patient denies other systemic sxs, including fever, n/v/d, cp, sob, chills, dysuria, hematuria, hematochezia, joint pains or recent sick contacts. Patient says the pain is similar to what he experienced with his kidney stones but that the pain was located flanks during those episodes.  ED Course:  Vitals- bp 229/95, hr 68, rr 20, t 35.6, 95% on RA  Labs- wbc wnl, Hb 13.9, K 5.1 (hemolyzed), glucose 241, TBili 1.8   A CT angio obtained was negative for dissection, but did show gallstones and enlarged gallbladder with a stone at the neck. An US of RUQ was ordered to further delineate if pain is due to biliary pathology. US shows a right renal calculi measuring 1.2 cm and gallbladder edema. Pt states he has never been told he has gallstones before.    Patient has received total of labetalol 20 IVP/300 PO, nifedipine 30 PO, and morphine 10mg IVP.    He was admitted for further management. W/U has revealed + Acute Cholecystitis.   (19 Oct 2022 08:09)    INTERVAL EVENTS: Patient seen today without distress, but feeling weak and frustrated about the delay..IR procedure was delayed 2/2 high INR yesterday & today.     MEDICATIONS  (STANDING):  dextrose 5%. 1000 milliLiter(s) (50 mL/Hr) IV Continuous <Continuous>  dextrose 5%. 1000 milliLiter(s) (100 mL/Hr) IV Continuous <Continuous>  dextrose 50% Injectable 25 Gram(s) IV Push once  dextrose 50% Injectable 12.5 Gram(s) IV Push once  dextrose 50% Injectable 25 Gram(s) IV Push once  fat emulsion (Fish Oil and Plant Based) 20% Infusion 1.0753 Gm/kG/Day (31.3 mL/Hr) IV Continuous <Continuous>  furosemide    Tablet 20 milliGRAM(s) Oral daily  glucagon  Injectable 1 milliGRAM(s) IntraMuscular once  insulin lispro (ADMELOG) corrective regimen sliding scale   SubCutaneous three times a day before meals  labetalol 200 milliGRAM(s) Oral two times a day  lactated ringers. 1000 milliLiter(s) (125 mL/Hr) IV Continuous <Continuous>  losartan 100 milliGRAM(s) Oral daily  magnesium oxide 400 milliGRAM(s) Oral three times a day with meals  Parenteral Nutrition - Adult 1 Each (65 mL/Hr) TPN Continuous <Continuous>  piperacillin/tazobactam IVPB.. 3.375 Gram(s) IV Intermittent every 8 hours  simvastatin 40 milliGRAM(s) Oral at bedtime  tamsulosin 0.4 milliGRAM(s) Oral at bedtime    MEDICATIONS  (PRN):  dextrose Oral Gel 15 Gram(s) Oral once PRN Blood Glucose LESS THAN 70 milliGRAM(s)/deciliter  morphine  - Injectable 2 milliGRAM(s) IV Push every 4 hours PRN Moderate Pain (4 - 6)  ondansetron    Tablet 8 milliGRAM(s) Oral every 8 hours PRN Nausea and/or Vomiting      Vital Signs Last 24 Hrs  T(C): 36.2 (26 Oct 2022 05:52), Max: 36.7 (25 Oct 2022 20:29)  T(F): 97.1 (26 Oct 2022 05:52), Max: 98.1 (25 Oct 2022 20:29)  HR: 82 (26 Oct 2022 05:52) (65 - 82)  BP: 160/73 (26 Oct 2022 05:52) (118/57 - 189/81)  BP(mean): --  RR: 18 (25 Oct 2022 20:29) (18 - 18)  SpO2: --        PHYSICAL EXAM:  GENERAL:  NAD; AAOx4  NECK: Supple, No JVD  CHEST/LUNG: Clear  HEART: RRR, no MRG  ABDOMEN:  obese, Soft, Nondistended; Bowel sounds present; NT  EXTREMITIES: No edema; L calf + TTP, no cord or erythema  SKIN: reddened face, peeling skin to the bridge of the nose    LABS:                           11.9   7.87  )-----------( 166      ( 25 Oct 2022 07:06 )             36.6                          12.4   11.32 )-----------( 166      ( 23 Oct 2022 08:34 )             38.4                        12.5   13.17 )-----------( 143      ( 22 Oct 2022 05:50 )             39.0                      13.1   15.80 )-----------( 153      ( 21 Oct 2022 09:46 )             39.4     10-26    137  |  99  |  10  ----------------------------<  281<H>  4.3   |  27  |  0.6<L>    Ca    8.3<L>      26 Oct 2022 07:49  Phos  3.0     10-26  Mg     1.6     10-26    TPro  5.0<L>  /  Alb  3.0<L>  /  TBili  0.9  /  DBili  x   /  AST  11  /  ALT  10  /  AlkPhos  61  10-25    10-23    147<H>  |  107  |  17  ----------------------------<  273<H>  4.6   |  25  |  0.7    Ca    7.9<L>      23 Oct 2022 08:34  Phos  2.5     10-23  Mg     1.7     10-23    TPro  5.2<L>  /  Alb  2.9<L>  /  TBili  1.6<H>  /  DBili  x   /  AST  17  /  ALT  12  /  AlkPhos  69  10-23    10-22    139  |  100  |  17  ----------------------------<  251<H>  3.8   |  26  |  0.8    Ca    8.0<L>      22 Oct 2022 05:50  Phos  1.9     10-22  Mg     2.1     10-22    TPro  5.4<L>  /  Alb  3.2<L>  /  TBili  2.6<H>  /  DBili  x   /  AST  11  /  ALT  7   /  AlkPhos  66  10-22              10-20    136  |  95<L>  |  13  ----------------------------<  215<H>  3.8   |  27  |  0.7    Ca    8.3<L>      20 Oct 2022 08:28  Mg     1.7     10-20    TPro  5.7<L>  /  Alb  3.8  /  TBili  3.6<H>  /  DBili  0.6<H>  /  AST  10  /  ALT  8   /  AlkPhos  56  10-20    LIVER FUNCTIONS - ( 20 Oct 2022 18:17 )  Alb: 3.8 g/dL / Pro: 5.7 g/dL / ALK PHOS: 56 U/L / ALT: 8 U/L / AST: 10 U/L / GGT: x                     PT/INR - ( 21 Oct 2022 09:46 )   PT: 30.20 sec;   INR: 2.57 ratio        Culture - Blood (collected 19 Oct 2022 11:26)  Source: .Blood Blood-Peripheral  Preliminary Report (20 Oct 2022 23:01):    No growth to date.      RADIOLOGY & ADDITIONAL TESTS:  < from: NM Hepatobiliary Imaging (10.19.22 @ 16:35) >  REASON: Abdominal pain  COMPARISON CT abdomen pelvis 10/18/2022  Following the intravenous administration of 4.2 mCi 99m-Tc mebrofenin,   anterior images over the abdomen were acquired at 2, 5, 10, 15, 30, 45,   60 minutes,  2 hours, and 4 hours post injection Oblique and right   lateral views were obtained at 1,2, and 4 hours post-injection. A 14 cm   length size standardization marker was used.    These images reveal no definite visualization of the gallbladder through   4 hours post-injection, consistent with cholecystitis, and clinical   correlation is suggested. Biliary tree is visualized at 10 minutes, and   there is visualization of intestinal activity by 15 minutes post   injection.      IMPRESSION:  NO DEFINITE VISUALIZATION OF THE GALLBLADDER THROUGH 4 HOURS   POST-INJECTION, CONSISTENT WITH CHOLECYSTITIS, AS DESCRIBED ABOVE.    CLINICAL CORRELATION IS SUGGESTED.        Dr. Pinky Dai discussed preliminary findings with FABRIZIO JULES on   10/19/2022 5:04 PM with readback.      < end of copied text >  < from: US Abdomen Upper Quadrant Right (10.19.22 @ 02:03) >  FINDINGS:    Liver: Partially visualized liver within normal limits.  Bile ducts: Normal caliber. Common bile duct measures 3 mm.  Gallbladder: Mild gallbladder wall thickening. Patient's known   cholelithiasis was difficult to visualize on the current exam. Negative   reported sonographic Srinivasan's sign.  Pancreas: Poorly visualized.  Right kidney: 11.6 cm. No hydronephrosis. Echogenic foci up to 1.2 cm,   likely representing calculi.  Ascites: None.  IVC: Not visualized      IMPRESSION:    Right renal calculi measuring up to 1.2 cm. No hydronephrosis.    Distended gallbladder with nonspecific mild gallbladder wall thickening.   Patient has known cholelithiasis as demonstrated on same day CT. Negative   reported sonographic Srinivasan's sign. Findings are nonspecific/equivocal.   If warranted, further evaluation with HIDA scan can be obtained.    --- End of Report ---          < end of copied text >

## 2022-10-26 NOTE — PROGRESS NOTE ADULT - SUBJECTIVE AND OBJECTIVE BOX
DAILY PROGRESS NOTE  ===========================================================    Patient Information:  MERARY GARCIA  /  72y  /  Male  /  MRN#: [id number]    Patient is a 72y old Male who presents with a chief complaint of abd pain, htn (26 Oct 2022 00:37)      Hospital Day: 7d     |:::::::::::::::::::::::::::| SUBJECTIVE |:::::::::::::::::::::::::::|    OVERNIGHT EVENTS:   TODAY: Patient was seen today at bedside. Review of systems is otherwise negative.    |:::::::::::::::::::::::::::| OBJECTIVE |:::::::::::::::::::::::::::|    VITAL SIGNS: Last 24 Hours  T(C): 36.2 (26 Oct 2022 05:52), Max: 36.7 (25 Oct 2022 20:29)  T(F): 97.1 (26 Oct 2022 05:52), Max: 98.1 (25 Oct 2022 20:29)  HR: 82 (26 Oct 2022 05:52) (65 - 82)  BP: 160/73 (26 Oct 2022 05:52) (118/57 - 189/81)  BP(mean): --  RR: 18 (25 Oct 2022 20:29) (18 - 18)  SpO2: --    10-24-22 @ 07:01  -  10-25-22 @ 07:00  --------------------------------------------------------  IN: 1970.4 mL / OUT: 1525 mL / NET: 445.4 mL    10-25-22 @ 07:01  -  10-26-22 @ 06:35  --------------------------------------------------------  IN: 0 mL / OUT: 1976 mL / NET: -1976 mL      PHYSICAL EXAM:  GENERAL: Awake, alert; NAD.  HEENT: Head NC/AT; Conjunctivae pink, Sclera anicteric; Oral mucosa moist.  CARDIO: Regular rate; Regular rhythm; S1 & S2.  RESP: No rales, wheezing, or rhonchi appreciated.  GI: Soft; NT/ND; BS; No guarding; No rebound tenderness.  EXT: Strength UE 5/5; Strength LE 5/5; No edema.   SKIN: Intact, no rashes, no erythema    LAB RESULTS:                        11.9   7.87  )-----------( 166      ( 25 Oct 2022 07:06 )             36.6     10-25    138  |  102  |  12  ----------------------------<  229<H>  4.3   |  27  |  0.6<L>    Ca    7.9<L>      25 Oct 2022 07:06  Phos  2.6     10-25  Mg     1.5     10-25    TPro  5.0<L>  /  Alb  3.0<L>  /  TBili  0.9  /  DBili  x   /  AST  11  /  ALT  10  /  AlkPhos  61  10-25    PT/INR - ( 25 Oct 2022 18:03 )   PT: 19.50 sec;   INR: 1.68 ratio         PTT - ( 25 Oct 2022 18:03 )  PTT:28.0 sec            MICROBIOLOGY:    RADIOLOGY:    ALLERGIES:  No Known Allergies    MEDICATIONS:  dextrose 5%. 1000 milliLiter(s) IV Continuous <Continuous>  dextrose 5%. 1000 milliLiter(s) IV Continuous <Continuous>  dextrose 50% Injectable 25 Gram(s) IV Push once  dextrose 50% Injectable 12.5 Gram(s) IV Push once  dextrose 50% Injectable 25 Gram(s) IV Push once  dextrose Oral Gel 15 Gram(s) Oral once PRN  fat emulsion (Fish Oil and Plant Based) 20% Infusion 1.0753 Gm/kG/Day IV Continuous <Continuous>  furosemide    Tablet 20 milliGRAM(s) Oral daily  glucagon  Injectable 1 milliGRAM(s) IntraMuscular once  insulin lispro (ADMELOG) corrective regimen sliding scale   SubCutaneous three times a day before meals  labetalol 200 milliGRAM(s) Oral two times a day  lactated ringers. 1000 milliLiter(s) IV Continuous <Continuous>  losartan 100 milliGRAM(s) Oral daily  magnesium oxide 400 milliGRAM(s) Oral three times a day with meals  morphine  - Injectable 2 milliGRAM(s) IV Push every 4 hours PRN  ondansetron    Tablet 8 milliGRAM(s) Oral every 8 hours PRN  Parenteral Nutrition - Adult 1 Each TPN Continuous <Continuous>  piperacillin/tazobactam IVPB.. 3.375 Gram(s) IV Intermittent every 8 hours  simvastatin 40 milliGRAM(s) Oral at bedtime  tamsulosin 0.4 milliGRAM(s) Oral at bedtime    ===========================================================     DAILY PROGRESS NOTE  ===========================================================    Patient Information:  MERARY GARCIA  /  72y  /  Male  /  MRN#: [id number]    Patient is a 72y old Male who presents with a chief complaint of abd pain, htn (26 Oct 2022 00:37)      Hospital Day: 7d     |:::::::::::::::::::::::::::| SUBJECTIVE |:::::::::::::::::::::::::::|    OVERNIGHT EVENTS: none.  TODAY: Patient was seen today at bedside. Review of systems is otherwise negative.    |:::::::::::::::::::::::::::| OBJECTIVE |:::::::::::::::::::::::::::|    VITAL SIGNS: Last 24 Hours  T(C): 36.2 (26 Oct 2022 05:52), Max: 36.7 (25 Oct 2022 20:29)  T(F): 97.1 (26 Oct 2022 05:52), Max: 98.1 (25 Oct 2022 20:29)  HR: 82 (26 Oct 2022 05:52) (65 - 82)  BP: 160/73 (26 Oct 2022 05:52) (118/57 - 189/81)  BP(mean): --  RR: 18 (25 Oct 2022 20:29) (18 - 18)  SpO2: --    10-24-22 @ 07:01  -  10-25-22 @ 07:00  --------------------------------------------------------  IN: 1970.4 mL / OUT: 1525 mL / NET: 445.4 mL    10-25-22 @ 07:01  -  10-26-22 @ 06:35  --------------------------------------------------------  IN: 0 mL / OUT: 1976 mL / NET: -1976 mL      PHYSICAL EXAM:  GENERAL: Awake, alert; NAD.  HEENT: Head NC/AT; Conjunctivae pink, Sclera anicteric; Oral mucosa moist.  CARDIO: Regular rate; Regular rhythm; S1 & S2.  RESP: No rales, wheezing, or rhonchi appreciated.  GI: Soft; NT/ND; BS; No guarding; No rebound tenderness.  EXT: Strength UE 5/5; Strength LE 5/5; No edema.   SKIN: Intact, no rashes, no erythema    LAB RESULTS:                        11.9   7.87  )-----------( 166      ( 25 Oct 2022 07:06 )             36.6     10-25    138  |  102  |  12  ----------------------------<  229<H>  4.3   |  27  |  0.6<L>    Ca    7.9<L>      25 Oct 2022 07:06  Phos  2.6     10-25  Mg     1.5     10-25    TPro  5.0<L>  /  Alb  3.0<L>  /  TBili  0.9  /  DBili  x   /  AST  11  /  ALT  10  /  AlkPhos  61  10-25    PT/INR - ( 25 Oct 2022 18:03 )   PT: 19.50 sec;   INR: 1.68 ratio         PTT - ( 25 Oct 2022 18:03 )  PTT:28.0 sec            MICROBIOLOGY:    RADIOLOGY:    ALLERGIES:  No Known Allergies    MEDICATIONS:  dextrose 5%. 1000 milliLiter(s) IV Continuous <Continuous>  dextrose 5%. 1000 milliLiter(s) IV Continuous <Continuous>  dextrose 50% Injectable 25 Gram(s) IV Push once  dextrose 50% Injectable 12.5 Gram(s) IV Push once  dextrose 50% Injectable 25 Gram(s) IV Push once  dextrose Oral Gel 15 Gram(s) Oral once PRN  fat emulsion (Fish Oil and Plant Based) 20% Infusion 1.0753 Gm/kG/Day IV Continuous <Continuous>  furosemide    Tablet 20 milliGRAM(s) Oral daily  glucagon  Injectable 1 milliGRAM(s) IntraMuscular once  insulin lispro (ADMELOG) corrective regimen sliding scale   SubCutaneous three times a day before meals  labetalol 200 milliGRAM(s) Oral two times a day  lactated ringers. 1000 milliLiter(s) IV Continuous <Continuous>  losartan 100 milliGRAM(s) Oral daily  magnesium oxide 400 milliGRAM(s) Oral three times a day with meals  morphine  - Injectable 2 milliGRAM(s) IV Push every 4 hours PRN  ondansetron    Tablet 8 milliGRAM(s) Oral every 8 hours PRN  Parenteral Nutrition - Adult 1 Each TPN Continuous <Continuous>  piperacillin/tazobactam IVPB.. 3.375 Gram(s) IV Intermittent every 8 hours  simvastatin 40 milliGRAM(s) Oral at bedtime  tamsulosin 0.4 milliGRAM(s) Oral at bedtime    ===========================================================

## 2022-10-26 NOTE — PROGRESS NOTE ADULT - ASSESSMENT
ASSESSMENT:  71M w/ PMH of HLD, HTN, MI, DM, CAD s/p CABG, previous DVT/PE on Xarelto presents for hypotensionand kidney stones presents to ED with complaints of abdominal pain in the bilateral flanks radiating across the lower abdomen as well as hypertension. Initial vitals noted to have a systolic of 239. A CT showed gallstones and enlarged gallbladder with a stone at the neck.   An US of RUQ was ordered to further delineate if pain is due to biliary pathology. US shows a right renal calculi measuring 1.2 cm and gallbladder edema. On exam, pt complains of right flank pain. Physical exam findings, imaging, and labs as documented above.     Acute Cholecystitis- as per US and HIDA  - on IV Zosyn  - NPO, PPN started  - pain rx prn  - GI, Surgery and IR eval's noted; Plan is for PC drainage  by IR; when INR<1.5       - reviewed w Dr Crouch, surgical attending, - he agrees with this plan; CCY in few weeks after DC    HTN Urgency:  - likely exacerbated  by severe abd pain  - on Labetalol 200mg BID, Losartan 100 QD, Lasix 20 QD  - BP has been better controlled since.    hx of DVT/PE  hx of MI/CABG  - Xarelto 20mg QD, on hold  - was on Lovenox  BID until procedure- now being held; vit K given to correct INR   - hold ASA 81 QD  - Cardio following    DM type 2  - on Metformin 1g BID at home, held during hospital stay  - start on SS   - monitor FS and adjust as needed    hx of Kidney Stones  - non obstructive kidney stone  - hydrate  - monitor    Deconditioning from all  - call to PPT to  evaluate and start RX    SEE PRIOR COMPLETED NOTE  Diet NPO  DVT Prophylaxis  Patient is a full code ASSESSMENT:  71M w/ PMH of HLD, HTN, MI, DM, CAD s/p CABG, previous DVT/PE on Xarelto presents for hypotensionand kidney stones presents to ED with complaints of abdominal pain in the bilateral flanks radiating across the lower abdomen as well as hypertension. Initial vitals noted to have a systolic of 239. A CT showed gallstones and enlarged gallbladder with a stone at the neck.   An US of RUQ was ordered to further delineate if pain is due to biliary pathology. US shows a right renal calculi measuring 1.2 cm and gallbladder edema. On exam, pt complains of right flank pain. Physical exam findings, imaging, and labs as documented above.     Acute Cholecystitis- as per US and HIDA  - on IV Zosyn  - NPO, PPN started  - pain rx prn  - GI, Surgery and IR eval's noted; Plan is for PC drainage  by IR; when INR<1.5       - reviewed w Dr Crouch, surgical attending, - he agrees with this plan; CCY in few weeks after DC    HTN Urgency:  - likely exacerbated  by severe abd pain  - on Labetalol 200mg BID, Losartan 100 QD, Lasix 20 QD  - BP has been better controlled since.    hx of DVT/PE  hx of MI/CABG  - Xarelto 20mg QD, on hold  - was on Lovenox  BID until procedure- now being held; vit K given to correct INR        still high today; FFP given; therapeutic heparin started with delay in procedure, but high PTT- will DC tonight.        More FFP tonight, and in AM PRN  - hold ASA 81 QD  - Cardio has seen    DM type 2  - on Metformin 1g BID at home, held during hospital stay  - start on SS   - monitor FS and adjust as needed  - hyperglycemia despite low glucose load of PPN, as per Nutrition Svc.  - give low dose insulin coverage  - please get labs as requested by Nutrition: eg, in phos  - hypomagnesemia- corrected; continue to follow    hx of Kidney Stones  - non obstructive kidney stone  - hydrate  - monitor    Deconditioning from all  - call to PPT to  evaluate and start RX    SEE PRIOR COMPLETED NOTE  Diet NPO  DVT Prophylaxis  Patient is a full code

## 2022-10-26 NOTE — PROGRESS NOTE ADULT - SUBJECTIVE AND OBJECTIVE BOX
SUBJ:No chest pain or shortness of breath      MEDICATIONS  (STANDING):  dextrose 5%. 1000 milliLiter(s) (50 mL/Hr) IV Continuous <Continuous>  dextrose 5%. 1000 milliLiter(s) (100 mL/Hr) IV Continuous <Continuous>  dextrose 50% Injectable 25 Gram(s) IV Push once  dextrose 50% Injectable 12.5 Gram(s) IV Push once  dextrose 50% Injectable 25 Gram(s) IV Push once  fat emulsion (Fish Oil and Plant Based) 20% Infusion 1.0753 Gm/kG/Day (31.3 mL/Hr) IV Continuous <Continuous>  furosemide    Tablet 20 milliGRAM(s) Oral daily  glucagon  Injectable 1 milliGRAM(s) IntraMuscular once  heparin  Infusion.  Unit(s)/Hr (17 mL/Hr) IV Continuous <Continuous>  insulin lispro (ADMELOG) corrective regimen sliding scale   SubCutaneous three times a day before meals  labetalol 200 milliGRAM(s) Oral two times a day  lactated ringers. 1000 milliLiter(s) (125 mL/Hr) IV Continuous <Continuous>  losartan 100 milliGRAM(s) Oral daily  magnesium oxide 400 milliGRAM(s) Oral three times a day with meals  Parenteral Nutrition - Adult 1 Each (65 mL/Hr) TPN Continuous <Continuous>  piperacillin/tazobactam IVPB.. 3.375 Gram(s) IV Intermittent every 8 hours  regadenoson Injectable 0.4 milliGRAM(s) IV Push once  simvastatin 40 milliGRAM(s) Oral at bedtime  tamsulosin 0.4 milliGRAM(s) Oral at bedtime    MEDICATIONS  (PRN):  dextrose Oral Gel 15 Gram(s) Oral once PRN Blood Glucose LESS THAN 70 milliGRAM(s)/deciliter  heparin   Injectable 7500 Unit(s) IV Push every 6 hours PRN For aPTT less than 40  heparin   Injectable 3500 Unit(s) IV Push every 6 hours PRN For aPTT between 40 - 57  morphine  - Injectable 2 milliGRAM(s) IV Push every 4 hours PRN Moderate Pain (4 - 6)  ondansetron    Tablet 8 milliGRAM(s) Oral every 8 hours PRN Nausea and/or Vomiting            Vital Signs Last 24 Hrs  T(C): 35.9 (26 Oct 2022 12:14), Max: 36.2 (26 Oct 2022 05:52)  T(F): 96.6 (26 Oct 2022 12:14), Max: 97.1 (26 Oct 2022 05:52)  HR: 78 (26 Oct 2022 12:14) (78 - 82)  BP: 95/53 (26 Oct 2022 12:14) (95/53 - 160/73)  BP(mean): --  RR: 18 (26 Oct 2022 12:14) (18 - 18)  SpO2: --          ECG:NML    TTE:    LABS:                        11.9   7.87  )-----------( 166      ( 25 Oct 2022 07:06 )             36.6     10-26    137  |  99  |  10  ----------------------------<  281<H>  4.3   |  27  |  0.6<L>    Ca    8.3<L>      26 Oct 2022 07:49  Phos  3.0     10-26  Mg     1.6     10-26    TPro  5.0<L>  /  Alb  3.0<L>  /  TBili  0.9  /  DBili  x   /  AST  11  /  ALT  10  /  AlkPhos  61  10-25    CARDIAC MARKERS ( 26 Oct 2022 18:31 )  x     / <0.01 ng/mL / x     / x     / x          PT/INR - ( 26 Oct 2022 18:31 )   PT: 21.50 sec;   INR: 1.85 ratio         PTT - ( 26 Oct 2022 18:31 )  PTT:118.5 sec    I&O's Summary    25 Oct 2022 07:01  -  26 Oct 2022 07:00  --------------------------------------------------------  IN: 0 mL / OUT: 1976 mL / NET: -1976 mL    26 Oct 2022 07:01  -  26 Oct 2022 20:40  --------------------------------------------------------  IN: 1110.3 mL / OUT: 0 mL / NET: 1110.3 mL      BNP

## 2022-10-26 NOTE — PROGRESS NOTE ADULT - ASSESSMENT
71M w/ PMH of HLD, HTN, MI, DM, CAD s/p CABG, previous DVT/PE on Xarelto presents for hypotensionand kidney stones presents to ED with complaints of abdominal pain in the bilateral flanks radiating across the lower abdomen as well as hypertension. Initial vitals noted to have a systolic of 239. A CT showed gallstones and enlarged gallbladder with a stone at the neck.   An US of RUQ was ordered to further delineate if pain is due to biliary pathology. US shows a right renal calculi measuring 1.2 cm and gallbladder edema. On exam, pt complains of right flank pain. Physical exam findings, imaging, and labs as documented above.     Acute Cholecystitis- as per US and HIDA  - on IV Zosyn  - NPO, PPN started  - pain rx prn  - INR today 1.83, IR needs <1/5, will give vit K IVPB with INR check @4, will restart PPN, c/w AC hold  - f/u surgery for timeline of future CCY    HTN Urgency:  - likely exacerbated  by severe abd pain  - on Labetalol 200mg BID, Losartan 100 QD, Lasix 20 QD  - BP better controlled today, continue to monitor    hx of DVT/PE  hx of MI/CABG  - Xarelto 20mg QD, on hold  - on Lovenox 40 BID until procedure- SEE PRIOR NOTE  - hold ASA 81 QD  - Cardio following    DM type 2  - on Metformin 1g BID at home, held during hospital stay  - start on SS   - monitor FS and adjust as needed    hx of Kidney Stones  - non obstructive kidney stone  - hydrate  - monitor    Deconditioning from all  - call to PPT to  evaluate and start RX      # Misc  - GI ppx:    - DVT ppx:   - Diet: NPO, PPN feeds  - Activity: AAT  - DISPO: acute, pending perc yefri with IR 71M w/ PMH of HLD, HTN, MI, DM, CAD s/p CABG, previous DVT/PE on Xarelto presents for hypotensionand kidney stones presents to ED with complaints of abdominal pain in the bilateral flanks radiating across the lower abdomen as well as hypertension. Initial vitals noted to have a systolic of 239. A CT showed gallstones and enlarged gallbladder with a stone at the neck.   An US of RUQ was ordered to further delineate if pain is due to biliary pathology. US shows a right renal calculi measuring 1.2 cm and gallbladder edema. On exam, pt complains of right flank pain. Physical exam findings, imaging, and labs as documented above.     Acute Cholecystitis- as per US and HIDA  - on IV Zosyn  - NPO, PPN started  - pain rx prn  - INR 1.68 s/p vit k IVPB, s/p 5mg oral addition, repeat this AM pending, IR aware    HTN Urgency:  - likely exacerbated  by severe abd pain  - on Labetalol 200mg BID, Losartan 100 QD, Lasix 20 QD  - BP better controlled today, continue to monitor    hx of DVT/PE  hx of MI/CABG  - Xarelto 20mg QD, on hold  - on Lovenox 40 BID until procedure- SEE PRIOR NOTE  - hold ASA 81 QD  - Cardio following    DM type 2  - on Metformin 1g BID at home, held during hospital stay  - start on SS   - monitor FS and adjust as needed    hx of Kidney Stones  - non obstructive kidney stone  - hydrate  - monitor    Deconditioning from all  - call to PPT to  evaluate and start RX      # Misc  - GI ppx:    - DVT ppx:   - Diet: NPO, PPN feeds  - Activity: AAT  - DISPO: acute, pending perc yefri with IR

## 2022-10-26 NOTE — PHYSICAL THERAPY INITIAL EVALUATION ADULT - PERTINENT HX OF CURRENT PROBLEM, REHAB EVAL
71M w/ PMH of HLD, HTN, MI, DM, CAD s/p CABG, previous DVT/PE on Xarelto, recurrent kidney stones presents to ED with complaints of abdominal pain and hypertension. Initial vitals noted to have a systolic of 239. A CT showed gallstones and enlarged gallbladder with a stone at the neck. An US of RUQ was ordered to further delineate if pain is due to biliary pathology. US shows a right renal calculi measuring 1.2 cm and gallbladder edema. Patient is being admitted for further management.

## 2022-10-26 NOTE — PROGRESS NOTE ADULT - SUBJECTIVE AND OBJECTIVE BOX
GENERAL SURGERY PROGRESS NOTE    Patient: MERARY GARCIA , 72y ([birth year])Male   MRN: [id number]  Location: [address]  Visit: 10-19-22 Inpatient  Date: 10-26-22 @ 00:38    Hospital Day #: 9  Events of past 24 hours: IR procedure cancelled due to high INR    PAST MEDICAL & SURGICAL HISTORY:  Hypertension, unspecified type      Type 2 diabetes mellitus with complication, unspecified long term insulin use status      High cholesterol      Kidney stones      MI (myocardial infarction)      DVT (deep venous thrombosis)      Acute massive pulmonary embolism  s/p tPA in 2018      Carotid atherosclerosis      H/O heart surgery  quadruple bypass 6 years ago      H/O cystoscopy          Vitals:   T(F): 98.1 (10-25-22 @ 20:29), Max: 98.1 (10-25-22 @ 20:29)  HR: 65 (10-25-22 @ 20:29)  BP: 175/82 (10-25-22 @ 20:29)  RR: 18 (10-25-22 @ 20:29)  SpO2: --      Diet, NPO after Midnight:      NPO Start Date: 25-Oct-2022,   NPO Start Time: 23:59  Diet, NPO:   Except Medications      Fluids:     I & O's:    10-24-22 @ 07:01  -  10-25-22 @ 07:00  --------------------------------------------------------  IN:    Fat Emulsion (Fish Oil &amp; Plant Based) 20% Infusion: 93.9 mL    Fat Emulsion (Fish Oil &amp; Plant Based) 20% Infusion: 156.5 mL    Lactated Ringers: 875 mL    PPN (Peripheral Parenteral Nutrition): 845 mL  Total IN: 1970.4 mL    OUT:    Voided (mL): 1525 mL  Total OUT: 1525 mL    Total NET: 445.4 mL        Bowel Movement: : [] YES [] NO  Flatus: : [] YES [] NO    PHYSICAL EXAM:  General: NAD, AAOx3, calm and cooperative  HEENT: NCAT  Cardiac: No peripheral cyanosis or pallor, extremities well perfused   Respiratory: Non-labored breathing, equal chest rise bilaterally   Abdomen: Soft, non-distended, non-tender, no rebound, no guarding  Musculoskeletal: Strength 5/5 BL UE/LE, ROM intact, compartments soft  Skin: Warm/dry, normal color, no jaundice  Incision/wound: healing well, dressings in place, clean, dry and intact      MEDICATIONS  (STANDING):  dextrose 5%. 1000 milliLiter(s) (50 mL/Hr) IV Continuous <Continuous>  dextrose 5%. 1000 milliLiter(s) (100 mL/Hr) IV Continuous <Continuous>  dextrose 50% Injectable 25 Gram(s) IV Push once  dextrose 50% Injectable 12.5 Gram(s) IV Push once  dextrose 50% Injectable 25 Gram(s) IV Push once  fat emulsion (Fish Oil and Plant Based) 20% Infusion 1.0753 Gm/kG/Day (31.3 mL/Hr) IV Continuous <Continuous>  furosemide    Tablet 20 milliGRAM(s) Oral daily  glucagon  Injectable 1 milliGRAM(s) IntraMuscular once  insulin lispro (ADMELOG) corrective regimen sliding scale   SubCutaneous three times a day before meals  labetalol 200 milliGRAM(s) Oral two times a day  lactated ringers. 1000 milliLiter(s) (125 mL/Hr) IV Continuous <Continuous>  losartan 100 milliGRAM(s) Oral daily  magnesium oxide 400 milliGRAM(s) Oral three times a day with meals  Parenteral Nutrition - Adult 1 Each (65 mL/Hr) TPN Continuous <Continuous>  Parenteral Nutrition - Adult 1 Each (65 mL/Hr) TPN Continuous <Continuous>  piperacillin/tazobactam IVPB.. 3.375 Gram(s) IV Intermittent every 8 hours  simvastatin 40 milliGRAM(s) Oral at bedtime  tamsulosin 0.4 milliGRAM(s) Oral at bedtime    MEDICATIONS  (PRN):  dextrose Oral Gel 15 Gram(s) Oral once PRN Blood Glucose LESS THAN 70 milliGRAM(s)/deciliter  morphine  - Injectable 2 milliGRAM(s) IV Push every 4 hours PRN Moderate Pain (4 - 6)  ondansetron    Tablet 8 milliGRAM(s) Oral every 8 hours PRN Nausea and/or Vomiting      DVT PROPHYLAXIS:   GI PROPHYLAXIS:   ANTICOAGULATION:   ANTIBIOTICS:  piperacillin/tazobactam IVPB.. 3.375 Gram(s)            LAB/STUDIES:  Labs:  CAPILLARY BLOOD GLUCOSE      POCT Blood Glucose.: 240 mg/dL (25 Oct 2022 18:13)  POCT Blood Glucose.: 250 mg/dL (25 Oct 2022 11:12)  POCT Blood Glucose.: 232 mg/dL (25 Oct 2022 08:38)                          11.9   7.87  )-----------( 166      ( 25 Oct 2022 07:06 )             36.6       Auto Neutrophil %: 68.2 % (10-25-22 @ 07:06)  Auto Immature Granulocyte %: 1.0 % (10-25-22 @ 07:06)    10-25    138  |  102  |  12  ----------------------------<  229<H>  4.3   |  27  |  0.6<L>      Calcium, Total Serum: 7.9 mg/dL (10-25-22 @ 07:06)      LFTs:             5.0  | 0.9  | 11       ------------------[61      ( 25 Oct 2022 07:06 )  3.0  | x    | 10          Lipase:x      Amylase:x             Coags:     19.50  ----< 1.68    ( 25 Oct 2022 18:03 )     28.0                            IMAGING:      ACCESS/ DEVICES:  [ ] Peripheral IV  [ ] Central Venous Line	[ ] R	[ ] L	[ ] IJ	[ ] Fem	[ ] SC	Placed:   [ ] Arterial Line		[ ] R	[ ] L	[ ] Fem	[ ] Rad	[ ] Ax	Placed:   [ ] PICC:					[ ] Mediport  [ ] Urinary Catheter,  Date Placed:   [ ] Chest tube: [ ] Right, [ ] Left  [ ] MAU/Je Drains

## 2022-10-26 NOTE — PROGRESS NOTE ADULT - ASSESSMENT
ASSESSMENT:  72 y/o M w/ pmhx of HLD, HTN, MI, DM, CAD s/p CABG, previous DVT/PE on Xarelto with imaging findings concerning for cholecystitis. Patient scheduled for percutaneous cholecystostomy tube with interventional radiology on Tuesday.     PLAN:  - Care as per primary team   - Pain control   - Monitor vitals   - Monitor labs   - Strict Is and Os   - F/u IR for procedure today    GREEN TEAM SPECTRA: [id number]

## 2022-10-26 NOTE — CHART NOTE - NSCHARTNOTESELECT_GEN_ALL_CORE
Nutrition Support/Nutrition Services
Nutrition Support/Nutrition Services
Event Note
Nutrition Support/Nutrition Services

## 2022-10-27 ENCOUNTER — TRANSCRIPTION ENCOUNTER (OUTPATIENT)
Age: 72
End: 2022-10-27

## 2022-10-27 LAB
ALBUMIN SERPL ELPH-MCNC: 3.2 G/DL — LOW (ref 3.5–5.2)
ALP SERPL-CCNC: 67 U/L — SIGNIFICANT CHANGE UP (ref 30–115)
ALT FLD-CCNC: 14 U/L — SIGNIFICANT CHANGE UP (ref 0–41)
ANION GAP SERPL CALC-SCNC: 11 MMOL/L — SIGNIFICANT CHANGE UP (ref 7–14)
APTT BLD: 28 SEC — SIGNIFICANT CHANGE UP (ref 27–39.2)
APTT BLD: 28.1 SEC — SIGNIFICANT CHANGE UP (ref 27–39.2)
APTT BLD: 44.9 SEC — HIGH (ref 27–39.2)
AST SERPL-CCNC: 18 U/L — SIGNIFICANT CHANGE UP (ref 0–41)
BASOPHILS # BLD AUTO: 0.07 K/UL — SIGNIFICANT CHANGE UP (ref 0–0.2)
BASOPHILS NFR BLD AUTO: 0.7 % — SIGNIFICANT CHANGE UP (ref 0–1)
BILIRUB SERPL-MCNC: 0.8 MG/DL — SIGNIFICANT CHANGE UP (ref 0.2–1.2)
BLD GP AB SCN SERPL QL: SIGNIFICANT CHANGE UP
BUN SERPL-MCNC: 11 MG/DL — SIGNIFICANT CHANGE UP (ref 10–20)
CALCIUM SERPL-MCNC: 8.5 MG/DL — SIGNIFICANT CHANGE UP (ref 8.4–10.5)
CHLORIDE SERPL-SCNC: 95 MMOL/L — LOW (ref 98–110)
CO2 SERPL-SCNC: 28 MMOL/L — SIGNIFICANT CHANGE UP (ref 17–32)
CREAT SERPL-MCNC: 0.7 MG/DL — SIGNIFICANT CHANGE UP (ref 0.7–1.5)
EGFR: 99 ML/MIN/1.73M2 — SIGNIFICANT CHANGE UP
EOSINOPHIL # BLD AUTO: 0.47 K/UL — SIGNIFICANT CHANGE UP (ref 0–0.7)
EOSINOPHIL NFR BLD AUTO: 4.8 % — SIGNIFICANT CHANGE UP (ref 0–8)
GLUCOSE BLDC GLUCOMTR-MCNC: 154 MG/DL — HIGH (ref 70–99)
GLUCOSE BLDC GLUCOMTR-MCNC: 160 MG/DL — HIGH (ref 70–99)
GLUCOSE BLDC GLUCOMTR-MCNC: 216 MG/DL — HIGH (ref 70–99)
GLUCOSE BLDC GLUCOMTR-MCNC: 224 MG/DL — HIGH (ref 70–99)
GLUCOSE SERPL-MCNC: 206 MG/DL — HIGH (ref 70–99)
HCT VFR BLD CALC: 36.1 % — LOW (ref 42–52)
HCT VFR BLD CALC: 37.3 % — LOW (ref 42–52)
HGB BLD-MCNC: 11.4 G/DL — LOW (ref 14–18)
HGB BLD-MCNC: 12.2 G/DL — LOW (ref 14–18)
IMM GRANULOCYTES NFR BLD AUTO: 2.9 % — HIGH (ref 0.1–0.3)
INR BLD: 1.61 RATIO — HIGH (ref 0.65–1.3)
INR BLD: 1.71 RATIO — HIGH (ref 0.65–1.3)
INR BLD: 1.78 RATIO — HIGH (ref 0.65–1.3)
LYMPHOCYTES # BLD AUTO: 1.06 K/UL — LOW (ref 1.2–3.4)
LYMPHOCYTES # BLD AUTO: 10.9 % — LOW (ref 20.5–51.1)
MAGNESIUM SERPL-MCNC: 2 MG/DL — SIGNIFICANT CHANGE UP (ref 1.8–2.4)
MCHC RBC-ENTMCNC: 29.5 PG — SIGNIFICANT CHANGE UP (ref 27–31)
MCHC RBC-ENTMCNC: 30.1 PG — SIGNIFICANT CHANGE UP (ref 27–31)
MCHC RBC-ENTMCNC: 31.6 G/DL — LOW (ref 32–37)
MCHC RBC-ENTMCNC: 32.7 G/DL — SIGNIFICANT CHANGE UP (ref 32–37)
MCV RBC AUTO: 92.1 FL — SIGNIFICANT CHANGE UP (ref 80–94)
MCV RBC AUTO: 93.3 FL — SIGNIFICANT CHANGE UP (ref 80–94)
MONOCYTES # BLD AUTO: 0.82 K/UL — HIGH (ref 0.1–0.6)
MONOCYTES NFR BLD AUTO: 8.5 % — SIGNIFICANT CHANGE UP (ref 1.7–9.3)
NEUTROPHILS # BLD AUTO: 7 K/UL — HIGH (ref 1.4–6.5)
NEUTROPHILS NFR BLD AUTO: 72.2 % — SIGNIFICANT CHANGE UP (ref 42.2–75.2)
NRBC # BLD: 0 /100 WBCS — SIGNIFICANT CHANGE UP (ref 0–0)
NRBC # BLD: 0 /100 WBCS — SIGNIFICANT CHANGE UP (ref 0–0)
PHOSPHATE SERPL-MCNC: 3.7 MG/DL — SIGNIFICANT CHANGE UP (ref 2.1–4.9)
PLATELET # BLD AUTO: 218 K/UL — SIGNIFICANT CHANGE UP (ref 130–400)
PLATELET # BLD AUTO: 232 K/UL — SIGNIFICANT CHANGE UP (ref 130–400)
POTASSIUM SERPL-MCNC: 4.1 MMOL/L — SIGNIFICANT CHANGE UP (ref 3.5–5)
POTASSIUM SERPL-SCNC: 4.1 MMOL/L — SIGNIFICANT CHANGE UP (ref 3.5–5)
PROT SERPL-MCNC: 5.7 G/DL — LOW (ref 6–8)
PROTHROM AB SERPL-ACNC: 18.6 SEC — HIGH (ref 9.95–12.87)
PROTHROM AB SERPL-ACNC: 19.8 SEC — HIGH (ref 9.95–12.87)
PROTHROM AB SERPL-ACNC: 20.6 SEC — HIGH (ref 9.95–12.87)
RBC # BLD: 3.87 M/UL — LOW (ref 4.7–6.1)
RBC # BLD: 4.05 M/UL — LOW (ref 4.7–6.1)
RBC # FLD: 13.3 % — SIGNIFICANT CHANGE UP (ref 11.5–14.5)
RBC # FLD: 13.4 % — SIGNIFICANT CHANGE UP (ref 11.5–14.5)
SODIUM SERPL-SCNC: 134 MMOL/L — LOW (ref 135–146)
WBC # BLD: 8.37 K/UL — SIGNIFICANT CHANGE UP (ref 4.8–10.8)
WBC # BLD: 9.7 K/UL — SIGNIFICANT CHANGE UP (ref 4.8–10.8)
WBC # FLD AUTO: 8.37 K/UL — SIGNIFICANT CHANGE UP (ref 4.8–10.8)
WBC # FLD AUTO: 9.7 K/UL — SIGNIFICANT CHANGE UP (ref 4.8–10.8)

## 2022-10-27 RX ORDER — PHYTONADIONE (VIT K1) 5 MG
10 TABLET ORAL ONCE
Refills: 0 | Status: COMPLETED | OUTPATIENT
Start: 2022-10-27 | End: 2022-10-27

## 2022-10-27 RX ORDER — I.V. FAT EMULSION 20 G/100ML
1.08 EMULSION INTRAVENOUS
Qty: 100 | Refills: 0 | Status: DISCONTINUED | OUTPATIENT
Start: 2022-10-27 | End: 2022-10-27

## 2022-10-27 RX ORDER — RIVAROXABAN 15 MG-20MG
20 KIT ORAL
Refills: 0 | Status: DISCONTINUED | OUTPATIENT
Start: 2022-10-27 | End: 2022-10-30

## 2022-10-27 RX ORDER — ELECTROLYTE SOLUTION,INJ
1 VIAL (ML) INTRAVENOUS
Refills: 0 | Status: DISCONTINUED | OUTPATIENT
Start: 2022-10-27 | End: 2022-10-27

## 2022-10-27 RX ADMIN — MAGNESIUM OXIDE 400 MG ORAL TABLET 400 MILLIGRAM(S): 241.3 TABLET ORAL at 17:30

## 2022-10-27 RX ADMIN — Medication 200 MILLIGRAM(S): at 05:06

## 2022-10-27 RX ADMIN — Medication 1: at 17:29

## 2022-10-27 RX ADMIN — Medication 0: at 08:37

## 2022-10-27 RX ADMIN — PIPERACILLIN AND TAZOBACTAM 25 GRAM(S): 4; .5 INJECTION, POWDER, LYOPHILIZED, FOR SOLUTION INTRAVENOUS at 05:06

## 2022-10-27 RX ADMIN — PIPERACILLIN AND TAZOBACTAM 25 GRAM(S): 4; .5 INJECTION, POWDER, LYOPHILIZED, FOR SOLUTION INTRAVENOUS at 14:03

## 2022-10-27 RX ADMIN — TAMSULOSIN HYDROCHLORIDE 0.4 MILLIGRAM(S): 0.4 CAPSULE ORAL at 22:28

## 2022-10-27 RX ADMIN — Medication 20 MILLIGRAM(S): at 05:06

## 2022-10-27 RX ADMIN — MAGNESIUM OXIDE 400 MG ORAL TABLET 400 MILLIGRAM(S): 241.3 TABLET ORAL at 10:40

## 2022-10-27 RX ADMIN — RIVAROXABAN 20 MILLIGRAM(S): KIT at 17:30

## 2022-10-27 RX ADMIN — LOSARTAN POTASSIUM 100 MILLIGRAM(S): 100 TABLET, FILM COATED ORAL at 05:06

## 2022-10-27 RX ADMIN — Medication 200 MILLIGRAM(S): at 17:30

## 2022-10-27 RX ADMIN — Medication 102 MILLIGRAM(S): at 11:17

## 2022-10-27 RX ADMIN — PIPERACILLIN AND TAZOBACTAM 25 GRAM(S): 4; .5 INJECTION, POWDER, LYOPHILIZED, FOR SOLUTION INTRAVENOUS at 22:29

## 2022-10-27 RX ADMIN — SIMVASTATIN 40 MILLIGRAM(S): 20 TABLET, FILM COATED ORAL at 22:28

## 2022-10-27 NOTE — PROGRESS NOTE ADULT - ASSESSMENT
ASSESSMENT:  70 y/o M w/ pmhx of HLD, HTN, MI, DM, CAD s/p CABG, previous DVT/PE on Xarelto with imaging findings concerning for cholecystitis. Patient scheduled for percutaneous cholecystostomy tube with interventional radiology on 10/27.    PLAN:  - Care as per primary team  - Pain control  - Monitor vitals  - F/u AM INR s/p 1u plasma  - F/u IR procedure today    [id number] ASSESSMENT:  70 y/o M w/ pmhx of HLD, HTN, MI, DM, CAD s/p CABG, previous DVT/PE on Xarelto with imaging findings concerning for cholecystitis. Patient scheduled for percutaneous cholecystostomy tube with interventional radiology on 10/27.    PLAN:  - Patient has resolved cholecystitis with course of IV antibiotics (Zosyn).  - From surgical perspective, patient does not require intervention at this time  - INR has remained > 1.5 even with vitamin K and FFP administration  - Hypertensive urgency has resolved and patient has remained stable on the floor    Recommendations:   - Discontinue PPN, may be restarted on diet   - Plan for outpatient laparoscopic cholecystectomy in 4-6 weeks   - Patient will follow up with Dr. Crouch in the office November 8th   - Patient may call [phone] to set up the appointment   - Green surgery signing off    If you have any remaining questions or concerns please contact us.  [id number]

## 2022-10-27 NOTE — DISCHARGE NOTE PROVIDER - CARE PROVIDER_API CALL
Tressa Mariscal)  Medicine  [address] [place] [address]  Phone: [phone]  Fax: [phone]  Follow Up Time: 1 week    Matt Crouch)  Surgery  [address] [place] [address]  Phone: [phone]  Fax: [phone]  Follow Up Time: 1 month

## 2022-10-27 NOTE — PROGRESS NOTE ADULT - SUBJECTIVE AND OBJECTIVE BOX
Interventional Radiology Follow- Up Note    HPI:  Patient is a 71-yo male with pmhx of htn, hld, DM2, MI s/p CABG, DVT/PE on Xarelto, recurrent kidney stones who presented with a chief complaint of abdominal pain and high blood pressure x 1 day. The pain started yesterday afternoon as a "horrible discomfort". It is hypogastric in anture, diffuse, encircling to patient's low back. It is constant, was a 10/10 prior to pain meds (now 7/10), no exacerbating factors. Patient states he takes his bp daily and usually runs around 155/65, but found his systolic in the 200s so decided to come to hospital. Patient denies other systemic sxs, including fever, n/v/d, cp, sob, chills, dysuria, hematuria, hematochezia, joint pains or recent sick contacts. Patient says the pain is similar to what he experienced with his kidney stones but that the pain was located flanks during those incidences.     Vitals: T(F): 97 (10-27-22 @ 12:05), Max: 97.1 (10-27-22 @ 05:15)  HR: 68 (10-27-22 @ 12:05) (68 - 76)  BP: 181/78 (10-27-22 @ 12:05) (164/72 - 181/78)  RR: 18 (10-27-22 @ 12:05) (18 - 18)  SpO2: --  Wt(kg): --    LABS:                        11.4   8.37  )-----------( 218      ( 27 Oct 2022 08:13 )             36.1     10-26    134<L>  |  95<L>  |  11  ----------------------------<  206<H>  4.1   |  28  |  0.7    Ca    8.5      26 Oct 2022 23:31  Phos  3.7     10-27  Mg     2.0     10-26    TPro  5.7<L>  /  Alb  3.2<L>  /  TBili  0.8  /  DBili  x   /  AST  18  /  ALT  14  /  AlkPhos  67  10-26    PT/INR - ( 27 Oct 2022 11:33 )   PT: 18.60 sec;   INR: 1.61 ratio         PTT - ( 27 Oct 2022 11:33 )  PTT:28.0 sec      Impression: 72y Male admitted with ABDOMINAL PAIN; CHOLECYSTITIS    Assessment/Plan:   72 y/o M w/ PMH of HLD, HTN, MI, DM, CAD s/p CABG, previous DVT/PE presents to ED with complaints of abdominal pain in the bilateral flanks radiating across the lower abdomen as well as hypertension. Imaging findings suggestive of cholecystitis.     10/20: IR consulted for percutaneous cholecystostomy placement. Patient on ASA must be held for 5 days. Will schedule for 10/25.    10/25: Patient was planned for procedure 10/25. INR: 1.82 which is unsafe to proceed as procedure has a high bleeding risk. Will reschedule for tomorrow.    10/26: Planned to reschedule procedure to today pending correction of INR. As per resident Vitamin K administered x 2, INR now 1.9. 2u FFP ordered, first hung ~11:45am, repeat INR 1.85. Patient seen at bedside, resting comfortably, afebrile, no WBC, no tachycardia. Patient denies N/V/D, abdominal pain, fever, or chills. Abdomen examined, soft, non-tender, no pain elicited from patient, negative Gabriels sign. Patient expressed concerns of not knowing plan. Inquired about getting a nuclear stress test as inpatient and discharge follow up with surgery for GB removal. Patient showed no clinical evidence of acute cholecystitis. Soft indication for perc yefri at this time. Dr. Garcia discussed case with Dr. Crouch who still recommended perc yefri, so we proceeded with scheduling pending INR correction. INR currently 1.78.    10/27: As of this morning, INR from 1.78 came down to 1.71, additional FFP given, INR currently 1.61. Received a call from primary team stating surgery re-evaluated patient this morning with Dr. Fabian and believes the cholecystitis has resolved with IV Zosyn. They now recommend no intervention at this time, and plan for outpatient lap yefri in 4-6 weeks. Patient remains HD stable, no WBC, afebrile, denies abdominal pain. IR agrees with new surgical plan.    No IR intervention at this time   If patient becomes symptomatic and perc yefri is warranted, INR must be < 1.5 to safely proceed      Please call Interventional Radiology [phone] with any questions, concerns, or issues regarding above.

## 2022-10-27 NOTE — DISCHARGE NOTE PROVIDER - PROVIDER TOKENS
PROVIDER:[TOKEN:[[id number]],FOLLOWUP:[1 week]],PROVIDER:[TOKEN:[[id number]],FOLLOWUP:[1 month]]

## 2022-10-27 NOTE — PROGRESS NOTE ADULT - SUBJECTIVE AND OBJECTIVE BOX
GARCIAMERARY DAY  72y  Male  HPI:  Patient is a 71-yo male with pmhx of htn, hld, DM2, MI s/p CABG, DVT/PE on Xarelto, recurrent kidney stones who presented with a chief complaint of abdominal pain and high blood pressure x 1 day. The pain started yesterday afternoon as a "horrible discomfort". It is hypogastric in anture, diffuse, encircling to patient's low back. It is constant, was a 10/10 prior to pain meds (now 7/10), no exacerbating factors. Patient states he takes his bp daily and usually runs around 155/65, but found his systolic in the 200s so decided to come to hospital. Patient denies other systemic sxs, including fever, n/v/d, cp, sob, chills, dysuria, hematuria, hematochezia, joint pains or recent sick contacts. Patient says the pain is similar to what he experienced with his kidney stones but that the pain was located flanks during those incidences.     ED Course:  Vitals- bp 229/95, hr 68, rr 20, t 35.6, 95% on RA  Labs- wbc wnl, Hb 13.9, K 5.1 (hemolyzed), glucose 241, TBili 1.8  EKG- Normal sinus rhythm, Left ventricular hypertrophy with QRS widening and repolarization abnormality ( R in aVL , Shimon product , Romhilt-Bowen ), Inferior infarct , age undetermined  BP had 20 point difference between arms and there was a concern for dissection. A CT angio obtained was negative for dissection, but did show gallstones and enlarged gallbladder with a stone at the neck. An US of RUQ was ordered to further delineate if pain is due to biliary pathology. US shows a right renal calculi measuring 1.2 cm and gallbladder edema. Pt states he has never been told he has gallstones before.    Patient has received total of labetalol 20 IVP/300 PO, nifedipine 30 PO, and morphine 10mg IVP.    He is being admitted for further management.   (19 Oct 2022 08:09)    MEDICATIONS  (STANDING):  dextrose 5%. 1000 milliLiter(s) (50 mL/Hr) IV Continuous <Continuous>  dextrose 5%. 1000 milliLiter(s) (100 mL/Hr) IV Continuous <Continuous>  dextrose 50% Injectable 25 Gram(s) IV Push once  dextrose 50% Injectable 12.5 Gram(s) IV Push once  dextrose 50% Injectable 25 Gram(s) IV Push once  furosemide    Tablet 20 milliGRAM(s) Oral daily  glucagon  Injectable 1 milliGRAM(s) IntraMuscular once  insulin lispro (ADMELOG) corrective regimen sliding scale   SubCutaneous three times a day before meals  labetalol 200 milliGRAM(s) Oral two times a day  lactated ringers. 1000 milliLiter(s) (125 mL/Hr) IV Continuous <Continuous>  losartan 100 milliGRAM(s) Oral daily  magnesium oxide 400 milliGRAM(s) Oral three times a day with meals  piperacillin/tazobactam IVPB.. 3.375 Gram(s) IV Intermittent every 8 hours  regadenoson Injectable 0.4 milliGRAM(s) IV Push once  rivaroxaban 20 milliGRAM(s) Oral with dinner  simvastatin 40 milliGRAM(s) Oral at bedtime  tamsulosin 0.4 milliGRAM(s) Oral at bedtime    MEDICATIONS  (PRN):  dextrose Oral Gel 15 Gram(s) Oral once PRN Blood Glucose LESS THAN 70 milliGRAM(s)/deciliter  morphine  - Injectable 2 milliGRAM(s) IV Push every 4 hours PRN Moderate Pain (4 - 6)  ondansetron    Tablet 8 milliGRAM(s) Oral every 8 hours PRN Nausea and/or Vomiting    INTERVAL EVENTS: Patient seen today upset about mishaps, delays, etc. Patient 's INR remains elevated. Patient given plasma. Surgical recommendations changed    T(C): 36.8 (10-27-22 @ 21:41), Max: 36.8 (10-27-22 @ 21:41)  HR: 75 (10-27-22 @ 21:41) (68 - 76)  BP: 176/81 (10-27-22 @ 21:41) (164/72 - 181/78)  RR: 18 (10-27-22 @ 21:41) (18 - 18)  SpO2: --  Wt(kg): --Vital Signs Last 24 Hrs  T(C): 36.8 (27 Oct 2022 21:41), Max: 36.8 (27 Oct 2022 21:41)  T(F): 98.3 (27 Oct 2022 21:41), Max: 98.3 (27 Oct 2022 21:41)  HR: 75 (27 Oct 2022 21:41) (68 - 76)  BP: 176/81 (27 Oct 2022 21:41) (164/72 - 181/78)  BP(mean): --  RR: 18 (27 Oct 2022 21:41) (18 - 18)  SpO2: --        PHYSICAL EXAM:  GENERAL: NAD  NECK: Supple, No JVD  CHEST/LUNG: Clear; No crackles or wheezing  HEART: S1, S2, Regular rate and rhythm;   ABDOMEN: Soft, Nontender, Nondistended; Bowel sounds present  EXTREMITIES: No clubbing, cyanosis, or edema  SKIN: No rashes or lesions    LABS:                        11.4   8.37  )-----------( 218      ( 27 Oct 2022 08:13 )             36.1             10-26    134<L>  |  95<L>  |  11  ----------------------------<  206<H>  4.1   |  28  |  0.7    Ca    8.5      26 Oct 2022 23:31  Phos  3.7     10-27  Mg     2.0     10-26    TPro  5.7<L>  /  Alb  3.2<L>  /  TBili  0.8  /  DBili  x   /  AST  18  /  ALT  14  /  AlkPhos  67  10-26    LIVER FUNCTIONS - ( 26 Oct 2022 23:31 )  Alb: 3.2 g/dL / Pro: 5.7 g/dL / ALK PHOS: 67 U/L / ALT: 14 U/L / AST: 18 U/L / GGT: x                   PT/INR - ( 27 Oct 2022 11:33 )   PT: 18.60 sec;   INR: 1.61 ratio         PTT - ( 27 Oct 2022 11:33 )  PTT:28.0 sec  CARDIAC MARKERS ( 26 Oct 2022 18:31 )  x     / <0.01 ng/mL / x     / x     / x            RADIOLOGY & ADDITIONAL TESTS:

## 2022-10-27 NOTE — DISCHARGE NOTE PROVIDER - HOSPITAL COURSE
Patient is a 71-yo male with pmhx of htn, hld, DM2, MI s/p CABG, DVT/PE on Xarelto, recurrent kidney stones who presented with a chief complaint of abdominal pain and high blood pressure x 1 day. The pain started yesterday afternoon as a "horrible discomfort". It is hypogastric in anture, diffuse, encircling to patient's low back. It is constant, was a 10/10 prior to pain meds (now 7/10), no exacerbating factors. Patient states he takes his bp daily and usually runs around 155/65, but found his systolic in the 200s so decided to come to hospital. Patient denies other systemic sxs, including fever, n/v/d, cp, sob, chills, dysuria, hematuria, hematochezia, joint pains or recent sick contacts. Patient says the pain is similar to what he experienced with his kidney stones but that the pain was located flanks during those incidences.     ED Course:  Vitals- bp 229/95, hr 68, rr 20, t 35.6, 95% on RA  Labs- wbc wnl, Hb 13.9, K 5.1 (hemolyzed), glucose 241, TBili 1.8  EKG- Normal sinus rhythm, Left ventricular hypertrophy with QRS widening and repolarization abnormality ( R in aVL , Shimon product , Romhilt-Bowen ), Inferior infarct , age undetermined  BP had 20 point difference between arms and there was a concern for dissection. A CT angio obtained was negative for dissection, but did show gallstones and enlarged gallbladder with a stone at the neck. An US of RUQ was ordered to further delineate if pain is due to biliary pathology. US shows a right renal calculi measuring 1.2 cm and gallbladder edema. Pt states he has never been told he has gallstones before.    Patient has received total of labetalol 20 IVP/300 PO, nifedipine 30 PO, and morphine 10mg IVP.    He is being admitted for further management.    Hospital Course:    Acute Cholecystitis- as per US and HIDA  - on IV Zosyn  - NPO, PPN started  - pain rx prn  - patiet s/p 4 units FFP + vit K to dec INR pending perc yefri   - decision made by surgery pt does not need acute intervention at this time  cholecystitis episode resolved w/ IV abx, can f/u in 4-6 weeks as o/p    HTN Urgency:  - likely exacerbated  by severe abd pain  - on Labetalol 200mg BID, Losartan 100 QD, Lasix 20 QD  - BP better controlled today, continue to monitor    hx of DVT/PE  hx of MI/CABG  - Xarelto 20mg QD, on hold  - was on heparin drip pending procedure, dc'd and regular AC restarted  - hold ASA 81 QD  - Cardio following    DM type 2  - on Metformin 1g BID at home, held during hospital stay  - start on SS   - monitor FS and adjust as needed    hx of Kidney Stones  - non obstructive kidney stone  - hydrate  - monitor    Deconditioning from all  - call to PPT to  evaluate and start RX      # Misc  - GI ppx:    - DVT ppx: heparin gtt  - Diet: NPO, PPN feeds  - Activity: AAT  - DISPO: acute, pending perc yefri with IR     Patient is a 71-yo male with pmhx of htn, hld, DM2, MI s/p CABG, DVT/PE on Xarelto, recurrent kidney stones who presented with a chief complaint of abdominal pain and high blood pressure x 1 day. The pain started yesterday afternoon as a "horrible discomfort". It is hypogastric in anture, diffuse, encircling to patient's low back. It is constant, was a 10/10 prior to pain meds (now 7/10), no exacerbating factors. Patient states he takes his bp daily and usually runs around 155/65, but found his systolic in the 200s so decided to come to hospital. Patient denies other systemic sxs, including fever, n/v/d, cp, sob, chills, dysuria, hematuria, hematochezia, joint pains or recent sick contacts. Patient says the pain is similar to what he experienced with his kidney stones but that the pain was located flanks during those incidences.     ED Course:  Vitals- bp 229/95, hr 68, rr 20, t 35.6, 95% on RA  Labs- wbc wnl, Hb 13.9, K 5.1 (hemolyzed), glucose 241, TBili 1.8  EKG- Normal sinus rhythm, Left ventricular hypertrophy with QRS widening and repolarization abnormality ( R in aVL , Shimon product , Romhilt-Bowen ), Inferior infarct , age undetermined  BP had 20 point difference between arms and there was a concern for dissection. A CT angio obtained was negative for dissection, but did show gallstones and enlarged gallbladder with a stone at the neck. An US of RUQ was ordered to further delineate if pain is due to biliary pathology. US shows a right renal calculi measuring 1.2 cm and gallbladder edema. Pt states he has never been told he has gallstones before.    Patient has received total of labetalol 20 IVP/300 PO, nifedipine 30 PO, and morphine 10mg IVP.    He is being admitted for further management.    Hospital Course:    Acute Cholecystitis- as per US and HIDA  - on IV Zosyn  - NPO, PPN started  - pain rx prn  - patiet s/p 4 units FFP + vit K to dec INR pending perc yefri   - decision made by surgery pt does not need acute intervention at this time  cholecystitis episode resolved w/ IV abx, can f/u in 4-6 weeks as o/p    HTN Urgency:  - likely exacerbated  by severe abd pain  - on Labetalol 200mg BID, Losartan 100 QD, Lasix 20 QD  - BP better controlled today, continue to monitor    hx of DVT/PE  hx of MI/CABG  - Xarelto 20mg QD, on hold  - was on heparin drip pending procedure, dc'd and regular AC restarted  - hold ASA 81 QD  - Cardio following    DM type 2  - on Metformin 1g BID at home, held during hospital stay  - start on SS   - monitor FS and adjust as needed    hx of Kidney Stones  - non obstructive kidney stone  - hydrate  - monitor    Deconditioning from all  - call to PPT to  evaluate and start RX      # Misc  - GI ppx:    - DVT ppx: heparin gtt  - Diet: NPO, PPN feeds  - Activity: AAT

## 2022-10-27 NOTE — PROGRESS NOTE ADULT - ASSESSMENT
ASSESSMENT:  71M w/ PMH of HLD, HTN, MI, DM, CAD s/p CABG, previous DVT/PE on Xarelto presents for hypotensionand kidney stones presents to ED with complaints of abdominal pain in the bilateral flanks radiating across the lower abdomen as well as hypertension. Initial vitals noted to have a systolic of 239. A CT showed gallstones and enlarged gallbladder with a stone at the neck.   An US of RUQ was ordered to further delineate if pain is due to biliary pathology. US shows a right renal calculi measuring 1.2 cm and gallbladder edema. On exam, pt complains of right flank pain. Physical exam findings, imaging, and labs as documented above.     Acute Cholecystitis  - on IV Zosyn, condition improved  - procedures cancelled due to elevated INR  - surgeron and IR believe can forgo procedure and return for lap yefri in 4-6 weeks  - resume PO diet, low fat, NCS diet  - pain rx prn  - GI, Surgery and IR f/u noted  - will need cardiac clearance for procedure  - stress test re-ordered    HTN, CAD, old MI, CABG  - on Labetalol 200mg BID, Losartan 100 QD, Lasix 20 QD  - ASA held  - cardio following    hx of DVT/PE  - Xarelto 20mg QD, on hold  - on Lovenox 40 BID on hold until after the procedure    DM type 2  - on Metformin 1g BID at home, held during hospital stay  - start on SS   - monitor FS and adjust as needed    hx of Kidney Stones  - non obstructive kidney stone  - hydrate  - monitor    Hypomagnesemia  - releted    Diet resumed today  DVT Prophylaxis on therapeutic dose  Patient is a full code

## 2022-10-27 NOTE — PROGRESS NOTE ADULT - SUBJECTIVE AND OBJECTIVE BOX
DAILY PROGRESS NOTE  ===========================================================    Patient Information:  MERARY GARCIA  /  72y  /  Male  /  MRN#: [id number]    Patient is a 72y old Male who presents with a chief complaint of abd pain, htn (27 Oct 2022 00:39)      Hospital Day: 8d     |:::::::::::::::::::::::::::| SUBJECTIVE |:::::::::::::::::::::::::::|    OVERNIGHT EVENTS: none.  TODAY: Patient was seen today at bedside. Review of systems is otherwise negative.    |:::::::::::::::::::::::::::| OBJECTIVE |:::::::::::::::::::::::::::|    VITAL SIGNS: Last 24 Hours  T(C): 36.2 (27 Oct 2022 05:15), Max: 36.2 (27 Oct 2022 05:15)  T(F): 97.1 (27 Oct 2022 05:15), Max: 97.1 (27 Oct 2022 05:15)  HR: 76 (27 Oct 2022 05:15) (76 - 78)  BP: 164/72 (27 Oct 2022 05:15) (95/53 - 164/72)  BP(mean): --  RR: 18 (27 Oct 2022 05:15) (18 - 18)  SpO2: --    10-26-22 @ 07:01  -  10-27-22 @ 07:00  --------------------------------------------------------  IN: 1110.3 mL / OUT: 0 mL / NET: 1110.3 mL    10-27-22 @ 07:01  -  10-27-22 @ 09:36  --------------------------------------------------------  IN: 1315 mL / OUT: 1300 mL / NET: 15 mL      PHYSICAL EXAM:  GENERAL: Awake, alert; NAD.  HEENT: Head NC/AT; Conjunctivae pink, Sclera anicteric; Oral mucosa moist.  CARDIO: Regular rate; Regular rhythm; S1 & S2.  RESP: No rales, wheezing, or rhonchi appreciated.  GI: Soft; NT/ND; BS; No guarding; No rebound tenderness.  EXT: Strength UE 5/5; Strength LE 5/5; No edema.   SKIN: Intact, no rashes, no erythema    LAB RESULTS:                        11.4   8.37  )-----------( 218      ( 27 Oct 2022 08:13 )             36.1     10-26    134<L>  |  95<L>  |  11  ----------------------------<  206<H>  4.1   |  28  |  0.7    Ca    8.5      26 Oct 2022 23:31  Phos  3.7     10-27  Mg     2.0     10-26    TPro  5.7<L>  /  Alb  3.2<L>  /  TBili  0.8  /  DBili  x   /  AST  18  /  ALT  14  /  AlkPhos  67  10-26    PT/INR - ( 27 Oct 2022 08:13 )   PT: 19.80 sec;   INR: 1.71 ratio         PTT - ( 27 Oct 2022 08:13 )  PTT:28.1 sec      Troponin T, Serum: <0.01 ng/mL (10-26-22 @ 18:31)    CARDIAC MARKERS ( 26 Oct 2022 18:31 )  x     / <0.01 ng/mL / x     / x     / x          MICROBIOLOGY:    RADIOLOGY:    ALLERGIES:  No Known Allergies    MEDICATIONS:  dextrose 5%. 1000 milliLiter(s) IV Continuous <Continuous>  dextrose 5%. 1000 milliLiter(s) IV Continuous <Continuous>  dextrose 50% Injectable 25 Gram(s) IV Push once  dextrose 50% Injectable 12.5 Gram(s) IV Push once  dextrose 50% Injectable 25 Gram(s) IV Push once  dextrose Oral Gel 15 Gram(s) Oral once PRN  fat emulsion (Fish Oil and Plant Based) 20% Infusion 1.0753 Gm/kG/Day IV Continuous <Continuous>  furosemide    Tablet 20 milliGRAM(s) Oral daily  glucagon  Injectable 1 milliGRAM(s) IntraMuscular once  insulin lispro (ADMELOG) corrective regimen sliding scale   SubCutaneous three times a day before meals  labetalol 200 milliGRAM(s) Oral two times a day  lactated ringers. 1000 milliLiter(s) IV Continuous <Continuous>  losartan 100 milliGRAM(s) Oral daily  magnesium oxide 400 milliGRAM(s) Oral three times a day with meals  morphine  - Injectable 2 milliGRAM(s) IV Push every 4 hours PRN  ondansetron    Tablet 8 milliGRAM(s) Oral every 8 hours PRN  Parenteral Nutrition - Adult 1 Each TPN Continuous <Continuous>  piperacillin/tazobactam IVPB.. 3.375 Gram(s) IV Intermittent every 8 hours  regadenoson Injectable 0.4 milliGRAM(s) IV Push once  simvastatin 40 milliGRAM(s) Oral at bedtime  tamsulosin 0.4 milliGRAM(s) Oral at bedtime    ===========================================================

## 2022-10-27 NOTE — PROGRESS NOTE ADULT - SUBJECTIVE AND OBJECTIVE BOX
GENERAL SURGERY PROGRESS NOTE    Patient: MERARY GARCIA , 72y ([birth year])Male   MRN: [id number]  Location: [address]  Visit: 10-19-22 Inpatient  Date: 10-27-22 @ 00:39    Procedure/Dx/Injuries: acute cholecystitis    Events of past 24 hours: INR 1.85 -> 1u plasma ordered for AM    PAST MEDICAL & SURGICAL HISTORY:  Hypertension, unspecified type  Type 2 diabetes mellitus with complication, unspecified long term insulin use status  High cholesterol  Kidney stones  MI (myocardial infarction)  DVT (deep venous thrombosis)  Acute massive pulmonary embolism  s/p tPA in 2018  Carotid atherosclerosis  H/O heart surgery  quadruple bypass 6 years ago  H/O cystoscopy      Vitals:   T(F): 96.6 (10-26-22 @ 12:14), Max: 97.1 (10-26-22 @ 05:52)  HR: 78 (10-26-22 @ 12:14)  BP: 95/53 (10-26-22 @ 12:14)  RR: 18 (10-26-22 @ 12:14)  SpO2: --      Diet, NPO after Midnight:      NPO Start Date: 26-Oct-2022,   NPO Start Time: 23:59  Diet, NPO after Midnight:      NPO Start Date: 25-Oct-2022,   NPO Start Time: 23:59  Diet, NPO:   Except Medications      Fluids:     I & O's:    10-25-22 @ 07:01  -  10-26-22 @ 07:00  --------------------------------------------------------  IN:  Total IN: 0 mL    OUT:    Stool (mL): 1 mL    Voided (mL): 1975 mL  Total OUT: 1976 mL    Total NET: -1976 mL    PHYSICAL EXAM:  General: NAD, AAOx3, calm and cooperative  Cardiac: S1, S2  Respiratory: no labored breathing, normal respiratory effort  Abdomen: Soft, non-distended, non-tender, no rebound, no guarding.  Skin: Warm/dry, normal color, no jaundice    MEDICATIONS  (STANDING):  dextrose 5%. 1000 milliLiter(s) (100 mL/Hr) IV Continuous <Continuous>  dextrose 5%. 1000 milliLiter(s) (50 mL/Hr) IV Continuous <Continuous>  dextrose 50% Injectable 25 Gram(s) IV Push once  dextrose 50% Injectable 12.5 Gram(s) IV Push once  dextrose 50% Injectable 25 Gram(s) IV Push once  fat emulsion (Fish Oil and Plant Based) 20% Infusion 1.0753 Gm/kG/Day (31.3 mL/Hr) IV Continuous <Continuous>  furosemide    Tablet 20 milliGRAM(s) Oral daily  glucagon  Injectable 1 milliGRAM(s) IntraMuscular once  heparin  Infusion.  Unit(s)/Hr (17 mL/Hr) IV Continuous <Continuous>  insulin lispro (ADMELOG) corrective regimen sliding scale   SubCutaneous three times a day before meals  labetalol 200 milliGRAM(s) Oral two times a day  lactated ringers. 1000 milliLiter(s) (125 mL/Hr) IV Continuous <Continuous>  losartan 100 milliGRAM(s) Oral daily  magnesium oxide 400 milliGRAM(s) Oral three times a day with meals  Parenteral Nutrition - Adult 1 Each (65 mL/Hr) TPN Continuous <Continuous>  piperacillin/tazobactam IVPB.. 3.375 Gram(s) IV Intermittent every 8 hours  regadenoson Injectable 0.4 milliGRAM(s) IV Push once  simvastatin 40 milliGRAM(s) Oral at bedtime  tamsulosin 0.4 milliGRAM(s) Oral at bedtime    MEDICATIONS  (PRN):  dextrose Oral Gel 15 Gram(s) Oral once PRN Blood Glucose LESS THAN 70 milliGRAM(s)/deciliter  heparin   Injectable 7500 Unit(s) IV Push every 6 hours PRN For aPTT less than 40  heparin   Injectable 3500 Unit(s) IV Push every 6 hours PRN For aPTT between 40 - 57  morphine  - Injectable 2 milliGRAM(s) IV Push every 4 hours PRN Moderate Pain (4 - 6)  ondansetron    Tablet 8 milliGRAM(s) Oral every 8 hours PRN Nausea and/or Vomiting      DVT PROPHYLAXIS: heparin   Injectable 7500 Unit(s) IV Push every 6 hours PRN  heparin   Injectable 3500 Unit(s) IV Push every 6 hours PRN  heparin  Infusion.  Unit(s)/Hr IV Continuous <Continuous>    GI PROPHYLAXIS:   ANTICOAGULATION:   ANTIBIOTICS:  piperacillin/tazobactam IVPB.. 3.375 Gram(s)            LAB/STUDIES:  Labs:  CAPILLARY BLOOD GLUCOSE      POCT Blood Glucose.: 259 mg/dL (26 Oct 2022 16:07)  POCT Blood Glucose.: 264 mg/dL (26 Oct 2022 11:13)  POCT Blood Glucose.: 252 mg/dL (26 Oct 2022 07:51)                          12.2   9.70  )-----------( 232      ( 26 Oct 2022 23:31 )             37.3         10-26    137  |  99  |  10  ----------------------------<  281<H>  4.3   |  27  |  0.6<L>      Calcium, Total Serum: 8.3 mg/dL (10-26-22 @ 07:49)      LFTs:             5.0  | 0.9  | 11       ------------------[61      ( 25 Oct 2022 07:06 )  3.0  | x    | 10          Lipase:x      Amylase:x             Coags:     20.60  ----< 1.78    ( 26 Oct 2022 23:31 )     44.9        CARDIAC MARKERS ( 26 Oct 2022 18:31 )  x     / <0.01 ng/mL / x     / x     / x

## 2022-10-27 NOTE — PROGRESS NOTE ADULT - ASSESSMENT
71M w/ PMH of HLD, HTN, MI, DM, CAD s/p CABG, previous DVT/PE on Xarelto presents for hypotensionand kidney stones presents to ED with complaints of abdominal pain in the bilateral flanks radiating across the lower abdomen as well as hypertension. Initial vitals noted to have a systolic of 239. A CT showed gallstones and enlarged gallbladder with a stone at the neck.   An US of RUQ was ordered to further delineate if pain is due to biliary pathology. US shows a right renal calculi measuring 1.2 cm and gallbladder edema. On exam, pt complains of right flank pain. Physical exam findings, imaging, and labs as documented above.     Acute Cholecystitis- as per US and HIDA  - on IV Zosyn  - NPO, PPN started  - pain rx prn  - patient currently s/p 3 units FFP, 2 orders of Vitamin K  repeat INR this AM 1.71, will give another unit STAT, IR aware    HTN Urgency:  - likely exacerbated  by severe abd pain  - on Labetalol 200mg BID, Losartan 100 QD, Lasix 20 QD  - BP better controlled today, continue to monitor    hx of DVT/PE  hx of MI/CABG  - Xarelto 20mg QD, on hold  - on Lovenox 40 BID until procedure- SEE PRIOR NOTE  - hold ASA 81 QD  - Cardio following    DM type 2  - on Metformin 1g BID at home, held during hospital stay  - start on SS   - monitor FS and adjust as needed    hx of Kidney Stones  - non obstructive kidney stone  - hydrate  - monitor    Deconditioning from all  - call to PPT to  evaluate and start RX      # Misc  - GI ppx:    - DVT ppx:   - Diet: NPO, PPN feeds  - Activity: AAT  - DISPO: acute, pending perc yefri with IR   71M w/ PMH of HLD, HTN, MI, DM, CAD s/p CABG, previous DVT/PE on Xarelto presents for hypotensionand kidney stones presents to ED with complaints of abdominal pain in the bilateral flanks radiating across the lower abdomen as well as hypertension. Initial vitals noted to have a systolic of 239. A CT showed gallstones and enlarged gallbladder with a stone at the neck.   An US of RUQ was ordered to further delineate if pain is due to biliary pathology. US shows a right renal calculi measuring 1.2 cm and gallbladder edema. On exam, pt complains of right flank pain. Physical exam findings, imaging, and labs as documented above.     Acute Cholecystitis- as per US and HIDA  - on IV Zosyn  - NPO, PPN started  - pain rx prn  - patient currently s/p 3 units FFP, 2 orders of Vitamin K  repeat INR this AM 1.71, will give another unit STAT, IR aware    HTN Urgency:  - likely exacerbated  by severe abd pain  - on Labetalol 200mg BID, Losartan 100 QD, Lasix 20 QD  - BP better controlled today, continue to monitor    hx of DVT/PE  hx of MI/CABG  - Xarelto 20mg QD, on hold  - on heparin drip, on hold since AM pending procedure  - hold ASA 81 QD  - Cardio following    DM type 2  - on Metformin 1g BID at home, held during hospital stay  - start on SS   - monitor FS and adjust as needed    hx of Kidney Stones  - non obstructive kidney stone  - hydrate  - monitor    Deconditioning from all  - call to PPT to  evaluate and start RX      # Misc  - GI ppx:    - DVT ppx: heparin gtt  - Diet: NPO, PPN feeds  - Activity: AAT  - DISPO: acute, pending perc yefri with IR

## 2022-10-27 NOTE — DISCHARGE NOTE PROVIDER - NSDCCPCAREPLAN_GEN_ALL_CORE_FT
PRINCIPAL DISCHARGE DIAGNOSIS  Diagnosis: Abdominal pain  Assessment and Plan of Treatment:   Cholecystitis is inflammation of the gallbladder. Cholecystitis is often called a gallbladder attack. The gallbladder is a pear-shaped organ that lies beneath the liver on the right side of the body. The gallbladder stores a fluid that helps the body digest fats (bile). If bile builds up in your gallbladder, your gallbladder becomes inflamed and can develop a serious infection.  This condition may occur suddenly. Cholecystitis is a serious condition and requires treatment.  What are the causes?  The most common cause of this condition is gallstones. Gallstones can block the tube (duct) that carries bile out of your gallbladder. This causes bile to build up.  Other causes include:  •Damage to the gallbladder due to decreased blood flow.  •Infection in the bile duct.  •Scars, kinks, or adhesions in the bile duct.  •Tumors in the liver, pancreas, or gallbladder.  You were treated with IV antibiotics during your hospital stay which resolved the episode. It is recommended you folow up with your surgeon to remove the gallbladder in 4-6 weeks.      SECONDARY DISCHARGE DIAGNOSES  Diagnosis: Hypertension  Assessment and Plan of Treatment: Hypertension  Hypertension, commonly called high blood pressure, is when the force of blood pumping through your arteries is too strong. Hypertension forces your heart to work harder to pump blood. Your arteries may become narrow or stiff. Having untreated or uncontrolled hypertension for a long period of time can cause heart attack, stroke, kidney disease, and other problems. If started on a medication, take exactly as prescribed by your health care professional. Maintain a healthy lifestyle and follow up with your primary care physician.  SEEK IMMEDIATE MEDICAL CARE IF YOU HAVE ANY OF THE FOLLOWING SYMPTOMS: severe headache, confusion, chest pain, abdominal pain, vomiting, or shortness of breath.

## 2022-10-27 NOTE — DISCHARGE NOTE PROVIDER - NSDCMRMEDTOKEN_GEN_ALL_CORE_FT
Aspirin Enteric Coated 81 mg oral delayed release tablet: 1 tab(s) orally once a day  losartan 100 mg oral tablet: 1 tab(s) orally once a day  magnesium oxide 400 mg (241.3 mg elemental magnesium) oral tablet: 1 tab(s) orally 3 times a day (with meals)  metFORMIN 1000 mg oral tablet: 1 tab(s) orally 2 times a day  simvastatin 40 mg oral tablet: 1 tab(s) orally once a day (at bedtime)  tamsulosin 0.4 mg oral capsule: 1 cap(s) orally once a day  Trandate 200 mg oral tablet: 1 tab(s) orally 2 times a day. Do not take if blood pressure is &lt; 120/60  Xarelto 20 mg oral tablet: 1 tab(s) orally once a day (in the morning)   Aspirin Enteric Coated 81 mg oral delayed release tablet: 1 tab(s) orally once a day  labetalol 100 mg oral tablet: 1 tab(s) orally   labetalol 200 mg oral tablet: 1 tab(s) orally   losartan 50 mg oral tablet: 1 tab(s) orally once a day  magnesium oxide 400 mg (241.3 mg elemental magnesium) oral tablet: 1 tab(s) orally 3 times a day (with meals)  metFORMIN 1000 mg oral tablet: 1 tab(s) orally 2 times a day  simvastatin 40 mg oral tablet: 1 tab(s) orally once a day (at bedtime)  tamsulosin 0.4 mg oral capsule: 1 cap(s) orally once a day  Xarelto 20 mg oral tablet: 1 tab(s) orally once a day (in the morning)   Aspirin Enteric Coated 81 mg oral delayed release tablet: 1 tab(s) orally once a day  labetalol 200 mg oral tablet: 1 tab(s) orally once a day  losartan 50 mg oral tablet: 1 tab(s) orally once a day  magnesium oxide 400 mg (241.3 mg elemental magnesium) oral tablet: 1 tab(s) orally 3 times a day (with meals)  metFORMIN 1000 mg oral tablet: 1 tab(s) orally 2 times a day  simvastatin 40 mg oral tablet: 1 tab(s) orally once a day (at bedtime)  tamsulosin 0.4 mg oral capsule: 1 cap(s) orally once a day  Xarelto 20 mg oral tablet: 1 tab(s) orally once a day (in the morning)

## 2022-10-27 NOTE — PROGRESS NOTE ADULT - SUBJECTIVE AND OBJECTIVE BOX
SUBJ:No chest pain or shortness of breath      MEDICATIONS  (STANDING):  dextrose 5%. 1000 milliLiter(s) (50 mL/Hr) IV Continuous <Continuous>  dextrose 5%. 1000 milliLiter(s) (100 mL/Hr) IV Continuous <Continuous>  dextrose 50% Injectable 25 Gram(s) IV Push once  dextrose 50% Injectable 12.5 Gram(s) IV Push once  dextrose 50% Injectable 25 Gram(s) IV Push once  furosemide    Tablet 20 milliGRAM(s) Oral daily  glucagon  Injectable 1 milliGRAM(s) IntraMuscular once  insulin lispro (ADMELOG) corrective regimen sliding scale   SubCutaneous three times a day before meals  labetalol 200 milliGRAM(s) Oral two times a day  lactated ringers. 1000 milliLiter(s) (125 mL/Hr) IV Continuous <Continuous>  losartan 100 milliGRAM(s) Oral daily  magnesium oxide 400 milliGRAM(s) Oral three times a day with meals  piperacillin/tazobactam IVPB.. 3.375 Gram(s) IV Intermittent every 8 hours  regadenoson Injectable 0.4 milliGRAM(s) IV Push once  rivaroxaban 20 milliGRAM(s) Oral with dinner  simvastatin 40 milliGRAM(s) Oral at bedtime  tamsulosin 0.4 milliGRAM(s) Oral at bedtime    MEDICATIONS  (PRN):  dextrose Oral Gel 15 Gram(s) Oral once PRN Blood Glucose LESS THAN 70 milliGRAM(s)/deciliter  morphine  - Injectable 2 milliGRAM(s) IV Push every 4 hours PRN Moderate Pain (4 - 6)  ondansetron    Tablet 8 milliGRAM(s) Oral every 8 hours PRN Nausea and/or Vomiting            Vital Signs Last 24 Hrs  T(C): 36.1 (27 Oct 2022 12:05), Max: 36.2 (27 Oct 2022 05:15)  T(F): 97 (27 Oct 2022 12:05), Max: 97.1 (27 Oct 2022 05:15)  HR: 68 (27 Oct 2022 12:05) (68 - 76)  BP: 181/78 (27 Oct 2022 12:05) (164/72 - 181/78)  BP(mean): --  RR: 18 (27 Oct 2022 12:05) (18 - 18)  SpO2: --          ECG:NML    TTE:    LABS:                        11.4   8.37  )-----------( 218      ( 27 Oct 2022 08:13 )             36.1     10-26    134<L>  |  95<L>  |  11  ----------------------------<  206<H>  4.1   |  28  |  0.7    Ca    8.5      26 Oct 2022 23:31  Phos  3.7     10-27  Mg     2.0     10-26    TPro  5.7<L>  /  Alb  3.2<L>  /  TBili  0.8  /  DBili  x   /  AST  18  /  ALT  14  /  AlkPhos  67  10-26    CARDIAC MARKERS ( 26 Oct 2022 18:31 )  x     / <0.01 ng/mL / x     / x     / x          PT/INR - ( 27 Oct 2022 11:33 )   PT: 18.60 sec;   INR: 1.61 ratio         PTT - ( 27 Oct 2022 11:33 )  PTT:28.0 sec    I&O's Summary    26 Oct 2022 07:01  -  27 Oct 2022 07:00  --------------------------------------------------------  IN: 1110.3 mL / OUT: 0 mL / NET: 1110.3 mL    27 Oct 2022 07:01  -  27 Oct 2022 20:26  --------------------------------------------------------  IN: 1315 mL / OUT: 1300 mL / NET: 15 mL      BNP

## 2022-10-28 LAB
ALBUMIN SERPL ELPH-MCNC: 3.4 G/DL — LOW (ref 3.5–5.2)
ALP SERPL-CCNC: 66 U/L — SIGNIFICANT CHANGE UP (ref 30–115)
ALT FLD-CCNC: 17 U/L — SIGNIFICANT CHANGE UP (ref 0–41)
ANION GAP SERPL CALC-SCNC: 10 MMOL/L — SIGNIFICANT CHANGE UP (ref 7–14)
AST SERPL-CCNC: 19 U/L — SIGNIFICANT CHANGE UP (ref 0–41)
BASOPHILS # BLD AUTO: 0.07 K/UL — SIGNIFICANT CHANGE UP (ref 0–0.2)
BASOPHILS NFR BLD AUTO: 0.8 % — SIGNIFICANT CHANGE UP (ref 0–1)
BILIRUB SERPL-MCNC: 0.8 MG/DL — SIGNIFICANT CHANGE UP (ref 0.2–1.2)
BUN SERPL-MCNC: 12 MG/DL — SIGNIFICANT CHANGE UP (ref 10–20)
CALCIUM SERPL-MCNC: 9.1 MG/DL — SIGNIFICANT CHANGE UP (ref 8.4–10.4)
CHLORIDE SERPL-SCNC: 101 MMOL/L — SIGNIFICANT CHANGE UP (ref 98–110)
CO2 SERPL-SCNC: 27 MMOL/L — SIGNIFICANT CHANGE UP (ref 17–32)
CREAT SERPL-MCNC: 0.7 MG/DL — SIGNIFICANT CHANGE UP (ref 0.7–1.5)
EGFR: 99 ML/MIN/1.73M2 — SIGNIFICANT CHANGE UP
EOSINOPHIL # BLD AUTO: 0.47 K/UL — SIGNIFICANT CHANGE UP (ref 0–0.7)
EOSINOPHIL NFR BLD AUTO: 5.3 % — SIGNIFICANT CHANGE UP (ref 0–8)
GLUCOSE BLDC GLUCOMTR-MCNC: 186 MG/DL — HIGH (ref 70–99)
GLUCOSE BLDC GLUCOMTR-MCNC: 220 MG/DL — HIGH (ref 70–99)
GLUCOSE BLDC GLUCOMTR-MCNC: 224 MG/DL — HIGH (ref 70–99)
GLUCOSE BLDC GLUCOMTR-MCNC: 254 MG/DL — HIGH (ref 70–99)
GLUCOSE SERPL-MCNC: 198 MG/DL — HIGH (ref 70–99)
HCT VFR BLD CALC: 39.9 % — LOW (ref 42–52)
HGB BLD-MCNC: 12.6 G/DL — LOW (ref 14–18)
IMM GRANULOCYTES NFR BLD AUTO: 3.3 % — HIGH (ref 0.1–0.3)
LYMPHOCYTES # BLD AUTO: 1.32 K/UL — SIGNIFICANT CHANGE UP (ref 1.2–3.4)
LYMPHOCYTES # BLD AUTO: 15 % — LOW (ref 20.5–51.1)
MAGNESIUM SERPL-MCNC: 1.5 MG/DL — LOW (ref 1.8–2.4)
MCHC RBC-ENTMCNC: 29.1 PG — SIGNIFICANT CHANGE UP (ref 27–31)
MCHC RBC-ENTMCNC: 31.6 G/DL — LOW (ref 32–37)
MCV RBC AUTO: 92.1 FL — SIGNIFICANT CHANGE UP (ref 80–94)
MONOCYTES # BLD AUTO: 0.71 K/UL — HIGH (ref 0.1–0.6)
MONOCYTES NFR BLD AUTO: 8.1 % — SIGNIFICANT CHANGE UP (ref 1.7–9.3)
NEUTROPHILS # BLD AUTO: 5.94 K/UL — SIGNIFICANT CHANGE UP (ref 1.4–6.5)
NEUTROPHILS NFR BLD AUTO: 67.5 % — SIGNIFICANT CHANGE UP (ref 42.2–75.2)
NRBC # BLD: 0 /100 WBCS — SIGNIFICANT CHANGE UP (ref 0–0)
PHOSPHATE SERPL-MCNC: 3.6 MG/DL — SIGNIFICANT CHANGE UP (ref 2.1–4.9)
PLATELET # BLD AUTO: 239 K/UL — SIGNIFICANT CHANGE UP (ref 130–400)
POTASSIUM SERPL-MCNC: 4.5 MMOL/L — SIGNIFICANT CHANGE UP (ref 3.5–5)
POTASSIUM SERPL-SCNC: 4.5 MMOL/L — SIGNIFICANT CHANGE UP (ref 3.5–5)
PROT SERPL-MCNC: 6.2 G/DL — SIGNIFICANT CHANGE UP (ref 6–8)
RBC # BLD: 4.33 M/UL — LOW (ref 4.7–6.1)
RBC # FLD: 13.4 % — SIGNIFICANT CHANGE UP (ref 11.5–14.5)
SODIUM SERPL-SCNC: 138 MMOL/L — SIGNIFICANT CHANGE UP (ref 135–146)
WBC # BLD: 8.8 K/UL — SIGNIFICANT CHANGE UP (ref 4.8–10.8)
WBC # FLD AUTO: 8.8 K/UL — SIGNIFICANT CHANGE UP (ref 4.8–10.8)

## 2022-10-28 PROCEDURE — 93016 CV STRESS TEST SUPVJ ONLY: CPT

## 2022-10-28 PROCEDURE — 93018 CV STRESS TEST I&R ONLY: CPT

## 2022-10-28 PROCEDURE — 78452 HT MUSCLE IMAGE SPECT MULT: CPT | Mod: 26

## 2022-10-28 RX ORDER — MAGNESIUM SULFATE 500 MG/ML
2 VIAL (ML) INJECTION
Refills: 0 | Status: COMPLETED | OUTPATIENT
Start: 2022-10-28 | End: 2022-10-28

## 2022-10-28 RX ORDER — AMLODIPINE BESYLATE 2.5 MG/1
5 TABLET ORAL DAILY
Refills: 0 | Status: DISCONTINUED | OUTPATIENT
Start: 2022-10-28 | End: 2022-10-29

## 2022-10-28 RX ADMIN — MAGNESIUM OXIDE 400 MG ORAL TABLET 400 MILLIGRAM(S): 241.3 TABLET ORAL at 11:40

## 2022-10-28 RX ADMIN — Medication 200 MILLIGRAM(S): at 05:33

## 2022-10-28 RX ADMIN — MAGNESIUM OXIDE 400 MG ORAL TABLET 400 MILLIGRAM(S): 241.3 TABLET ORAL at 11:41

## 2022-10-28 RX ADMIN — Medication 1: at 09:08

## 2022-10-28 RX ADMIN — Medication 25 GRAM(S): at 14:08

## 2022-10-28 RX ADMIN — Medication 25 GRAM(S): at 18:00

## 2022-10-28 RX ADMIN — MAGNESIUM OXIDE 400 MG ORAL TABLET 400 MILLIGRAM(S): 241.3 TABLET ORAL at 18:00

## 2022-10-28 RX ADMIN — Medication 200 MILLIGRAM(S): at 18:00

## 2022-10-28 RX ADMIN — SIMVASTATIN 40 MILLIGRAM(S): 20 TABLET, FILM COATED ORAL at 21:29

## 2022-10-28 RX ADMIN — LOSARTAN POTASSIUM 100 MILLIGRAM(S): 100 TABLET, FILM COATED ORAL at 05:33

## 2022-10-28 RX ADMIN — PIPERACILLIN AND TAZOBACTAM 25 GRAM(S): 4; .5 INJECTION, POWDER, LYOPHILIZED, FOR SOLUTION INTRAVENOUS at 14:08

## 2022-10-28 RX ADMIN — RIVAROXABAN 20 MILLIGRAM(S): KIT at 17:59

## 2022-10-28 RX ADMIN — AMLODIPINE BESYLATE 5 MILLIGRAM(S): 2.5 TABLET ORAL at 11:45

## 2022-10-28 RX ADMIN — Medication 20 MILLIGRAM(S): at 05:34

## 2022-10-28 RX ADMIN — PIPERACILLIN AND TAZOBACTAM 25 GRAM(S): 4; .5 INJECTION, POWDER, LYOPHILIZED, FOR SOLUTION INTRAVENOUS at 05:33

## 2022-10-28 RX ADMIN — Medication 2: at 17:58

## 2022-10-28 RX ADMIN — TAMSULOSIN HYDROCHLORIDE 0.4 MILLIGRAM(S): 0.4 CAPSULE ORAL at 21:29

## 2022-10-28 NOTE — CONSULT NOTE ADULT - ASSESSMENT
Patient is a 71-yo male with pmhx of HTN DM2, MI s/p CABG, DVT/PE on Xarelto, recurrent kidney stones who presented with a chief complaint of abdominal pain    # hypomagnesemia related to? diarrhea vs Lasix use   continue Mg Oxide current dose   DC lasix if possible  avoid Iv Mg unless Mg< 1.5  check U Mg U Creat to calculate Fe Mg ( that would help differentiate between renal and  non renal causes)  If diuretics are needed consider Amiloride or triamterene   check Mg in AM     will follow

## 2022-10-28 NOTE — CONSULT NOTE ADULT - CONSULT REQUESTED DATE/TIME
19-Oct-2022 03:23
20-Oct-2022 12:13
21-Oct-2022 09:43
28-Oct-2022 12:15
19-Oct-2022 12:36
20-Oct-2022 18:42

## 2022-10-28 NOTE — PROGRESS NOTE ADULT - SUBJECTIVE AND OBJECTIVE BOX
GARCIAMERARY DAY  72y  Male  HPI:  Patient is a 71-yo male with pmhx of htn, hld, DM2, MI s/p CABG, DVT/PE on Xarelto, recurrent kidney stones who presented with a chief complaint of abdominal pain and high blood pressure x 1 day. The pain started yesterday afternoon as a "horrible discomfort". It is hypogastric in anture, diffuse, encircling to patient's low back. It is constant, was a 10/10 prior to pain meds (now 7/10), no exacerbating factors. Patient states he takes his bp daily and usually runs around 155/65, but found his systolic in the 200s so decided to come to hospital. Patient denies other systemic sxs, including fever, n/v/d, cp, sob, chills, dysuria, hematuria, hematochezia, joint pains or recent sick contacts. Patient says the pain is similar to what he experienced with his kidney stones but that the pain was located flanks during those incidences.     ED Course:  Vitals- bp 229/95, hr 68, rr 20, t 35.6, 95% on RA  Labs- wbc wnl, Hb 13.9, K 5.1 (hemolyzed), glucose 241, TBili 1.8  EKG- Normal sinus rhythm, Left ventricular hypertrophy with QRS widening and repolarization abnormality ( R in aVL , Shimon product , Romhilt-Bowen ), Inferior infarct , age undetermined  BP had 20 point difference between arms and there was a concern for dissection. A CT angio obtained was negative for dissection, but did show gallstones and enlarged gallbladder with a stone at the neck. An US of RUQ was ordered to further delineate if pain is due to biliary pathology. US shows a right renal calculi measuring 1.2 cm and gallbladder edema. Pt states he has never been told he has gallstones before.    Patient has received total of labetalol 20 IVP/300 PO, nifedipine 30 PO, and morphine 10mg IVP.    He is being admitted for further management.   (19 Oct 2022 08:09)    MEDICATIONS  (STANDING):  amLODIPine   Tablet 5 milliGRAM(s) Oral daily  dextrose 5%. 1000 milliLiter(s) (100 mL/Hr) IV Continuous <Continuous>  dextrose 5%. 1000 milliLiter(s) (50 mL/Hr) IV Continuous <Continuous>  dextrose 50% Injectable 25 Gram(s) IV Push once  dextrose 50% Injectable 12.5 Gram(s) IV Push once  dextrose 50% Injectable 25 Gram(s) IV Push once  furosemide    Tablet 20 milliGRAM(s) Oral daily  glucagon  Injectable 1 milliGRAM(s) IntraMuscular once  insulin lispro (ADMELOG) corrective regimen sliding scale   SubCutaneous three times a day before meals  labetalol 200 milliGRAM(s) Oral two times a day  losartan 100 milliGRAM(s) Oral daily  magnesium oxide 400 milliGRAM(s) Oral three times a day with meals  magnesium sulfate  IVPB 2 Gram(s) IV Intermittent every 2 hours  piperacillin/tazobactam IVPB.. 3.375 Gram(s) IV Intermittent every 8 hours  regadenoson Injectable 0.4 milliGRAM(s) IV Push once  rivaroxaban 20 milliGRAM(s) Oral with dinner  simvastatin 40 milliGRAM(s) Oral at bedtime  tamsulosin 0.4 milliGRAM(s) Oral at bedtime    MEDICATIONS  (PRN):  dextrose Oral Gel 15 Gram(s) Oral once PRN Blood Glucose LESS THAN 70 milliGRAM(s)/deciliter  ondansetron    Tablet 8 milliGRAM(s) Oral every 8 hours PRN Nausea and/or Vomiting    INTERVAL EVENTS: Patient seen today, chart reviewed. Patient now NPO for stress test    T(C): 36.7 (10-28-22 @ 05:15), Max: 36.8 (10-27-22 @ 21:41)  HR: 75 (10-28-22 @ 05:15) (68 - 75)  BP: 179/83 (10-28-22 @ 05:15) (176/81 - 181/78)  RR: 18 (10-28-22 @ 05:15) (18 - 18)  SpO2: --  Wt(kg): --Vital Signs Last 24 Hrs  T(C): 36.7 (28 Oct 2022 05:15), Max: 36.8 (27 Oct 2022 21:41)  T(F): 98 (28 Oct 2022 05:15), Max: 98.3 (27 Oct 2022 21:41)  HR: 75 (28 Oct 2022 05:15) (68 - 75)  BP: 179/83 (28 Oct 2022 05:15) (176/81 - 181/78)  BP(mean): --  RR: 18 (28 Oct 2022 05:15) (18 - 18)  SpO2: --        PHYSICAL EXAM:  GENERAL: NAD  NECK: Supple  CHEST/LUNG: Norma  HEART: S1, S2, Regular   ABDOMEN: Soft, Nontender,  Bowel sounds present  EXTREMITIES: No edema    LABS:                        12.6   8.80  )-----------( 239      ( 28 Oct 2022 07:26 )             39.9             10-28    138  |  101  |  12  ----------------------------<  198<H>  4.5   |  27  |  0.7    Ca    9.1      28 Oct 2022 07:26  Phos  3.6     10-28  Mg     1.5     10-28    TPro  6.2  /  Alb  3.4<L>  /  TBili  0.8  /  DBili  x   /  AST  19  /  ALT  17  /  AlkPhos  66  10-28    LIVER FUNCTIONS - ( 28 Oct 2022 07:26 )  Alb: 3.4 g/dL / Pro: 6.2 g/dL / ALK PHOS: 66 U/L / ALT: 17 U/L / AST: 19 U/L / GGT: x                   PT/INR - ( 27 Oct 2022 11:33 )   PT: 18.60 sec;   INR: 1.61 ratio         PTT - ( 27 Oct 2022 11:33 )  PTT:28.0 sec  CARDIAC MARKERS ( 26 Oct 2022 18:31 )  x     / <0.01 ng/mL / x     / x     / x              RADIOLOGY & ADDITIONAL TESTS:

## 2022-10-28 NOTE — PROGRESS NOTE ADULT - ASSESSMENT
71M w/ PMH of HLD, HTN, MI, DM, CAD s/p CABG, previous DVT/PE on Xarelto presents for hypotensionand kidney stones presents to ED with complaints of abdominal pain in the bilateral flanks radiating across the lower abdomen as well as hypertension. Initial vitals noted to have a systolic of 239. A CT showed gallstones and enlarged gallbladder with a stone at the neck.   An US of RUQ was ordered to further delineate if pain is due to biliary pathology. US shows a right renal calculi measuring 1.2 cm and gallbladder edema. On exam, pt complains of right flank pain. Physical exam findings, imaging, and labs as documented above.     Acute Cholecystitis- as per US and HIDA  - on IV Zosyn  - NPO, PPN started  - pain rx prn  - patient s/p 4 units FFP, 2 orders of Vitamin K  decision was made by Sx to defer procedure for now  cholecystitis resolved w/ abx  can follow up w. Dr. Crouch as o/p 4-6 weeks for lap yefri    HTN Urgency:  - likely exacerbated  by severe abd pain  - on Labetalol 200mg BID, Losartan 100 QD, Lasix 20 QD  - BP better controlled today, continue to monitor    hx of DVT/PE  hx of MI/CABG  - Xarelto 20mg QD, restarted  - hold ASA 81 QD  - Cardio following: will go for NST today in s/o ECG changes on admission    DM type 2  - on Metformin 1g BID at home, held during hospital stay  - start on SS   - monitor FS and adjust as needed    hx of Kidney Stones  - non obstructive kidney stone  - hydrate  - monitor    Deconditioning from all  - call to PPT to  evaluate and start RX      # Misc  - GI ppx:    - DVT ppx: heparin gtt  - Diet: NPO, PPN feeds  - Activity: AAT  - DISPO: dc following NST

## 2022-10-28 NOTE — CONSULT NOTE ADULT - SUBJECTIVE AND OBJECTIVE BOX
NEPHROLOGY CONSULTATION NOTE    THIS CONSULT IS INCOMPLETE / FULL CONSULT TO FOLLOW    Patient is a 72y Male whom presented to the hospital with     PAST MEDICAL & SURGICAL HISTORY:  Hypertension, unspecified type      Type 2 diabetes mellitus with complication, unspecified long term insulin use status      High cholesterol      Kidney stones      MI (myocardial infarction)      DVT (deep venous thrombosis)      Acute massive pulmonary embolism  s/p tPA in 2018      Carotid atherosclerosis      H/O heart surgery  quadruple bypass 6 years ago      H/O cystoscopy        Allergies:  No Known Allergies    Home Medications Reviewed  Hospital Medications:   MEDICATIONS  (STANDING):  amLODIPine   Tablet 5 milliGRAM(s) Oral daily  dextrose 5%. 1000 milliLiter(s) (100 mL/Hr) IV Continuous <Continuous>  dextrose 5%. 1000 milliLiter(s) (50 mL/Hr) IV Continuous <Continuous>  dextrose 50% Injectable 25 Gram(s) IV Push once  dextrose 50% Injectable 12.5 Gram(s) IV Push once  dextrose 50% Injectable 25 Gram(s) IV Push once  furosemide    Tablet 20 milliGRAM(s) Oral daily  glucagon  Injectable 1 milliGRAM(s) IntraMuscular once  insulin lispro (ADMELOG) corrective regimen sliding scale   SubCutaneous three times a day before meals  labetalol 200 milliGRAM(s) Oral two times a day  losartan 100 milliGRAM(s) Oral daily  magnesium oxide 400 milliGRAM(s) Oral three times a day with meals  magnesium sulfate  IVPB 2 Gram(s) IV Intermittent every 2 hours  piperacillin/tazobactam IVPB.. 3.375 Gram(s) IV Intermittent every 8 hours  regadenoson Injectable 0.4 milliGRAM(s) IV Push once  rivaroxaban 20 milliGRAM(s) Oral with dinner  simvastatin 40 milliGRAM(s) Oral at bedtime  tamsulosin 0.4 milliGRAM(s) Oral at bedtime      SOCIAL HISTORY:  Denies ETOH,Smoking,   FAMILY HISTORY:  FH: myocardial infarction  mother at age 78          REVIEW OF SYSTEMS:  CONSTITUTIONAL: No weakness, fevers or chills  EYES/ENT: No visual changes;  No vertigo or throat pain   NECK: No pain or stiffness  RESPIRATORY: No cough, wheezing, hemoptysis; No shortness of breath  CARDIOVASCULAR: No chest pain or palpitations.  GASTROINTESTINAL: No abdominal or epigastric pain. No nausea, vomiting, or hematemesis; No diarrhea or constipation. No melena or hematochezia.  GENITOURINARY: No dysuria, frequency, foamy urine, urinary urgency, incontinence or hematuria  NEUROLOGICAL: No numbness or weakness  SKIN: No itching, burning, rashes, or lesions   VASCULAR: No bilateral lower extremity edema.   All other review of systems is negative unless indicated above.    VITALS:  T(F): 98 (10-28-22 @ 05:15), Max: 98.3 (10-27-22 @ 21:41)  HR: 75 (10-28-22 @ 05:15)  BP: 179/83 (10-28-22 @ 05:15)  RR: 18 (10-28-22 @ 05:15)  SpO2: --    10-27 @ 07:  -  10-28 @ 07:00  --------------------------------------------------------  IN: 1315 mL / OUT: 2000 mL / NET: -685 mL    10-28 @ 07:01  -  10-28 @ 12:16  --------------------------------------------------------  IN: 0 mL / OUT: 250 mL / NET: -250 mL            I&O's Detail    27 Oct 2022 07:  -  28 Oct 2022 07:00  --------------------------------------------------------  IN:    IV PiggyBack: 200 mL    Lactated Ringers: 875 mL    Oral Fluid: 240 mL  Total IN: 1315 mL    OUT:    Voided (mL): 2000 mL  Total OUT: 2000 mL    Total NET: -685 mL      28 Oct 2022 07:  -  28 Oct 2022 12:16  --------------------------------------------------------  IN:  Total IN: 0 mL    OUT:    Voided (mL): 250 mL  Total OUT: 250 mL    Total NET: -250 mL            PHYSICAL EXAM:  Constitutional: NAD  HEENT: anicteric sclera, oropharynx clear, MMM  Neck: No JVD  Respiratory: CTAB, no wheezes, rales or rhonchi  Cardiovascular: S1, S2, RRR  Gastrointestinal: BS+, soft, NT/ND  Extremities: No cyanosis or clubbing. No peripheral edema  Neurological: A/O x 3, no focal deficits  Psychiatric: Normal mood, normal affect  : No CVA tenderness. No mcneil.   Skin: No rashes  Vascular Access:    LABS:  10-28    138  |  101  |  12  ----------------------------<  198<H>  4.5   |  27  |  0.7    Ca    9.1      28 Oct 2022 07:26  Phos  3.6     10-28  Mg     1.5     10-28    TPro  6.2  /  Alb  3.4<L>  /  TBili  0.8  /  DBili      /  AST  19  /  ALT  17  /  AlkPhos  66  10-28    Creatinine Trend: 0.7 <--, 0.7 <--, 0.6 <--, 0.6 <--, 0.6 <--, 0.6 <--, 0.7 <--, 0.8 <--, 0.7 <--, 0.7 <--, 0.7 <--, 0.7 <--                        12.6   8.80  )-----------( 239      ( 28 Oct 2022 07:26 )             39.9     Urine Studies:  Urinalysis Basic - ( 21 Oct 2022 18:42 )    Color: Yellow / Appearance: Clear / S.022 / pH:   Gluc:  / Ketone: Small  / Bili: Negative / Urobili: 3 mg/dL   Blood:  / Protein: 30 mg/dL / Nitrite: Negative   Leuk Esterase: Negative / RBC: 5 /HPF / WBC 2 /HPF   Sq Epi:  / Non Sq Epi: 1 /HPF / Bacteria: Negative            Vitamin D (25OH) 45      [10-22-22 @ 05:50]  HbA1c 7.3      [19 @ 06:22]  Lipid: chol 96, , HDL 30, LDL --      [10-22-22 @ 05:50]    HCV 0.13, Nonreact      [19 @ 06:22]        RADIOLOGY & ADDITIONAL STUDIES:                 NEPHROLOGY CONSULTATION NOTE    Patient is a 71-yo male with pmhx of HTN DM2, MI s/p CABG, DVT/PE on Xarelto, recurrent kidney stones who presented with a chief complaint of abdominal pain and high blood pressure x 1 day 4 days ago. The pain started one day ptp as a "horrible discomfort".Pain located at the stomach  diffuse, encircling to patient's low back. It is constant, was a 10/10 prior to pain meds (now 7/10), no exacerbating factors. Patient states he takes his bp daily and usually runs around 155/65, but found his systolic in the 200s so decided to come to hospital.  Seen today complaining of soft stools , denied nausea no vomiting / uses lasix for edema lower ext / has good appetite   Has been waiting for cholecystectomy     PAST MEDICAL & SURGICAL HISTORY:  Hypertension, unspecified type  Type 2 diabetes mellitus with complication, unspecified long term insulin use status  High cholesterol  Kidney stones  MI (myocardial infarction)  DVT (deep venous thrombosis)  Acute massive pulmonary embolism  s/p tPA in 2018  Carotid atherosclerosis  H/O heart surgery  H/O cystoscopy        Allergies:  No Known Allergies    Home Medications Reviewed  Hospital Medications:   MEDICATIONS  (STANDING):  amLODIPine   Tablet 5 milliGRAM(s) Oral daily  furosemide    Tablet 20 milliGRAM(s) Oral daily  glucagon  Injectable 1 milliGRAM(s) IntraMuscular once  insulin lispro (ADMELOG) corrective regimen sliding scale   SubCutaneous three times a day before meals  labetalol 200 milliGRAM(s) Oral two times a day  losartan 100 milliGRAM(s) Oral daily  magnesium oxide 400 milliGRAM(s) Oral three times a day with meals  magnesium sulfate  IVPB 2 Gram(s) IV Intermittent every 2 hours  piperacillin/tazobactam IVPB.. 3.375 Gram(s) IV Intermittent every 8 hours  regadenoson Injectable 0.4 milliGRAM(s) IV Push once  rivaroxaban 20 milliGRAM(s) Oral with dinner  simvastatin 40 milliGRAM(s) Oral at bedtime  tamsulosin 0.4 milliGRAM(s) Oral at bedtime      SOCIAL HISTORY:  Denies ETOH,Smoking,   FAMILY HISTORY:  FH: myocardial infarction  mother at age 78          REVIEW OF SYSTEMS:  All other review of systems is negative unless indicated above.    VITALS:  T(F): 98 (10-28-22 @ 05:15), Max: 98.3 (10-27-22 @ 21:41)  HR: 75 (10-28-22 @ 05:15)  BP: 179/83 (10-28-22 @ 05:15)  RR: 18 (10-28-22 @ 05:15)  SpO2: --    10-27 @ 07:01  -  10-28 @ 07:00  --------------------------------------------------------  IN: 1315 mL / OUT: 2000 mL / NET: -685 mL    10-28 @ 07:01  -  10-28 @ 12:16  --------------------------------------------------------  IN: 0 mL / OUT: 250 mL / NET: -250 mL            I&O's Detail    27 Oct 2022 07:01  -  28 Oct 2022 07:00  --------------------------------------------------------  IN:    IV PiggyBack: 200 mL    Lactated Ringers: 875 mL    Oral Fluid: 240 mL  Total IN: 1315 mL    OUT:    Voided (mL): 2000 mL  Total OUT: 2000 mL    Total NET: -685 mL      28 Oct 2022 07:  -  28 Oct 2022 12:16  --------------------------------------------------------  IN:  Total IN: 0 mL    OUT:    Voided (mL): 250 mL  Total OUT: 250 mL    Total NET: -250 mL            PHYSICAL EXAM:  Constitutional: NAD  Neck: No JVD  Respiratory: CTAB,   Cardiovascular: S1, S2, RRR  Gastrointestinal: BS+, soft, NT/ND  Extremities: No cyanosis or clubbing. No peripheral edema  : No CVA tenderness. No mcneil.   Skin: No rashes  Vascular Access:    LABS:  10-28    138  |  101  |  12  ----------------------------<  198<H>  4.5   |  27  |  0.7    Ca    9.1      28 Oct 2022 07:26  Phos  3.6     10-28  Mg     1.5     10-28    TPro  6.2  /  Alb  3.4<L>  /  TBili  0.8  /  DBili      /  AST  19  /  ALT  17  /  AlkPhos  66  10-28    Creatinine Trend: 0.7 <--, 0.7 <--, 0.6 <--, 0.6 <--, 0.6 <--, 0.6 <--, 0.7 <--, 0.8 <--, 0.7 <--, 0.7 <--, 0.7 <--, 0.7 <--                        12.6   8.80  )-----------( 239      ( 28 Oct 2022 07:26 )             39.9     Urine Studies:  Urinalysis Basic - ( 21 Oct 2022 18:42 )    Color: Yellow / Appearance: Clear / S.022 / pH:   Gluc:  / Ketone: Small  / Bili: Negative / Urobili: 3 mg/dL   Blood:  / Protein: 30 mg/dL / Nitrite: Negative   Leuk Esterase: Negative / RBC: 5 /HPF / WBC 2 /HPF   Sq Epi:  / Non Sq Epi: 1 /HPF / Bacteria: Negative            Vitamin D (25OH) 45      [10-22-22 @ 05:50]  HbA1c 7.3      [19 @ 06:22]  Lipid: chol 96, , HDL 30, LDL --      [10-22-22 @ 05:50]    HCV 0.13, Nonreact      [19 @ 06:22]        RADIOLOGY & ADDITIONAL STUDIES:

## 2022-10-28 NOTE — PROGRESS NOTE ADULT - ASSESSMENT
ASSESSMENT:  71M w/ PMH of HLD, HTN, MI, DM, CAD s/p CABG, previous DVT/PE on Xarelto presents for hypotensionand kidney stones presents to ED with complaints of abdominal pain in the bilateral flanks radiating across the lower abdomen as well as hypertension. Initial vitals noted to have a systolic of 239. A CT showed gallstones and enlarged gallbladder with a stone at the neck.   An US of RUQ was ordered to further delineate if pain is due to biliary pathology. US shows a right renal calculi measuring 1.2 cm and gallbladder edema. On exam, pt complains of right flank pain. Physical exam findings, imaging, and labs as documented above.     Acute Cholecystitis  - on IV Zosyn, condition improved  - procedures cancelled due to elevated INR  - surgeron and IR believe can forgo procedure and return for lap yefri in 4-6 weeks  - tolerating PO diet, low fat, NCS diet?  - pain rx prn  - GI, Surgery and IR f/u noted  - stress test today    HTN, CAD, old MI, CABG  - on Labetalol 200mg BID, Losartan 100 QD, Lasix 20 QD  - ASA held  - cardio following    hx of DVT/PE  - Xarelto 20mg QD, on hold  - on Lovenox 40 BID on hold until after the procedure    DM type 2  - on Metformin 1g BID at home, held during hospital stay  - start on SS   - monitor FS and adjust as needed    hx of Kidney Stones  - non obstructive kidney stone  - hydrate  - monitor    Hypomagnesemia  - level noted  - renal evaluation  - can discontinue Lasix and monitor    Diet DASH, low fat/cholesterol  DVT Prophylaxis on therapeutic dose  Patient is a full code    D/C planning pending the above results and toleration of diet

## 2022-10-28 NOTE — PROGRESS NOTE ADULT - SUBJECTIVE AND OBJECTIVE BOX
DAILY PROGRESS NOTE  ===========================================================    Patient Information:  MERARY GARCIA  /  72y  /  Male  /  MRN#: [id number]    Patient is a 72y old Male who presents with a chief complaint of abd pain, htn (27 Oct 2022 22:30)      Hospital Day: 9d     |:::::::::::::::::::::::::::| SUBJECTIVE |:::::::::::::::::::::::::::|    OVERNIGHT EVENTS: none.  TODAY: Patient was seen today at bedside. Review of systems is otherwise negative.    |:::::::::::::::::::::::::::| OBJECTIVE |:::::::::::::::::::::::::::|    VITAL SIGNS: Last 24 Hours  T(C): 36.7 (28 Oct 2022 05:15), Max: 36.8 (27 Oct 2022 21:41)  T(F): 98 (28 Oct 2022 05:15), Max: 98.3 (27 Oct 2022 21:41)  HR: 75 (28 Oct 2022 05:15) (68 - 75)  BP: 179/83 (28 Oct 2022 05:15) (176/81 - 181/78)  BP(mean): --  RR: 18 (28 Oct 2022 05:15) (18 - 18)  SpO2: --    10-27-22 @ 07:01  -  10-28-22 @ 07:00  --------------------------------------------------------  IN: 1315 mL / OUT: 2000 mL / NET: -685 mL    10-28-22 @ 07:01  -  10-28-22 @ 09:58  --------------------------------------------------------  IN: 0 mL / OUT: 250 mL / NET: -250 mL      PHYSICAL EXAM:  GENERAL: Awake, alert; NAD.  HEENT: Head NC/AT; Conjunctivae pink, Sclera anicteric; Oral mucosa moist.  CARDIO: Regular rate; Regular rhythm; S1 & S2.  RESP: No rales, wheezing, or rhonchi appreciated.  GI: Soft; NT/ND; BS; No guarding; No rebound tenderness.  EXT: Strength UE 5/5; Strength LE 5/5; No edema.   SKIN: Intact, no rashes, no erythema    LAB RESULTS:                        12.6   8.80  )-----------( 239      ( 28 Oct 2022 07:26 )             39.9     10-28    138  |  101  |  12  ----------------------------<  198<H>  4.5   |  27  |  0.7    Ca    9.1      28 Oct 2022 07:26  Phos  3.6     10-28  Mg     1.5     10-28    TPro  6.2  /  Alb  3.4<L>  /  TBili  0.8  /  DBili  x   /  AST  19  /  ALT  17  /  AlkPhos  66  10-28    PT/INR - ( 27 Oct 2022 11:33 )   PT: 18.60 sec;   INR: 1.61 ratio         PTT - ( 27 Oct 2022 11:33 )  PTT:28.0 sec        CARDIAC MARKERS ( 26 Oct 2022 18:31 )  x     / <0.01 ng/mL / x     / x     / x          MICROBIOLOGY:    RADIOLOGY:    ALLERGIES:  No Known Allergies    MEDICATIONS:  dextrose 5%. 1000 milliLiter(s) IV Continuous <Continuous>  dextrose 5%. 1000 milliLiter(s) IV Continuous <Continuous>  dextrose 50% Injectable 25 Gram(s) IV Push once  dextrose 50% Injectable 12.5 Gram(s) IV Push once  dextrose 50% Injectable 25 Gram(s) IV Push once  dextrose Oral Gel 15 Gram(s) Oral once PRN  furosemide    Tablet 20 milliGRAM(s) Oral daily  glucagon  Injectable 1 milliGRAM(s) IntraMuscular once  insulin lispro (ADMELOG) corrective regimen sliding scale   SubCutaneous three times a day before meals  labetalol 200 milliGRAM(s) Oral two times a day  losartan 100 milliGRAM(s) Oral daily  magnesium oxide 400 milliGRAM(s) Oral three times a day with meals  magnesium sulfate  IVPB 2 Gram(s) IV Intermittent every 2 hours  ondansetron    Tablet 8 milliGRAM(s) Oral every 8 hours PRN  piperacillin/tazobactam IVPB.. 3.375 Gram(s) IV Intermittent every 8 hours  regadenoson Injectable 0.4 milliGRAM(s) IV Push once  rivaroxaban 20 milliGRAM(s) Oral with dinner  simvastatin 40 milliGRAM(s) Oral at bedtime  tamsulosin 0.4 milliGRAM(s) Oral at bedtime    ===========================================================

## 2022-10-28 NOTE — CONSULT NOTE ADULT - CONSULT REASON
Dr. Crouch patient, acute cholecystitis
Cholecystitis
Percutaneous cholecystostomy
R/O cholecystitis
hypomagensemia
cad

## 2022-10-28 NOTE — PROGRESS NOTE ADULT - SUBJECTIVE AND OBJECTIVE BOX
SUBJ:No chest pain or shortness of breath      MEDICATIONS  (STANDING):  amLODIPine   Tablet 5 milliGRAM(s) Oral daily  dextrose 5%. 1000 milliLiter(s) (100 mL/Hr) IV Continuous <Continuous>  dextrose 5%. 1000 milliLiter(s) (50 mL/Hr) IV Continuous <Continuous>  dextrose 50% Injectable 25 Gram(s) IV Push once  dextrose 50% Injectable 12.5 Gram(s) IV Push once  dextrose 50% Injectable 25 Gram(s) IV Push once  furosemide    Tablet 20 milliGRAM(s) Oral daily  glucagon  Injectable 1 milliGRAM(s) IntraMuscular once  insulin lispro (ADMELOG) corrective regimen sliding scale   SubCutaneous three times a day before meals  labetalol 200 milliGRAM(s) Oral two times a day  losartan 100 milliGRAM(s) Oral daily  magnesium oxide 400 milliGRAM(s) Oral three times a day with meals  regadenoson Injectable 0.4 milliGRAM(s) IV Push once  rivaroxaban 20 milliGRAM(s) Oral with dinner  simvastatin 40 milliGRAM(s) Oral at bedtime  tamsulosin 0.4 milliGRAM(s) Oral at bedtime    MEDICATIONS  (PRN):  dextrose Oral Gel 15 Gram(s) Oral once PRN Blood Glucose LESS THAN 70 milliGRAM(s)/deciliter  ondansetron    Tablet 8 milliGRAM(s) Oral every 8 hours PRN Nausea and/or Vomiting            Vital Signs Last 24 Hrs  T(C): 36.3 (28 Oct 2022 21:38), Max: 36.8 (28 Oct 2022 14:54)  T(F): 97.3 (28 Oct 2022 21:38), Max: 98.2 (28 Oct 2022 14:54)  HR: 75 (28 Oct 2022 21:38) (73 - 83)  BP: 134/62 (28 Oct 2022 21:38) (117/61 - 181/82)  BP(mean): --  RR: 16 (28 Oct 2022 21:38) (16 - 19)  SpO2: 95% (28 Oct 2022 14:54) (95% - 95%)          ECG:NML    TTE:    LABS:                        12.6   8.80  )-----------( 239      ( 28 Oct 2022 07:26 )             39.9     10-28    138  |  101  |  12  ----------------------------<  198<H>  4.5   |  27  |  0.7    Ca    9.1      28 Oct 2022 07:26  Phos  3.6     10-28  Mg     1.5     10-28    TPro  6.2  /  Alb  3.4<L>  /  TBili  0.8  /  DBili  x   /  AST  19  /  ALT  17  /  AlkPhos  66  10-28        PT/INR - ( 27 Oct 2022 11:33 )   PT: 18.60 sec;   INR: 1.61 ratio         PTT - ( 27 Oct 2022 11:33 )  PTT:28.0 sec    I&O's Summary    27 Oct 2022 07:01  -  28 Oct 2022 07:00  --------------------------------------------------------  IN: 1315 mL / OUT: 2000 mL / NET: -685 mL    28 Oct 2022 07:01  -  28 Oct 2022 22:23  --------------------------------------------------------  IN: 0 mL / OUT: 250 mL / NET: -250 mL      BNP

## 2022-10-29 LAB
ALBUMIN SERPL ELPH-MCNC: 3.7 G/DL — SIGNIFICANT CHANGE UP (ref 3.5–5.2)
ALP SERPL-CCNC: 70 U/L — SIGNIFICANT CHANGE UP (ref 30–115)
ALT FLD-CCNC: 22 U/L — SIGNIFICANT CHANGE UP (ref 0–41)
ANION GAP SERPL CALC-SCNC: 15 MMOL/L — HIGH (ref 7–14)
AST SERPL-CCNC: 26 U/L — SIGNIFICANT CHANGE UP (ref 0–41)
BILIRUB SERPL-MCNC: 0.9 MG/DL — SIGNIFICANT CHANGE UP (ref 0.2–1.2)
BUN SERPL-MCNC: 19 MG/DL — SIGNIFICANT CHANGE UP (ref 10–20)
CALCIUM SERPL-MCNC: 9.2 MG/DL — SIGNIFICANT CHANGE UP (ref 8.4–10.5)
CHLORIDE SERPL-SCNC: 102 MMOL/L — SIGNIFICANT CHANGE UP (ref 98–110)
CO2 SERPL-SCNC: 26 MMOL/L — SIGNIFICANT CHANGE UP (ref 17–32)
CREAT SERPL-MCNC: 0.8 MG/DL — SIGNIFICANT CHANGE UP (ref 0.7–1.5)
EGFR: 95 ML/MIN/1.73M2 — SIGNIFICANT CHANGE UP
GLUCOSE BLDC GLUCOMTR-MCNC: 203 MG/DL — HIGH (ref 70–99)
GLUCOSE BLDC GLUCOMTR-MCNC: 210 MG/DL — HIGH (ref 70–99)
GLUCOSE BLDC GLUCOMTR-MCNC: 222 MG/DL — HIGH (ref 70–99)
GLUCOSE BLDC GLUCOMTR-MCNC: 290 MG/DL — HIGH (ref 70–99)
GLUCOSE SERPL-MCNC: 227 MG/DL — HIGH (ref 70–99)
HCT VFR BLD CALC: 43.5 % — SIGNIFICANT CHANGE UP (ref 42–52)
HGB BLD-MCNC: 13.9 G/DL — LOW (ref 14–18)
MAGNESIUM SERPL-MCNC: 1.8 MG/DL — SIGNIFICANT CHANGE UP (ref 1.8–2.4)
MCHC RBC-ENTMCNC: 29.5 PG — SIGNIFICANT CHANGE UP (ref 27–31)
MCHC RBC-ENTMCNC: 32 G/DL — SIGNIFICANT CHANGE UP (ref 32–37)
MCV RBC AUTO: 92.4 FL — SIGNIFICANT CHANGE UP (ref 80–94)
NRBC # BLD: 0 /100 WBCS — SIGNIFICANT CHANGE UP (ref 0–0)
PLATELET # BLD AUTO: 303 K/UL — SIGNIFICANT CHANGE UP (ref 130–400)
POTASSIUM SERPL-MCNC: 4.8 MMOL/L — SIGNIFICANT CHANGE UP (ref 3.5–5)
POTASSIUM SERPL-SCNC: 4.8 MMOL/L — SIGNIFICANT CHANGE UP (ref 3.5–5)
PROT SERPL-MCNC: 6.5 G/DL — SIGNIFICANT CHANGE UP (ref 6–8)
RBC # BLD: 4.71 M/UL — SIGNIFICANT CHANGE UP (ref 4.7–6.1)
RBC # FLD: 13.6 % — SIGNIFICANT CHANGE UP (ref 11.5–14.5)
SODIUM SERPL-SCNC: 143 MMOL/L — SIGNIFICANT CHANGE UP (ref 135–146)
WBC # BLD: 9.25 K/UL — SIGNIFICANT CHANGE UP (ref 4.8–10.8)
WBC # FLD AUTO: 9.25 K/UL — SIGNIFICANT CHANGE UP (ref 4.8–10.8)

## 2022-10-29 RX ORDER — LOSARTAN POTASSIUM 100 MG/1
50 TABLET, FILM COATED ORAL DAILY
Refills: 0 | Status: DISCONTINUED | OUTPATIENT
Start: 2022-10-29 | End: 2022-10-29

## 2022-10-29 RX ORDER — LABETALOL HCL 100 MG
200 TABLET ORAL
Refills: 0 | Status: DISCONTINUED | OUTPATIENT
Start: 2022-10-29 | End: 2022-10-29

## 2022-10-29 RX ORDER — SODIUM CHLORIDE 9 MG/ML
1000 INJECTION INTRAMUSCULAR; INTRAVENOUS; SUBCUTANEOUS ONCE
Refills: 0 | Status: COMPLETED | OUTPATIENT
Start: 2022-10-29 | End: 2022-10-29

## 2022-10-29 RX ORDER — LABETALOL HCL 100 MG
100 TABLET ORAL
Refills: 0 | Status: DISCONTINUED | OUTPATIENT
Start: 2022-10-29 | End: 2022-10-30

## 2022-10-29 RX ORDER — LABETALOL HCL 100 MG
200 TABLET ORAL
Refills: 0 | Status: DISCONTINUED | OUTPATIENT
Start: 2022-10-29 | End: 2022-10-30

## 2022-10-29 RX ORDER — LOSARTAN POTASSIUM 100 MG/1
50 TABLET, FILM COATED ORAL DAILY
Refills: 0 | Status: DISCONTINUED | OUTPATIENT
Start: 2022-10-29 | End: 2022-10-30

## 2022-10-29 RX ADMIN — Medication 2: at 08:25

## 2022-10-29 RX ADMIN — SODIUM CHLORIDE 1000 MILLILITER(S): 9 INJECTION INTRAMUSCULAR; INTRAVENOUS; SUBCUTANEOUS at 08:32

## 2022-10-29 RX ADMIN — SIMVASTATIN 40 MILLIGRAM(S): 20 TABLET, FILM COATED ORAL at 22:19

## 2022-10-29 RX ADMIN — Medication 2: at 17:07

## 2022-10-29 RX ADMIN — Medication 200 MILLIGRAM(S): at 17:02

## 2022-10-29 RX ADMIN — Medication 200 MILLIGRAM(S): at 05:13

## 2022-10-29 RX ADMIN — MAGNESIUM OXIDE 400 MG ORAL TABLET 400 MILLIGRAM(S): 241.3 TABLET ORAL at 08:24

## 2022-10-29 RX ADMIN — AMLODIPINE BESYLATE 5 MILLIGRAM(S): 2.5 TABLET ORAL at 05:12

## 2022-10-29 RX ADMIN — MAGNESIUM OXIDE 400 MG ORAL TABLET 400 MILLIGRAM(S): 241.3 TABLET ORAL at 17:02

## 2022-10-29 RX ADMIN — MAGNESIUM OXIDE 400 MG ORAL TABLET 400 MILLIGRAM(S): 241.3 TABLET ORAL at 11:45

## 2022-10-29 RX ADMIN — Medication 20 MILLIGRAM(S): at 05:12

## 2022-10-29 RX ADMIN — LOSARTAN POTASSIUM 100 MILLIGRAM(S): 100 TABLET, FILM COATED ORAL at 05:12

## 2022-10-29 RX ADMIN — Medication 3: at 11:45

## 2022-10-29 RX ADMIN — RIVAROXABAN 20 MILLIGRAM(S): KIT at 17:02

## 2022-10-29 NOTE — PROGRESS NOTE ADULT - SUBJECTIVE AND OBJECTIVE BOX
Nephrology progress note    THIS IS AN INCOMPLETE NOTE . FULL NOTE TO FOLLOW SHORTLY    Patient is seen and examined, events over the last 24 h noted .    Allergies:  No Known Allergies    Hospital Medications:   MEDICATIONS  (STANDING):  amLODIPine   Tablet 5 milliGRAM(s) Oral daily  dextrose 5%. 1000 milliLiter(s) (100 mL/Hr) IV Continuous <Continuous>  dextrose 5%. 1000 milliLiter(s) (50 mL/Hr) IV Continuous <Continuous>  dextrose 50% Injectable 25 Gram(s) IV Push once  dextrose 50% Injectable 12.5 Gram(s) IV Push once  dextrose 50% Injectable 25 Gram(s) IV Push once  furosemide    Tablet 20 milliGRAM(s) Oral daily  glucagon  Injectable 1 milliGRAM(s) IntraMuscular once  insulin lispro (ADMELOG) corrective regimen sliding scale   SubCutaneous three times a day before meals  labetalol 200 milliGRAM(s) Oral two times a day  losartan 100 milliGRAM(s) Oral daily  magnesium oxide 400 milliGRAM(s) Oral three times a day with meals  regadenoson Injectable 0.4 milliGRAM(s) IV Push once  rivaroxaban 20 milliGRAM(s) Oral with dinner  simvastatin 40 milliGRAM(s) Oral at bedtime  tamsulosin 0.4 milliGRAM(s) Oral at bedtime        VITALS:  T(F): 96.4 (10-29-22 @ 05:25), Max: 98.2 (10-28-22 @ 14:54)  HR: 72 (10-29-22 @ 05:25)  BP: 148/70 (10-29-22 @ 05:25)  RR: 18 (10-29-22 @ 05:25)  SpO2: 95% (10-28-22 @ 14:54)  Wt(kg): --    10-27 @ 07:01  -  10-28 @ 07:00  --------------------------------------------------------  IN: 1315 mL / OUT: 2000 mL / NET: -685 mL    10-28 @ 07:01  -  10-29 @ 07:00  --------------------------------------------------------  IN: 0 mL / OUT: 490 mL / NET: -490 mL          PHYSICAL EXAM:  Constitutional: NAD  HEENT: anicteric sclera, oropharynx clear, MMM  Neck: No JVD  Respiratory: CTAB, no wheezes, rales or rhonchi  Cardiovascular: S1, S2, RRR  Gastrointestinal: BS+, soft, NT/ND  Extremities: No cyanosis or clubbing. No peripheral edema  :  No mcneil.   Skin: No rashes    LABS:  10-28    138  |  101  |  12  ----------------------------<  198<H>  4.5   |  27  |  0.7    Ca    9.1      28 Oct 2022 07:26  Phos  3.6     10-28  Mg     1.5     10-28    TPro  6.2  /  Alb  3.4<L>  /  TBili  0.8  /  DBili      /  AST  19  /  ALT  17  /  AlkPhos  66  10-28                          12.6   8.80  )-----------( 239      ( 28 Oct 2022 07:26 )             39.9       Urine Studies:        Vitamin D (25OH) 45      [10-22-22 @ 05:50]  HbA1c 7.3      [12-02-19 @ 06:22]  Lipid: chol 96, , HDL 30, LDL --      [10-22-22 @ 05:50]    HCV 0.13, Nonreact      [12-02-19 @ 06:22]        RADIOLOGY & ADDITIONAL STUDIES:   Nephrology progress note  Patient is seen and examined, events over the last 24 h noted .  Had low BP overnight     Allergies:  No Known Allergies    Hospital Medications:   MEDICATIONS  (STANDING):  amLODIPine   Tablet 5 milliGRAM(s) Oral daily  furosemide    Tablet 20 milliGRAM(s) Oral daily  glucagon  Injectable 1 milliGRAM(s) IntraMuscular once  insulin lispro (ADMELOG) corrective regimen sliding scale   SubCutaneous three times a day before meals  labetalol 200 milliGRAM(s) Oral two times a day  losartan 100 milliGRAM(s) Oral daily  magnesium oxide 400 milliGRAM(s) Oral three times a day with meals  regadenoson Injectable 0.4 milliGRAM(s) IV Push once  rivaroxaban 20 milliGRAM(s) Oral with dinner  simvastatin 40 milliGRAM(s) Oral at bedtime  tamsulosin 0.4 milliGRAM(s) Oral at bedtime        VITALS:  T(F): 96.4 (10-29-22 @ 05:25), Max: 98.2 (10-28-22 @ 14:54)  HR: 72 (10-29-22 @ 05:25)  BP: 148/70 (10-29-22 @ 05:25)  RR: 18 (10-29-22 @ 05:25)  SpO2: 95% (10-28-22 @ 14:54)      10-27 @ 07:01  -  10-28 @ 07:00  --------------------------------------------------------  IN: 1315 mL / OUT: 2000 mL / NET: -685 mL    10-28 @ 07:01  -  10-29 @ 07:00  --------------------------------------------------------  IN: 0 mL / OUT: 490 mL / NET: -490 mL          PHYSICAL EXAM:  Constitutional: NAD  HEENT: anicteric sclera, oropharynx clear, MMM  Neck: No JVD  Respiratory: CTAB, no wheezes, rales or rhonchi  Cardiovascular: S1, S2, RRR  Gastrointestinal: BS+, soft, NT/ND  Extremities: No cyanosis or clubbing. No peripheral edema  :  No mcneil.   Skin: No rashes    LABS:  10-29    143  |  102  |  19  ----------------------------<  227<H>  4.8   |  26  |  0.8    Ca    9.2      29 Oct 2022 08:34  Phos  3.6     10-28  Mg     1.8     10-29    TPro  6.5  /  Alb  3.7  /  TBili  0.9  /  DBili  x   /  AST  26  /  ALT  22  /  AlkPhos  70  10-29    10-28    138  |  101  |  12  ----------------------------<  198<H>  4.5   |  27  |  0.7    Ca    9.1      28 Oct 2022 07:26  Phos  3.6     10-28  Mg     1.5     10-28    TPro  6.2  /  Alb  3.4<L>  /  TBili  0.8  /  DBili      /  AST  19  /  ALT  17  /  AlkPhos  66  10-28                          12.6   8.80  )-----------( 239      ( 28 Oct 2022 07:26 )             39.9       Urine Studies:        Vitamin D (25OH) 45      [10-22-22 @ 05:50]  HbA1c 7.3      [12-02-19 @ 06:22]  Lipid: chol 96, , HDL 30, LDL --      [10-22-22 @ 05:50]    HCV 0.13, Nonreact      [12-02-19 @ 06:22]        RADIOLOGY & ADDITIONAL STUDIES:

## 2022-10-29 NOTE — PROGRESS NOTE ADULT - ASSESSMENT
71M w/ PMH of HLD, HTN, MI, DM, CAD s/p CABG, previous DVT/PE on Xarelto presents for hypotensionand kidney stones presents to ED with complaints of abdominal pain in the bilateral flanks radiating across the lower abdomen as well as hypertension. Initial vitals noted to have a systolic of 239. A CT showed gallstones and enlarged gallbladder with a stone at the neck.   An US of RUQ was ordered to further delineate if pain is due to biliary pathology. US shows a right renal calculi measuring 1.2 cm and gallbladder edema. On exam, pt complains of right flank pain. Physical exam findings, imaging, and labs as documented above.     Acute Cholecystitis- as per US and HIDA  - on IV Zosyn  - NPO, PPN started  - pain rx prn  - patient s/p 4 units FFP, 2 orders of Vitamin K  decision was made by Sx to defer procedure for now  cholecystitis resolved w/ abx  can follow up w. Dr. Crouch as o/p 4-6 weeks for lap yefri    HTN Urgency:  - likely exacerbated  by severe abd pain  - on Labetalol 200mg BID, Losartan 100 QD, Lasix 20 QD  - added amlodipine 5 in s/o poorly controlled BP  - pt now with orthostatic hypotension  was low today 88/52, will give 1 liter bolus, dc'd amlodipine, now back on home regimen  - trend BP    hx of DVT/PE  hx of MI/CABG  - Xarelto 20mg QD, restarted  - hold ASA 81 QD  - Cardio: pt went for NST  positive for reversible defect in lateral wall of LV consistent w/ lateral wall ischemia superimposed on inferolateral scar  spoke to Dr. Hernandes: will not cath patient, can dc with close follow-up once BP is controlled    DM type 2  - on Metformin 1g BID at home, held during hospital stay  - start on SS   - monitor FS and adjust as needed    hx of Kidney Stones  - non obstructive kidney stone  - hydrate  - monitor    Deconditioning from all  - call to PPT to  evaluate and start RX      # Misc  - GI ppx:    - DVT ppx: heparin gtt  - Diet: NPO, PPN feeds  - Activity: AAT  - DISPO: dc home pending bp stabilization

## 2022-10-29 NOTE — PROGRESS NOTE ADULT - SUBJECTIVE AND OBJECTIVE BOX
DAILY PROGRESS NOTE  ===========================================================    Patient Information:  MERARY GARCIA  /  72y  /  Male  /  MRN#: [id number]    Patient is a 72y old Male who presents with a chief complaint of abd pain, htn (29 Oct 2022 07:55)      Hospital Day: 10d     |:::::::::::::::::::::::::::| SUBJECTIVE |:::::::::::::::::::::::::::|    OVERNIGHT EVENTS: none.  TODAY: Patient was seen today at bedside. Review of systems is otherwise negative.    |:::::::::::::::::::::::::::| OBJECTIVE |:::::::::::::::::::::::::::|    VITAL SIGNS: Last 24 Hours  T(C): 35.8 (29 Oct 2022 05:25), Max: 36.8 (28 Oct 2022 14:54)  T(F): 96.4 (29 Oct 2022 05:25), Max: 98.2 (28 Oct 2022 14:54)  HR: 75 (29 Oct 2022 08:15) (72 - 83)  BP: 88/52 (29 Oct 2022 08:15) (88/52 - 181/82)  BP(mean): 64 (29 Oct 2022 08:15) (64 - 64)  RR: 18 (29 Oct 2022 08:15) (16 - 19)  SpO2: 95% (28 Oct 2022 14:54) (95% - 95%)    10-28-22 @ 07:01  -  10-29-22 @ 07:00  --------------------------------------------------------  IN: 0 mL / OUT: 490 mL / NET: -490 mL      PHYSICAL EXAM:  GENERAL: Awake, alert; NAD.  HEENT: Head NC/AT; Conjunctivae pink, Sclera anicteric; Oral mucosa moist.  CARDIO: Regular rate; Regular rhythm; S1 & S2.  RESP: No rales, wheezing, or rhonchi appreciated.  GI: Soft; NT/ND; BS; No guarding; No rebound tenderness.  EXT: Strength UE 5/5; Strength LE 5/5; No edema.   SKIN: Intact, no rashes, no erythema    LAB RESULTS:                        12.6   8.80  )-----------( 239      ( 28 Oct 2022 07:26 )             39.9     10-28    138  |  101  |  12  ----------------------------<  198<H>  4.5   |  27  |  0.7    Ca    9.1      28 Oct 2022 07:26  Phos  3.6     10-28  Mg     1.5     10-28    TPro  6.2  /  Alb  3.4<L>  /  TBili  0.8  /  DBili  x   /  AST  19  /  ALT  17  /  AlkPhos  66  10-28    PT/INR - ( 27 Oct 2022 11:33 )   PT: 18.60 sec;   INR: 1.61 ratio         PTT - ( 27 Oct 2022 11:33 )  PTT:28.0 sec            MICROBIOLOGY:    RADIOLOGY:    ALLERGIES:  No Known Allergies    MEDICATIONS:  dextrose 5%. 1000 milliLiter(s) IV Continuous <Continuous>  dextrose 5%. 1000 milliLiter(s) IV Continuous <Continuous>  dextrose 50% Injectable 25 Gram(s) IV Push once  dextrose 50% Injectable 12.5 Gram(s) IV Push once  dextrose 50% Injectable 25 Gram(s) IV Push once  dextrose Oral Gel 15 Gram(s) Oral once PRN  furosemide    Tablet 20 milliGRAM(s) Oral daily  glucagon  Injectable 1 milliGRAM(s) IntraMuscular once  insulin lispro (ADMELOG) corrective regimen sliding scale   SubCutaneous three times a day before meals  labetalol 200 milliGRAM(s) Oral two times a day  losartan 50 milliGRAM(s) Oral daily  magnesium oxide 400 milliGRAM(s) Oral three times a day with meals  ondansetron    Tablet 8 milliGRAM(s) Oral every 8 hours PRN  regadenoson Injectable 0.4 milliGRAM(s) IV Push once  rivaroxaban 20 milliGRAM(s) Oral with dinner  simvastatin 40 milliGRAM(s) Oral at bedtime  tamsulosin 0.4 milliGRAM(s) Oral at bedtime    ===========================================================

## 2022-10-29 NOTE — PROGRESS NOTE ADULT - SUBJECTIVE AND OBJECTIVE BOX
RADHA MERARY  72y  Male      Patient is a 72y old  Male who presents with a chief complaint of abd pain, htn (29 Oct 2022 09:27)        REVIEW OF SYSTEMS:  CONSTITUTIONAL: No fever, weight loss, or fatigue  RESPIRATORY: No cough, wheezing, chills or hemoptysis; No shortness of breath  CARDIOVASCULAR: No chest pain, palpitations, dizziness, or leg swelling  GASTROINTESTINAL: No abdominal or epigastric pain. No nausea, vomiting, or hematemesis; No diarrhea or constipation. No melena or hematochezia.  GENITOURINARY: No dysuria, frequency, hematuria, or incontinence  NEUROLOGICAL: No headaches, memory loss, loss of strength, numbness  MUSCULOSKELETAL: No joint pain or swelling; No muscle, back, or extremity pain  PSYCHIATRIC: No depression, anxiety, mood swings, or difficulty sleeping  HEME/LYMPH: No easy bruising, or bleeding gums  ALLERY AND IMMUNOLOGIC: No hives or eczema  FAMILY HISTORY:  FH: myocardial infarction  mother at age 78      T(C): 35.8 (10-29-22 @ 05:25), Max: 36.8 (10-28-22 @ 14:54)  HR: 75 (10-29-22 @ 08:15) (72 - 83)  BP: 88/52 (10-29-22 @ 08:15) (88/52 - 181/82)  RR: 18 (10-29-22 @ 08:15) (16 - 19)  SpO2: 95% (10-28-22 @ 14:54) (95% - 95%)  Wt(kg): --Vital Signs Last 24 Hrs  T(C): 35.8 (29 Oct 2022 05:25), Max: 36.8 (28 Oct 2022 14:54)  T(F): 96.4 (29 Oct 2022 05:25), Max: 98.2 (28 Oct 2022 14:54)  HR: 75 (29 Oct 2022 08:15) (72 - 83)  BP: 88/52 (29 Oct 2022 08:15) (88/52 - 181/82)  BP(mean): 64 (29 Oct 2022 08:15) (64 - 64)  RR: 18 (29 Oct 2022 08:15) (16 - 19)  SpO2: 95% (28 Oct 2022 14:54) (95% - 95%)      No Known Allergies      PHYSICAL EXAM:  GENERAL: NAD,   HEAD:  Atraumatic, Normocephalic  EYES: EOMI, PERRLA, conjunctiva and sclera clear  ENMT: No tonsillar erythema, exudates, or enlargement;  NECK: Supple, No JVD, Normal thyroid  NERVOUS SYSTEM:  Alert & Oriented X3, Good concentration;   CHEST/LUNGs vbs  HEART: Regular rate and rhythm; No murmurs, rubs, or gallops  ABDOMEN: Soft, Nontender, Nondistended; Bowel sounds present  EXTREMITIES:   No clubbing, cyanosis, or edema  LYMPH: No lymphadenopathy noted  SKIN: No rashes or lesions      LABS:  10-28    138  |  101  |  12  ----------------------------<  198<H>  4.5   |  27  |  0.7    Ca    9.1      28 Oct 2022 07:26  Phos  3.6     10-28  Mg     1.5     10-28    TPro  6.2  /  Alb  3.4<L>  /  TBili  0.8  /  DBili  x   /  AST  19  /  ALT  17  /  AlkPhos  66  10-28                          13.9   9.25  )-----------( 303      ( 29 Oct 2022 08:34 )             43.5         < from: [facility] Nuclear Stress Pharmacologic Multiple (10.28.22 @ 17:27) >  1.   Moderate size reversible defect in the lateral wall of the left   ventricle consistent with lateral wall ischemia superimposed on   inferolateral scar.  2.  Fixed defect in the inferolateral wall which does not completely   thicken consistent with prior injury (scar).  3.  Normal global left ventricular wall motion. All other walls of the   left ventricle thicken.  4.  Left ventricular ejection fraction calculated as 60% which is within   the range of normal    < end of copied text >  < from: [facility] Duplex Lower Ext Vein Scan, Bilat (10.23.22 @ 10:21) >  Right: No DVT or superficial thrombosis noted    Left: Chronic DVT noted in the left popliteal vein.    < end of copied text >  < from: [facility] Hepatobiliary Imaging (10.19.22 @ 16:35) >  NO DEFINITE VISUALIZATION OF THE GALLBLADDER THROUGH 4 HOURS   POST-INJECTION, CONSISTENT WITH CHOLECYSTITIS, AS DESCRIBED ABOVE.    CLINICAL CORRELATION IS SUGGESTED.    < end of copied text >  < from: CT Angio Abdomen and Pelvis w/ IV Cont (10.18.22 @ 22:49) >  1. Cholelithiasis. Gallbladder pericholecystic stranding. Findings may   represent acute cholecystitis in the appropriate clinical setting.    2. No evidence of acute intrathoracic pathology. No evidence of acute   aortic pathology.      < end of copied text >  RADIOLOGY & ADDITIONAL TESTS:    MEDICATION:  dextrose 5%. 1000 milliLiter(s) IV Continuous <Continuous>  dextrose 5%. 1000 milliLiter(s) IV Continuous <Continuous>  dextrose 50% Injectable 25 Gram(s) IV Push once  dextrose 50% Injectable 12.5 Gram(s) IV Push once  dextrose 50% Injectable 25 Gram(s) IV Push once  dextrose Oral Gel 15 Gram(s) Oral once PRN  glucagon  Injectable 1 milliGRAM(s) IntraMuscular once  insulin lispro (ADMELOG) corrective regimen sliding scale   SubCutaneous three times a day before meals  labetalol 200 milliGRAM(s) Oral two times a day  losartan 50 milliGRAM(s) Oral daily  magnesium oxide 400 milliGRAM(s) Oral three times a day with meals  ondansetron    Tablet 8 milliGRAM(s) Oral every 8 hours PRN  regadenoson Injectable 0.4 milliGRAM(s) IV Push once  rivaroxaban 20 milliGRAM(s) Oral with dinner  simvastatin 40 milliGRAM(s) Oral at bedtime  tamsulosin 0.4 milliGRAM(s) Oral at bedtime      HEALTH ISSUES - PROBLEM Dx:  acute cholecystitis resolved going for out pt surgery   hx LT DVT with PE in the past on xarelto  HTN now low BP hold all BP medication for the future lower the losartan  type 2 DM on insulin  stress test scar tissue cardiology follow up for future surgery clearance   orthostatic hypotension will check on that

## 2022-10-29 NOTE — PROGRESS NOTE ADULT - ASSESSMENT
Patient is a 71-yo male with pmhx of HTN DM2, MI s/p CABG, DVT/PE on Xarelto, recurrent kidney stones who presented with a chief complaint of abdominal pain    # hypomagnesemia related to? diarrhea vs Lasix use   continue Mg Oxide current dose   DC lasix if possible  avoid Iv Mg unless Mg< 1.5  check U Mg U Creat to calculate Fe Mg ( that would help differentiate between renal and  non renal causes- expect it to be high with lasix use )  If diuretics are needed consider Amiloride or triamterene   check Mg q24h     will sign off recall PRN / call using TEAMS or on [phone]

## 2022-10-30 ENCOUNTER — TRANSCRIPTION ENCOUNTER (OUTPATIENT)
Age: 72
End: 2022-10-30

## 2022-10-30 VITALS
HEART RATE: 75 BPM | SYSTOLIC BLOOD PRESSURE: 119 MMHG | RESPIRATION RATE: 18 BRPM | DIASTOLIC BLOOD PRESSURE: 65 MMHG | TEMPERATURE: 98 F

## 2022-10-30 LAB
ALBUMIN SERPL ELPH-MCNC: 3.7 G/DL — SIGNIFICANT CHANGE UP (ref 3.5–5.2)
ALP SERPL-CCNC: 69 U/L — SIGNIFICANT CHANGE UP (ref 30–115)
ALT FLD-CCNC: 18 U/L — SIGNIFICANT CHANGE UP (ref 0–41)
ANION GAP SERPL CALC-SCNC: 14 MMOL/L — SIGNIFICANT CHANGE UP (ref 7–14)
AST SERPL-CCNC: 17 U/L — SIGNIFICANT CHANGE UP (ref 0–41)
BILIRUB SERPL-MCNC: 0.7 MG/DL — SIGNIFICANT CHANGE UP (ref 0.2–1.2)
BUN SERPL-MCNC: 23 MG/DL — HIGH (ref 10–20)
CALCIUM SERPL-MCNC: 8.9 MG/DL — SIGNIFICANT CHANGE UP (ref 8.4–10.5)
CHLORIDE SERPL-SCNC: 107 MMOL/L — SIGNIFICANT CHANGE UP (ref 98–110)
CO2 SERPL-SCNC: 26 MMOL/L — SIGNIFICANT CHANGE UP (ref 17–32)
CREAT SERPL-MCNC: 0.7 MG/DL — SIGNIFICANT CHANGE UP (ref 0.7–1.5)
EGFR: 99 ML/MIN/1.73M2 — SIGNIFICANT CHANGE UP
GLUCOSE BLDC GLUCOMTR-MCNC: 227 MG/DL — HIGH (ref 70–99)
GLUCOSE BLDC GLUCOMTR-MCNC: 231 MG/DL — HIGH (ref 70–99)
GLUCOSE BLDC GLUCOMTR-MCNC: 330 MG/DL — HIGH (ref 70–99)
GLUCOSE SERPL-MCNC: 237 MG/DL — HIGH (ref 70–99)
HCT VFR BLD CALC: 42.8 % — SIGNIFICANT CHANGE UP (ref 42–52)
HGB BLD-MCNC: 13.3 G/DL — LOW (ref 14–18)
MAGNESIUM SERPL-MCNC: 1.6 MG/DL — LOW (ref 1.8–2.4)
MCHC RBC-ENTMCNC: 28.9 PG — SIGNIFICANT CHANGE UP (ref 27–31)
MCHC RBC-ENTMCNC: 31.1 G/DL — LOW (ref 32–37)
MCV RBC AUTO: 93 FL — SIGNIFICANT CHANGE UP (ref 80–94)
NRBC # BLD: 0 /100 WBCS — SIGNIFICANT CHANGE UP (ref 0–0)
PLATELET # BLD AUTO: 294 K/UL — SIGNIFICANT CHANGE UP (ref 130–400)
POTASSIUM SERPL-MCNC: 5 MMOL/L — SIGNIFICANT CHANGE UP (ref 3.5–5)
POTASSIUM SERPL-SCNC: 5 MMOL/L — SIGNIFICANT CHANGE UP (ref 3.5–5)
PROT SERPL-MCNC: 6.2 G/DL — SIGNIFICANT CHANGE UP (ref 6–8)
RBC # BLD: 4.6 M/UL — LOW (ref 4.7–6.1)
RBC # FLD: 13.5 % — SIGNIFICANT CHANGE UP (ref 11.5–14.5)
SODIUM SERPL-SCNC: 147 MMOL/L — HIGH (ref 135–146)
WBC # BLD: 8.53 K/UL — SIGNIFICANT CHANGE UP (ref 4.8–10.8)
WBC # FLD AUTO: 8.53 K/UL — SIGNIFICANT CHANGE UP (ref 4.8–10.8)

## 2022-10-30 RX ORDER — LOSARTAN POTASSIUM 100 MG/1
1 TABLET, FILM COATED ORAL
Qty: 0 | Refills: 0 | DISCHARGE
Start: 2022-10-30

## 2022-10-30 RX ORDER — LABETALOL HCL 100 MG
1 TABLET ORAL
Qty: 0 | Refills: 0 | DISCHARGE
Start: 2022-10-30

## 2022-10-30 RX ORDER — LABETALOL HCL 100 MG
1 TABLET ORAL
Refills: 0 | DISCHARGE
Start: 2022-10-30

## 2022-10-30 RX ADMIN — MAGNESIUM OXIDE 400 MG ORAL TABLET 400 MILLIGRAM(S): 241.3 TABLET ORAL at 11:47

## 2022-10-30 RX ADMIN — Medication 100 MILLIGRAM(S): at 06:13

## 2022-10-30 RX ADMIN — Medication 2: at 08:19

## 2022-10-30 RX ADMIN — MAGNESIUM OXIDE 400 MG ORAL TABLET 400 MILLIGRAM(S): 241.3 TABLET ORAL at 08:20

## 2022-10-30 RX ADMIN — LOSARTAN POTASSIUM 50 MILLIGRAM(S): 100 TABLET, FILM COATED ORAL at 06:14

## 2022-10-30 RX ADMIN — Medication 4: at 11:47

## 2022-10-30 NOTE — PROGRESS NOTE ADULT - PROVIDER SPECIALTY LIST ADULT
Cardiology
Internal Medicine
Cardiology
Internal Medicine
Intervent Radiology
Nephrology
Surgery
Internal Medicine
Intervent Radiology
Surgery
Cardiology
Internal Medicine
Surgery
Cardiology

## 2022-10-30 NOTE — DISCHARGE NOTE NURSING/CASE MANAGEMENT/SOCIAL WORK - NSDCPEFALRISK_GEN_ALL_CORE
For information on Fall & Injury Prevention, visit: [email or web address] OR  [email or web address] OR  https://www.cdc.gov/steadi/patient.html

## 2022-10-30 NOTE — PROGRESS NOTE ADULT - ASSESSMENT
71M w/ PMH of HLD, HTN, MI, DM, CAD s/p CABG, previous DVT/PE on Xarelto presents for hypotensionand kidney stones presents to ED with complaints of abdominal pain in the bilateral flanks radiating across the lower abdomen as well as hypertension. Initial vitals noted to have a systolic of 239. A CT showed gallstones and enlarged gallbladder with a stone at the neck.   An US of RUQ was ordered to further delineate if pain is due to biliary pathology. US shows a right renal calculi measuring 1.2 cm and gallbladder edema. On exam, pt complains of right flank pain. Physical exam findings, imaging, and labs as documented above.     Acute Cholecystitis- as per US and HIDA  - on IV Zosyn  - pain rx prn  - patient s/p 4 units FFP, 2 orders of Vitamin K  decision was made by Sx to defer procedure for now  cholecystitis resolved w/ abx  can follow up w. Dr. Crouch as o/p 4-6 weeks for lap yefri    HTN Urgency:  - likely exacerbated  by severe abd pain  - on Labetalol 100mg mornings, 200mg evenings, Losartan 50 QD  - Previously receiving lasix and amlodipine, discontinued  - pt now with orthostatic hypotension  was low today 88/52, will give 1 liter bolus, dc'd amlodipine, now back on home regimen  - trend BP    hx of DVT/PE  hx of MI/CABG  - Xarelto 20mg QD, restarted  - hold ASA 81 QD  - Cardio: pt went for NST  positive for reversible defect in lateral wall of LV consistent w/ lateral wall ischemia superimposed on inferolateral scar  spoke to Dr. Hernandes: will not cath patient, can dc with close follow-up once BP is controlled    DM type 2  - on Metformin 1g BID at home, held during hospital stay  - start on SS   - monitor FS and adjust as needed    hx of Kidney Stones  - non obstructive kidney stone  - hydrate  - monitor    Deconditioning from all  - call to PPT to  evaluate and start RX    # Misc  - GI ppx: none  - DVT ppx: xaralto  - Diet: DASH/TLC  - Activity: AAT  - DISPO: dc home pending bp stabilization

## 2022-10-30 NOTE — PROGRESS NOTE ADULT - SUBJECTIVE AND OBJECTIVE BOX
MERARY GARCIA 72y Male  MRN#: [id number]  Hospital Day: 11d    Pt is currently admitted with the primary diagnosis of acute cholecystitis     HPI: Patient is a 71-yo male with pmhx of htn, hld, DM2, MI s/p CABG, DVT/PE on Xarelto, recurrent kidney stones who presented with a chief complaint of abdominal pain and high blood pressure x 1 day. The pain started yesterday afternoon as a "horrible discomfort". It is hypogastric in anture, diffuse, encircling to patient's low back. It is constant, was a 10/10 prior to pain meds (now 7/10), no exacerbating factors. Patient states he takes his bp daily and usually runs around 155/65, but found his systolic in the 200s so decided to come to hospital. Patient denies other systemic sxs, including fever, n/v/d, cp, sob, chills, dysuria, hematuria, hematochezia, joint pains or recent sick contacts. Patient says the pain is similar to what he experienced with his kidney stones but that the pain was located flanks during those incidences.     SUBJECTIVE  Overnight events: None  Subjective complaints: None                                            ----------------------------------------------------------  OBJECTIVE    VITAL SIGNS: Last 24 Hours  T(C): 36.2 (29 Oct 2022 21:00), Max: 36.3 (29 Oct 2022 19:45)  T(F): 97.2 (29 Oct 2022 21:00), Max: 97.4 (29 Oct 2022 19:45)  HR: 80 (29 Oct 2022 21:00) (72 - 94)  BP: 150/68 (29 Oct 2022 21:00) (86/54 - 150/68)  BP(mean): 100 (29 Oct 2022 19:45) (64 - 100)  RR: 18 (29 Oct 2022 21:00) (17 - 18)  SpO2: 94% (29 Oct 2022 19:45) (94% - 94%)      10-28-22 @ 07:01  -  10-29-22 @ 07:00  --------------------------------------------------------  IN: 0 mL / OUT: 490 mL / NET: -490 mL        PHYSICAL EXAM:  General: well-appearing in NAD.  HEENT: Normocephalic, nontraumatic. PERRL.  LUNGS: Clear to auscultation b/l. No wheezes, rales, or rhonchi.  HEART: RRR. No murmurs, rubs, or gallops.  ABDOMEN: Soft, nontender, nondistended. + bowel sounds.  EXT: Pulses palpable x 4. No lower extremity edema.  NEURO: AAOx4.  SKIN: Warm, dry.    PAST MEDICAL & SURGICAL HISTORY  Hypertension, unspecified type  Type 2 diabetes mellitus with complication, unspecified long term insulin use status  High cholesterol  Kidney stones  MI (myocardial infarction)  DVT (deep venous thrombosis)  Acute massive pulmonary embolism s/p tPA in 2018  Carotid atherosclerosis  H/O heart surgery quadruple bypass 6 years ago  H/O cystoscopy                                            -----------------------------------------------------------  ALLERGIES:  No Known Allergies                                            ------------------------------------------------------------    HOME MEDICATIONS  Home Medications:  Aspirin Enteric Coated 81 mg oral delayed release tablet: 1 tab(s) orally once a day (24 Mar 2021 16:36)  metFORMIN 1000 mg oral tablet: 1 tab(s) orally 2 times a day (24 Mar 2021 16:36)  simvastatin 40 mg oral tablet: 1 tab(s) orally once a day (at bedtime) (24 Mar 2021 16:36)  Xarelto 20 mg oral tablet: 1 tab(s) orally once a day (in the morning) (24 Mar 2021 16:36)                           MEDICATIONS:  STANDING MEDICATIONS  dextrose 5%. 1000 milliLiter(s) IV Continuous <Continuous>  dextrose 5%. 1000 milliLiter(s) IV Continuous <Continuous>  dextrose 50% Injectable 25 Gram(s) IV Push once  dextrose 50% Injectable 12.5 Gram(s) IV Push once  dextrose 50% Injectable 25 Gram(s) IV Push once  glucagon  Injectable 1 milliGRAM(s) IntraMuscular once  insulin lispro (ADMELOG) corrective regimen sliding scale   SubCutaneous three times a day before meals  labetalol 100 milliGRAM(s) Oral <User Schedule>  labetalol 200 milliGRAM(s) Oral <User Schedule>  losartan 50 milliGRAM(s) Oral daily  magnesium oxide 400 milliGRAM(s) Oral three times a day with meals  regadenoson Injectable 0.4 milliGRAM(s) IV Push once  rivaroxaban 20 milliGRAM(s) Oral with dinner  simvastatin 40 milliGRAM(s) Oral at bedtime  tamsulosin 0.4 milliGRAM(s) Oral at bedtime    PRN MEDICATIONS  dextrose Oral Gel 15 Gram(s) Oral once PRN  ondansetron    Tablet 8 milliGRAM(s) Oral every 8 hours PRN                                           --------------------------------------------------------------  LABS:                        13.9   9.25  )-----------( 303      ( 29 Oct 2022 08:34 )             43.5     10-29    143  |  102  |  19  ----------------------------<  227<H>  4.8   |  26  |  0.8    Ca    9.2      29 Oct 2022 08:34  Phos  3.6     10-28  Mg     1.8     10-29    TPro  6.5  /  Alb  3.7  /  TBili  0.9  /  DBili  x   /  AST  26  /  ALT  22  /  AlkPhos  70  10-29    CAPILLARY BLOOD GLUCOSE  POCT Blood Glucose.: 210 mg/dL (29 Oct 2022 21:22)  POCT Blood Glucose.: 222 mg/dL (29 Oct 2022 16:32)  POCT Blood Glucose.: 290 mg/dL (29 Oct 2022 11:35)  POCT Blood Glucose.: 203 mg/dL (29 Oct 2022 07:35)                                            -------------------------------------------------------------  RADIOLOGY:    < from: NM Nuclear Stress Pharmacologic Multiple (10.28.22 @ 17:27) >  Reason: Chest pain  Impression:  1.   Moderate size reversible defect in the lateral wall of the left   ventricle consistent with lateral wall ischemia superimposed on   inferolateral scar.  2.  Fixed defect in the inferolateral wall which does not completely   thicken consistent with prior injury (scar).  3.  Normal global left ventricular wall motion. All other walls of the   left ventricle thicken.  4.  Left ventricular ejection fraction calculated as 60% which is within   the range of normal                                            --------------------------------------------------------------

## 2022-10-30 NOTE — PROGRESS NOTE ADULT - SUBJECTIVE AND OBJECTIVE BOX
MERARY GARCIA  72y  Male      Patient is a 72y old  Male who presents with a chief complaint of abd pain, htn (30 Oct 2022 03:48)        REVIEW OF SYSTEMS:  CONSTITUTIONAL: No fever, weight loss, or fatigue  RESPIRATORY: No cough, wheezing, chills or hemoptysis; No shortness of breath  CARDIOVASCULAR: No chest pain, palpitations, dizziness, or leg swelling  GASTROINTESTINAL: No abdominal or epigastric pain. No nausea, vomiting, or hematemesis; No diarrhea or constipation. No melena or hematochezia.  GENITOURINARY: No dysuria, frequency, hematuria, or incontinence  NEUROLOGICAL: No headaches, memory loss, loss of strength, numbness, or tremors  SKIN: No itching, burning, rashes, or lesions   LYMPH NODES: No enlarged glands  ENDOCRINE: No heat or cold intolerance; No hair loss  MUSCULOSKELETAL: No joint pain or swelling; No muscle, back, or extremity pain  PSYCHIATRIC: No depression, anxiety, mood swings, or difficulty sleeping  HEME/LYMPH: No easy bruising, or bleeding gums  ALLERY AND IMMUNOLOGIC: No hives or eczema  FAMILY HISTORY:  FH: myocardial infarction  mother at age 78      T(C): 36.3 (10-30-22 @ 06:34), Max: 36.3 (10-29-22 @ 19:45)  HR: 74 (10-30-22 @ 06:34) (73 - 94)  BP: 140/66 (10-30-22 @ 06:34) (86/54 - 150/68)  RR: 18 (10-30-22 @ 06:34) (17 - 18)  SpO2: 94% (10-29-22 @ 19:45) (94% - 94%)  Wt(kg): --Vital Signs Last 24 Hrs  T(C): 36.3 (30 Oct 2022 06:34), Max: 36.3 (29 Oct 2022 19:45)  T(F): 97.4 (30 Oct 2022 06:34), Max: 97.4 (29 Oct 2022 19:45)  HR: 74 (30 Oct 2022 06:34) (73 - 94)  BP: 140/66 (30 Oct 2022 06:34) (86/54 - 150/68)  BP(mean): 100 (29 Oct 2022 19:45) (100 - 100)  RR: 18 (30 Oct 2022 06:34) (17 - 18)  SpO2: 94% (29 Oct 2022 19:45) (94% - 94%)    Parameters below as of 29 Oct 2022 19:45  Patient On (Oxygen Delivery Method): room air      No Known Allergies      PHYSICAL EXAM:  GENERAL: NAD  HEAD:  Atraumatic, Normocephalic  EYES: EOMI, PERRLA, conjunctiva and sclera clear  ENMT: No tonsillar erythema, exudates, or enlargement;   NECK: Supple, No JVD, Normal thyroid  NERVOUS SYSTEM:  Alert & Oriented X3, Good concentration; Motor Strength 5/5 B/L upper and lower extremities; DTRs 2+ intact and symmetric  CHEST/LUNG: Clear to percussion bilaterally; No rales, rhonchi, wheezing, or rubs  HEART: Regular rate and rhythm; No murmurs, rubs, or gallops  ABDOMEN: Soft, Nontender, Nondistended; Bowel sounds present  EXTREMITIES:  , No clubbing, cyanosis, or edema  LYMPH: No lymphadenopathy noted  SKIN: No rashes or lesions      LABS:  10-29    143  |  102  |  19  ----------------------------<  227<H>  4.8   |  26  |  0.8    Ca    9.2      29 Oct 2022 08:34  Mg     1.8     10-29    TPro  6.5  /  Alb  3.7  /  TBili  0.9  /  DBili  x   /  AST  26  /  ALT  22  /  AlkPhos  70  10-29                          13.9   9.25  )-----------( 303      ( 29 Oct 2022 08:34 )             43.5         RADIOLOGY & ADDITIONAL TESTS:    MEDICATION:  dextrose 5%. 1000 milliLiter(s) IV Continuous <Continuous>  dextrose 5%. 1000 milliLiter(s) IV Continuous <Continuous>  dextrose 50% Injectable 25 Gram(s) IV Push once  dextrose 50% Injectable 12.5 Gram(s) IV Push once  dextrose 50% Injectable 25 Gram(s) IV Push once  dextrose Oral Gel 15 Gram(s) Oral once PRN  glucagon  Injectable 1 milliGRAM(s) IntraMuscular once  insulin lispro (ADMELOG) corrective regimen sliding scale   SubCutaneous three times a day before meals  labetalol 100 milliGRAM(s) Oral <User Schedule>  labetalol 200 milliGRAM(s) Oral <User Schedule>  losartan 50 milliGRAM(s) Oral daily  magnesium oxide 400 milliGRAM(s) Oral three times a day with meals  ondansetron    Tablet 8 milliGRAM(s) Oral every 8 hours PRN  regadenoson Injectable 0.4 milliGRAM(s) IV Push once  rivaroxaban 20 milliGRAM(s) Oral with dinner  simvastatin 40 milliGRAM(s) Oral at bedtime  tamsulosin 0.4 milliGRAM(s) Oral at bedtime      HEALTH ISSUES - PROBLEM Dx:    s/p acute cholecystitis resolved ,going for out pt surgery  hx dvt /PE on xarelto  HTN stable with losartan 50mg,  type 2 DM on insulin  stress test old scar cardiologist will clear him ,d/c home today.

## 2022-10-30 NOTE — DISCHARGE NOTE NURSING/CASE MANAGEMENT/SOCIAL WORK - PATIENT PORTAL LINK FT
You can access the FollowMyHealth Patient Portal offered by [facility] by registering at the following website: [email or web address]. By joining [facility]’s FollowMyHealth portal, you will also be able to view your health information using other applications (apps) compatible with our system.

## 2022-10-30 NOTE — PROGRESS NOTE ADULT - REASON FOR ADMISSION
abd pain, htn

## 2022-11-08 DIAGNOSIS — I16.1 HYPERTENSIVE EMERGENCY: ICD-10-CM

## 2022-11-08 DIAGNOSIS — K80.00 CALCULUS OF GALLBLADDER WITH ACUTE CHOLECYSTITIS WITHOUT OBSTRUCTION: ICD-10-CM

## 2022-11-08 DIAGNOSIS — K81.0 ACUTE CHOLECYSTITIS: ICD-10-CM

## 2022-11-08 DIAGNOSIS — I95.9 HYPOTENSION, UNSPECIFIED: ICD-10-CM

## 2022-11-11 ENCOUNTER — OUTPATIENT (OUTPATIENT)
Dept: OUTPATIENT SERVICES | Facility: HOSPITAL | Age: 72
LOS: 1 days | Discharge: HOME | End: 2022-11-11

## 2022-11-11 VITALS
TEMPERATURE: 98 F | OXYGEN SATURATION: 99 % | DIASTOLIC BLOOD PRESSURE: 79 MMHG | HEIGHT: 73 IN | HEART RATE: 85 BPM | RESPIRATION RATE: 15 BRPM | WEIGHT: 189.82 LBS | SYSTOLIC BLOOD PRESSURE: 147 MMHG

## 2022-11-11 DIAGNOSIS — K80.20 CALCULUS OF GALLBLADDER WITHOUT CHOLECYSTITIS WITHOUT OBSTRUCTION: ICD-10-CM

## 2022-11-11 DIAGNOSIS — Z01.818 ENCOUNTER FOR OTHER PREPROCEDURAL EXAMINATION: ICD-10-CM

## 2022-11-11 DIAGNOSIS — Z98.890 OTHER SPECIFIED POSTPROCEDURAL STATES: Chronic | ICD-10-CM

## 2022-11-11 LAB
A1C WITH ESTIMATED AVERAGE GLUCOSE RESULT: 7.6 % — HIGH (ref 4–5.6)
ALBUMIN SERPL ELPH-MCNC: 4.5 G/DL — SIGNIFICANT CHANGE UP (ref 3.5–5.2)
ALP SERPL-CCNC: 64 U/L — SIGNIFICANT CHANGE UP (ref 30–115)
ALT FLD-CCNC: 13 U/L — SIGNIFICANT CHANGE UP (ref 0–41)
ANION GAP SERPL CALC-SCNC: 12 MMOL/L — SIGNIFICANT CHANGE UP (ref 7–14)
APTT BLD: 39.7 SEC — HIGH (ref 27–39.2)
AST SERPL-CCNC: 16 U/L — SIGNIFICANT CHANGE UP (ref 0–41)
BASOPHILS # BLD AUTO: 0.05 K/UL — SIGNIFICANT CHANGE UP (ref 0–0.2)
BASOPHILS NFR BLD AUTO: 0.7 % — SIGNIFICANT CHANGE UP (ref 0–1)
BILIRUB SERPL-MCNC: 1.1 MG/DL — SIGNIFICANT CHANGE UP (ref 0.2–1.2)
BUN SERPL-MCNC: 25 MG/DL — HIGH (ref 10–20)
CALCIUM SERPL-MCNC: 9.7 MG/DL — SIGNIFICANT CHANGE UP (ref 8.4–10.5)
CHLORIDE SERPL-SCNC: 100 MMOL/L — SIGNIFICANT CHANGE UP (ref 98–110)
CO2 SERPL-SCNC: 27 MMOL/L — SIGNIFICANT CHANGE UP (ref 17–32)
CREAT SERPL-MCNC: 0.8 MG/DL — SIGNIFICANT CHANGE UP (ref 0.7–1.5)
EGFR: 95 ML/MIN/1.73M2 — SIGNIFICANT CHANGE UP
EOSINOPHIL # BLD AUTO: 0.34 K/UL — SIGNIFICANT CHANGE UP (ref 0–0.7)
EOSINOPHIL NFR BLD AUTO: 4.5 % — SIGNIFICANT CHANGE UP (ref 0–8)
ESTIMATED AVERAGE GLUCOSE: 171 MG/DL — HIGH (ref 68–114)
GLUCOSE SERPL-MCNC: 160 MG/DL — HIGH (ref 70–99)
HCT VFR BLD CALC: 42.5 % — SIGNIFICANT CHANGE UP (ref 42–52)
HGB BLD-MCNC: 13.6 G/DL — LOW (ref 14–18)
IMM GRANULOCYTES NFR BLD AUTO: 0.7 % — HIGH (ref 0.1–0.3)
INR BLD: 3.68 RATIO — HIGH (ref 0.65–1.3)
LYMPHOCYTES # BLD AUTO: 1.62 K/UL — SIGNIFICANT CHANGE UP (ref 1.2–3.4)
LYMPHOCYTES # BLD AUTO: 21.4 % — SIGNIFICANT CHANGE UP (ref 20.5–51.1)
MCHC RBC-ENTMCNC: 29.1 PG — SIGNIFICANT CHANGE UP (ref 27–31)
MCHC RBC-ENTMCNC: 32 G/DL — SIGNIFICANT CHANGE UP (ref 32–37)
MCV RBC AUTO: 91 FL — SIGNIFICANT CHANGE UP (ref 80–94)
MONOCYTES # BLD AUTO: 0.54 K/UL — SIGNIFICANT CHANGE UP (ref 0.1–0.6)
MONOCYTES NFR BLD AUTO: 7.1 % — SIGNIFICANT CHANGE UP (ref 1.7–9.3)
NEUTROPHILS # BLD AUTO: 4.97 K/UL — SIGNIFICANT CHANGE UP (ref 1.4–6.5)
NEUTROPHILS NFR BLD AUTO: 65.6 % — SIGNIFICANT CHANGE UP (ref 42.2–75.2)
NRBC # BLD: 0 /100 WBCS — SIGNIFICANT CHANGE UP (ref 0–0)
PLATELET # BLD AUTO: 170 K/UL — SIGNIFICANT CHANGE UP (ref 130–400)
POTASSIUM SERPL-MCNC: 4.4 MMOL/L — SIGNIFICANT CHANGE UP (ref 3.5–5)
POTASSIUM SERPL-SCNC: 4.4 MMOL/L — SIGNIFICANT CHANGE UP (ref 3.5–5)
PROT SERPL-MCNC: 6.7 G/DL — SIGNIFICANT CHANGE UP (ref 6–8)
PROTHROM AB SERPL-ACNC: >40 SEC — HIGH (ref 9.95–12.87)
RBC # BLD: 4.67 M/UL — LOW (ref 4.7–6.1)
RBC # FLD: 13.6 % — SIGNIFICANT CHANGE UP (ref 11.5–14.5)
SODIUM SERPL-SCNC: 139 MMOL/L — SIGNIFICANT CHANGE UP (ref 135–146)
WBC # BLD: 7.57 K/UL — SIGNIFICANT CHANGE UP (ref 4.8–10.8)
WBC # FLD AUTO: 7.57 K/UL — SIGNIFICANT CHANGE UP (ref 4.8–10.8)

## 2022-11-11 PROCEDURE — 93010 ELECTROCARDIOGRAM REPORT: CPT

## 2022-11-11 PROCEDURE — 71046 X-RAY EXAM CHEST 2 VIEWS: CPT | Mod: 26

## 2022-11-11 NOTE — H&P PST ADULT - HISTORY OF PRESENT ILLNESS
PT PRESENTS TO PAST WITH NO SOB, CP, PALPITATIONS, DYSURIA, UTI OR URI AT PRESENT.   PT ABLE TO WALK UP 2-3 FLIGHTS OF STEPS WITH NO SOB.  AS PER THE PT, THIS IS HIS/HER COMPLETE MEDICAL AND SURGICAL HX, INCLUDING MEDICATIONS PRESCRIBED AND OVER THE COUNTER  pt denies any covid s/s, or tested positive in the past--PT AWARE OF DATE AND TIME OF COVID TESTING PRIOR TO DOP.   pt advised self quarantine till day of procedure  denies travel outside the [place] in the past 30 days  Anesthesia Alert  NO--Difficult Airway  NO--History of neck surgery or radiation  NO--Limited ROM of neck  NO--History of Malignant hyperthermia  NO--Personal or family history of Pseudocholinesterase deficiency  NO--Prior Anesthesia Complication  NO--Latex Allergy  NO--Loose teeth  NO--History of Rheumatoid Arthritis  NO--DENIA  NO BLEEDING RISK  NO--Other_____

## 2022-11-11 NOTE — H&P PST ADULT - REASON FOR ADMISSION
Proceduralist: Matt Crouch  Procedure: LAPAROSCOPIC CHOLECYSTECTOMY POSSIBLE OPEN   Procedure: 120 Minutes  Anesthesia Type: General  PT STATES--I HAD PAIN FOR A FEW DAYS IN OCTOBER.   I WAS ADMITTED TO [facility] FOR 13  DAYS IN OCTOBER.  THE PAIN AT THAT TIME WAS 7/10. THE PAIN WAS ON THE RIGHT SIDE OF MY ABDOMIN.  THE PAIN WAS A BURNING, THROBBING AND STABBING TYPE OF PAIN.   THE PAIN HAS GONE AWAY FOR ABOUT ONE MONTH. Proceduralist: Matt Crouch  Procedure: LAPAROSCOPIC CHOLECYSTECTOMY POSSIBLE OPEN   Procedure: 120 Minutes  Anesthesia Type: General  PT STATES--I HAD PAIN FOR A FEW DAYS IN OCTOBER.   I WAS ADMITTED TO [facility] FOR 13  DAYS IN OCTOBER.  THE PAIN AT THAT TIME WAS 7/10. THE PAIN WAS ON THE RIGHT SIDE OF MY ABDOMIN.  THE PAIN WAS A BURNING, THROBBING AND STABBING TYPE OF PAIN.   THE PAIN HAS GONE AWAY FOR ABOUT ONE MONTH.  PT IS A POOR HISTORIAN.

## 2022-11-17 ENCOUNTER — TRANSCRIPTION ENCOUNTER (OUTPATIENT)
Age: 72
End: 2022-11-17

## 2022-11-17 ENCOUNTER — RESULT REVIEW (OUTPATIENT)
Age: 72
End: 2022-11-17

## 2022-11-17 ENCOUNTER — OUTPATIENT (OUTPATIENT)
Dept: OUTPATIENT SERVICES | Facility: HOSPITAL | Age: 72
LOS: 1 days | Discharge: HOME | End: 2022-11-17

## 2022-11-17 VITALS
SYSTOLIC BLOOD PRESSURE: 114 MMHG | HEART RATE: 70 BPM | RESPIRATION RATE: 16 BRPM | OXYGEN SATURATION: 95 % | DIASTOLIC BLOOD PRESSURE: 60 MMHG

## 2022-11-17 VITALS
WEIGHT: 194.01 LBS | RESPIRATION RATE: 16 BRPM | HEIGHT: 73 IN | HEART RATE: 70 BPM | TEMPERATURE: 98 F | OXYGEN SATURATION: 98 % | DIASTOLIC BLOOD PRESSURE: 72 MMHG | SYSTOLIC BLOOD PRESSURE: 141 MMHG

## 2022-11-17 DIAGNOSIS — Z98.890 OTHER SPECIFIED POSTPROCEDURAL STATES: Chronic | ICD-10-CM

## 2022-11-17 LAB
ABO RH CONFIRMATION: SIGNIFICANT CHANGE UP
GLUCOSE BLDC GLUCOMTR-MCNC: 158 MG/DL — HIGH (ref 70–99)
GLUCOSE BLDC GLUCOMTR-MCNC: 208 MG/DL — HIGH (ref 70–99)

## 2022-11-17 PROCEDURE — 93970 EXTREMITY STUDY: CPT | Mod: 26

## 2022-11-17 PROCEDURE — 88304 TISSUE EXAM BY PATHOLOGIST: CPT | Mod: 26

## 2022-11-17 RX ORDER — OXYCODONE AND ACETAMINOPHEN 5; 325 MG/1; MG/1
1 TABLET ORAL
Qty: 20 | Refills: 0
Start: 2022-11-17

## 2022-11-17 RX ORDER — SODIUM CHLORIDE 9 MG/ML
1000 INJECTION, SOLUTION INTRAVENOUS
Refills: 0 | Status: DISCONTINUED | OUTPATIENT
Start: 2022-11-17 | End: 2022-11-17

## 2022-11-17 RX ORDER — LOSARTAN POTASSIUM 100 MG/1
1 TABLET, FILM COATED ORAL
Qty: 0 | Refills: 0 | DISCHARGE

## 2022-11-17 RX ORDER — HYDROMORPHONE HYDROCHLORIDE 2 MG/ML
0.5 INJECTION INTRAMUSCULAR; INTRAVENOUS; SUBCUTANEOUS
Refills: 0 | Status: DISCONTINUED | OUTPATIENT
Start: 2022-11-17 | End: 2022-11-17

## 2022-11-17 RX ORDER — OXYCODONE AND ACETAMINOPHEN 5; 325 MG/1; MG/1
1 TABLET ORAL
Qty: 20 | Refills: 0
Start: 2022-11-17 | End: 2022-11-21

## 2022-11-17 RX ADMIN — HYDROMORPHONE HYDROCHLORIDE 0.5 MILLIGRAM(S): 2 INJECTION INTRAMUSCULAR; INTRAVENOUS; SUBCUTANEOUS at 11:00

## 2022-11-17 RX ADMIN — HYDROMORPHONE HYDROCHLORIDE 0.5 MILLIGRAM(S): 2 INJECTION INTRAMUSCULAR; INTRAVENOUS; SUBCUTANEOUS at 11:15

## 2022-11-17 RX ADMIN — HYDROMORPHONE HYDROCHLORIDE 0.5 MILLIGRAM(S): 2 INJECTION INTRAMUSCULAR; INTRAVENOUS; SUBCUTANEOUS at 10:31

## 2022-11-17 NOTE — CHART NOTE - NSCHARTNOTEFT_GEN_A_CORE
PACU ANESTHESIA ADMISSION NOTE      Procedure: Laparoscopic cholecystectomy      Post op diagnosis:  Acute cholecystitis        ____  Intubated  TV:______       Rate: ______      FiO2: ______    __x__  Patent Airway    __x__  Full return of protective reflexes    __x__  Full recovery from anesthesia / back to baseline status      Mental Status:  __x__ Awake   ___x__ Alert   _____ Drowsy   _____ Sedated    Nausea/Vomiting:  __x__ NO  ______Yes,   See Post - Op Orders          Pain Scale (0-10):  _0____    Treatment: ____ None    __x__ See Post - Op/PCA Orders    Post - Operative Fluids:   ____ Oral   __x__ See Post - Op Orders    Plan: Discharge:   __x__Home       _____Floor     _____Critical Care    _____  Other:_________________    Comments: Patient had smooth intraoperative event, no anesthesia complication.      PACU Vital signs: HR:    69        BP:  183      /   89       RR:   16          O2 Sat: 98      %     Temp 97.8

## 2022-11-17 NOTE — ASU DISCHARGE PLAN (ADULT/PEDIATRIC) - NS MD DC FALL RISK RISK
For information on Fall & Injury Prevention, visit: [email or web address] OR  [email or web address] OR  https://www.cdc.gov/steadi/patient.html

## 2022-11-17 NOTE — ASU DISCHARGE PLAN (ADULT/PEDIATRIC) - ASU DC SPECIAL INSTRUCTIONSFT
Please follow up with Dr. Cruoch next Tuesday, 11/29/22. Please call to make an appointment.    Please hold anticoagulation for 48 hours. You may continue Xalerto on Saturday, 11/19/22.

## 2022-11-17 NOTE — ASU DISCHARGE PLAN (ADULT/PEDIATRIC) - CARE PROVIDER_API CALL
Matt Crouch)  Surgery  [address] [place] [address]  Phone: [phone]  Fax: [phone]  Follow Up Time: 2 weeks

## 2022-11-17 NOTE — BRIEF OPERATIVE NOTE - DISPOSITION
PACU, then home 05-29    136  |  102  |  66<H>  ----------------------------<  156<H>  6.6<HH>   |  28  |  5.60<H>    Ca    8.8      29 May 2019 13:00                              10.6   9.61  )-----------( 278      ( 29 May 2019 15:14 )             35.6                 PT/INR - ( 29 May 2019 16:07 )   PT: 14.5 sec;   INR: 1.26 ratio

## 2022-11-21 LAB — SURGICAL PATHOLOGY STUDY: SIGNIFICANT CHANGE UP

## 2022-11-22 DIAGNOSIS — Z86.718 PERSONAL HISTORY OF OTHER VENOUS THROMBOSIS AND EMBOLISM: ICD-10-CM

## 2022-11-22 DIAGNOSIS — K80.12 CALCULUS OF GALLBLADDER WITH ACUTE AND CHRONIC CHOLECYSTITIS WITHOUT OBSTRUCTION: ICD-10-CM

## 2022-11-22 DIAGNOSIS — Z87.891 PERSONAL HISTORY OF NICOTINE DEPENDENCE: ICD-10-CM

## 2022-11-22 DIAGNOSIS — Z79.82 LONG TERM (CURRENT) USE OF ASPIRIN: ICD-10-CM

## 2022-11-22 DIAGNOSIS — Z95.1 PRESENCE OF AORTOCORONARY BYPASS GRAFT: ICD-10-CM

## 2022-11-22 DIAGNOSIS — I25.2 OLD MYOCARDIAL INFARCTION: ICD-10-CM

## 2022-11-22 DIAGNOSIS — E78.00 PURE HYPERCHOLESTEROLEMIA, UNSPECIFIED: ICD-10-CM

## 2022-11-22 DIAGNOSIS — Z86.711 PERSONAL HISTORY OF PULMONARY EMBOLISM: ICD-10-CM

## 2022-11-22 DIAGNOSIS — I65.29 OCCLUSION AND STENOSIS OF UNSPECIFIED CAROTID ARTERY: ICD-10-CM

## 2022-11-22 DIAGNOSIS — E11.9 TYPE 2 DIABETES MELLITUS WITHOUT COMPLICATIONS: ICD-10-CM

## 2022-11-22 DIAGNOSIS — Z79.84 LONG TERM (CURRENT) USE OF ORAL HYPOGLYCEMIC DRUGS: ICD-10-CM

## 2022-11-22 DIAGNOSIS — I10 ESSENTIAL (PRIMARY) HYPERTENSION: ICD-10-CM

## 2022-12-13 NOTE — PROGRESS NOTE ADULT - ASSESSMENT
70 Y M with pmh of CAD s/p CABG/old MI, DM2, HTN, HLD, DVT/PE on Xarelto, recurrent nephrolithiasis presents to ED with left flank pain and hematuria since this morning. Found to have left obstructing nephroureteral calculus.    Left hydronephroureter secondary to obstructing ureteral stone  Bilateral nephrolithiasis  Hematuria- improved  - POD #1: post cystoscopy, left ureteroscopy with laser lithotripsy, and placement of stent    - encourage PO fluids  - pain control     Hypertensive urgency  - post IV Labetalol and Hydralazine   - on Loressor and Loartan at home  - continue Losartan daily  - off Lopressor, started labetolol 200mg q8h  - started Hydralazine 25 16 >>increased to 50 q6h for better BP control  - needs Renal artery doppler , was scheduled OP to order in patient    Hypomagnesemia  - replacement ordered    DM type 2  - basal/bolus insulin while inpatient  - avoid hypoglycemia    HLD  -continue statin    CAD s/p CABG  - continue home meds    DVT/PE - recurrent  - resume Xarelto if okay with urology    DVT PPx- SCDs for now; will restart Xarelto once cleared by urology  GI PPx- None  Diet- NPO  Activity- AAT  Dispo- Home  Code- FULL    D/C planning 24 hours if stable with outpatient urology f/u [Joint Pain] : joint pain [Negative] : Heme/Lymph

## 2023-05-23 NOTE — ED ADULT NURSE NOTE - NSFALLRSKASSESSTYPE_ED_ALL_ED
hold    Treatment Plan:  [x] Therapeutic Exercise   51132  [] Iontophoresis: 4 mg/mL Dexamethasone Sodium Phosphate  mAmin  77669   [x] Therapeutic Activity  97402 [] Vasopneumatic cold with compression  82708    [x] Gait Training   39004 [] Ultrasound   37429   [x] Neuromuscular Re-education  92051 [x] Electrical Stimulation Unattended  57168   [x] Manual Therapy  99733 [] Electrical Stimulation Attended  70235   [x] Instruction in HEP  [] Lumbar/Cervical Traction  61358   [] Aquatic Therapy   87750 [x] Cold/hotpack    [] Massage   84296      [] Dry Needling, 1 or 2 muscles  05493   [] Biofeedback, first 15 minutes   90447  [] Biofeedback, additional 15 minutes   64435 [] Dry Needling, 3 or more muscles  05769     []  Medication allergies reviewed for use of    Dexamethasone Sodium Phosphate 4mg/ml     with iontophoresis treatments. Pt is not allergic. Frequency:  2 x/week for 18 visits    Todays Treatment:  Modalities:   Precautions:  Exercises:  Exercise Reps/ Time Weight/ Level Comments   Trigger point release  6 min  Throughout low back, gluts, and hips   Seated LAQ 10 x   HEP;  ball between knees   Seated hamstring stretch 3 x ea 10 sec HEP               Other: Tolerated good - reported feeling fine after Rx. Specific Instructions for next treatment:  Hypervolt to low back as needed for pain control. Balance and gait training - eyes open/closed, head turns, over cones, ramps, increased speed.       Evaluation Complexity:  History (Personal factors, comorbidities) [] 0 [] 1-2 [x] 3+   Exam (limitations, restrictions) [] 1-2 [x] 3 [] 4+   Clinical presentation (progression) [x] Stable [] Evolving  [] Unstable   Decision Making [x] Low [] Moderate [] High    [x] Low Complexity [] Moderate Complexity [] High Complexity       Treatment Charges: Mins Units   [x] Evaluation       [x]  Low       []  Moderate       []  High 30 1   []  Modalities     [x]  Ther Exercise 4 0   [x]  Manual Therapy 6 1   [] Initial (On Arrival)

## 2023-06-05 NOTE — ED PROVIDER NOTE - NS_ATTENDINGSCRIBE_ED_ALL_ED
98
I personally performed the service described in the documentation recorded by the scribe in my presence, and it accurately and completely records my words and actions.

## 2023-07-03 NOTE — ED ADULT NURSE NOTE - TEMPLATE LIST FOR HEAD TO TOE ASSESSMENT
Stop taking the doxycycline as it appears you may have had a reaction to it. You need to follow-up with the [facility] department. Your initial HIV test the other day was negative but there is a confirmatory test pending. However a test can be negative initially and you could still have HIV. You should not have sex until cleared by the health department. You should probably be retested in 3 to 6 months depending on what they recommend. They are the experts and that is who you must go see to have this taken care of appropriately.   If your symptoms worsen or you have new symptoms get rechecked immediately
Abdominal Pain, N/V/D

## 2023-09-13 NOTE — PROGRESS NOTE ADULT - SUBJECTIVE AND OBJECTIVE BOX
GENERAL SURGERY PROGRESS NOTE    Hospital Day: 3    24 Hour Events:  Patient hypertensive urgency improving with IV and PO Labetalol. Current /66. Patient reports RUQ pain.     Vitals:  T(F): 98.1 (10-20-22 @ 04:57), Max: 99 (10-19-22 @ 23:53)  HR: 92 (10-20-22 @ 04:57)  BP: 145/66 (10-20-22 @ 04:57)  RR: 18 (10-20-22 @ 04:57)  SpO2: 94% (10-20-22 @ 00:39)    Fluids: lactated ringers.: Solution, 1000 milliLiter(s) infuse at 100 mL/Hr, Stop After 24 Hours    I & O's:    PHYSICAL EXAM:  General: NAD, mildly diaphoretic  Cardiac: RRR, S1/S2 identified, nmrg  Respiratory: unlabored breathing at rest, clear to auscultation bilaterally  Abdomen: Soft, non-distended, moderate RUQ/epigastric tenderness, no rebound  Musculoskeletal: FROM in b/l UE and LE  Neuro: Sensation grossly intact and equal throughout, no focal deficits  Skin: Warm/dry, normal color, no jaundice    MEDICATIONS  (STANDING):  ampicillin/sulbactam  IVPB 1.5 Gram(s) IV Intermittent every 6 hours  aspirin  chewable 81 milliGRAM(s) Oral daily  cholecalciferol 1000 Unit(s) Oral daily  cyanocobalamin 1000 MICROGram(s) Oral daily  dextrose 5%. 1000 milliLiter(s) (50 mL/Hr) IV Continuous <Continuous>  dextrose 5%. 1000 milliLiter(s) (100 mL/Hr) IV Continuous <Continuous>  dextrose 50% Injectable 25 Gram(s) IV Push once  dextrose 50% Injectable 12.5 Gram(s) IV Push once  dextrose 50% Injectable 25 Gram(s) IV Push once  enoxaparin Injectable 40 milliGRAM(s) SubCutaneous every 12 hours  furosemide    Tablet 20 milliGRAM(s) Oral daily  glucagon  Injectable 1 milliGRAM(s) IntraMuscular once  insulin lispro (ADMELOG) corrective regimen sliding scale   SubCutaneous three times a day before meals  labetalol 200 milliGRAM(s) Oral two times a day  lactated ringers. 1000 milliLiter(s) (100 mL/Hr) IV Continuous <Continuous>  losartan 100 milliGRAM(s) Oral daily  simvastatin 40 milliGRAM(s) Oral at bedtime  tamsulosin 0.4 milliGRAM(s) Oral at bedtime    MEDICATIONS  (PRN):  dextrose Oral Gel 15 Gram(s) Oral once PRN Blood Glucose LESS THAN 70 milliGRAM(s)/deciliter  HYDROmorphone  Injectable 0.5 milliGRAM(s) IV Push every 6 hours PRN Severe Pain (7 - 10)  ondansetron    Tablet 8 milliGRAM(s) Oral every 8 hours PRN Nausea and/or Vomiting    DVT PROPHYLAXIS: enoxaparin Injectable 40 milliGRAM(s) SubCutaneous every 12 hours    GI PROPHYLAXIS:   ANTICOAGULATION:   ANTIBIOTICS:  ampicillin/sulbactam  IVPB 1.5 Gram(s)    LAB/STUDIES:  Labs:  CAPILLARY BLOOD GLUCOSE    POCT Blood Glucose.: 247 mg/dL (19 Oct 2022 18:58)  POCT Blood Glucose.: 232 mg/dL (19 Oct 2022 17:14)                      14.8   13.84 )-----------( 156      ( 19 Oct 2022 11:26 )             46.0       10-19    135  |  93<L>  |  10  ----------------------------<  215<H>  4.2   |  30  |  0.7      Calcium, Total Serum: 9.0 mg/dL (10-19-22 @ 11:26)      LFTs:             7.2  | 2.6  | 17       ------------------[72      ( 19 Oct 2022 11:26 )  4.5  | x    | 12          Lipase:x      Amylase:x         Lactate, Blood: 1.6 mmol/L (10-18-22 @ 22:21)      Coags:    CARDIAC MARKERS ( 18 Oct 2022 22:21 )  x     / <0.01 ng/mL / x     / x     / x        IMAGING:  US Abdomen Upper Quadrant Right (10.19.22 @ 02:03)  IMPRESSION:    Right renal calculi measuring up to 1.2 cm. No hydronephrosis.    Distended gallbladder with nonspecific mild gallbladder wall thickening.   Patient has known cholelithiasis as demonstrated on same day CT. Negative   reported sonographic Srinivasan's sign. Findings are nonspecific/equivocal.   If warranted, further evaluation with HIDA scan can be obtained.    NM Hepatobiliary Imaging (10.19.22 @ 16:35)  IMPRESSION:  NO DEFINITE VISUALIZATION OF THE GALLBLADDER THROUGH 4 HOURS   POST-INJECTION, CONSISTENT WITH CHOLECYSTITIS, AS DESCRIBED ABOVE.    CLINICAL CORRELATION IS SUGGESTED.    CT Angio Abdomen and Pelvis w/ IV Cont (10.18.22 @ 22:49)  IMPRESSION:    1. Cholelithiasis. Gallbladder pericholecystic stranding. Findings may   represent acute cholecystitis in the appropriate clinical setting.    2. No evidence of acute intrathoracic pathology. No evidence of acute   aortic pathology.       No

## 2023-11-15 ENCOUNTER — EMERGENCY (EMERGENCY)
Facility: HOSPITAL | Age: 73
LOS: 0 days | Discharge: HOME | End: 2023-11-15
Attending: EMERGENCY MEDICINE
Payer: MEDICARE

## 2023-11-15 VITALS
SYSTOLIC BLOOD PRESSURE: 170 MMHG | OXYGEN SATURATION: 99 % | HEART RATE: 68 BPM | RESPIRATION RATE: 20 BRPM | DIASTOLIC BLOOD PRESSURE: 90 MMHG | TEMPERATURE: 98 F

## 2023-11-15 VITALS
OXYGEN SATURATION: 99 % | WEIGHT: 201.94 LBS | DIASTOLIC BLOOD PRESSURE: 106 MMHG | HEIGHT: 73 IN | SYSTOLIC BLOOD PRESSURE: 244 MMHG | HEART RATE: 68 BPM | RESPIRATION RATE: 20 BRPM | TEMPERATURE: 98 F

## 2023-11-15 DIAGNOSIS — R11.2 NAUSEA WITH VOMITING, UNSPECIFIED: ICD-10-CM

## 2023-11-15 DIAGNOSIS — N20.0 CALCULUS OF KIDNEY: ICD-10-CM

## 2023-11-15 DIAGNOSIS — N13.2 HYDRONEPHROSIS WITH RENAL AND URETERAL CALCULOUS OBSTRUCTION: ICD-10-CM

## 2023-11-15 DIAGNOSIS — Z87.442 PERSONAL HISTORY OF URINARY CALCULI: ICD-10-CM

## 2023-11-15 DIAGNOSIS — Z98.890 OTHER SPECIFIED POSTPROCEDURAL STATES: Chronic | ICD-10-CM

## 2023-11-15 DIAGNOSIS — R10.9 UNSPECIFIED ABDOMINAL PAIN: ICD-10-CM

## 2023-11-15 LAB
ALBUMIN SERPL ELPH-MCNC: 4.5 G/DL — SIGNIFICANT CHANGE UP (ref 3.5–5.2)
ALBUMIN SERPL ELPH-MCNC: 4.5 G/DL — SIGNIFICANT CHANGE UP (ref 3.5–5.2)
ALP SERPL-CCNC: 63 U/L — SIGNIFICANT CHANGE UP (ref 30–115)
ALP SERPL-CCNC: 63 U/L — SIGNIFICANT CHANGE UP (ref 30–115)
ALT FLD-CCNC: 12 U/L — SIGNIFICANT CHANGE UP (ref 0–41)
ALT FLD-CCNC: 12 U/L — SIGNIFICANT CHANGE UP (ref 0–41)
ANION GAP SERPL CALC-SCNC: 14 MMOL/L — SIGNIFICANT CHANGE UP (ref 7–14)
ANION GAP SERPL CALC-SCNC: 14 MMOL/L — SIGNIFICANT CHANGE UP (ref 7–14)
APPEARANCE UR: ABNORMAL
APPEARANCE UR: ABNORMAL
AST SERPL-CCNC: 17 U/L — SIGNIFICANT CHANGE UP (ref 0–41)
AST SERPL-CCNC: 17 U/L — SIGNIFICANT CHANGE UP (ref 0–41)
BACTERIA # UR AUTO: NEGATIVE /HPF — SIGNIFICANT CHANGE UP
BACTERIA # UR AUTO: NEGATIVE /HPF — SIGNIFICANT CHANGE UP
BASOPHILS # BLD AUTO: 0.04 K/UL — SIGNIFICANT CHANGE UP (ref 0–0.2)
BASOPHILS # BLD AUTO: 0.04 K/UL — SIGNIFICANT CHANGE UP (ref 0–0.2)
BASOPHILS NFR BLD AUTO: 0.4 % — SIGNIFICANT CHANGE UP (ref 0–1)
BASOPHILS NFR BLD AUTO: 0.4 % — SIGNIFICANT CHANGE UP (ref 0–1)
BILIRUB SERPL-MCNC: 1.5 MG/DL — HIGH (ref 0.2–1.2)
BILIRUB SERPL-MCNC: 1.5 MG/DL — HIGH (ref 0.2–1.2)
BILIRUB UR-MCNC: NEGATIVE — SIGNIFICANT CHANGE UP
BILIRUB UR-MCNC: NEGATIVE — SIGNIFICANT CHANGE UP
BUN SERPL-MCNC: 26 MG/DL — HIGH (ref 10–20)
BUN SERPL-MCNC: 26 MG/DL — HIGH (ref 10–20)
CALCIUM SERPL-MCNC: 9.3 MG/DL — SIGNIFICANT CHANGE UP (ref 8.4–10.4)
CALCIUM SERPL-MCNC: 9.3 MG/DL — SIGNIFICANT CHANGE UP (ref 8.4–10.4)
CAST: 0 /LPF — SIGNIFICANT CHANGE UP (ref 0–4)
CAST: 0 /LPF — SIGNIFICANT CHANGE UP (ref 0–4)
CHLORIDE SERPL-SCNC: 103 MMOL/L — SIGNIFICANT CHANGE UP (ref 98–110)
CHLORIDE SERPL-SCNC: 103 MMOL/L — SIGNIFICANT CHANGE UP (ref 98–110)
CO2 SERPL-SCNC: 23 MMOL/L — SIGNIFICANT CHANGE UP (ref 17–32)
CO2 SERPL-SCNC: 23 MMOL/L — SIGNIFICANT CHANGE UP (ref 17–32)
COLOR SPEC: ABNORMAL
COLOR SPEC: ABNORMAL
CREAT SERPL-MCNC: 0.9 MG/DL — SIGNIFICANT CHANGE UP (ref 0.7–1.5)
CREAT SERPL-MCNC: 0.9 MG/DL — SIGNIFICANT CHANGE UP (ref 0.7–1.5)
DIFF PNL FLD: ABNORMAL
DIFF PNL FLD: ABNORMAL
EGFR: 91 ML/MIN/1.73M2 — SIGNIFICANT CHANGE UP
EGFR: 91 ML/MIN/1.73M2 — SIGNIFICANT CHANGE UP
EOSINOPHIL # BLD AUTO: 0.21 K/UL — SIGNIFICANT CHANGE UP (ref 0–0.7)
EOSINOPHIL # BLD AUTO: 0.21 K/UL — SIGNIFICANT CHANGE UP (ref 0–0.7)
EOSINOPHIL NFR BLD AUTO: 2.1 % — SIGNIFICANT CHANGE UP (ref 0–8)
EOSINOPHIL NFR BLD AUTO: 2.1 % — SIGNIFICANT CHANGE UP (ref 0–8)
GLUCOSE SERPL-MCNC: 229 MG/DL — HIGH (ref 70–99)
GLUCOSE SERPL-MCNC: 229 MG/DL — HIGH (ref 70–99)
GLUCOSE UR QL: NEGATIVE MG/DL — SIGNIFICANT CHANGE UP
GLUCOSE UR QL: NEGATIVE MG/DL — SIGNIFICANT CHANGE UP
HCT VFR BLD CALC: 42.6 % — SIGNIFICANT CHANGE UP (ref 42–52)
HCT VFR BLD CALC: 42.6 % — SIGNIFICANT CHANGE UP (ref 42–52)
HGB BLD-MCNC: 14.1 G/DL — SIGNIFICANT CHANGE UP (ref 14–18)
HGB BLD-MCNC: 14.1 G/DL — SIGNIFICANT CHANGE UP (ref 14–18)
IMM GRANULOCYTES NFR BLD AUTO: 1 % — HIGH (ref 0.1–0.3)
IMM GRANULOCYTES NFR BLD AUTO: 1 % — HIGH (ref 0.1–0.3)
KETONES UR-MCNC: NEGATIVE MG/DL — SIGNIFICANT CHANGE UP
KETONES UR-MCNC: NEGATIVE MG/DL — SIGNIFICANT CHANGE UP
LEUKOCYTE ESTERASE UR-ACNC: ABNORMAL
LEUKOCYTE ESTERASE UR-ACNC: ABNORMAL
LIDOCAIN IGE QN: 28 U/L — SIGNIFICANT CHANGE UP (ref 7–60)
LIDOCAIN IGE QN: 28 U/L — SIGNIFICANT CHANGE UP (ref 7–60)
LYMPHOCYTES # BLD AUTO: 1.17 K/UL — LOW (ref 1.2–3.4)
LYMPHOCYTES # BLD AUTO: 1.17 K/UL — LOW (ref 1.2–3.4)
LYMPHOCYTES # BLD AUTO: 11.5 % — LOW (ref 20.5–51.1)
LYMPHOCYTES # BLD AUTO: 11.5 % — LOW (ref 20.5–51.1)
MCHC RBC-ENTMCNC: 31 PG — SIGNIFICANT CHANGE UP (ref 27–31)
MCHC RBC-ENTMCNC: 31 PG — SIGNIFICANT CHANGE UP (ref 27–31)
MCHC RBC-ENTMCNC: 33.1 G/DL — SIGNIFICANT CHANGE UP (ref 32–37)
MCHC RBC-ENTMCNC: 33.1 G/DL — SIGNIFICANT CHANGE UP (ref 32–37)
MCV RBC AUTO: 93.6 FL — SIGNIFICANT CHANGE UP (ref 80–94)
MCV RBC AUTO: 93.6 FL — SIGNIFICANT CHANGE UP (ref 80–94)
MONOCYTES # BLD AUTO: 0.67 K/UL — HIGH (ref 0.1–0.6)
MONOCYTES # BLD AUTO: 0.67 K/UL — HIGH (ref 0.1–0.6)
MONOCYTES NFR BLD AUTO: 6.6 % — SIGNIFICANT CHANGE UP (ref 1.7–9.3)
MONOCYTES NFR BLD AUTO: 6.6 % — SIGNIFICANT CHANGE UP (ref 1.7–9.3)
NEUTROPHILS # BLD AUTO: 7.99 K/UL — HIGH (ref 1.4–6.5)
NEUTROPHILS # BLD AUTO: 7.99 K/UL — HIGH (ref 1.4–6.5)
NEUTROPHILS NFR BLD AUTO: 78.4 % — HIGH (ref 42.2–75.2)
NEUTROPHILS NFR BLD AUTO: 78.4 % — HIGH (ref 42.2–75.2)
NITRITE UR-MCNC: NEGATIVE — SIGNIFICANT CHANGE UP
NITRITE UR-MCNC: NEGATIVE — SIGNIFICANT CHANGE UP
NRBC # BLD: 0 /100 WBCS — SIGNIFICANT CHANGE UP (ref 0–0)
NRBC # BLD: 0 /100 WBCS — SIGNIFICANT CHANGE UP (ref 0–0)
PH UR: 5.5 — SIGNIFICANT CHANGE UP (ref 5–8)
PH UR: 5.5 — SIGNIFICANT CHANGE UP (ref 5–8)
PLATELET # BLD AUTO: 160 K/UL — SIGNIFICANT CHANGE UP (ref 130–400)
PLATELET # BLD AUTO: 160 K/UL — SIGNIFICANT CHANGE UP (ref 130–400)
PMV BLD: 10.4 FL — SIGNIFICANT CHANGE UP (ref 7.4–10.4)
PMV BLD: 10.4 FL — SIGNIFICANT CHANGE UP (ref 7.4–10.4)
POTASSIUM SERPL-MCNC: 4.9 MMOL/L — SIGNIFICANT CHANGE UP (ref 3.5–5)
POTASSIUM SERPL-MCNC: 4.9 MMOL/L — SIGNIFICANT CHANGE UP (ref 3.5–5)
POTASSIUM SERPL-SCNC: 4.9 MMOL/L — SIGNIFICANT CHANGE UP (ref 3.5–5)
POTASSIUM SERPL-SCNC: 4.9 MMOL/L — SIGNIFICANT CHANGE UP (ref 3.5–5)
PROT SERPL-MCNC: 6.7 G/DL — SIGNIFICANT CHANGE UP (ref 6–8)
PROT SERPL-MCNC: 6.7 G/DL — SIGNIFICANT CHANGE UP (ref 6–8)
PROT UR-MCNC: 100 MG/DL
PROT UR-MCNC: 100 MG/DL
RBC # BLD: 4.55 M/UL — LOW (ref 4.7–6.1)
RBC # BLD: 4.55 M/UL — LOW (ref 4.7–6.1)
RBC # FLD: 13.1 % — SIGNIFICANT CHANGE UP (ref 11.5–14.5)
RBC # FLD: 13.1 % — SIGNIFICANT CHANGE UP (ref 11.5–14.5)
RBC CASTS # UR COMP ASSIST: >1900 /HPF — HIGH (ref 0–4)
RBC CASTS # UR COMP ASSIST: >1900 /HPF — HIGH (ref 0–4)
SODIUM SERPL-SCNC: 140 MMOL/L — SIGNIFICANT CHANGE UP (ref 135–146)
SODIUM SERPL-SCNC: 140 MMOL/L — SIGNIFICANT CHANGE UP (ref 135–146)
SP GR SPEC: 1.02 — SIGNIFICANT CHANGE UP (ref 1–1.03)
SP GR SPEC: 1.02 — SIGNIFICANT CHANGE UP (ref 1–1.03)
SQUAMOUS # UR AUTO: 0 /HPF — SIGNIFICANT CHANGE UP (ref 0–5)
SQUAMOUS # UR AUTO: 0 /HPF — SIGNIFICANT CHANGE UP (ref 0–5)
UROBILINOGEN FLD QL: 1 MG/DL — SIGNIFICANT CHANGE UP (ref 0.2–1)
UROBILINOGEN FLD QL: 1 MG/DL — SIGNIFICANT CHANGE UP (ref 0.2–1)
WBC # BLD: 10.18 K/UL — SIGNIFICANT CHANGE UP (ref 4.8–10.8)
WBC # BLD: 10.18 K/UL — SIGNIFICANT CHANGE UP (ref 4.8–10.8)
WBC # FLD AUTO: 10.18 K/UL — SIGNIFICANT CHANGE UP (ref 4.8–10.8)
WBC # FLD AUTO: 10.18 K/UL — SIGNIFICANT CHANGE UP (ref 4.8–10.8)
WBC UR QL: 4 /HPF — SIGNIFICANT CHANGE UP (ref 0–5)
WBC UR QL: 4 /HPF — SIGNIFICANT CHANGE UP (ref 0–5)

## 2023-11-15 PROCEDURE — 99284 EMERGENCY DEPT VISIT MOD MDM: CPT | Mod: 25

## 2023-11-15 PROCEDURE — 96375 TX/PRO/DX INJ NEW DRUG ADDON: CPT

## 2023-11-15 PROCEDURE — 83690 ASSAY OF LIPASE: CPT

## 2023-11-15 PROCEDURE — 96374 THER/PROPH/DIAG INJ IV PUSH: CPT | Mod: XU

## 2023-11-15 PROCEDURE — 81001 URINALYSIS AUTO W/SCOPE: CPT

## 2023-11-15 PROCEDURE — 99285 EMERGENCY DEPT VISIT HI MDM: CPT

## 2023-11-15 PROCEDURE — 87086 URINE CULTURE/COLONY COUNT: CPT

## 2023-11-15 PROCEDURE — 74177 CT ABD & PELVIS W/CONTRAST: CPT | Mod: MA

## 2023-11-15 PROCEDURE — 85025 COMPLETE CBC W/AUTO DIFF WBC: CPT

## 2023-11-15 PROCEDURE — 74177 CT ABD & PELVIS W/CONTRAST: CPT | Mod: 26,MA

## 2023-11-15 PROCEDURE — 36415 COLL VENOUS BLD VENIPUNCTURE: CPT

## 2023-11-15 PROCEDURE — 80053 COMPREHEN METABOLIC PANEL: CPT

## 2023-11-15 RX ORDER — SODIUM CHLORIDE 9 MG/ML
1000 INJECTION INTRAMUSCULAR; INTRAVENOUS; SUBCUTANEOUS ONCE
Refills: 0 | Status: COMPLETED | OUTPATIENT
Start: 2023-11-15 | End: 2023-11-15

## 2023-11-15 RX ORDER — MORPHINE SULFATE 50 MG/1
4 CAPSULE, EXTENDED RELEASE ORAL ONCE
Refills: 0 | Status: DISCONTINUED | OUTPATIENT
Start: 2023-11-15 | End: 2023-11-15

## 2023-11-15 RX ORDER — ONDANSETRON 8 MG/1
4 TABLET, FILM COATED ORAL ONCE
Refills: 0 | Status: COMPLETED | OUTPATIENT
Start: 2023-11-15 | End: 2023-11-15

## 2023-11-15 RX ORDER — TAMSULOSIN HYDROCHLORIDE 0.4 MG/1
1 CAPSULE ORAL
Qty: 5 | Refills: 0
Start: 2023-11-15 | End: 2023-11-19

## 2023-11-15 RX ADMIN — SODIUM CHLORIDE 1000 MILLILITER(S): 9 INJECTION INTRAMUSCULAR; INTRAVENOUS; SUBCUTANEOUS at 16:02

## 2023-11-15 RX ADMIN — ONDANSETRON 4 MILLIGRAM(S): 8 TABLET, FILM COATED ORAL at 16:02

## 2023-11-15 RX ADMIN — MORPHINE SULFATE 4 MILLIGRAM(S): 50 CAPSULE, EXTENDED RELEASE ORAL at 16:02

## 2023-11-15 RX ADMIN — MORPHINE SULFATE 4 MILLIGRAM(S): 50 CAPSULE, EXTENDED RELEASE ORAL at 16:17

## 2023-11-15 NOTE — ED PROVIDER NOTE - OBJECTIVE STATEMENT
Pt is a 73 year old male with PMH noted in chart presents to ED with complaints of L flank pain. Pt states has hx of kidney stones in past req surgical retrieval and removal, started having L flank pain over las 12 hours. Pain is moderate, radiating into L groin no alleviating factors. endorses associated nausea with vomiting NBNB. Pt denies any fever, chills, bodyaches, chest pain, sob, constipation diarrhea, dysuria or testicular pain

## 2023-11-15 NOTE — ED PROVIDER NOTE - CLINICAL SUMMARY MEDICAL DECISION MAKING FREE TEXT BOX
73 year old male with extensive cardiac and pulmonary hx noted in chart presents to ED with complaints of L flank pain. On CT, patient found to have a right UPJ stone and left nonobstructing renal stones.  Urology consulted.  Case and imaging discussed with Dr. Siddiqi.  No acute surgical/ intervention indicated at this time.  Recommend pain control, antiemetic prn and increased hydration.  Recommend OP f/u with Dr. Siddiqi this week for further urologic management - Office will call tomorrow morning to schedule an appointment.  Return to the ED if you develop fevers, intractable pain, intractable vomiting, or inability to tolerate po.

## 2023-11-15 NOTE — ED ADULT NURSE NOTE - NSFALLUNIVINTERV_ED_ALL_ED
Bed/Stretcher in lowest position, wheels locked, appropriate side rails in place/Call bell, personal items and telephone in reach/Instruct patient to call for assistance before getting out of bed/chair/stretcher/Non-slip footwear applied when patient is off stretcher/[place] to call system/Physically safe environment - no spills, clutter or unnecessary equipment/Purposeful proactive rounding/Room/bathroom lighting operational, light cord in reach

## 2023-11-15 NOTE — ED PROVIDER NOTE - PATIENT PORTAL LINK FT
You can access the FollowMyHealth Patient Portal offered by [facility] by registering at the following website: [email or web address]. By joining [facility]’s FollowMyHealth portal, you will also be able to view your health information using other applications (apps) compatible with our system.

## 2023-11-15 NOTE — ED ADULT TRIAGE NOTE - CHIEF COMPLAINT QUOTE
Pt c/o left flank pain. hx of kidney stones. States he is having a hard time urinating and that his urine is bloody

## 2023-11-15 NOTE — ED ADULT NURSE NOTE - OBJECTIVE STATEMENT
Pt presents to ER c/o left flank pain. hx of kidney stones. States he is having a hard time urinating and that his urine is bloody

## 2023-11-15 NOTE — ED PROVIDER NOTE - CARE PROVIDER_API CALL
Donal Siddiqi  Urology  [facility] [place] [address]  Phone: [phone]  Fax: [phone]  Follow Up Time: 1-3 Days

## 2023-11-15 NOTE — ED PROVIDER NOTE - NSFOLLOWUPINSTRUCTIONS_ED_ALL_ED_FT
Follow up with urologist provided for you at [place]    Kidney Stones    Kidney stones (urolithiasis) are deposits that form inside your kidneys. The intense pain is caused by the stone moving through the urinary tract. When the stone moves, the ureter goes into spasm around the stone. The stone is usually passed in the urine. Symptoms include abdominal, side, or back pain, nausea, vomiting, blood in the urine, frequency with urination. Drink enough water and fluids to keep your urine clear or pale yellow. This will help you to pass the stone or stone fragments.    SEEK IMMEDIATE MEDICAL CARE IF YOU HAVE THE FOLLOWING SYMPTOMS: pain not controlled with medication, fever/chills, worsening vomiting, inability to urinate, or dizziness/lightheadedness.

## 2023-11-15 NOTE — CONSULT NOTE ADULT - SUBJECTIVE AND OBJECTIVE BOX
Urology Consult Note:   Pt is a 73 year old male with extensive cardiac and pulmonary hx noted in chart presents to ED with complaints of L flank pain.  called for further evaluation of flank pain and was found to have a right UPJ stone and left nonobstructing renal stones on CTAP. Patient seen and examined at bedside in the ED, notes started having left flank pain over las 12 hours. Pain is moderate, radiating into L groin no alleviating factors, and endorses associated nausea with vomiting NBNB however patient now stating that his pain has resolved and feels as though he passed a kidney stone. Pt states has hx of kidney stones in past req surgical retrieval and removal (seen by Dr Siddiqi in 3/2021). Pt denies any fever, chills, body aches, chest pain, sob, constipation diarrhea, dysuria or testicular pain    [ x ] A 10 Point Review of Systems was negative except where noted    PAST MEDICAL & SURGICAL HISTORY:  Hypertension, unspecified type  Type 2 diabetes mellitus with complication, unspecified long term insulin use status  High cholesterol  Kidney stones  MI (myocardial infarction)  DVT (deep venous thrombosis)  Acute massive pulmonary emboliss/p tPA in 2018  Carotid atherosclerosis  H/O heart surgery  quadruple bypass 6 years ago  H/O cystoscopy    Allergies: No Known Allergies    SOCIAL HISTORY: No illicit drug use    FAMILY HISTORY:  FH: myocardial infarction  mother at age 78    PHYSICAL EXAM:  Constitutional: NAD, well-developed  Back: No CVA ttp bilaterally  Resp: No accessory respiratory muscle use  Abd: Soft, NT/ND. No suprapubic ttp  : Freely voiding  Ext: No edema or cyanosis, MALAGON x 4  Neuro: Awake and alert  Psych: Normal mood, normal affect  Skin: Good color, non diaphoretic    Vital Signs Last 24 Hrs  T(C): 36.8 (15 Nov 2023 21:05), Max: 36.8 (15 Nov 2023 14:05)  T(F): 98.2 (15 Nov 2023 21:05), Max: 98.3 (15 Nov 2023 14:05)  HR: 68 (15 Nov 2023 21:05) (68 - 68)  BP: 170/90 (15 Nov 2023 21:05) (170/90 - 244/106)  RR: 20 (15 Nov 2023 21:05) (20 - 20)  SpO2: 99% (15 Nov 2023 21:05) (99% - 99%)    Parameters below as of 15 Nov 2023 14:05  Patient On (Oxygen Delivery Method): room air    LABS:                      14.1   10.18 )-----------( 160      ( 15 Nov 2023 15:49 )             42.6     11-15    140  |  103  |  26<H>  ----------------------------<  229<H>  4.9   |  23  |  0.9    Ca    9.3      15 Nov 2023 15:49    TPro  6.7  /  Alb  4.5  /  TBili  1.5<H>  /  DBili  x   /  AST  17  /  ALT  12  /  AlkPhos  63  11-15        Urinalysis Basic - ( 15 Nov 2023 15:49 )    Color: Red / Appearance: Cloudy / S.020 / pH: x  Gluc: 229 mg/dL / Ketone: Negative mg/dL  / Bili: Negative / Urobili: 1.0 mg/dL   Blood: x / Protein: 100 mg/dL / Nitrite: Negative   Leuk Esterase: Small / RBC: >1900 /HPF / WBC 4 /HPF   Sq Epi: x / Non Sq Epi: 0 /HPF / Bacteria: Negative /HPF        RADIOLOGY & ADDITIONAL STUDIES:    ACC: [id number] EXAM:  CT ABDOMEN AND PELVIS IC   ORDERED BY: SHAHZAD NINA     PROCEDURE DATE:  11/15/2023      INTERPRETATION:  CLINICAL HISTORY / REASON FOR EXAM: Left flank pain.    TECHNIQUE: Contiguous axial CT images were obtained from the lower chest   to the pubic symphysis following the administration of 100 mL Omnipaque   350 intravenous contrast. Oral contrast was not administered. Reformatted   images in the coronal and sagittal planes were acquired.    COMPARISON CT: CT abdomen and pelvis from 2021    OTHER STUDIES USED FOR CORRELATION: None.      FINDINGS:    LOWER CHEST: Post sternotomy. Cardiomegaly. Bibasilar atelectasis and   interlobular septal thickening.    HEPATOBILIARY: Post cholecystectomy.    SPLEEN: Unremarkable.    PANCREAS: Unremarkable.    ADRENAL GLANDS: Unremarkable.    LEFT KIDNEY: Left lower pole 1.5 x 0.7 cm nonobstructing calculus HU =   382. Additional left renal calculi. Moderate left hydroureter and left   perinephric fat stranding. The left ureter transitions to normal caliber   at the mid segment (series 4, image 262).    RIGHT KIDNEY: Calculus within the right ureter UPJ measuring 1.3 x 0.6 x   0.8 cm HU = 1,155 (series 4, image 178). Multiple nonobstructing right   renal calculi,largest within the right interpolar region measuring   approximately 1.1 x 1.2 cm HU = 917.    ABDOMINOPELVIC NODES: Unremarkable.    PELVIC ORGANS: Unremarkable.    PERITONEUM/MESENTERY/BOWEL: Duodenal diverticulum. No bowel obstruction,   ascites or intraperitoneal free air. Normal caliber appendix.    BONES/SOFT TISSUES: Degenerative changes of the thoracolumbar spine.    VASCULAR: Calcified atherosclerotic disease within the abdominal aorta.      IMPRESSION:    Left kidney with retroperitoneal inflammatory changes and hydroureter. An   obstructing calculus is not identified. This may reflect a recently   passed stone.    Calculus within the right ureter at the UPJ measuring 1.3 x 0.6 x 0.8 cm.    --- End of Report ---    ROHIT BANKS MD; Attending Radiologist  This document has been electronically signed. Nov 15 2023  7:45PM

## 2023-11-15 NOTE — ED PROVIDER NOTE - NS ED ROS FT
Constitutional: (-) fever  Eyes/ENT: (-) blurry vision, (-) epistaxis  Cardiovascular: (-) chest pain, (-) syncope  Respiratory: (-) cough, (-) shortness of breath  Gastrointestinal: (+) vomiting, (-) diarrhea (+) nausea (+) flank pain  Musculoskeletal: (-) neck pain, (-) back pain, (-) joint pain  Integumentary: (-) rash, (-) edema  Neurological: (-) headache, (-) altered mental status  Psychiatric: (-) hallucinations  Allergic/Immunologic: (-) pruritus

## 2023-11-15 NOTE — CONSULT NOTE ADULT - ASSESSMENT
Pt is a 73 year old male with extensive cardiac and pulmonary hx noted in chart presents to ED with complaints of L flank pain.  called for further evaluation of flank pain and was found to have a right UPJ stone and left nonobstructing renal stones on CTAP.     Assessment:  Right UPJ stone, left renal stones    Plan:  - Case and imaging discussed with Dr. Siddiqi, recommendations below, recommendations below:  - No acute surgical/ intervention indicated at this time  - Recommend flomax if not contraindicated  - Continue pain control prn  - Continue antiemetics prn  - Encourage increased hydration  - Encourage increased ambulation  - Recommend strain all urine to catch the stone if it passes so that stone may be sent for analysis   - Recommend OP f/u with Dr. Siddiqi this week for further urologic management - Office will call tomorrow morning to schedule an appointment  - Recommend strict return precautions: Please return to the ED if you develop fevers of 100.3F or greater, intractable pain, intractable vomiting, inability to tolerate po, maintain secretions or difficulty breathing or overall worsening of symptoms.  - ED team and patient agreeable to plan

## 2023-11-15 NOTE — ED PROVIDER NOTE - PROGRESS NOTE DETAILS
: as per urology, Dc pt home with [facility]. Pt will follow up with dr. Siddiqi and office will call pt tomorrow

## 2023-11-16 LAB
CULTURE RESULTS: NO GROWTH — SIGNIFICANT CHANGE UP
CULTURE RESULTS: NO GROWTH — SIGNIFICANT CHANGE UP
SPECIMEN SOURCE: SIGNIFICANT CHANGE UP
SPECIMEN SOURCE: SIGNIFICANT CHANGE UP

## 2023-12-05 NOTE — ED ADULT NURSE NOTE - BEFAST ARM NUMBNESS
RN called patient to assess symptoms after clinic visit yesterday. Dr Mustafa wants daily check ins to see where patient is at and to determine if an admission is still appropriate. Patient reports a slight improve in energy and appetite since being on her abx. The bed situation in the hospital is still tight. Patient was told RN would call again tomorrow or sooner depending on when a bed is ready. Patient was okay with plan.  Carlos Bustamante RN     No

## 2024-02-27 NOTE — PROGRESS NOTE ADULT - ASSESSMENT
67 y/o M with PMH of HTN, recurrent nephrolithiasis, DMII, DVT and massive PE in 2018 s/p tpa - now on Xarelto, CAD with MI s/p CABG, hx of DVT in past presents to [facility] with complaints of SOB and weakness for past few days.     # SOB/Weakness - likely secondary to Community Acquired Pneumonia - CURB 65 = 1 (for age)  - Febrile to 101 in ED, afebrile now  - Leukocytosis to 14, trending down now 8k  - CT chest showing consolidation at the left lung base may represent pneumonia in the appropriate clinical setting. No CTA evidence for acute pulmonary embolism.  - s/p rocephin and azithromycin in ED. s/p 2L IVF  - flue negative  - f/u blood cultures  - c/w azithromycin and ceftriaxone for now  -pulmonary consult for possible need of steroids/ COPD ?? hx of smoking   -UA negative    # hx of DVT and massive PE  - continue Xarelto  -evidence of chronic PE on CT chest w contrast    # DM   - hold oral anti-glycemics  - f/u hemoglobin A1C, 6.4  - fingersticks ACHS  - Start on basal/bolus and SSI insulin if fs > 180     # HTN   - continue home meds  -will increase losartan to 100mg daily, extra dose of 50 to be given STAT    # CAD with MI s/p CABG  - continue home meds     # recurrent nephrolithiasis - stable  - outpatient follow up     dvt ppx: on xarelto  gi ppx: not indicated  ambulate as tolerated  DASH/Carb consistent diet  Dispo: from home  FULL CODE  -pending pulm consult and clinical improvement 97.9

## 2024-04-22 NOTE — PATIENT PROFILE ADULT - NSTOBACCONEVERSMOKERY/N_GEN_A
Monitor: The problem is stable.  Evaluation: No labs/tests required today.  Assessment/Treatment:  Continue current treatment/monitoring regimen.   Yes

## 2024-05-23 ENCOUNTER — OUTPATIENT (OUTPATIENT)
Dept: OUTPATIENT SERVICES | Facility: HOSPITAL | Age: 74
LOS: 1 days | End: 2024-05-23
Payer: MEDICARE

## 2024-05-23 DIAGNOSIS — Z98.890 OTHER SPECIFIED POSTPROCEDURAL STATES: Chronic | ICD-10-CM

## 2024-05-23 DIAGNOSIS — N20.0 CALCULUS OF KIDNEY: ICD-10-CM

## 2024-05-23 PROCEDURE — 74019 RADEX ABDOMEN 2 VIEWS: CPT

## 2024-05-23 PROCEDURE — 74019 RADEX ABDOMEN 2 VIEWS: CPT | Mod: 26

## 2024-05-24 DIAGNOSIS — N20.0 CALCULUS OF KIDNEY: ICD-10-CM

## 2024-06-03 ENCOUNTER — APPOINTMENT (OUTPATIENT)
Dept: UROLOGY | Facility: CLINIC | Age: 74
End: 2024-06-03
Payer: MEDICARE

## 2024-06-03 VITALS
WEIGHT: 205 LBS | DIASTOLIC BLOOD PRESSURE: 75 MMHG | TEMPERATURE: 98.7 F | BODY MASS INDEX: 27.17 KG/M2 | OXYGEN SATURATION: 95 % | HEART RATE: 76 BPM | SYSTOLIC BLOOD PRESSURE: 155 MMHG | HEIGHT: 73 IN

## 2024-06-03 DIAGNOSIS — Z87.442 PERSONAL HISTORY OF URINARY CALCULI: ICD-10-CM

## 2024-06-03 DIAGNOSIS — Z79.01 LONG TERM (CURRENT) USE OF ANTICOAGULANTS: ICD-10-CM

## 2024-06-03 DIAGNOSIS — N20.0 CALCULUS OF KIDNEY: ICD-10-CM

## 2024-06-03 DIAGNOSIS — R31.0 GROSS HEMATURIA: ICD-10-CM

## 2024-06-03 DIAGNOSIS — Z87.891 PERSONAL HISTORY OF NICOTINE DEPENDENCE: ICD-10-CM

## 2024-06-03 DIAGNOSIS — Z78.9 OTHER SPECIFIED HEALTH STATUS: ICD-10-CM

## 2024-06-03 DIAGNOSIS — Z82.49 FAMILY HISTORY OF ISCHEMIC HEART DISEASE AND OTHER DISEASES OF THE CIRCULATORY SYSTEM: ICD-10-CM

## 2024-06-03 PROCEDURE — 81003 URINALYSIS AUTO W/O SCOPE: CPT | Mod: QW

## 2024-06-03 PROCEDURE — 99205 OFFICE O/P NEW HI 60 MIN: CPT

## 2024-06-03 PROCEDURE — G2211 COMPLEX E/M VISIT ADD ON: CPT

## 2024-06-04 PROBLEM — Z79.01 ANTICOAGULATED: Status: ACTIVE | Noted: 2024-06-04

## 2024-06-04 LAB
BILIRUB UR QL STRIP: NORMAL
COLLECTION METHOD: NORMAL
GLUCOSE UR-MCNC: NORMAL
HCG UR QL: 2 EU/DL
HGB UR QL STRIP.AUTO: NORMAL
KETONES UR-MCNC: NORMAL
LEUKOCYTE ESTERASE UR QL STRIP: NORMAL
NITRITE UR QL STRIP: NORMAL
PH UR STRIP: 5.5
PROT UR STRIP-MCNC: 100
SP GR UR STRIP: 1.02

## 2024-06-04 RX ORDER — FUROSEMIDE 20 MG/1
20 TABLET ORAL
Refills: 0 | Status: ACTIVE | COMMUNITY

## 2024-06-04 RX ORDER — SIMVASTATIN 40 MG/1
40 TABLET, FILM COATED ORAL
Refills: 0 | Status: ACTIVE | COMMUNITY

## 2024-06-04 RX ORDER — LABETALOL HYDROCHLORIDE 200 MG/1
200 TABLET, FILM COATED ORAL
Refills: 0 | Status: ACTIVE | COMMUNITY

## 2024-06-04 RX ORDER — RIVAROXABAN 20 MG/1
20 TABLET, FILM COATED ORAL
Refills: 0 | Status: ACTIVE | COMMUNITY

## 2024-06-04 RX ORDER — METFORMIN HYDROCHLORIDE 1000 MG/1
1000 TABLET, COATED ORAL
Refills: 0 | Status: ACTIVE | COMMUNITY

## 2024-06-04 RX ORDER — TAMSULOSIN HYDROCHLORIDE 0.4 MG/1
0.4 CAPSULE ORAL
Refills: 0 | Status: ACTIVE | COMMUNITY

## 2024-06-04 RX ORDER — LOSARTAN POTASSIUM 100 MG/1
100 TABLET, FILM COATED ORAL
Refills: 0 | Status: ACTIVE | COMMUNITY

## 2024-06-04 NOTE — ASSESSMENT
[FreeTextEntry1] :  73 year old male patient with: 1) multiple right sided stones including ureteral stones; 2) gross hematuria; 3) recent renal colic, currently resolved; 4) history of CAD.  We discussed these issues at length. We discussed that the two main alternatives would be to try and stent him and go from below and do ureteroscopy and laser lithotripsy likely using CVAC system. The other options would be a nephrostomy tube and go from above to take care of the stones. Risks and benefits of each were discussed.  All of the patient's questions were otherwise answered. He would prefer to proceed with stenting, although he is not excited about having a stent. He would need cardiology clearance and we would need to confirm with his PCP, Dr. Mariscal that he is able to come off the Eliquis for 2-3 days prior and stay off for a few days after the procedure. We will work on finding a cardiology to provide pre-operative clearance. He understands if he experiences any further renal colic, fever, chills, or does not feel well, he is to go immediately to the hospital. We will try to schedule him as soon as possible. Total time=65 min.   The submitted E/M billing level for this visit reflects the total time spent on the day of the visit including face-to-face time spent with the patient, non-face-to-face review of medical records and relevant information, documentation, and asynchronous communication with the patient after a visit via phone, email, or patients EHR portal after the visit. The medical records reviewed are either scanned into the chart or reviewed with the patient using a patients electronic medical records portal for patients with records not available to [facility] via electronic transmission platforms from other institutions and labs. Time spent counseling and performing coordination of care was also included in determining the appropriate EM billing level.   I have reviewed and verified information regarding the chief complaint and history recorded by the ancillary staff and/or the patient. I have independently reviewed and interpreted tests performed by other physicians and facilities as necessary.   I have discussed with the patient differential diagnosis, reason for auxiliary tests if ordered, risks, benefits, alternatives, and complications of each form of therapy were discussed.  I personally performed the services described in the documentation, reviewed the documentation recorded by the scribe in my presence, and it accurately and completely records my words and actions.

## 2024-06-04 NOTE — ADDENDUM
[FreeTextEntry1] :  DANIEL SHI, am scribing for and in the presence of  in the following sections: HISTORY OF PRESENT ILLNESS, PAST MEDICAL/FAMILY/SOCIAL HISTORY, REVIEW OF SYSTEMS, VITAL SIGNS, PHYSICAL EXAM, ASSESSMENT/PLAN on 06/03/2024.

## 2024-06-04 NOTE — PHYSICAL EXAM
[General Appearance - Well Developed] : well developed [General Appearance - Well Nourished] : well nourished [General Appearance - In No Acute Distress] : no acute distress [Oriented To Time, Place, And Person] : oriented to person, place, and time [Costovertebral Angle Tenderness] : no ~M costovertebral angle tenderness [FreeTextEntry1] : sniffling and feels he may be coming down with a cold

## 2024-06-04 NOTE — HISTORY OF PRESENT ILLNESS
[FreeTextEntry1] :  73 year old male patient with a known history of stone disease and feels that at some point in the past he has had stents and lithotripsy, but not for a long time. He was on a cruise recently when he began experiencing gross hematuria, right flank pain and was given pain medication. Currently, he is pain free. I reviewed his KUB and CT scan from November 2023 - these show multiple stones - at least 1 in the right upper ureter, one a the UPJ level or renal pelvis, and several stones ranging from 7mm to 1 cm. He is on Eliquis for a left leg DVT which occurred 5 years ago, with no recurrences. He has come off the Eliquis for other procedures including cholecystectomy. He has a prior history of CABG and while he denies current chest pain or shortness of breath, his cardiologist retired, and he has not seen a new cardiologist since. Currently, he denies gross hematuria and is voiding without difficulty.  PMHx: coronary artery disease, diabetes mellitus, DVT PSHx: Cholecystectomy, CABG ALG:  No known drug allergies, fungus with certain antibiotics FHx: CVA, coronary artery disease SHx: Tobacco: 1.5 packs per day for 30 years, quit 20 years ago. occasional alcohol use  I have reviewed the following imaging:  XR Abdomen, CT from late 2023:  Right sided nephrolithiasis.

## 2024-06-07 ENCOUNTER — OUTPATIENT (OUTPATIENT)
Dept: OUTPATIENT SERVICES | Facility: HOSPITAL | Age: 74
LOS: 1 days | End: 2024-06-07
Payer: MEDICARE

## 2024-06-07 ENCOUNTER — RESULT REVIEW (OUTPATIENT)
Age: 74
End: 2024-06-07

## 2024-06-07 DIAGNOSIS — Z98.890 OTHER SPECIFIED POSTPROCEDURAL STATES: Chronic | ICD-10-CM

## 2024-06-07 DIAGNOSIS — Z00.8 ENCOUNTER FOR OTHER GENERAL EXAMINATION: ICD-10-CM

## 2024-06-07 DIAGNOSIS — N20.0 CALCULUS OF KIDNEY: ICD-10-CM

## 2024-06-07 PROCEDURE — 74176 CT ABD & PELVIS W/O CONTRAST: CPT

## 2024-06-07 PROCEDURE — 74176 CT ABD & PELVIS W/O CONTRAST: CPT | Mod: 26

## 2024-06-08 DIAGNOSIS — N20.0 CALCULUS OF KIDNEY: ICD-10-CM

## 2024-06-19 ENCOUNTER — OUTPATIENT (OUTPATIENT)
Dept: OUTPATIENT SERVICES | Facility: HOSPITAL | Age: 74
LOS: 1 days | End: 2024-06-19
Payer: MEDICARE

## 2024-06-19 VITALS
TEMPERATURE: 98 F | WEIGHT: 210.1 LBS | RESPIRATION RATE: 18 BRPM | SYSTOLIC BLOOD PRESSURE: 105 MMHG | OXYGEN SATURATION: 98 % | HEIGHT: 73 IN | DIASTOLIC BLOOD PRESSURE: 62 MMHG | HEART RATE: 80 BPM

## 2024-06-19 DIAGNOSIS — Z98.890 OTHER SPECIFIED POSTPROCEDURAL STATES: Chronic | ICD-10-CM

## 2024-06-19 DIAGNOSIS — Z90.49 ACQUIRED ABSENCE OF OTHER SPECIFIED PARTS OF DIGESTIVE TRACT: Chronic | ICD-10-CM

## 2024-06-19 DIAGNOSIS — Z01.818 ENCOUNTER FOR OTHER PREPROCEDURAL EXAMINATION: ICD-10-CM

## 2024-06-19 DIAGNOSIS — R31.0 GROSS HEMATURIA: ICD-10-CM

## 2024-06-19 DIAGNOSIS — N20.0 CALCULUS OF KIDNEY: ICD-10-CM

## 2024-06-19 LAB
A1C WITH ESTIMATED AVERAGE GLUCOSE RESULT: 7.7 % — HIGH (ref 4–5.6)
ALBUMIN SERPL ELPH-MCNC: 4.2 G/DL — SIGNIFICANT CHANGE UP (ref 3.5–5.2)
ALP SERPL-CCNC: 60 U/L — SIGNIFICANT CHANGE UP (ref 30–115)
ALT FLD-CCNC: 9 U/L — SIGNIFICANT CHANGE UP (ref 0–41)
ANION GAP SERPL CALC-SCNC: 17 MMOL/L — HIGH (ref 7–14)
APPEARANCE UR: CLEAR — SIGNIFICANT CHANGE UP
APTT BLD: 41 SEC — HIGH (ref 27–39.2)
AST SERPL-CCNC: 10 U/L — SIGNIFICANT CHANGE UP (ref 0–41)
BACTERIA # UR AUTO: NEGATIVE /HPF — SIGNIFICANT CHANGE UP
BASOPHILS # BLD AUTO: 0.03 K/UL — SIGNIFICANT CHANGE UP (ref 0–0.2)
BASOPHILS NFR BLD AUTO: 0.4 % — SIGNIFICANT CHANGE UP (ref 0–1)
BILIRUB SERPL-MCNC: 1.1 MG/DL — SIGNIFICANT CHANGE UP (ref 0.2–1.2)
BILIRUB UR-MCNC: NEGATIVE — SIGNIFICANT CHANGE UP
BUN SERPL-MCNC: 35 MG/DL — HIGH (ref 10–20)
CALCIUM SERPL-MCNC: 9 MG/DL — SIGNIFICANT CHANGE UP (ref 8.4–10.5)
CAST: 1 /LPF — SIGNIFICANT CHANGE UP (ref 0–4)
CHLORIDE SERPL-SCNC: 103 MMOL/L — SIGNIFICANT CHANGE UP (ref 98–110)
CO2 SERPL-SCNC: 22 MMOL/L — SIGNIFICANT CHANGE UP (ref 17–32)
COLOR SPEC: YELLOW — SIGNIFICANT CHANGE UP
CREAT SERPL-MCNC: 1.6 MG/DL — HIGH (ref 0.7–1.5)
DIFF PNL FLD: NEGATIVE — SIGNIFICANT CHANGE UP
EGFR: 46 ML/MIN/1.73M2 — LOW
EOSINOPHIL # BLD AUTO: 0.24 K/UL — SIGNIFICANT CHANGE UP (ref 0–0.7)
EOSINOPHIL NFR BLD AUTO: 3.2 % — SIGNIFICANT CHANGE UP (ref 0–8)
ESTIMATED AVERAGE GLUCOSE: 174 MG/DL — HIGH (ref 68–114)
GLUCOSE SERPL-MCNC: 183 MG/DL — HIGH (ref 70–99)
GLUCOSE UR QL: NEGATIVE MG/DL — SIGNIFICANT CHANGE UP
HCT VFR BLD CALC: 38.7 % — LOW (ref 42–52)
HGB BLD-MCNC: 12.6 G/DL — LOW (ref 14–18)
IMM GRANULOCYTES NFR BLD AUTO: 0.9 % — HIGH (ref 0.1–0.3)
INR BLD: 4.63 RATIO — HIGH (ref 0.65–1.3)
KETONES UR-MCNC: ABNORMAL MG/DL
LEUKOCYTE ESTERASE UR-ACNC: ABNORMAL
LYMPHOCYTES # BLD AUTO: 1.13 K/UL — LOW (ref 1.2–3.4)
LYMPHOCYTES # BLD AUTO: 15.1 % — LOW (ref 20.5–51.1)
MCHC RBC-ENTMCNC: 30.7 PG — SIGNIFICANT CHANGE UP (ref 27–31)
MCHC RBC-ENTMCNC: 32.6 G/DL — SIGNIFICANT CHANGE UP (ref 32–37)
MCV RBC AUTO: 94.2 FL — HIGH (ref 80–94)
MONOCYTES # BLD AUTO: 0.55 K/UL — SIGNIFICANT CHANGE UP (ref 0.1–0.6)
MONOCYTES NFR BLD AUTO: 7.4 % — SIGNIFICANT CHANGE UP (ref 1.7–9.3)
NEUTROPHILS # BLD AUTO: 5.45 K/UL — SIGNIFICANT CHANGE UP (ref 1.4–6.5)
NEUTROPHILS NFR BLD AUTO: 73 % — SIGNIFICANT CHANGE UP (ref 42.2–75.2)
NITRITE UR-MCNC: NEGATIVE — SIGNIFICANT CHANGE UP
NRBC # BLD: 0 /100 WBCS — SIGNIFICANT CHANGE UP (ref 0–0)
PH UR: 6 — SIGNIFICANT CHANGE UP (ref 5–8)
PLATELET # BLD AUTO: 192 K/UL — SIGNIFICANT CHANGE UP (ref 130–400)
PMV BLD: 10.4 FL — SIGNIFICANT CHANGE UP (ref 7.4–10.4)
POTASSIUM SERPL-MCNC: 4.6 MMOL/L — SIGNIFICANT CHANGE UP (ref 3.5–5)
POTASSIUM SERPL-SCNC: 4.6 MMOL/L — SIGNIFICANT CHANGE UP (ref 3.5–5)
PROT SERPL-MCNC: 6.5 G/DL — SIGNIFICANT CHANGE UP (ref 6–8)
PROT UR-MCNC: 100 MG/DL
PROTHROM AB SERPL-ACNC: >40 SEC — HIGH (ref 9.95–12.87)
RBC # BLD: 4.11 M/UL — LOW (ref 4.7–6.1)
RBC # FLD: 12.8 % — SIGNIFICANT CHANGE UP (ref 11.5–14.5)
RBC CASTS # UR COMP ASSIST: 1 /HPF — SIGNIFICANT CHANGE UP (ref 0–4)
SODIUM SERPL-SCNC: 142 MMOL/L — SIGNIFICANT CHANGE UP (ref 135–146)
SP GR SPEC: 1.02 — SIGNIFICANT CHANGE UP (ref 1–1.03)
SQUAMOUS # UR AUTO: 1 /HPF — SIGNIFICANT CHANGE UP (ref 0–5)
UROBILINOGEN FLD QL: 1 MG/DL — SIGNIFICANT CHANGE UP (ref 0.2–1)
WBC # BLD: 7.47 K/UL — SIGNIFICANT CHANGE UP (ref 4.8–10.8)
WBC # FLD AUTO: 7.47 K/UL — SIGNIFICANT CHANGE UP (ref 4.8–10.8)
WBC UR QL: 2 /HPF — SIGNIFICANT CHANGE UP (ref 0–5)

## 2024-06-19 PROCEDURE — 99214 OFFICE O/P EST MOD 30 MIN: CPT | Mod: 25

## 2024-06-19 PROCEDURE — 83036 HEMOGLOBIN GLYCOSYLATED A1C: CPT

## 2024-06-19 PROCEDURE — 81001 URINALYSIS AUTO W/SCOPE: CPT

## 2024-06-19 PROCEDURE — 93005 ELECTROCARDIOGRAM TRACING: CPT

## 2024-06-19 PROCEDURE — 93010 ELECTROCARDIOGRAM REPORT: CPT

## 2024-06-19 PROCEDURE — 36415 COLL VENOUS BLD VENIPUNCTURE: CPT

## 2024-06-19 PROCEDURE — 80053 COMPREHEN METABOLIC PANEL: CPT

## 2024-06-19 PROCEDURE — 85025 COMPLETE CBC W/AUTO DIFF WBC: CPT

## 2024-06-19 PROCEDURE — 85610 PROTHROMBIN TIME: CPT

## 2024-06-19 PROCEDURE — 87086 URINE CULTURE/COLONY COUNT: CPT

## 2024-06-19 PROCEDURE — 85730 THROMBOPLASTIN TIME PARTIAL: CPT

## 2024-06-20 ENCOUNTER — APPOINTMENT (OUTPATIENT)
Dept: CARDIOLOGY | Facility: CLINIC | Age: 74
End: 2024-06-20
Payer: MEDICARE

## 2024-06-20 VITALS
DIASTOLIC BLOOD PRESSURE: 68 MMHG | HEIGHT: 73 IN | OXYGEN SATURATION: 96 % | HEART RATE: 79 BPM | SYSTOLIC BLOOD PRESSURE: 130 MMHG

## 2024-06-20 DIAGNOSIS — R31.0 GROSS HEMATURIA: ICD-10-CM

## 2024-06-20 DIAGNOSIS — I10 ESSENTIAL (PRIMARY) HYPERTENSION: ICD-10-CM

## 2024-06-20 DIAGNOSIS — N20.0 CALCULUS OF KIDNEY: ICD-10-CM

## 2024-06-20 DIAGNOSIS — Z01.818 ENCOUNTER FOR OTHER PREPROCEDURAL EXAMINATION: ICD-10-CM

## 2024-06-20 DIAGNOSIS — E78.5 HYPERLIPIDEMIA, UNSPECIFIED: ICD-10-CM

## 2024-06-20 DIAGNOSIS — Z86.718 PERSONAL HISTORY OF OTHER VENOUS THROMBOSIS AND EMBOLISM: ICD-10-CM

## 2024-06-20 DIAGNOSIS — R01.1 CARDIAC MURMUR, UNSPECIFIED: ICD-10-CM

## 2024-06-20 DIAGNOSIS — E11.9 TYPE 2 DIABETES MELLITUS W/OUT COMPLICATIONS: ICD-10-CM

## 2024-06-20 DIAGNOSIS — I25.10 ATHEROSCLEROTIC HEART DISEASE OF NATIVE CORONARY ARTERY W/OUT ANGINA PECTORIS: ICD-10-CM

## 2024-06-20 LAB
CULTURE RESULTS: NO GROWTH — SIGNIFICANT CHANGE UP
SPECIMEN SOURCE: SIGNIFICANT CHANGE UP

## 2024-06-20 PROCEDURE — 99204 OFFICE O/P NEW MOD 45 MIN: CPT

## 2024-06-20 PROCEDURE — G2211 COMPLEX E/M VISIT ADD ON: CPT

## 2024-06-20 PROCEDURE — 93306 TTE W/DOPPLER COMPLETE: CPT

## 2024-06-20 PROCEDURE — 93000 ELECTROCARDIOGRAM COMPLETE: CPT

## 2024-06-20 RX ORDER — ASPIRIN 81 MG/1
81 TABLET ORAL
Refills: 0 | Status: ACTIVE | COMMUNITY

## 2024-06-21 NOTE — HISTORY OF PRESENT ILLNESS
[FreeTextEntry1] : MERARY GARCIA is a 73-year-old male, with a PMHx significant for HTN, HLD, DM II, CAD with known RCA occlusion on cath 2012 (medically managed) and nuclear stress test in 2022 with lateral aortic infarct ischemia with EF of 60% prior to hernia surgery, s/p CABG, DVT/PE, who presents today for preoperative cardiac evaluation prior to cystoscopy. Offers no additional complaints at this time. The patient can climb 2 flights of stairs without symptoms. Otherwise: (-) chest pain, (-) SOB.  Social History: (+) Former smoker, (+) EtOH, (-) Illicit drug use.

## 2024-06-21 NOTE — DISCUSSION/SUMMARY
[EKG obtained to assist in diagnosis and management of assessed problem(s)] : EKG obtained to assist in diagnosis and management of assessed problem(s) [FreeTextEntry1] : EKG performed today revealed NSR, (+) early repolarization.   Echocardiogram performed today revealed an EF of 45-50% with basal inferoseptal and inferior akinesis, which is unchanged from prior echo.   Preoperative Cardiac Evaluation: The patient is patient is currently optimized and can proceed with surgery as planned without further cardiac intervention.  CAD: The impression is coronary artery disease. Currently, the condition is stable. There are no changes in medication management. Continue current medical therapy. Other planned treatment includes dietary modification, an exercise regimen, and weight reduction.  HTN: The impression is hypertension. Currently, the condition is controlled. There are no changes in medication management. Continue current medical therapy. Other planned treatments include an exercise regimen, weight loss, low sodium diet, and alcohol moderation.  HLD: The impression is hyperlipidemia. There are no changes in medication management. Continue current medical therapy. Other planned treatment includes diet modification, exercise, and weight loss.  DM: The impression is diabetes mellitus. There are no changes in medication management. Patient advised to continue taking medications as prescribed, closely monitor blood sugar at home, follow a diabetic diet, exercise, lose weight, and follow up for continued monitoring. Discussed importance of diet and exercise to improve glycemic control.  Results and plan discussed with the patient.

## 2024-06-21 NOTE — CARDIOLOGY SUMMARY
[de-identified] : 06/20/2024: NSR, (+) early repolarization.  ======================================================= [de-identified] : Cardiac Cath - 01/25/2024 (study date: 11/21/2012): Summary:  1. Hemodynamics: Hemodynamic assessment demonstrates moderately elevated LVEDP. 2. Impressions: There is significant double vessel coronary artery disease. 3. Cardiac structures: Global left ventricular function was mildly depressed. EF estimated was 45% with inferior wall hypokinesis. =======================================================

## 2024-06-26 ENCOUNTER — TRANSCRIPTION ENCOUNTER (OUTPATIENT)
Age: 74
End: 2024-06-26

## 2024-06-26 ENCOUNTER — APPOINTMENT (OUTPATIENT)
Dept: UROLOGY | Facility: HOSPITAL | Age: 74
End: 2024-06-26

## 2024-06-26 ENCOUNTER — OUTPATIENT (OUTPATIENT)
Dept: OUTPATIENT SERVICES | Facility: HOSPITAL | Age: 74
LOS: 1 days | Discharge: HOME | End: 2024-06-26
Payer: MEDICARE

## 2024-06-26 VITALS
TEMPERATURE: 97 F | OXYGEN SATURATION: 96 % | HEIGHT: 73 IN | DIASTOLIC BLOOD PRESSURE: 83 MMHG | WEIGHT: 199.96 LBS | RESPIRATION RATE: 18 BRPM | HEART RATE: 78 BPM | SYSTOLIC BLOOD PRESSURE: 161 MMHG

## 2024-06-26 VITALS — SYSTOLIC BLOOD PRESSURE: 178 MMHG | DIASTOLIC BLOOD PRESSURE: 80 MMHG | HEART RATE: 78 BPM | RESPIRATION RATE: 18 BRPM

## 2024-06-26 DIAGNOSIS — N20.0 CALCULUS OF KIDNEY: ICD-10-CM

## 2024-06-26 DIAGNOSIS — Z90.49 ACQUIRED ABSENCE OF OTHER SPECIFIED PARTS OF DIGESTIVE TRACT: Chronic | ICD-10-CM

## 2024-06-26 DIAGNOSIS — Z98.890 OTHER SPECIFIED POSTPROCEDURAL STATES: Chronic | ICD-10-CM

## 2024-06-26 DIAGNOSIS — R31.0 GROSS HEMATURIA: ICD-10-CM

## 2024-06-26 LAB — GLUCOSE BLDC GLUCOMTR-MCNC: 160 MG/DL — HIGH (ref 70–99)

## 2024-06-26 PROCEDURE — 87070 CULTURE OTHR SPECIMN AEROBIC: CPT

## 2024-06-26 PROCEDURE — C1769: CPT

## 2024-06-26 PROCEDURE — 52332 CYSTOSCOPY AND TREATMENT: CPT | Mod: RT

## 2024-06-26 PROCEDURE — 72170 X-RAY EXAM OF PELVIS: CPT

## 2024-06-26 PROCEDURE — 82962 GLUCOSE BLOOD TEST: CPT

## 2024-06-26 PROCEDURE — 87075 CULTR BACTERIA EXCEPT BLOOD: CPT

## 2024-06-26 PROCEDURE — C2617: CPT

## 2024-06-26 PROCEDURE — 87205 SMEAR GRAM STAIN: CPT

## 2024-06-26 RX ORDER — RIVAROXABAN 15 MG-20MG
1 KIT ORAL
Qty: 0 | Refills: 0 | DISCHARGE

## 2024-06-26 RX ORDER — HYDROMORPHONE HCL 0.2 MG/ML
1 INJECTION, SOLUTION INTRAVENOUS
Refills: 0 | Status: DISCONTINUED | OUTPATIENT
Start: 2024-06-26 | End: 2024-06-26

## 2024-06-26 RX ORDER — ASPIRIN/CALCIUM CARB/MAGNESIUM 324 MG
1 TABLET ORAL
Qty: 0 | Refills: 0 | DISCHARGE

## 2024-06-26 RX ORDER — ONDANSETRON HYDROCHLORIDE 2 MG/ML
4 INJECTION INTRAMUSCULAR; INTRAVENOUS ONCE
Refills: 0 | Status: DISCONTINUED | OUTPATIENT
Start: 2024-06-26 | End: 2024-06-26

## 2024-06-26 RX ORDER — SODIUM CHLORIDE 0.9 % (FLUSH) 0.9 %
1000 SYRINGE (ML) INJECTION
Refills: 0 | Status: DISCONTINUED | OUTPATIENT
Start: 2024-06-26 | End: 2024-06-26

## 2024-06-26 RX ORDER — SIMVASTATIN 20 MG/1
1 TABLET, FILM COATED ORAL
Qty: 0 | Refills: 0 | DISCHARGE

## 2024-06-26 RX ORDER — PHENAZOPYRIDINE HCL 200 MG/1
200 TABLET ORAL ONCE
Refills: 0 | Status: DISCONTINUED | OUTPATIENT
Start: 2024-06-26 | End: 2024-06-26

## 2024-06-26 RX ORDER — POTASSIUM CITRATE 10 MEQ/1
10 MEQ TABLET, EXTENDED RELEASE ORAL TWICE DAILY
Qty: 60 | Refills: 1 | Status: ACTIVE | COMMUNITY
Start: 2024-06-26 | End: 1900-01-01

## 2024-06-26 RX ORDER — CHOLECALCIFEROL (VITAMIN D3) 125 MCG
1 CAPSULE ORAL
Qty: 0 | Refills: 0 | DISCHARGE

## 2024-06-26 RX ORDER — METFORMIN HYDROCHLORIDE 850 MG/1
1 TABLET ORAL
Qty: 0 | Refills: 0 | DISCHARGE

## 2024-06-26 RX ORDER — LOSARTAN POTASSIUM 100 MG/1
1 TABLET, FILM COATED ORAL
Qty: 0 | Refills: 0 | DISCHARGE

## 2024-06-26 RX ORDER — PREGABALIN 225 MG/1
1 CAPSULE ORAL
Qty: 0 | Refills: 0 | DISCHARGE

## 2024-06-26 RX ORDER — HYDROMORPHONE HCL 0.2 MG/ML
0.5 INJECTION, SOLUTION INTRAVENOUS
Refills: 0 | Status: DISCONTINUED | OUTPATIENT
Start: 2024-06-26 | End: 2024-06-26

## 2024-06-26 RX ADMIN — Medication 100 MILLILITER(S): at 16:39

## 2024-06-27 ENCOUNTER — NON-APPOINTMENT (OUTPATIENT)
Age: 74
End: 2024-06-27

## 2024-06-27 LAB
GRAM STN FLD: ABNORMAL
SPECIMEN SOURCE: SIGNIFICANT CHANGE UP

## 2024-06-28 ENCOUNTER — OUTPATIENT (OUTPATIENT)
Dept: OUTPATIENT SERVICES | Facility: HOSPITAL | Age: 74
LOS: 1 days | End: 2024-06-28
Payer: MEDICARE

## 2024-06-28 ENCOUNTER — RESULT REVIEW (OUTPATIENT)
Age: 74
End: 2024-06-28

## 2024-06-28 DIAGNOSIS — N20.0 CALCULUS OF KIDNEY: ICD-10-CM

## 2024-06-28 DIAGNOSIS — Z98.890 OTHER SPECIFIED POSTPROCEDURAL STATES: Chronic | ICD-10-CM

## 2024-06-28 DIAGNOSIS — Z90.49 ACQUIRED ABSENCE OF OTHER SPECIFIED PARTS OF DIGESTIVE TRACT: Chronic | ICD-10-CM

## 2024-06-28 PROCEDURE — 74019 RADEX ABDOMEN 2 VIEWS: CPT

## 2024-06-28 PROCEDURE — 74019 RADEX ABDOMEN 2 VIEWS: CPT | Mod: 26

## 2024-06-29 DIAGNOSIS — N20.0 CALCULUS OF KIDNEY: ICD-10-CM

## 2024-07-02 DIAGNOSIS — I25.2 OLD MYOCARDIAL INFARCTION: ICD-10-CM

## 2024-07-02 DIAGNOSIS — Z79.82 LONG TERM (CURRENT) USE OF ASPIRIN: ICD-10-CM

## 2024-07-02 DIAGNOSIS — N20.2 CALCULUS OF KIDNEY WITH CALCULUS OF URETER: ICD-10-CM

## 2024-07-02 DIAGNOSIS — E78.00 PURE HYPERCHOLESTEROLEMIA, UNSPECIFIED: ICD-10-CM

## 2024-07-02 DIAGNOSIS — E11.9 TYPE 2 DIABETES MELLITUS WITHOUT COMPLICATIONS: ICD-10-CM

## 2024-07-02 DIAGNOSIS — Z86.718 PERSONAL HISTORY OF OTHER VENOUS THROMBOSIS AND EMBOLISM: ICD-10-CM

## 2024-07-02 DIAGNOSIS — Z79.01 LONG TERM (CURRENT) USE OF ANTICOAGULANTS: ICD-10-CM

## 2024-07-02 DIAGNOSIS — Z90.49 ACQUIRED ABSENCE OF OTHER SPECIFIED PARTS OF DIGESTIVE TRACT: ICD-10-CM

## 2024-07-02 DIAGNOSIS — I10 ESSENTIAL (PRIMARY) HYPERTENSION: ICD-10-CM

## 2024-07-02 DIAGNOSIS — Z79.84 LONG TERM (CURRENT) USE OF ORAL HYPOGLYCEMIC DRUGS: ICD-10-CM

## 2024-07-03 ENCOUNTER — NON-APPOINTMENT (OUTPATIENT)
Age: 74
End: 2024-07-03

## 2024-07-03 ENCOUNTER — APPOINTMENT (OUTPATIENT)
Dept: UROLOGY | Facility: CLINIC | Age: 74
End: 2024-07-03

## 2024-07-03 ENCOUNTER — APPOINTMENT (OUTPATIENT)
Dept: UROLOGY | Facility: HOSPITAL | Age: 74
End: 2024-07-03

## 2024-07-03 ENCOUNTER — OUTPATIENT (OUTPATIENT)
Dept: OUTPATIENT SERVICES | Facility: HOSPITAL | Age: 74
LOS: 1 days | Discharge: HOME | End: 2024-07-03
Payer: MEDICARE

## 2024-07-03 ENCOUNTER — TRANSCRIPTION ENCOUNTER (OUTPATIENT)
Age: 74
End: 2024-07-03

## 2024-07-03 VITALS
WEIGHT: 210.1 LBS | SYSTOLIC BLOOD PRESSURE: 135 MMHG | RESPIRATION RATE: 17 BRPM | TEMPERATURE: 98 F | HEART RATE: 68 BPM | OXYGEN SATURATION: 97 % | HEIGHT: 73 IN | DIASTOLIC BLOOD PRESSURE: 71 MMHG

## 2024-07-03 VITALS — DIASTOLIC BLOOD PRESSURE: 83 MMHG | SYSTOLIC BLOOD PRESSURE: 211 MMHG

## 2024-07-03 DIAGNOSIS — Z90.49 ACQUIRED ABSENCE OF OTHER SPECIFIED PARTS OF DIGESTIVE TRACT: ICD-10-CM

## 2024-07-03 DIAGNOSIS — Z90.49 ACQUIRED ABSENCE OF OTHER SPECIFIED PARTS OF DIGESTIVE TRACT: Chronic | ICD-10-CM

## 2024-07-03 DIAGNOSIS — N20.0 CALCULUS OF KIDNEY: ICD-10-CM

## 2024-07-03 DIAGNOSIS — Z79.84 LONG TERM (CURRENT) USE OF ORAL HYPOGLYCEMIC DRUGS: ICD-10-CM

## 2024-07-03 DIAGNOSIS — I10 ESSENTIAL (PRIMARY) HYPERTENSION: ICD-10-CM

## 2024-07-03 DIAGNOSIS — Z98.890 OTHER SPECIFIED POSTPROCEDURAL STATES: Chronic | ICD-10-CM

## 2024-07-03 DIAGNOSIS — Z79.01 LONG TERM (CURRENT) USE OF ANTICOAGULANTS: ICD-10-CM

## 2024-07-03 DIAGNOSIS — E78.00 PURE HYPERCHOLESTEROLEMIA, UNSPECIFIED: ICD-10-CM

## 2024-07-03 DIAGNOSIS — N20.1 CALCULUS OF URETER: ICD-10-CM

## 2024-07-03 DIAGNOSIS — Z79.82 LONG TERM (CURRENT) USE OF ASPIRIN: ICD-10-CM

## 2024-07-03 DIAGNOSIS — I25.2 OLD MYOCARDIAL INFARCTION: ICD-10-CM

## 2024-07-03 DIAGNOSIS — R31.0 GROSS HEMATURIA: ICD-10-CM

## 2024-07-03 DIAGNOSIS — Z86.718 PERSONAL HISTORY OF OTHER VENOUS THROMBOSIS AND EMBOLISM: ICD-10-CM

## 2024-07-03 DIAGNOSIS — E11.9 TYPE 2 DIABETES MELLITUS WITHOUT COMPLICATIONS: ICD-10-CM

## 2024-07-03 LAB
CULTURE RESULTS: ABNORMAL
GLUCOSE BLDC GLUCOMTR-MCNC: 177 MG/DL — HIGH (ref 70–99)
SPECIMEN SOURCE: SIGNIFICANT CHANGE UP

## 2024-07-03 PROCEDURE — C1758: CPT

## 2024-07-03 PROCEDURE — C1747: CPT

## 2024-07-03 PROCEDURE — C1889: CPT

## 2024-07-03 PROCEDURE — C2617: CPT

## 2024-07-03 PROCEDURE — 52356 CYSTO/URETERO W/LITHOTRIPSY: CPT | Mod: RT

## 2024-07-03 PROCEDURE — 52353 CYSTOURETERO W/LITHOTRIPSY: CPT | Mod: RT

## 2024-07-03 PROCEDURE — C1894: CPT

## 2024-07-03 PROCEDURE — 82962 GLUCOSE BLOOD TEST: CPT

## 2024-07-03 PROCEDURE — C1769: CPT

## 2024-07-03 PROCEDURE — C9399: CPT

## 2024-07-03 PROCEDURE — 82365 CALCULUS SPECTROSCOPY: CPT

## 2024-07-03 RX ORDER — ACETAMINOPHEN 325 MG
650 TABLET ORAL ONCE
Refills: 0 | Status: COMPLETED | OUTPATIENT
Start: 2024-07-03 | End: 2024-07-03

## 2024-07-03 RX ORDER — DEXTROSE MONOHYDRATE AND SODIUM CHLORIDE 5; .3 G/100ML; G/100ML
1000 INJECTION, SOLUTION INTRAVENOUS
Refills: 0 | Status: DISCONTINUED | OUTPATIENT
Start: 2024-07-03 | End: 2024-07-03

## 2024-07-03 RX ORDER — PHENAZOPYRIDINE HCL 200 MG/1
200 TABLET ORAL ONCE
Refills: 0 | Status: COMPLETED | OUTPATIENT
Start: 2024-07-03 | End: 2024-07-03

## 2024-07-03 RX ORDER — ONDANSETRON HYDROCHLORIDE 2 MG/ML
4 INJECTION INTRAMUSCULAR; INTRAVENOUS ONCE
Refills: 0 | Status: DISCONTINUED | OUTPATIENT
Start: 2024-07-03 | End: 2024-07-03

## 2024-07-03 RX ORDER — PHENAZOPYRIDINE 200 MG/1
200 TABLET, FILM COATED ORAL
Qty: 6 | Refills: 2 | Status: ACTIVE | COMMUNITY
Start: 2024-07-03 | End: 1900-01-01

## 2024-07-03 RX ORDER — MORPHINE SULFATE 100 MG/1
2 TABLET, EXTENDED RELEASE ORAL
Refills: 0 | Status: DISCONTINUED | OUTPATIENT
Start: 2024-07-03 | End: 2024-07-03

## 2024-07-03 RX ORDER — HYDROMORPHONE HCL 0.2 MG/ML
0.5 INJECTION, SOLUTION INTRAVENOUS
Refills: 0 | Status: DISCONTINUED | OUTPATIENT
Start: 2024-07-03 | End: 2024-07-03

## 2024-07-03 RX ADMIN — PHENAZOPYRIDINE HCL 200 MILLIGRAM(S): 200 TABLET ORAL at 13:06

## 2024-07-03 RX ADMIN — Medication 650 MILLIGRAM(S): at 11:46

## 2024-07-08 LAB
CULTURE RESULTS: ABNORMAL
GRAM STN FLD: ABNORMAL

## 2024-07-09 ENCOUNTER — APPOINTMENT (OUTPATIENT)
Dept: UROLOGY | Facility: CLINIC | Age: 74
End: 2024-07-09
Payer: MEDICARE

## 2024-07-09 DIAGNOSIS — Z86.718 PERSONAL HISTORY OF OTHER VENOUS THROMBOSIS AND EMBOLISM: ICD-10-CM

## 2024-07-09 DIAGNOSIS — N20.0 CALCULUS OF KIDNEY: ICD-10-CM

## 2024-07-09 DIAGNOSIS — R31.0 GROSS HEMATURIA: ICD-10-CM

## 2024-07-09 PROCEDURE — 52310 CYSTOSCOPY AND TREATMENT: CPT

## 2024-07-10 ENCOUNTER — NON-APPOINTMENT (OUTPATIENT)
Age: 74
End: 2024-07-10

## 2024-07-11 LAB
CELL MATERIAL STONE EST-MCNT: SIGNIFICANT CHANGE UP
LABORATORY COMMENT REPORT: SIGNIFICANT CHANGE UP
NIDUS STONE QN: SIGNIFICANT CHANGE UP

## 2024-07-22 ENCOUNTER — RESULT REVIEW (OUTPATIENT)
Age: 74
End: 2024-07-22

## 2024-08-13 ENCOUNTER — APPOINTMENT (OUTPATIENT)
Dept: UROLOGY | Facility: CLINIC | Age: 74
End: 2024-08-13

## 2024-08-13 VITALS
SYSTOLIC BLOOD PRESSURE: 117 MMHG | TEMPERATURE: 98.1 F | BODY MASS INDEX: 27.17 KG/M2 | HEIGHT: 73 IN | HEART RATE: 79 BPM | WEIGHT: 205 LBS | DIASTOLIC BLOOD PRESSURE: 76 MMHG

## 2024-08-13 DIAGNOSIS — N20.0 CALCULUS OF KIDNEY: ICD-10-CM

## 2024-08-13 DIAGNOSIS — R31.0 GROSS HEMATURIA: ICD-10-CM

## 2024-08-13 PROCEDURE — 99214 OFFICE O/P EST MOD 30 MIN: CPT

## 2024-08-13 PROCEDURE — 81003 URINALYSIS AUTO W/O SCOPE: CPT | Mod: QW

## 2024-08-13 PROCEDURE — G2211 COMPLEX E/M VISIT ADD ON: CPT

## 2024-08-16 LAB
BILIRUB UR QL STRIP: NORMAL
COLLECTION METHOD: NORMAL
GLUCOSE UR-MCNC: NORMAL
HCG UR QL: 1 EU/DL
HGB UR QL STRIP.AUTO: NORMAL
KETONES UR-MCNC: NORMAL
LEUKOCYTE ESTERASE UR QL STRIP: NORMAL
NITRITE UR QL STRIP: NORMAL
PH UR STRIP: 7
PROT UR STRIP-MCNC: NORMAL
SP GR UR STRIP: 1.01

## 2024-08-21 NOTE — ADDENDUM
PT: Mirian Ireland  DATE: 9/8/2021    Chief Complaint   Patient presents with   • Follow-up     Pt here for f/u       HPI: Mirian Ireland is a 34 year old female who presents for    Feels like she is loosing hearing, couple of years, h/o Right surgery when she was younger, in 2nd grade. Patient feels that she has to ask others to repeat themselves, has had a noted decrease in her hearing over the past few months.      ADHD: Adderall 10mg qam and sometimes in afternoon.  Anxiety/Depression: \"accepting that it will not go away'  Learning to walk away.  anxious because COVID19.  Tried cutting herself teenage years, not doing anything.   Denies SI/HI/AV hallucinations.  Gets bored easily, and gets anxious.  Pt has burst of anger at home, reports  Food is an outlet  Quit smoking during pandemic.       Anxiety -patient reports increased levels of stress and anxiety, has suppressed a lot of her emotions due to how she was brought up in a [place] household.  She is learning to express her emotions better.  She is open towards having a counselor also she is interested in maybe initiating low-dose of medication to help her with her anxiety.  She denies any suicidal homicidal ideation denies any drug or alcohol use.   Patient reports at times getting overwhelmed, if things do not go according to her plan she gets overly frustrated and then has to come home and gets very angry will have an outburst of anger, wishes to learn how to cope and process her stressors more effectively.  Has tried multiple antidepressants in the past without noted improvement.  She denies any current suicidal or homicidal ideation, she denies any drug, alcohol or nicotine use at the current time.      Scoliosis - since childhood, Fell from an 8ft flight of stairs when she was 14 or 16yo.  5 years ago fell on stairs and 'viktor her back'  Did PT, helpful.  Having trouble w/ lower back pain that radiates around to her right thigh and hip region at  [FreeTextEntry1] :  DANIEL SHI, am scribing for and in the presence of  in the following sections: HISTORY OF PRESENT ILLNESS, PAST MEDICAL/FAMILY/SOCIAL HISTORY, REVIEW OF SYSTEMS, VITAL SIGNS, PHYSICAL EXAM, ASSESSMENT/PLAN on 08/13/2024.        times to groin.    Having right hip pain when she is driving, 2/10 pain, reports having nagging pain 2 years.  Reports pain when driving pain tends to worsen and lays on that right hip.  Pt reports having tingilng.  Pt reports having lower back pain 2-3/10, some tingling along right thigh.  Ice pack and heating pad helps.  Patient denies any loss of function of lower extremities.    Anemia Hb corrected to 14.3 5/25/21  HLP - 206/hdl 48/ldl 129  Obesity - BMI37 weight reduction of 5 lbs. seeing nutritionist, focusing on diet.  Has had intentional weight loss of 20 pounds  Has not done any exercise  Shx:  Has a dog  French/Mexican  Mom is full Welsh  Single, no kid, dog - pitbull mix.  Fixing her house.   Works in health care revenue       Past Medical History:   Diagnosis Date   • ADHD    • Anxiety disorder    • Depression    • Scoliosis        Outpatient Medications Marked as Taking for the 9/8/21 encounter (Office Visit) with Hugo Mak MD   Medication Sig Dispense Refill   • Cholecalciferol (DIALYVITE VITAMIN D 5000 PO) Take by mouth daily.     • amphetamine-dextroamphetamine (Adderall) 10 MG tablet Take 1 tablet by mouth 2 times daily. 60 tablet 0   • albuterol (PROAIR RESPICLICK) 108 (90 Base) MCG/ACT inhaler Inhale 2 puffs into the lungs every 4 hours as needed for Shortness of Breath or Wheezing. 1 Inhaler 2   • diphenhydrAMINE-PE-APAP (BENADRYL ALLERGY/COLD PO) as directed prn     • cetirizine (ZYRTEC ALLERGY) 10 MG tablet 1 tab(s) orally once a day prn     • loratadine (CLARITIN) 10 MG tablet 1 tab(s) orally once a day prn     • Fexofenadine HCl (ALLEGRA PO) as directed prn         ALLERGIES:   Allergen Reactions   • Penicillins RASH     upset stomach        Review of Systems   Constitutional: Negative for chills, fever, malaise/fatigue and weight loss.   HENT: Negative for ear pain, hearing loss and sore throat.    Eyes: Negative for blurred vision, pain and discharge.   Respiratory: Negative for  cough, hemoptysis, sputum production and shortness of breath.    Cardiovascular: Negative for chest pain, palpitations, orthopnea and leg swelling.   Gastrointestinal: Negative for abdominal pain, blood in stool, nausea and vomiting.   Genitourinary: Negative for dysuria and urgency.   Musculoskeletal: Positive for back pain.   Skin: Negative for rash.   Neurological: Negative for loss of consciousness, weakness and headaches.   Endo/Heme/Allergies: Does not bruise/bleed easily.   Psychiatric/Behavioral: The patient is nervous/anxious.         Anger issues.         Physical Exam    Visit Vitals  /82 (BP Location: LUE - Left upper extremity)   Pulse 60   Temp 97.6 °F (36.4 °C)   Resp 16   Ht 5' 5\" (1.651 m)   Wt 104.3 kg (229 lb 15 oz)   LMP 05/03/2021 (Approximate)   SpO2 96%   BMI 38.26 kg/m²     Physical Exam  Vitals and nursing note reviewed.   Constitutional:       General: She is awake.      Appearance: Normal appearance. She is well-developed. She is obese. She is not ill-appearing.   HENT:      Head: Normocephalic and atraumatic.      Right Ear: Tympanic membrane, ear canal and external ear normal.      Left Ear: Ear canal and external ear normal. Tympanic membrane is perforated.      Nose: Nose normal.   Eyes:      General: Lids are normal.      Extraocular Movements: Extraocular movements intact.      Conjunctiva/sclera: Conjunctivae normal.      Pupils: Pupils are equal, round, and reactive to light.   Neck:      Thyroid: No thyroid mass, thyromegaly or thyroid tenderness.      Vascular: No carotid bruit.      Trachea: Trachea normal.   Cardiovascular:      Rate and Rhythm: Normal rate and regular rhythm.      Heart sounds: Normal heart sounds, S1 normal and S2 normal.   Pulmonary:      Effort: Pulmonary effort is normal. No tachypnea or respiratory distress.      Breath sounds: Normal breath sounds and air entry. No wheezing or rales.   Abdominal:      General: Abdomen is flat. Bowel sounds are  normal. There is no distension.      Palpations: Abdomen is soft.      Tenderness: There is no abdominal tenderness.   Musculoskeletal:      Cervical back: Normal range of motion and neck supple.      Right lower leg: No edema.      Left lower leg: No edema.        Legs:    Lymphadenopathy:      Cervical: No cervical adenopathy.   Neurological:      Mental Status: She is alert and oriented to person, place, and time.   Psychiatric:         Behavior: Behavior is cooperative.         No visits with results within 1 Day(s) from this visit.   Latest known visit with results is:   Office Visit on 05/25/2021   Component Date Value Ref Range Status   • Sodium 05/25/2021 142  135 - 145 mmol/L Final   • Potassium 05/25/2021 4.4  3.4 - 5.1 mmol/L Final   • Chloride 05/25/2021 108* 98 - 107 mmol/L Final   • Carbon Dioxide 05/25/2021 28  21 - 32 mmol/L Final   • Anion Gap 05/25/2021 10  10 - 20 mmol/L Final   • Glucose 05/25/2021 104* 65 - 99 mg/dL Final   • BUN 05/25/2021 16  6 - 20 mg/dL Final   • Creatinine 05/25/2021 0.72  0.51 - 0.95 mg/dL Final   • Glomerular Filtration Rate 05/25/2021 >90  >90 mL/min/1.73m2 Final    eGFR results = or >90 mL/min/1.73m2 = Normal kidney function.   • BUN/ Creatinine Ratio 05/25/2021 22  7 - 25 Final   • Calcium 05/25/2021 8.9  8.4 - 10.2 mg/dL Final   • Bilirubin, Total 05/25/2021 1.0  0.2 - 1.0 mg/dL Final   • GOT/AST 05/25/2021 18  <=37 Units/L Final   • GPT/ALT 05/25/2021 39  <64 Units/L Final   • Alkaline Phosphatase 05/25/2021 56  45 - 117 Units/L Final   • Albumin 05/25/2021 4.0  3.6 - 5.1 g/dL Final   • Protein, Total 05/25/2021 7.1  6.4 - 8.2 g/dL Final   • Globulin 05/25/2021 3.1  2.0 - 4.0 g/dL Final   • A/G Ratio 05/25/2021 1.3  1.0 - 2.4 Final   • Ferritin 05/25/2021 66  8 - 252 ng/mL Final   • Hemoglobin A1C 05/25/2021 5.7* 4.5 - 5.6 % Final      Diabetic Screening  Non Diabetic:             <5.7%  Increased Risk:           5.7-6.4%  Diagnostic For Diabetes:  >6.4%    Diabetic  Control  A1C%       eAG mg/dL  6.0            126  6.5            140  7.0            154  7.5            169  8.0            183  8.5            197  9.0            212  9.5            226  10.0           240   • Cholesterol 05/25/2021 206* <=199 mg/dL Final    Desirable         <200  Borderline High   200 to 239  High              >=240   • Triglycerides 05/25/2021 143  <=149 mg/dL Final    Normal            <150  Borderline High   150 to 199  High              200 to 499  Very High         >=500   • HDL 05/25/2021 48* >=50 mg/dL Final    Low              <40  Borderline Low   40 to 49  Near Optimal     50 to 59  Optimal          >=60   • LDL 05/25/2021 129  <=129 mg/dL Final    OPTIMAL           <100  NEAR OPTIMAL      100 to 129  BORDERLINE HIGH   130 to 159  HIGH              160 to 189  VERY HIGH         >=190   • Non-HDL Cholesterol 05/25/2021 158  mg/dL Final    Therapeutic Target:  CHD and risk equivalents  <130  Multiple risk factors     <160  0 to 1 risk factor        <190   • Cholesterol/ HDL Ratio 05/25/2021 4.3  <=4.4 Final   • TSH 05/25/2021 1.812  0.350 - 5.000 mcUnits/mL Final   • WBC 05/25/2021 6.6  4.2 - 11.0 K/mcL Final   • RBC 05/25/2021 4.95  4.00 - 5.20 mil/mcL Final   • HGB 05/25/2021 14.3  12.0 - 15.5 g/dL Final   • HCT 05/25/2021 44.4  36.0 - 46.5 % Final   • MCV 05/25/2021 89.7  78.0 - 100.0 fl Final   • MCH 05/25/2021 28.9  26.0 - 34.0 pg Final   • MCHC 05/25/2021 32.2  32.0 - 36.5 g/dL Final   • RDW-CV 05/25/2021 13.6  11.0 - 15.0 % Final   • RDW-SD 05/25/2021 45.0  39.0 - 50.0 fL Final   • PLT 05/25/2021 158  140 - 450 K/mcL Final   • NRBC 05/25/2021 0  <=0 /100 WBC Final   • Neutrophil, Percent 05/25/2021 66  % Final   • Lymphocytes, Percent 05/25/2021 24  % Final   • Mono, Percent 05/25/2021 6  % Final   • Eosinophils, Percent 05/25/2021 3  % Final   • Basophils, Percent 05/25/2021 1  % Final   • Immature Granulocytes 05/25/2021 0  % Final   • Absolute Neutrophils 05/25/2021 4.4  1.8  - 7.7 K/mcL Final   • Absolute Lymphocytes 05/25/2021 1.6  1.0 - 4.8 K/mcL Final   • Absolute Monocytes 05/25/2021 0.4  0.3 - 0.9 K/mcL Final   • Absolute Eosinophils  05/25/2021 0.2  0.0 - 0.5 K/mcL Final   • Absolute Basophils 05/25/2021 0.1  0.0 - 0.3 K/mcL Final   • Absolute Immmature Granulocytes 05/25/2021 0.0  0.0 - 0.2 K/mcL Final       Assessment/Plan:    Diagnoses and all orders for this visit:  Attention deficit hyperactivity disorder (ADHD), predominantly inattentive type  -     amphetamine-dextroamphetamine (Adderall) 10 MG tablet; Take 1 tablet by mouth 2 times daily.  -     amphetamine-dextroamphetamine (Adderall) 10 MG tablet; Take 1 tablet by mouth 2 times daily.      Continue current Adderall 10 mg 1 tablet twice daily as needed.  Anxiety obtain a counselor, start sertraline 25 mg at bedtime potential side effects discussed with patient advised short interval follow-up 4 weeks, if any suicidal homicidal ideation advised patient to call 911 or report to ED.   -     sertraline (ZOLOFT) 25 MG tablet; Take 1 tablet by mouth daily.  Decreased hearing of both ears  -     SERVICE TO ENT  Perforation of left tympanic membrane  -     SERVICE TO ENT  Patient has noted decreased hearing, perforated eardrum on the left ear, ENT referral placed also will benefit from having audiology evaluation.  Total time spent with patient 35 minutes reviewed above items advised short and will follow up in 4 weeks, sooner if warranted.    Hugo Mak MD

## 2024-08-21 NOTE — ASSESSMENT
[FreeTextEntry1] :  73 year old male patient with history of significant stone disease. I reviewed all the XRs with him going back prior to ureteroscopy and most recent ultrasound and KUB. If he is able to come off Xarelto and Aspirin, we can consider right shockwave lithotripsy for lower pole stones. He does not want to have another ureteroscopy laser lithotripsy as he does not want another stent in place as it was uncomfortable for him. I do not this he is a great candidate for PCNL if he is going to be on Xarelto and Aspirin.  We discussed these issues at length. I discussed there is no hurry since there is no hydronephrosis and both stones are lower pole. I think he should see nephrology to see if they can help with both stone dissolution and prevent formation of new stones. He understands. He will follow-up in 3 months with KUB and ultrasound. Hopefully, he will see nephrology in the meanwhile. His cardiologist is Dr. Clayton Cuenca and I will reach out to him to discuss the risks of patient being able to come off the Xarelto and Aspirin for some period of time to be able to go through with shockwave lithotripsy.  All of the patient's questions were otherwise answered. Total time: 30 mins.    The submitted E/M billing level for this visit reflects the total time spent on the day of the visit including face-to-face time spent with the patient, non-face-to-face review of medical records and relevant information, documentation, and asynchronous communication with the patient after a visit via phone, email, or patients EHR portal after the visit. The medical records reviewed are either scanned into the chart or reviewed with the patient using a patients electronic medical records portal for patients with records not available to [facility] via electronic transmission platforms from other institutions and labs. Time spent counseling and performing coordination of care was also included in determining the appropriate EM billing level.   I have reviewed and verified information regarding the chief complaint and history recorded by the ancillary staff and/or the patient. I have independently reviewed and interpreted tests performed by other physicians and facilities as necessary.   I have discussed with the patient differential diagnosis, reason for auxiliary tests if ordered, risks, benefits, alternatives, and complications of each form of therapy were discussed.  I personally performed the services described in the documentation, reviewed the documentation recorded by the scribe in my presence, and it accurately and completely records my words and actions.

## 2024-08-21 NOTE — HISTORY OF PRESENT ILLNESS
[FreeTextEntry1] :  73 year old male patient seen in follow-up with history of significant stone disease. He had undergone ureteroscopy laser lithotripsy as a staged procedure. We took out 2 large renal stones and renal pelvic stones. He has a right lower pole conglomeration of stone and partially branched calculus on the left side. The left side is not really visible on Xray and may be amenable to some dissolution therapy and we are trying that with Potassium citrate. Pt is otherwise doing well. He has no pain or discomfort. He is voiding well. He has no further hematuria.   I have reviewed the following imaging studies:  KUB from 07/23/24:  Calcifications overlying the right abdomen, may represent renal calculi, measuring up to 1.7 cm. No definite calculus overlying the expected region of the right ureter (ureteral calculi were noted on prior radiograph). Limited evaluation for left renal calculi due to overlying bowel. Nonobstructive bowel gas pattern. Osseous degenerative change. Vascular calcifications. Surgical clips overlying abdomen.  Renal and Bladder Ultrasound from 7/23/24:  Right kidney: 12.7 cm. No hydronephrosis. 1.2 x 1.2 cm lower pole calculus. 1.2 x 1.1 cm interpolar calculus. 0.9 x 0.8 cm lower pole calculus. Left kidney: 11.8 cm. No hydronephrosis. 0.8 x 0.9 cm interpolar calculus. 1.0 x 1.0 cm lower pole calculus. 1.1 x 1.0 cm lower pole calculus. 1.1 x 1.0 cm lower pole calculus. No hydronephrosis. Bladder wall thickening. Correlate with urinalysis. Enlarged prostate 34 mL.   CT Abdomen and Pelvis taken on 06/07/24:  Since November 15, 2023/. Numerous bilateral genitourinary tract calcifications.  Right system: 2 adjacent proximal right ureteral calculi each measuring approximately 1 cm (602/52, HU 1181) with associated dilatation of proximal ureter to approximately 14 mm (not present on earlier study). Multiple additional calculi, lower pole calyceal system, largest 1.4 cm (602/56), similar to earlier study. 1.1 x 1.2 cm right renal pelvic calcification (2/62) HU 1317.  Left system: Multiple calculi involving primarily the lower pole system without ureteral dilatation (602/60 4-72)..

## 2024-08-21 NOTE — ASSESSMENT
[FreeTextEntry1] :  73 year old male patient with history of significant stone disease. I reviewed all the XRs with him going back prior to ureteroscopy and most recent ultrasound and KUB. If he is able to come off Xarelto and Aspirin, we can consider right shockwave lithotripsy for lower pole stones. He does not want to have another ureteroscopy laser lithotripsy as he does not want another stent in place as it was uncomfortable for him. I do not this he is a great candidate for PCNL if he is going to be on Xarelto and Aspirin.  We discussed these issues at length. I discussed there is no hurry since there is no hydronephrosis and both stones are lower pole. I think he should see nephrology to see if they can help with both stone dissolution and prevent formation of new stones. He understands. He will follow-up in 3 months with KUB and ultrasound. Hopefully, he will see nephrology in the meanwhile. His cardiologist is Dr. Clayton Cuenca and I will reach out to him to discuss the risks of patient being able to come off the Xarelto and Aspirin for some period of time to be able to go through with shockwave lithotripsy.  All of the patient's questions were otherwise answered. Total time: 30 mins.    The submitted E/M billing level for this visit reflects the total time spent on the day of the visit including face-to-face time spent with the patient, non-face-to-face review of medical records and relevant information, documentation, and asynchronous communication with the patient after a visit via phone, email, or patients EHR portal after the visit. The medical records reviewed are either scanned into the chart or reviewed with the patient using a patients electronic medical records portal for patients with records not available to [facility] via electronic transmission platforms from other institutions and labs. Time spent counseling and performing coordination of care was also included in determining the appropriate EM billing level.   I have reviewed and verified information regarding the chief complaint and history recorded by the ancillary staff and/or the patient. I have independently reviewed and interpreted tests performed by other physicians and facilities as necessary.   I have discussed with the patient differential diagnosis, reason for auxiliary tests if ordered, risks, benefits, alternatives, and complications of each form of therapy were discussed.  I personally performed the services described in the documentation, reviewed the documentation recorded by the scribe in my presence, and it accurately and completely records my words and actions.

## 2024-08-21 NOTE — ADDENDUM
[FreeTextEntry1] :  DANIEL SHI, am scribing for and in the presence of  in the following sections: HISTORY OF PRESENT ILLNESS, PAST MEDICAL/FAMILY/SOCIAL HISTORY, REVIEW OF SYSTEMS, VITAL SIGNS, PHYSICAL EXAM, ASSESSMENT/PLAN on 08/13/2024.

## 2024-11-13 ENCOUNTER — OUTPATIENT (OUTPATIENT)
Dept: OUTPATIENT SERVICES | Facility: HOSPITAL | Age: 74
LOS: 1 days | End: 2024-11-13
Payer: MEDICARE

## 2024-11-13 ENCOUNTER — RESULT REVIEW (OUTPATIENT)
Age: 74
End: 2024-11-13

## 2024-11-13 DIAGNOSIS — Z00.8 ENCOUNTER FOR OTHER GENERAL EXAMINATION: ICD-10-CM

## 2024-11-13 DIAGNOSIS — Z90.49 ACQUIRED ABSENCE OF OTHER SPECIFIED PARTS OF DIGESTIVE TRACT: Chronic | ICD-10-CM

## 2024-11-13 DIAGNOSIS — Z98.890 OTHER SPECIFIED POSTPROCEDURAL STATES: Chronic | ICD-10-CM

## 2024-11-13 PROCEDURE — 74018 RADEX ABDOMEN 1 VIEW: CPT | Mod: 26

## 2024-11-13 PROCEDURE — 76775 US EXAM ABDO BACK WALL LIM: CPT

## 2024-11-13 PROCEDURE — 74018 RADEX ABDOMEN 1 VIEW: CPT

## 2024-11-13 PROCEDURE — 76775 US EXAM ABDO BACK WALL LIM: CPT | Mod: 26

## 2024-11-14 DIAGNOSIS — Z00.8 ENCOUNTER FOR OTHER GENERAL EXAMINATION: ICD-10-CM

## 2024-11-19 ENCOUNTER — APPOINTMENT (OUTPATIENT)
Dept: UROLOGY | Facility: CLINIC | Age: 74
End: 2024-11-19
Payer: MEDICARE

## 2024-11-19 ENCOUNTER — NON-APPOINTMENT (OUTPATIENT)
Age: 74
End: 2024-11-19

## 2024-11-19 VITALS
HEART RATE: 63 BPM | WEIGHT: 205 LBS | TEMPERATURE: 98 F | SYSTOLIC BLOOD PRESSURE: 132 MMHG | OXYGEN SATURATION: 95 % | RESPIRATION RATE: 17 BRPM | DIASTOLIC BLOOD PRESSURE: 72 MMHG | HEIGHT: 73 IN | BODY MASS INDEX: 27.17 KG/M2

## 2024-11-19 DIAGNOSIS — N20.0 CALCULUS OF KIDNEY: ICD-10-CM

## 2024-11-19 DIAGNOSIS — Z86.718 PERSONAL HISTORY OF OTHER VENOUS THROMBOSIS AND EMBOLISM: ICD-10-CM

## 2024-11-19 DIAGNOSIS — Z79.01 LONG TERM (CURRENT) USE OF ANTICOAGULANTS: ICD-10-CM

## 2024-11-19 DIAGNOSIS — R31.0 GROSS HEMATURIA: ICD-10-CM

## 2024-11-19 PROCEDURE — 99213 OFFICE O/P EST LOW 20 MIN: CPT

## 2024-11-19 PROCEDURE — 81003 URINALYSIS AUTO W/O SCOPE: CPT | Mod: QW

## 2024-11-19 PROCEDURE — G2211 COMPLEX E/M VISIT ADD ON: CPT

## 2024-11-20 LAB
ANION GAP SERPL CALC-SCNC: 19 MMOL/L
BILIRUB UR QL STRIP: NORMAL
BUN SERPL-MCNC: 24 MG/DL
CALCIUM SERPL-MCNC: 9.8 MG/DL
CALCIUM SERPL-MCNC: 9.8 MG/DL
CHLORIDE SERPL-SCNC: 102 MMOL/L
CO2 SERPL-SCNC: 21 MMOL/L
COLLECTION METHOD: NORMAL
CREAT SERPL-MCNC: 0.87 MG/DL
EGFR: 92 ML/MIN/1.73M2
GLUCOSE UR-MCNC: NORMAL
HCG UR QL: 1 EU/DL
HGB UR QL STRIP.AUTO: NORMAL
KETONES UR-MCNC: NORMAL
LEUKOCYTE ESTERASE UR QL STRIP: NORMAL
NITRITE UR QL STRIP: NORMAL
PARATHYROID HORMONE INTACT: 29 PG/ML
PH UR STRIP: 5.5
PHOSPHATE SERPL-MCNC: 3.6 MG/DL
POTASSIUM SERPL-SCNC: 5.2 MMOL/L
PROT UR STRIP-MCNC: NORMAL
SODIUM SERPL-SCNC: 142 MMOL/L
SP GR UR STRIP: 1.02

## 2024-12-18 ENCOUNTER — APPOINTMENT (OUTPATIENT)
Dept: UROLOGY | Facility: CLINIC | Age: 74
End: 2024-12-18

## 2025-01-08 ENCOUNTER — APPOINTMENT (OUTPATIENT)
Dept: CARDIOLOGY | Facility: CLINIC | Age: 75
End: 2025-01-08
Payer: MEDICARE

## 2025-01-08 VITALS
HEART RATE: 91 BPM | WEIGHT: 199 LBS | HEIGHT: 73 IN | OXYGEN SATURATION: 98 % | DIASTOLIC BLOOD PRESSURE: 62 MMHG | BODY MASS INDEX: 26.37 KG/M2 | SYSTOLIC BLOOD PRESSURE: 138 MMHG

## 2025-01-08 DIAGNOSIS — E11.9 TYPE 2 DIABETES MELLITUS W/OUT COMPLICATIONS: ICD-10-CM

## 2025-01-08 DIAGNOSIS — E78.5 HYPERLIPIDEMIA, UNSPECIFIED: ICD-10-CM

## 2025-01-08 DIAGNOSIS — I25.10 ATHEROSCLEROTIC HEART DISEASE OF NATIVE CORONARY ARTERY W/OUT ANGINA PECTORIS: ICD-10-CM

## 2025-01-08 DIAGNOSIS — I10 ESSENTIAL (PRIMARY) HYPERTENSION: ICD-10-CM

## 2025-01-08 PROCEDURE — 99214 OFFICE O/P EST MOD 30 MIN: CPT

## 2025-01-08 PROCEDURE — G2211 COMPLEX E/M VISIT ADD ON: CPT

## 2025-01-08 PROCEDURE — 93000 ELECTROCARDIOGRAM COMPLETE: CPT

## 2025-04-11 NOTE — PATIENT PROFILE ADULT - FUNCTIONAL SCREEN CURRENT LEVEL: COMMUNICATION, MLM
Problem: PAIN - ADULT  Goal: Verbalizes/displays adequate comfort level or baseline comfort level  Description: Interventions:- Encourage patient to monitor pain and request assistance- Assess pain using appropriate pain scale- Administer analgesics based on type and severity of pain and evaluate response- Implement non-pharmacological measures as appropriate and evaluate response- Consider cultural and social influences on pain and pain management- Notify physician/advanced practitioner if interventions unsuccessful or patient reports new pain  Outcome: Progressing     Problem: INFECTION - ADULT  Goal: Absence or prevention of progression during hospitalization  Description: INTERVENTIONS:- Assess and monitor for signs and symptoms of infection- Monitor lab/diagnostic results- Monitor all insertion sites, i.e. indwelling lines, tubes, and drains- Monitor endotracheal if appropriate and nasal secretions for changes in amount and color- [place] appropriate cooling/warming therapies per order- Administer medications as ordered- Instruct and encourage patient and family to use good hand hygiene technique- Identify and instruct in appropriate isolation precautions for identified infection/condition  Outcome: Progressing  Goal: Absence of fever/infection during neutropenic period  Description: INTERVENTIONS:- Monitor WBC  Outcome: Progressing     Problem: SAFETY ADULT  Goal: Patient will remain free of falls  Description: INTERVENTIONS:- Educate patient/family on patient safety including physical limitations- Instruct patient to call for assistance with activity - Consult OT/PT to assist with strengthening/mobility - Keep Call bell within reach- Keep bed low and locked with side rails adjusted as appropriate- Keep care items and personal belongings within reach- Initiate and maintain comfort rounds- Make Fall Risk Sign visible to staff- Offer Toileting every  Hours, in advance of need- Initiate/Maintain alarm- Obtain  necessary fall risk management equipment: - Apply yellow socks and bracelet for high fall risk patients- Consider moving patient to room near nurses station  Outcome: Progressing  Goal: Maintain or return to baseline ADL function  Description: INTERVENTIONS:-  Assess patient's ability to carry out ADLs; assess patient's baseline for ADL function and identify physical deficits which impact ability to perform ADLs (bathing, care of mouth/teeth, toileting, grooming, dressing, etc.)- Assess/evaluate cause of self-care deficits - Assess range of motion- Assess patient's mobility; develop plan if impaired- Assess patient's need for assistive devices and provide as appropriate- Encourage maximum independence but intervene and supervise when necessary- Involve family in performance of ADLs- Assess for home care needs following discharge - Consider OT consult to assist with ADL evaluation and planning for discharge- Provide patient education as appropriate  Outcome: Progressing  Goal: Maintains/Returns to pre admission functional level  Description: INTERVENTIONS:- Perform AM-PAC 6 Click Basic Mobility/ Daily Activity assessment daily.- Set and communicate daily mobility goal to care team and patient/family/caregiver. - Collaborate with rehabilitation services on mobility goals if consulted- Perform Range of Motion  times a day.- Reposition patient every  hours.- Dangle patient  times a day- Stand patient  times a day- Ambulate patient  times a day- Out of bed to chair  times a day - Out of bed for meals  times a day- Out of bed for toileting- Record patient progress and toleration of activity level   Outcome: Progressing     Problem: DISCHARGE PLANNING  Goal: Discharge to home or other facility with appropriate resources  Description: INTERVENTIONS:- Identify barriers to discharge w/patient and caregiver- Arrange for needed discharge resources and transportation as appropriate- Identify discharge learning needs (meds, wound  care, etc.)- Arrange for interpretive services to assist at discharge as needed- Refer to Case Management Department for coordinating discharge planning if the patient needs post-hospital services based on physician/advanced practitioner order or complex needs related to functional status, cognitive ability, or social support system  Outcome: Progressing     Problem: Knowledge Deficit  Goal: Patient/family/caregiver demonstrates understanding of disease process, treatment plan, medications, and discharge instructions  Description: Complete learning assessment and assess knowledge base.Interventions:- Provide teaching at level of understanding- Provide teaching via preferred learning methods  Outcome: Progressing      0 = understands/communicates without difficulty

## 2025-05-01 ENCOUNTER — EMERGENCY (EMERGENCY)
Facility: HOSPITAL | Age: 75
LOS: 0 days | Discharge: HOME | End: 2025-05-02
Attending: STUDENT IN AN ORGANIZED HEALTH CARE EDUCATION/TRAINING PROGRAM
Payer: MEDICARE

## 2025-05-01 VITALS
TEMPERATURE: 98 F | SYSTOLIC BLOOD PRESSURE: 154 MMHG | WEIGHT: 207.9 LBS | RESPIRATION RATE: 18 BRPM | HEIGHT: 73 IN | HEART RATE: 79 BPM | DIASTOLIC BLOOD PRESSURE: 79 MMHG | OXYGEN SATURATION: 94 %

## 2025-05-01 DIAGNOSIS — J18.9 PNEUMONIA, UNSPECIFIED ORGANISM: ICD-10-CM

## 2025-05-01 DIAGNOSIS — I10 ESSENTIAL (PRIMARY) HYPERTENSION: ICD-10-CM

## 2025-05-01 DIAGNOSIS — Z90.49 ACQUIRED ABSENCE OF OTHER SPECIFIED PARTS OF DIGESTIVE TRACT: Chronic | ICD-10-CM

## 2025-05-01 DIAGNOSIS — E11.9 TYPE 2 DIABETES MELLITUS WITHOUT COMPLICATIONS: ICD-10-CM

## 2025-05-01 DIAGNOSIS — Z79.01 LONG TERM (CURRENT) USE OF ANTICOAGULANTS: ICD-10-CM

## 2025-05-01 DIAGNOSIS — M79.662 PAIN IN LEFT LOWER LEG: ICD-10-CM

## 2025-05-01 DIAGNOSIS — E78.5 HYPERLIPIDEMIA, UNSPECIFIED: ICD-10-CM

## 2025-05-01 DIAGNOSIS — I25.10 ATHEROSCLEROTIC HEART DISEASE OF NATIVE CORONARY ARTERY WITHOUT ANGINA PECTORIS: ICD-10-CM

## 2025-05-01 DIAGNOSIS — Z98.890 OTHER SPECIFIED POSTPROCEDURAL STATES: Chronic | ICD-10-CM

## 2025-05-01 DIAGNOSIS — Z86.718 PERSONAL HISTORY OF OTHER VENOUS THROMBOSIS AND EMBOLISM: ICD-10-CM

## 2025-05-01 DIAGNOSIS — M79.661 PAIN IN RIGHT LOWER LEG: ICD-10-CM

## 2025-05-01 DIAGNOSIS — Z95.1 PRESENCE OF AORTOCORONARY BYPASS GRAFT: ICD-10-CM

## 2025-05-01 DIAGNOSIS — Z86.711 PERSONAL HISTORY OF PULMONARY EMBOLISM: ICD-10-CM

## 2025-05-01 LAB
ALBUMIN SERPL ELPH-MCNC: 3.8 G/DL — SIGNIFICANT CHANGE UP (ref 3.5–5.2)
ALP SERPL-CCNC: 57 U/L — SIGNIFICANT CHANGE UP (ref 30–115)
ALT FLD-CCNC: 12 U/L — SIGNIFICANT CHANGE UP (ref 0–41)
ANION GAP SERPL CALC-SCNC: 12 MMOL/L — SIGNIFICANT CHANGE UP (ref 7–14)
APPEARANCE UR: CLEAR — SIGNIFICANT CHANGE UP
AST SERPL-CCNC: 33 U/L — SIGNIFICANT CHANGE UP (ref 0–41)
BACTERIA # UR AUTO: NEGATIVE /HPF — SIGNIFICANT CHANGE UP
BASOPHILS # BLD AUTO: 0.04 K/UL — SIGNIFICANT CHANGE UP (ref 0–0.2)
BASOPHILS NFR BLD AUTO: 0.4 % — SIGNIFICANT CHANGE UP (ref 0–1)
BILIRUB SERPL-MCNC: 1.2 MG/DL — SIGNIFICANT CHANGE UP (ref 0.2–1.2)
BILIRUB UR-MCNC: NEGATIVE — SIGNIFICANT CHANGE UP
BUN SERPL-MCNC: 22 MG/DL — HIGH (ref 10–20)
CALCIUM SERPL-MCNC: 9 MG/DL — SIGNIFICANT CHANGE UP (ref 8.4–10.5)
CAST: 0 /LPF — SIGNIFICANT CHANGE UP (ref 0–4)
CHLORIDE SERPL-SCNC: 99 MMOL/L — SIGNIFICANT CHANGE UP (ref 98–110)
CO2 SERPL-SCNC: 26 MMOL/L — SIGNIFICANT CHANGE UP (ref 17–32)
COLOR SPEC: YELLOW — SIGNIFICANT CHANGE UP
CREAT SERPL-MCNC: 1 MG/DL — SIGNIFICANT CHANGE UP (ref 0.7–1.5)
DIFF PNL FLD: ABNORMAL
EGFR: 80 ML/MIN/1.73M2 — SIGNIFICANT CHANGE UP
EGFR: 80 ML/MIN/1.73M2 — SIGNIFICANT CHANGE UP
EOSINOPHIL # BLD AUTO: 0.17 K/UL — SIGNIFICANT CHANGE UP (ref 0–0.7)
EOSINOPHIL NFR BLD AUTO: 1.8 % — SIGNIFICANT CHANGE UP (ref 0–8)
GLUCOSE SERPL-MCNC: 231 MG/DL — HIGH (ref 70–99)
GLUCOSE UR QL: 100 MG/DL
HCT VFR BLD CALC: 42.8 % — SIGNIFICANT CHANGE UP (ref 42–52)
HGB BLD-MCNC: 13.8 G/DL — LOW (ref 14–18)
IMM GRANULOCYTES NFR BLD AUTO: 0.8 % — HIGH (ref 0.1–0.3)
KETONES UR-MCNC: NEGATIVE MG/DL — SIGNIFICANT CHANGE UP
LEUKOCYTE ESTERASE UR-ACNC: NEGATIVE — SIGNIFICANT CHANGE UP
LYMPHOCYTES # BLD AUTO: 1.56 K/UL — SIGNIFICANT CHANGE UP (ref 1.2–3.4)
LYMPHOCYTES # BLD AUTO: 16.2 % — LOW (ref 20.5–51.1)
MCHC RBC-ENTMCNC: 30.1 PG — SIGNIFICANT CHANGE UP (ref 27–31)
MCHC RBC-ENTMCNC: 32.2 G/DL — SIGNIFICANT CHANGE UP (ref 32–37)
MCV RBC AUTO: 93.2 FL — SIGNIFICANT CHANGE UP (ref 80–94)
MONOCYTES # BLD AUTO: 0.76 K/UL — HIGH (ref 0.1–0.6)
MONOCYTES NFR BLD AUTO: 7.9 % — SIGNIFICANT CHANGE UP (ref 1.7–9.3)
NEUTROPHILS # BLD AUTO: 7.01 K/UL — HIGH (ref 1.4–6.5)
NEUTROPHILS NFR BLD AUTO: 72.9 % — SIGNIFICANT CHANGE UP (ref 42.2–75.2)
NITRITE UR-MCNC: NEGATIVE — SIGNIFICANT CHANGE UP
NRBC BLD AUTO-RTO: 0 /100 WBCS — SIGNIFICANT CHANGE UP (ref 0–0)
NT-PROBNP SERPL-SCNC: 720 PG/ML — HIGH (ref 0–300)
PH UR: 7.5 — SIGNIFICANT CHANGE UP (ref 5–8)
PLATELET # BLD AUTO: 149 K/UL — SIGNIFICANT CHANGE UP (ref 130–400)
PMV BLD: 10.6 FL — HIGH (ref 7.4–10.4)
POTASSIUM SERPL-MCNC: 5.5 MMOL/L — HIGH (ref 3.5–5)
POTASSIUM SERPL-SCNC: 5.5 MMOL/L — HIGH (ref 3.5–5)
PROT SERPL-MCNC: 6.1 G/DL — SIGNIFICANT CHANGE UP (ref 6–8)
PROT UR-MCNC: 30 MG/DL
RBC # BLD: 4.59 M/UL — LOW (ref 4.7–6.1)
RBC # FLD: 13.2 % — SIGNIFICANT CHANGE UP (ref 11.5–14.5)
RBC CASTS # UR COMP ASSIST: 29 /HPF — HIGH (ref 0–4)
SODIUM SERPL-SCNC: 137 MMOL/L — SIGNIFICANT CHANGE UP (ref 135–146)
SP GR SPEC: 1.02 — SIGNIFICANT CHANGE UP (ref 1–1.03)
SQUAMOUS # UR AUTO: 1 /HPF — SIGNIFICANT CHANGE UP (ref 0–5)
TROPONIN T, HIGH SENSITIVITY RESULT: 32 NG/L — HIGH (ref 6–21)
TROPONIN T, HIGH SENSITIVITY RESULT: 33 NG/L — HIGH (ref 6–21)
UROBILINOGEN FLD QL: 1 MG/DL — SIGNIFICANT CHANGE UP (ref 0.2–1)
WBC # BLD: 9.62 K/UL — SIGNIFICANT CHANGE UP (ref 4.8–10.8)
WBC # FLD AUTO: 9.62 K/UL — SIGNIFICANT CHANGE UP (ref 4.8–10.8)
WBC UR QL: 1 /HPF — SIGNIFICANT CHANGE UP (ref 0–5)

## 2025-05-01 PROCEDURE — A9500: CPT

## 2025-05-01 PROCEDURE — 85025 COMPLETE CBC W/AUTO DIFF WBC: CPT

## 2025-05-01 PROCEDURE — 36415 COLL VENOUS BLD VENIPUNCTURE: CPT

## 2025-05-01 PROCEDURE — 87086 URINE CULTURE/COLONY COUNT: CPT

## 2025-05-01 PROCEDURE — 99223 1ST HOSP IP/OBS HIGH 75: CPT

## 2025-05-01 PROCEDURE — 93970 EXTREMITY STUDY: CPT

## 2025-05-01 PROCEDURE — 84295 ASSAY OF SERUM SODIUM: CPT

## 2025-05-01 PROCEDURE — 83880 ASSAY OF NATRIURETIC PEPTIDE: CPT

## 2025-05-01 PROCEDURE — 85014 HEMATOCRIT: CPT

## 2025-05-01 PROCEDURE — 71046 X-RAY EXAM CHEST 2 VIEWS: CPT

## 2025-05-01 PROCEDURE — 93010 ELECTROCARDIOGRAM REPORT: CPT

## 2025-05-01 PROCEDURE — 78452 HT MUSCLE IMAGE SPECT MULT: CPT | Mod: MC

## 2025-05-01 PROCEDURE — 85018 HEMOGLOBIN: CPT

## 2025-05-01 PROCEDURE — 84132 ASSAY OF SERUM POTASSIUM: CPT

## 2025-05-01 PROCEDURE — 82330 ASSAY OF CALCIUM: CPT

## 2025-05-01 PROCEDURE — 93005 ELECTROCARDIOGRAM TRACING: CPT

## 2025-05-01 PROCEDURE — G0378: CPT

## 2025-05-01 PROCEDURE — 71046 X-RAY EXAM CHEST 2 VIEWS: CPT | Mod: 26

## 2025-05-01 PROCEDURE — 93017 CV STRESS TEST TRACING ONLY: CPT

## 2025-05-01 PROCEDURE — 80053 COMPREHEN METABOLIC PANEL: CPT

## 2025-05-01 PROCEDURE — 81001 URINALYSIS AUTO W/SCOPE: CPT

## 2025-05-01 PROCEDURE — 82803 BLOOD GASES ANY COMBINATION: CPT

## 2025-05-01 PROCEDURE — 99285 EMERGENCY DEPT VISIT HI MDM: CPT | Mod: 25

## 2025-05-01 PROCEDURE — 84484 ASSAY OF TROPONIN QUANT: CPT

## 2025-05-01 PROCEDURE — 83605 ASSAY OF LACTIC ACID: CPT

## 2025-05-01 RX ORDER — LABETALOL HYDROCHLORIDE 200 MG/1
200 TABLET, FILM COATED ORAL EVERY 12 HOURS
Refills: 0 | Status: DISCONTINUED | OUTPATIENT
Start: 2025-05-01 | End: 2025-05-02

## 2025-05-01 RX ORDER — METFORMIN HYDROCHLORIDE 850 MG/1
1000 TABLET ORAL
Refills: 0 | Status: DISCONTINUED | OUTPATIENT
Start: 2025-05-01 | End: 2025-05-02

## 2025-05-01 RX ORDER — TAMSULOSIN HYDROCHLORIDE 0.4 MG/1
0.4 CAPSULE ORAL AT BEDTIME
Refills: 0 | Status: DISCONTINUED | OUTPATIENT
Start: 2025-05-01 | End: 2025-05-02

## 2025-05-01 RX ADMIN — LABETALOL HYDROCHLORIDE 200 MILLIGRAM(S): 200 TABLET, FILM COATED ORAL at 22:07

## 2025-05-01 RX ADMIN — TAMSULOSIN HYDROCHLORIDE 0.4 MILLIGRAM(S): 0.4 CAPSULE ORAL at 22:07

## 2025-05-01 RX ADMIN — METFORMIN HYDROCHLORIDE 1000 MILLIGRAM(S): 850 TABLET ORAL at 22:07

## 2025-05-01 NOTE — ED PROVIDER NOTE - ATTENDING CONTRIBUTION TO CARE
74-year-old male past medical history hypertension, hyperlipidemia, diabetes, CAD status post CABG, DVT/PE on Xarelto, kidney stones presents to the emergency department for evaluation of elevated blood pressure readings.  Patient reports he went to urgent care a few days ago for cough congestion and bodyaches, was started on prednisone, levofloxacin and inhaler and Tessalon Perles.  Patient has been taking his blood pressure multiple times a day at home, ranging from 160 systolic to 190 systolic.  Patient reports he stopped taking the steroids.  Patient denies chest pain, shortness of breath, abdominal pain, dysuria, hematuria, numbness, tingling.  Patient requesting EKG and urine analysis. Cardiologist - Skyler     CONSTITUTIONAL: NAD.   SKIN: warm, dry  HEAD: Normocephalic; atraumatic.  EYES: no conjunctival injection. EOMI.   ENT: MMM.   NECK: Supple.  CARD: RRR.   RESP: No wheezes, rales or rhonchi.  ABD: soft ntnd.   EXT: Normal ROM.  No lower extremity edema.   NEURO: Alert, oriented, grossly unremarkable.

## 2025-05-01 NOTE — ED ADULT TRIAGE NOTE - CHIEF COMPLAINT QUOTE
Patient taking levaquin and inhalers for PNA diagnosed outpatient- states his BP was high today when he took it and came in because he was nervous.

## 2025-05-01 NOTE — ED PROVIDER NOTE - PHYSICAL EXAMINATION
Initial vital signs reviewed.  General: NAD, nontoxic appearing.  HENT: AT/NC.  Eyes: non-injected conjunctivae b/l.  Neck: supple.  CV: RRR, no murmurs. 2+ distal pulses x4.  Pulm: nonlabored work of breathing, CTAB.  Abd: soft, nondistended, nontender.  MSK: no joint deformity. calves symmetric. ambulate steady gait.  Skin: warm, dry, well-perfused.  Neuro: A&Ox4. CN2-12 grossly intact. moving all ext.

## 2025-05-01 NOTE — ED PROVIDER NOTE - CARE PLAN
Principal Discharge DX:	CAD (coronary artery disease)   1 Principal Discharge DX:	CAD (coronary artery disease)  Assessment and plan of treatment:	Plan- EKG, labs, UA, reassess

## 2025-05-01 NOTE — ED ADULT NURSE REASSESSMENT NOTE - NS ED NURSE REASSESS COMMENT FT1
pt is alert and awake speaking in full sentences. IV intact, vital stable, no s/s of distress.    pt on tele/o2 monitor. fall precaution in place bed alarm

## 2025-05-01 NOTE — ED PROVIDER NOTE - DIFFERENTIAL DIAGNOSIS
Differential Diagnosis The differential diagnosis for patients clinical presentation includes but is not limited to: pneumonia, viral illness, acs, essential HTN

## 2025-05-01 NOTE — ED PROVIDER NOTE - CLINICAL SUMMARY MEDICAL DECISION MAKING FREE TEXT BOX
75 yo M presented to ED for evaluation of elevated BP readings. Labs and EKG were ordered and reviewed. EKG NSR with TWI (on some previous EKGs). Troponin 33 -> 32. Imaging was ordered and reviewed by me. CXR with bilateral opacities. Appropriate medications for patient's presenting complaints were ordered and effects were reassessed.  Patient's records (prior hospital, ED visit, and/or nursing home notes if available) were reviewed.  Additional history was obtained from EMS, family, and/or PCP (where available).  Escalation to admission/observation was considered.  Placed in ED OBS for continued care.

## 2025-05-01 NOTE — ED PROVIDER NOTE - NSICDXPASTSURGICALHX_GEN_ALL_CORE_FT
PAST SURGICAL HISTORY:  H/O cystoscopy     H/O heart surgery quadruple bypass 6 years ago    History of cholecystectomy

## 2025-05-02 VITALS
HEART RATE: 77 BPM | RESPIRATION RATE: 18 BRPM | SYSTOLIC BLOOD PRESSURE: 149 MMHG | OXYGEN SATURATION: 98 % | TEMPERATURE: 97 F | DIASTOLIC BLOOD PRESSURE: 83 MMHG

## 2025-05-02 LAB
BASE EXCESS BLDV CALC-SCNC: 0.9 MMOL/L — SIGNIFICANT CHANGE UP (ref -2–3)
CA-I SERPL-SCNC: 1.16 MMOL/L — SIGNIFICANT CHANGE UP (ref 1.15–1.33)
CULTURE RESULTS: NO GROWTH — SIGNIFICANT CHANGE UP
GAS PNL BLDV: 132 MMOL/L — LOW (ref 136–145)
GAS PNL BLDV: SIGNIFICANT CHANGE UP
GAS PNL BLDV: SIGNIFICANT CHANGE UP
HCO3 BLDV-SCNC: 27 MMOL/L — SIGNIFICANT CHANGE UP (ref 22–29)
HCT VFR BLDA CALC: 44 % — SIGNIFICANT CHANGE UP (ref 39–51)
HGB BLD CALC-MCNC: 14.8 G/DL — SIGNIFICANT CHANGE UP (ref 12.6–17.4)
LACTATE BLDV-MCNC: 2.7 MMOL/L — HIGH (ref 0.5–2)
LACTATE SERPL-SCNC: 1.6 MMOL/L — SIGNIFICANT CHANGE UP (ref 0.7–2)
PCO2 BLDV: 48 MMHG — SIGNIFICANT CHANGE UP (ref 42–55)
PH BLDV: 7.36 — SIGNIFICANT CHANGE UP (ref 7.32–7.43)
PO2 BLDV: 51 MMHG — HIGH (ref 25–45)
POTASSIUM BLDV-SCNC: 4 MMOL/L — SIGNIFICANT CHANGE UP (ref 3.5–5.1)
SAO2 % BLDV: 84.2 % — SIGNIFICANT CHANGE UP (ref 67–88)
SPECIMEN SOURCE: SIGNIFICANT CHANGE UP

## 2025-05-02 PROCEDURE — 93018 CV STRESS TEST I&R ONLY: CPT

## 2025-05-02 PROCEDURE — 99239 HOSP IP/OBS DSCHRG MGMT >30: CPT

## 2025-05-02 PROCEDURE — 93970 EXTREMITY STUDY: CPT | Mod: 26

## 2025-05-02 PROCEDURE — 93016 CV STRESS TEST SUPVJ ONLY: CPT

## 2025-05-02 PROCEDURE — 78452 HT MUSCLE IMAGE SPECT MULT: CPT | Mod: 26

## 2025-05-02 RX ORDER — REGADENOSON 0.08 MG/ML
0.4 INJECTION, SOLUTION INTRAVENOUS ONCE
Refills: 0 | Status: DISCONTINUED | OUTPATIENT
Start: 2025-05-02 | End: 2025-05-02

## 2025-05-02 RX ORDER — AZITHROMYCIN 250 MG
1 CAPSULE ORAL
Qty: 1 | Refills: 0
Start: 2025-05-02 | End: 2025-05-06

## 2025-05-02 RX ORDER — LABETALOL HYDROCHLORIDE 200 MG/1
10 TABLET, FILM COATED ORAL ONCE
Refills: 0 | Status: DISCONTINUED | OUTPATIENT
Start: 2025-05-02 | End: 2025-05-02

## 2025-05-02 RX ADMIN — LABETALOL HYDROCHLORIDE 200 MILLIGRAM(S): 200 TABLET, FILM COATED ORAL at 05:53

## 2025-05-02 RX ADMIN — METFORMIN HYDROCHLORIDE 1000 MILLIGRAM(S): 850 TABLET ORAL at 05:53

## 2025-05-02 NOTE — ED CDU PROVIDER DISPOSITION NOTE - NSFOLLOWUPINSTRUCTIONS_ED_ALL_ED_FT
Our Emergency Department Referral Coordinators will be reaching out to you in the next 24-48 hours from 9:00am to 5:00pm with a follow up appointment. Please expect a phone call from the hospital in that time frame. If you do not receive a call or if you have any questions or concerns, you can reach them at   [phone]

## 2025-05-02 NOTE — ED CDU PROVIDER DISPOSITION NOTE - CLINICAL COURSE
73y/o M placed in OBS for chest discomfort in the setting of ongoing URI symptoms.  All ED laboratory work, EKGs, and imaging reviewed by me.  Bilateral Pacitti's noted on chest x-ray.  Patient felt better in OBS, though did complain of bilateral lower extremity discomfort.  Lower extremity Doppler negative for DVT.  Normal nuc stress.  Patient taking Levaquin for pneumonia, consider this could be the etiology of patient's lower leg discomfort?  Will switch patient to azithromycin, stop Levaquin.  Impression chest discomfort, leg discomfort, pneumonia.  Patient is safe for discharge continue outpatient follow-up, supportive care and return precautions.  [facility]

## 2025-05-02 NOTE — ED CDU PROVIDER INITIAL DAY NOTE - OBJECTIVE STATEMENT
74-year-old male with past history of hypertension, diabetes 2, history of kidney stones, CAD, history of DVT and massive pulmonary embolism on Xarelto, who presents for evaluation of elevated blood pressure.  States he was evaluated urgent care a few days ago for cough, congestion, and bodyaches.  He had a x-ray with radiology report as normal.  He was prescribed prednisone taper, levofloxacin, and inhaler, and tessalon perles. He checked his blood pressure multiple times a day for the past few days since then. He typically only checks his blood pressure sporadically prior to this. Noted that his blood pressure systolic were around 190s at times. When he took his daily blood pressure medicine this morning, SBP went down to 160s.  He denies any other symptoms including no chest pain, shortness of breath, new numbness, tingling, weakness, vision changes, slurred speech.  Would like to get workup including urine and EKG.

## 2025-05-02 NOTE — ED CDU PROVIDER DISPOSITION NOTE - PATIENT PORTAL LINK FT
You can access the FollowMyHealth Patient Portal offered by [facility] by registering at the following website: [email or web address]. By joining [facility]’s FollowMyHealth portal, you will also be able to view your health information using other applications (apps) compatible with our system.

## 2025-05-02 NOTE — ED CDU PROVIDER INITIAL DAY NOTE - PROGRESS NOTE DETAILS
pt made aware of all findings, will place pt in referral program for cardiology   pt knows about blood in urine, will see urologist  leg pain likely from levaquin so pt instructed to dc, (already took 5 days)  will send azithromycin instead bilateral opacities in setting of productive cough

## 2025-05-06 ENCOUNTER — APPOINTMENT (OUTPATIENT)
Dept: CARDIOLOGY | Facility: CLINIC | Age: 75
End: 2025-05-06
Payer: MEDICARE

## 2025-05-06 VITALS
WEIGHT: 190 LBS | BODY MASS INDEX: 25.18 KG/M2 | SYSTOLIC BLOOD PRESSURE: 106 MMHG | HEIGHT: 73 IN | DIASTOLIC BLOOD PRESSURE: 65 MMHG | HEART RATE: 76 BPM | OXYGEN SATURATION: 95 %

## 2025-05-06 DIAGNOSIS — E78.5 HYPERLIPIDEMIA, UNSPECIFIED: ICD-10-CM

## 2025-05-06 DIAGNOSIS — I10 ESSENTIAL (PRIMARY) HYPERTENSION: ICD-10-CM

## 2025-05-06 DIAGNOSIS — E11.9 TYPE 2 DIABETES MELLITUS W/OUT COMPLICATIONS: ICD-10-CM

## 2025-05-06 DIAGNOSIS — I25.10 ATHEROSCLEROTIC HEART DISEASE OF NATIVE CORONARY ARTERY W/OUT ANGINA PECTORIS: ICD-10-CM

## 2025-05-06 PROCEDURE — 99214 OFFICE O/P EST MOD 30 MIN: CPT | Mod: 25

## 2025-05-06 PROCEDURE — 93000 ELECTROCARDIOGRAM COMPLETE: CPT

## 2025-05-06 RX ORDER — LOSARTAN POTASSIUM 50 MG/1
50 TABLET, FILM COATED ORAL
Qty: 60 | Refills: 5 | Status: ACTIVE | COMMUNITY
Start: 2025-05-06 | End: 1900-01-01

## 2025-09-03 ENCOUNTER — NON-APPOINTMENT (OUTPATIENT)
Age: 75
End: 2025-09-03

## 2025-09-03 ENCOUNTER — APPOINTMENT (OUTPATIENT)
Dept: CARDIOLOGY | Facility: CLINIC | Age: 75
End: 2025-09-03
Payer: MEDICARE

## 2025-09-03 VITALS
WEIGHT: 202 LBS | BODY MASS INDEX: 23.37 KG/M2 | HEIGHT: 78 IN | RESPIRATION RATE: 16 BRPM | SYSTOLIC BLOOD PRESSURE: 140 MMHG | HEART RATE: 82 BPM | OXYGEN SATURATION: 98 % | DIASTOLIC BLOOD PRESSURE: 86 MMHG

## 2025-09-03 DIAGNOSIS — E11.9 TYPE 2 DIABETES MELLITUS W/OUT COMPLICATIONS: ICD-10-CM

## 2025-09-03 DIAGNOSIS — I25.10 ATHEROSCLEROTIC HEART DISEASE OF NATIVE CORONARY ARTERY W/OUT ANGINA PECTORIS: ICD-10-CM

## 2025-09-03 DIAGNOSIS — E78.5 HYPERLIPIDEMIA, UNSPECIFIED: ICD-10-CM

## 2025-09-03 DIAGNOSIS — I10 ESSENTIAL (PRIMARY) HYPERTENSION: ICD-10-CM

## 2025-09-03 DIAGNOSIS — R06.02 SHORTNESS OF BREATH: ICD-10-CM

## 2025-09-03 PROCEDURE — G2211 COMPLEX E/M VISIT ADD ON: CPT

## 2025-09-03 PROCEDURE — 93000 ELECTROCARDIOGRAM COMPLETE: CPT

## 2025-09-03 PROCEDURE — 99214 OFFICE O/P EST MOD 30 MIN: CPT
